# Patient Record
Sex: MALE | Race: BLACK OR AFRICAN AMERICAN | NOT HISPANIC OR LATINO | Employment: PART TIME | URBAN - METROPOLITAN AREA
[De-identification: names, ages, dates, MRNs, and addresses within clinical notes are randomized per-mention and may not be internally consistent; named-entity substitution may affect disease eponyms.]

---

## 2019-11-02 PROBLEM — Z79.82 ASPIRIN LONG-TERM USE: Status: ACTIVE | Noted: 2019-11-02

## 2019-11-02 PROBLEM — Z85.72 HX OF LYMPHOMA: Status: ACTIVE | Noted: 2019-11-02

## 2019-11-02 PROBLEM — E78.5 HYPERLIPIDEMIA: Status: ACTIVE | Noted: 2019-11-02

## 2019-11-02 PROBLEM — D64.9 ANEMIA: Status: ACTIVE | Noted: 2019-11-02

## 2019-11-02 PROBLEM — E11.319 TYPE 2 DIABETES MELLITUS WITH RETINOPATHY OF BOTH EYES, WITHOUT LONG-TERM CURRENT USE OF INSULIN: Status: ACTIVE | Noted: 2019-11-02

## 2019-11-02 PROBLEM — I10 HYPERTENSION, ESSENTIAL: Status: ACTIVE | Noted: 2019-11-02

## 2019-11-02 PROBLEM — R41.89 COGNITIVE IMPAIRMENT: Status: ACTIVE | Noted: 2019-11-02

## 2020-02-05 ENCOUNTER — OFFICE VISIT (OUTPATIENT)
Dept: FAMILY MEDICINE | Facility: CLINIC | Age: 70
End: 2020-02-05
Payer: MEDICARE

## 2020-02-05 VITALS
DIASTOLIC BLOOD PRESSURE: 74 MMHG | WEIGHT: 170.63 LBS | HEART RATE: 77 BPM | HEIGHT: 67 IN | BODY MASS INDEX: 26.78 KG/M2 | OXYGEN SATURATION: 97 % | TEMPERATURE: 98 F | SYSTOLIC BLOOD PRESSURE: 138 MMHG

## 2020-02-05 DIAGNOSIS — Z87.39 HISTORY OF GOUT: ICD-10-CM

## 2020-02-05 DIAGNOSIS — G31.84 MILD COGNITIVE IMPAIRMENT: ICD-10-CM

## 2020-02-05 DIAGNOSIS — D64.89 ANEMIA DUE TO RADIATION: ICD-10-CM

## 2020-02-05 DIAGNOSIS — G47.33 OBSTRUCTIVE SLEEP APNEA: ICD-10-CM

## 2020-02-05 DIAGNOSIS — E78.2 MIXED DIABETIC HYPERLIPIDEMIA ASSOCIATED WITH TYPE 2 DIABETES MELLITUS: ICD-10-CM

## 2020-02-05 DIAGNOSIS — Z85.72 HX OF LYMPHOMA: ICD-10-CM

## 2020-02-05 DIAGNOSIS — E11.69 MIXED DIABETIC HYPERLIPIDEMIA ASSOCIATED WITH TYPE 2 DIABETES MELLITUS: ICD-10-CM

## 2020-02-05 DIAGNOSIS — E55.9 VITAMIN D DEFICIENCY: ICD-10-CM

## 2020-02-05 DIAGNOSIS — N18.2 HYPERTENSION ASSOCIATED WITH STAGE 2 CHRONIC KIDNEY DISEASE DUE TO TYPE 2 DIABETES MELLITUS: Primary | ICD-10-CM

## 2020-02-05 DIAGNOSIS — Z11.59 NEED FOR HEPATITIS C SCREENING TEST: ICD-10-CM

## 2020-02-05 DIAGNOSIS — Z86.010 HISTORY OF COLON POLYPS: ICD-10-CM

## 2020-02-05 DIAGNOSIS — E11.3393 CONTROLLED TYPE 2 DIABETES MELLITUS WITH BOTH EYES AFFECTED BY MODERATE NONPROLIFERATIVE RETINOPATHY WITHOUT MACULAR EDEMA, WITHOUT LONG-TERM CURRENT USE OF INSULIN: ICD-10-CM

## 2020-02-05 DIAGNOSIS — I12.9 HYPERTENSION ASSOCIATED WITH STAGE 2 CHRONIC KIDNEY DISEASE DUE TO TYPE 2 DIABETES MELLITUS: Primary | ICD-10-CM

## 2020-02-05 DIAGNOSIS — Z23 NEED FOR VACCINATION FOR PNEUMOCOCCUS: ICD-10-CM

## 2020-02-05 DIAGNOSIS — E11.22 HYPERTENSION ASSOCIATED WITH STAGE 2 CHRONIC KIDNEY DISEASE DUE TO TYPE 2 DIABETES MELLITUS: Primary | ICD-10-CM

## 2020-02-05 PROBLEM — I15.2 HYPERTENSION ASSOCIATED WITH TYPE 2 DIABETES MELLITUS: Status: ACTIVE | Noted: 2019-11-02

## 2020-02-05 PROBLEM — E11.59 HYPERTENSION ASSOCIATED WITH TYPE 2 DIABETES MELLITUS: Status: ACTIVE | Noted: 2019-11-02

## 2020-02-05 PROBLEM — Z86.0100 HISTORY OF COLON POLYPS: Status: ACTIVE | Noted: 2020-02-05

## 2020-02-05 PROCEDURE — 99204 OFFICE O/P NEW MOD 45 MIN: CPT | Mod: PBBFAC,PO | Performed by: FAMILY MEDICINE

## 2020-02-05 PROCEDURE — 99204 PR OFFICE/OUTPT VISIT, NEW, LEVL IV, 45-59 MIN: ICD-10-PCS | Mod: S$PBB,25,, | Performed by: FAMILY MEDICINE

## 2020-02-05 PROCEDURE — G0009 ADMIN PNEUMOCOCCAL VACCINE: HCPCS | Mod: PBBFAC,PO

## 2020-02-05 PROCEDURE — 99999 PR PBB SHADOW E&M-NEW PATIENT-LVL IV: ICD-10-PCS | Mod: PBBFAC,,, | Performed by: FAMILY MEDICINE

## 2020-02-05 PROCEDURE — 99999 PR PBB SHADOW E&M-NEW PATIENT-LVL IV: CPT | Mod: PBBFAC,,, | Performed by: FAMILY MEDICINE

## 2020-02-05 PROCEDURE — 99204 OFFICE O/P NEW MOD 45 MIN: CPT | Mod: S$PBB,25,, | Performed by: FAMILY MEDICINE

## 2020-02-05 RX ORDER — ATORVASTATIN CALCIUM 20 MG/1
20 TABLET, FILM COATED ORAL NIGHTLY
Qty: 90 TABLET | Refills: 3 | Status: SHIPPED | OUTPATIENT
Start: 2020-02-05 | End: 2021-03-03 | Stop reason: SDUPTHER

## 2020-02-05 RX ORDER — AMLODIPINE BESYLATE 2.5 MG/1
2.5 TABLET ORAL DAILY
Qty: 90 TABLET | Refills: 3 | Status: SHIPPED | OUTPATIENT
Start: 2020-02-05 | End: 2021-03-03 | Stop reason: SDUPTHER

## 2020-02-05 RX ORDER — GLIMEPIRIDE 4 MG/1
4 TABLET ORAL DAILY
Qty: 90 TABLET | Refills: 3 | Status: SHIPPED | OUTPATIENT
Start: 2020-02-05 | End: 2021-02-01 | Stop reason: SDUPTHER

## 2020-02-05 RX ORDER — ALLOPURINOL 300 MG/1
300 TABLET ORAL DAILY
Qty: 90 TABLET | Refills: 3 | Status: SHIPPED | OUTPATIENT
Start: 2020-02-05 | End: 2021-03-03 | Stop reason: SDUPTHER

## 2020-02-05 RX ORDER — FOSINOPRIL SODIUM 40 MG/1
40 TABLET ORAL DAILY
Qty: 90 TABLET | Refills: 3 | Status: SHIPPED | OUTPATIENT
Start: 2020-02-05 | End: 2021-02-01 | Stop reason: SDUPTHER

## 2020-02-05 RX ORDER — METFORMIN HYDROCHLORIDE 1000 MG/1
1000 TABLET ORAL 2 TIMES DAILY
Qty: 180 TABLET | Refills: 3 | Status: SHIPPED | OUTPATIENT
Start: 2020-02-05 | End: 2021-03-03 | Stop reason: SDUPTHER

## 2020-02-05 RX ORDER — DONEPEZIL HYDROCHLORIDE 5 MG/1
5 TABLET, FILM COATED ORAL NIGHTLY
Qty: 90 TABLET | Refills: 3 | Status: SHIPPED | OUTPATIENT
Start: 2020-02-05 | End: 2021-03-03 | Stop reason: SDUPTHER

## 2020-02-05 NOTE — PROGRESS NOTES
Subjective:       Patient ID: Jorge Becerra is a 69 y.o. male.    Chief Complaint: Establish Care    HPI   Patient presents to establish care with a new family doctor after his previous family doctor (Dr. Shearer) changed to a  practice and for medication refills and for medication refills.  He has a past medical history as in the problem list and below and follows with Nephrology (Dr. Arredondo) at yearly interval, Oncology (Dr. hCacon) at yearly interval for follow-up of lymphoma diagnosed in 2002 treated with radiation although he tells me he was just released and was told he was cancer free and needed no further follow-up with Oncology, Gastroenterology (Dr. Matute) at 5 year interval secondary to polyps found on last colonoscopy in 2017 and ophthalmologist (Dr. Cerda) at 6 month interval.  He tells me he has been doing great since he saw his family doctor 3 months ago and has no new concerns or complaints today.  He tells me all of his chronic medical conditions have been very well controlled on his current medications without any adverse effects and he has lab results from [facility] from last month with him today ordered by his nephrologist as he could not complete labs through his past family doctor any longer.  His hemoglobin A1c remains controlled and stable at 6.6, CMP was normal and CBC showed stable mild normocytic anemia.  He checks his blood sugars fasting once daily and reports his lowest value has been 99 and highest 113 following a very strict low-carbohydrate diet that he notes his in forced by his wife.  He reports he was significantly heavier in the past and his wife put him on a strict diet and low-impact aerobic exercise routine that he completes most days of the week.  He notes he use use a CPAP for sleep apnea but with dramatic weight loss this resolved and he currently sleeps well without use of a CPAP.  He has not had a gout flare since being on allopurinol and has had a normal uric  acid level last year.  He does follow a gout diet as well.  He has a history of vitamin-D deficiency and previously supplemented with vitamin-D but has used any supplementation for some time and believes his levels were normal when last checked although this was remotely.  He has a history of mild cognitive impairment but tells me his memory significantly improve with use of Aricept.  Blood pressures have been well controlled with use of Norvasc and Monopril checking most is low we get home with blood pressures on average running in the 120s/60s-70s.  His blood pressure was initially slightly elevated but normalized upon recheck.  Question him about health maintenance issues he has never had hepatitis C antibody screening or shingles vaccination to his knowledge and he tells me it has been over 10 years since he had a tetanus vaccine.  He has previously had 1 pneumonia vaccine in 2017 and tells me he thinks he is due for another vaccine but his past provider did not have this available and he forgot to check at the pharmacy for this.    Review of Systems   Constitutional: Negative for activity change, appetite change, fatigue and unexpected weight change.   HENT: Negative for congestion, hearing loss, sore throat, trouble swallowing and voice change.    Eyes: Negative for visual disturbance.   Respiratory: Negative for cough, chest tightness, shortness of breath and wheezing.    Cardiovascular: Negative for chest pain, palpitations and leg swelling.   Gastrointestinal: Negative for abdominal pain, blood in stool, constipation, diarrhea, nausea and vomiting.   Genitourinary: Negative for difficulty urinating, dysuria, flank pain, frequency, hematuria and urgency.   Musculoskeletal: Negative for arthralgias, back pain, gait problem, joint swelling, myalgias, neck pain and neck stiffness.   Skin: Negative for rash and wound.   Neurological: Negative for dizziness, tremors, syncope, weakness, light-headedness, numbness  "and headaches.   Hematological: Negative for adenopathy. Does not bruise/bleed easily.   Psychiatric/Behavioral: Negative for dysphoric mood and sleep disturbance. The patient is not nervous/anxious.          Objective:      Vitals:    02/05/20 1304 02/05/20 1308   BP: (!) 140/72 138/74   Pulse: 77    Temp: 97.9 °F (36.6 °C)    TempSrc: Oral    SpO2: 97%    Weight: 77.4 kg (170 lb 10.2 oz)    Height: 5' 7" (1.702 m)    PainSc: 0-No pain      Physical Exam   Constitutional: He is oriented to person, place, and time. He appears well-developed and well-nourished. No distress.   HENT:   Head: Normocephalic and atraumatic.   Right Ear: External ear normal.   Left Ear: External ear normal.   Nose: Nose normal.   Mouth/Throat: Oropharynx is clear and moist.   Eyes: Pupils are equal, round, and reactive to light. Conjunctivae and EOM are normal. No scleral icterus.   Neck: Trachea normal, normal range of motion and phonation normal. Neck supple. No JVD present. No thyroid mass and no thyromegaly present.   Cardiovascular: Normal rate, regular rhythm and normal heart sounds. Exam reveals no gallop and no friction rub.   No murmur heard.  Pulses:       Dorsalis pedis pulses are 2+ on the right side, and 2+ on the left side.        Posterior tibial pulses are 2+ on the right side, and 2+ on the left side.   Pulmonary/Chest: Effort normal and breath sounds normal. No respiratory distress. He has no wheezes.   Abdominal: Soft. Bowel sounds are normal. He exhibits no distension and no mass. There is no tenderness. There is no guarding.   Musculoskeletal: Normal range of motion. He exhibits no edema or deformity.        Right foot: There is normal range of motion and no deformity.        Left foot: There is normal range of motion and no deformity.   Feet:   Right Foot:   Protective Sensation: 5 sites tested. 5 sites sensed.   Skin Integrity: Negative for ulcer, blister, skin breakdown, erythema, warmth, callus or dry skin.   Left " Foot:   Protective Sensation: 5 sites tested. 5 sites sensed.   Skin Integrity: Negative for ulcer, blister, skin breakdown, erythema, warmth, callus or dry skin.   Lymphadenopathy:     He has no cervical adenopathy.   Neurological: He is alert and oriented to person, place, and time.   Skin: Skin is warm and dry. He is not diaphoretic.   Psychiatric: He has a normal mood and affect. His behavior is normal. Judgment and thought content normal.   Nursing note and vitals reviewed.        Assessment:       1. Hypertension associated with stage 2 chronic kidney disease due to type 2 diabetes mellitus    2. 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 Mixed diabetic hyperlipidemia associated with type 2 diabetes mellitus    4. Anemia due to radiation    5. Hx of lymphoma    6. History of gout    7. Vitamin D deficiency    8. Obstructive sleep apnea    9. Mild cognitive impairment    10. History of colon polyps    11. Need for hepatitis C screening test    12. Need for vaccination for pneumococcus          Plan:   Hypertension associated with stage 2 chronic kidney disease due to type 2 diabetes mellitus  -     TSH; Future; Expected date: 02/05/2020  -     CBC auto differential; Future; Expected date: 02/05/2020  -     Comprehensive metabolic panel; Future; Expected date: 02/05/2020  -     fosinopril (MONOPRIL) 40 MG tablet; Take 1 tablet (40 mg total) by mouth once daily.  Dispense: 90 tablet; Refill: 3  -     amLODIPine (NORVASC) 2.5 MG tablet; Take 1 tablet (2.5 mg total) by mouth once daily.  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  Dispense: 180 tablet; Refill: 3  -     glimepiride (AMARYL) 4 MG tablet; Take 1 tablet (4 mg total) by mouth once daily.  Dispense: 90 tablet; Refill: 3    Mixed diabetic hyperlipidemia associated with type 2 diabetes mellitus  -     Lipid panel; Future; Expected date: 02/05/2020  -     atorvastatin (LIPITOR) 20 MG tablet; Take 1 tablet (20 mg total) by mouth every evening.  Dispense: 90 tablet; Refill: 3    Anemia due to radiation  -     CBC auto differential; Future; Expected date: 02/05/2020    Hx of lymphoma  -     CBC auto differential; Future; Expected date: 02/05/2020    History of gout  -     allopurinoL (ZYLOPRIM) 300 MG tablet; Take 1 tablet (300 mg total) by mouth once daily.  Dispense: 90 tablet; Refill: 3    Vitamin D deficiency  -     Vitamin D; Future; Expected date: 02/05/2020    Obstructive sleep apnea    Mild cognitive impairment  -     donepezil (ARICEPT) 5 MG tablet; Take 1 tablet (5 mg total) by mouth every evening.  Dispense: 90 tablet; Refill: 3    History of colon polyps    Need for hepatitis C screening test  -     Hepatitis C antibody; Future; Expected date: 02/05/2020    Need for vaccination for pneumococcus  -     Pneumococcal Conjugate Vaccine (13 Valent) (IM)      Follow up in about 3 months (around 5/5/2020).    Jorge appears to be stable and doing chronically on all of his current medications.  I reviewed the risks and benefits of all of these with him and provided him with refills as above as he requested.  Appears to be up-to-date with all labs at this time with his recent CBC, CMP and hemoglobin A1c along with PSA, microalbumin to creatinine ratio testing and fasting lipid panel completed in October within our system.  I recommended initially only checking a hemoglobin C antibody and vitamin-D level today but he would prefer to check these with all of his routine labs prior to a three-month follow-up visit with me.  I  advised him with all of his chronic medical conditions well controlled her for so long I would have no problem seeing him back at 6 month interval but he tells me he would prefer three-month interval at least at this time.  I will check all the labs as above as he requested just prior to a three-month follow-up with me.  I have asked my nurse to obtain records from Dr. Matute for his last colonoscopy but he appears to be up-to-date with this.  I discussed the risks and benefits of the pneumococcal 13, Tdap and Shingrix vaccines with him and he was very interested in all of these.  He can receive the pneumococcal vaccine here but advised him his insurance does not cover the others here and he will have to receive these at the pharmacy.  He stated he would get these when he goes for his medication refills.  He will continue following with Nephrology and Ophthalmology but his kidney function looks excellent on last labs he reports no vision problems currently.

## 2020-04-07 ENCOUNTER — PATIENT MESSAGE (OUTPATIENT)
Dept: FAMILY MEDICINE | Facility: CLINIC | Age: 70
End: 2020-04-07

## 2020-04-10 ENCOUNTER — HOSPITAL ENCOUNTER (EMERGENCY)
Facility: HOSPITAL | Age: 70
Discharge: HOME | End: 2020-04-10
Payer: MEDICARE

## 2020-04-10 VITALS
SYSTOLIC BLOOD PRESSURE: 149 MMHG | HEIGHT: 67 IN | BODY MASS INDEX: 26.68 KG/M2 | TEMPERATURE: 98 F | DIASTOLIC BLOOD PRESSURE: 68 MMHG | RESPIRATION RATE: 16 BRPM | WEIGHT: 170 LBS | HEART RATE: 73 BPM | OXYGEN SATURATION: 98 %

## 2020-04-10 DIAGNOSIS — Z20.822 CLOSE EXPOSURE TO COVID-19 VIRUS: Primary | ICD-10-CM

## 2020-04-10 LAB — SARS-COV-2 RDRP RESP QL NAA+PROBE: NEGATIVE

## 2020-04-10 PROCEDURE — U0002 COVID-19 LAB TEST NON-CDC: HCPCS

## 2020-04-10 PROCEDURE — 99282 EMERGENCY DEPT VISIT SF MDM: CPT | Mod: CS

## 2020-04-10 NOTE — ED PROVIDER NOTES
Encounter Date: 4/10/2020       History   No chief complaint on file.    Mr Becerra is a 68yo gentleman here via CQ for concern re known COVID exposure with multiple comorbities. He denies cough, occasion SOB. Afebrile.         Review of patient's allergies indicates:  No Known Allergies  No past medical history on file.  No past surgical history on file.  No family history on file.  Social History     Tobacco Use    Smoking status: Never Smoker   Substance Use Topics    Alcohol use: Never     Frequency: Never    Drug use: Not on file     Review of Systems   Constitutional: Negative for fever.   Respiratory: Positive for shortness of breath. Negative for cough.    Gastrointestinal: Negative for diarrhea, nausea and vomiting.   All other systems reviewed and are negative.      Physical Exam     Initial Vitals   BP Pulse Resp Temp SpO2   -- -- -- -- --      MAP       --         Physical Exam    Nursing note and vitals reviewed.  Constitutional: He appears well-developed and well-nourished.   HENT:   Head: Normocephalic.   Eyes: Conjunctivae and EOM are normal. Pupils are equal, round, and reactive to light.   Neck: Normal range of motion. Neck supple.   Cardiovascular: Normal rate, regular rhythm and normal heart sounds.   Pulmonary/Chest: Breath sounds normal. No respiratory distress. He has no wheezes. He has no rales.   Musculoskeletal: Normal range of motion.   Neurological: He is alert and oriented to person, place, and time. He has normal reflexes.   Skin: Skin is warm. Capillary refill takes less than 2 seconds.   Psychiatric: He has a normal mood and affect.         ED Course   Procedures  Labs Reviewed - No data to display       Imaging Results    None                                          Clinical Impression:       ICD-10-CM ICD-9-CM   1. Close Exposure to Covid-19 Virus Z20.828                                 Izabel Hernandez NP  04/10/20 1118

## 2020-04-10 NOTE — DISCHARGE INSTRUCTIONS
At this time you must quarantine at home until you have   3 days without fever  2.   Must have not taken any fever reducing medications   3.  Other symptoms such as cough should be improving.  4.  Must have been at least 7 days since first symptom.    At this time it is important to self quarantine at home and to call the [facility] at [phone] or text PRASHANTHD to [phone] for further advice on when quarantine may be lifted.  You may talk to a doctor by virtual visit by going to [email or web address] or [email or web address] or call your primary care doctor for further advice.  You should return to the ER immediately if you have difficulty breathing, have any worsening symptoms or any concerns. You may call [phone] for 24/7 Covid-19 information.    Places for Testing         [place] (for [facility] and [facility] patients only)                   [facility] [address] [place] [address]                             [facility] [place] [address], [address]  [place] [address]    [place] [facility]  [address] [place] [address]          [facility] [address]        [address]         [place], [address]          [facility] [address]        [address] [place] [address]          [facility] [address]        [address] [place] [facility] [place]  [address] [facility] [address]  [place] [address]

## 2020-04-14 ENCOUNTER — TELEPHONE (OUTPATIENT)
Dept: ADMINISTRATIVE | Facility: HOSPITAL | Age: 70
End: 2020-04-14

## 2020-04-30 ENCOUNTER — LAB VISIT (OUTPATIENT)
Dept: LAB | Facility: HOSPITAL | Age: 70
End: 2020-04-30
Attending: FAMILY MEDICINE
Payer: MEDICARE

## 2020-04-30 DIAGNOSIS — E78.2 MIXED DIABETIC HYPERLIPIDEMIA ASSOCIATED WITH TYPE 2 DIABETES MELLITUS: ICD-10-CM

## 2020-04-30 DIAGNOSIS — E11.3393 CONTROLLED TYPE 2 DIABETES MELLITUS WITH BOTH EYES AFFECTED BY MODERATE NONPROLIFERATIVE RETINOPATHY WITHOUT MACULAR EDEMA, WITHOUT LONG-TERM CURRENT USE OF INSULIN: ICD-10-CM

## 2020-04-30 DIAGNOSIS — Z85.72 HX OF LYMPHOMA: ICD-10-CM

## 2020-04-30 DIAGNOSIS — Z11.59 NEED FOR HEPATITIS C SCREENING TEST: ICD-10-CM

## 2020-04-30 DIAGNOSIS — N18.2 HYPERTENSION ASSOCIATED WITH STAGE 2 CHRONIC KIDNEY DISEASE DUE TO TYPE 2 DIABETES MELLITUS: ICD-10-CM

## 2020-04-30 DIAGNOSIS — E55.9 VITAMIN D DEFICIENCY: ICD-10-CM

## 2020-04-30 DIAGNOSIS — I12.9 HYPERTENSION ASSOCIATED WITH STAGE 2 CHRONIC KIDNEY DISEASE DUE TO TYPE 2 DIABETES MELLITUS: ICD-10-CM

## 2020-04-30 DIAGNOSIS — D64.89 ANEMIA DUE TO RADIATION: ICD-10-CM

## 2020-04-30 DIAGNOSIS — E11.22 HYPERTENSION ASSOCIATED WITH STAGE 2 CHRONIC KIDNEY DISEASE DUE TO TYPE 2 DIABETES MELLITUS: ICD-10-CM

## 2020-04-30 DIAGNOSIS — E11.69 MIXED DIABETIC HYPERLIPIDEMIA ASSOCIATED WITH TYPE 2 DIABETES MELLITUS: ICD-10-CM

## 2020-04-30 LAB
25(OH)D3+25(OH)D2 SERPL-MCNC: 39 NG/ML (ref 30–96)
ALBUMIN SERPL BCP-MCNC: 4 G/DL (ref 3.5–5.2)
ALP SERPL-CCNC: 63 U/L (ref 55–135)
ALT SERPL W/O P-5'-P-CCNC: 23 U/L (ref 10–44)
ANION GAP SERPL CALC-SCNC: 10 MMOL/L (ref 8–16)
AST SERPL-CCNC: 31 U/L (ref 10–40)
BASOPHILS # BLD AUTO: 0.04 K/UL (ref 0–0.2)
BASOPHILS NFR BLD: 0.5 % (ref 0–1.9)
BILIRUB SERPL-MCNC: 0.7 MG/DL (ref 0.1–1)
BUN SERPL-MCNC: 11 MG/DL (ref 8–23)
CALCIUM SERPL-MCNC: 10 MG/DL (ref 8.7–10.5)
CHLORIDE SERPL-SCNC: 104 MMOL/L (ref 95–110)
CHOLEST SERPL-MCNC: 97 MG/DL (ref 120–199)
CHOLEST/HDLC SERPL: 2.8 {RATIO} (ref 2–5)
CO2 SERPL-SCNC: 29 MMOL/L (ref 23–29)
CREAT SERPL-MCNC: 1 MG/DL (ref 0.5–1.4)
DIFFERENTIAL METHOD: ABNORMAL
EOSINOPHIL # BLD AUTO: 0.6 K/UL (ref 0–0.5)
EOSINOPHIL NFR BLD: 6.9 % (ref 0–8)
ERYTHROCYTE [DISTWIDTH] IN BLOOD BY AUTOMATED COUNT: 14.6 % (ref 11.5–14.5)
EST. GFR  (AFRICAN AMERICAN): >60 ML/MIN/1.73 M^2
EST. GFR  (NON AFRICAN AMERICAN): >60 ML/MIN/1.73 M^2
ESTIMATED AVG GLUCOSE: 137 MG/DL (ref 68–131)
GLUCOSE SERPL-MCNC: 92 MG/DL (ref 70–110)
HBA1C MFR BLD HPLC: 6.4 % (ref 4–5.6)
HCT VFR BLD AUTO: 40.9 % (ref 40–54)
HDLC SERPL-MCNC: 35 MG/DL (ref 40–75)
HDLC SERPL: 36.1 % (ref 20–50)
HGB BLD-MCNC: 12.8 G/DL (ref 14–18)
IMM GRANULOCYTES # BLD AUTO: 0.02 K/UL (ref 0–0.04)
IMM GRANULOCYTES NFR BLD AUTO: 0.2 % (ref 0–0.5)
LDLC SERPL CALC-MCNC: 44 MG/DL (ref 63–159)
LYMPHOCYTES # BLD AUTO: 3.1 K/UL (ref 1–4.8)
LYMPHOCYTES NFR BLD: 38 % (ref 18–48)
MCH RBC QN AUTO: 32.8 PG (ref 27–31)
MCHC RBC AUTO-ENTMCNC: 31.3 G/DL (ref 32–36)
MCV RBC AUTO: 105 FL (ref 82–98)
MONOCYTES # BLD AUTO: 0.8 K/UL (ref 0.3–1)
MONOCYTES NFR BLD: 9.2 % (ref 4–15)
NEUTROPHILS # BLD AUTO: 3.7 K/UL (ref 1.8–7.7)
NEUTROPHILS NFR BLD: 45.2 % (ref 38–73)
NONHDLC SERPL-MCNC: 62 MG/DL
NRBC BLD-RTO: 0 /100 WBC
PLATELET # BLD AUTO: 253 K/UL (ref 150–350)
PMV BLD AUTO: 11.4 FL (ref 9.2–12.9)
POTASSIUM SERPL-SCNC: 4.5 MMOL/L (ref 3.5–5.1)
PROT SERPL-MCNC: 7.5 G/DL (ref 6–8.4)
RBC # BLD AUTO: 3.9 M/UL (ref 4.6–6.2)
SODIUM SERPL-SCNC: 143 MMOL/L (ref 136–145)
TRIGL SERPL-MCNC: 90 MG/DL (ref 30–150)
TSH SERPL DL<=0.005 MIU/L-ACNC: 3.34 UIU/ML (ref 0.4–4)
WBC # BLD AUTO: 8.15 K/UL (ref 3.9–12.7)

## 2020-04-30 PROCEDURE — 83036 HEMOGLOBIN GLYCOSYLATED A1C: CPT

## 2020-04-30 PROCEDURE — 82306 VITAMIN D 25 HYDROXY: CPT

## 2020-04-30 PROCEDURE — 85025 COMPLETE CBC W/AUTO DIFF WBC: CPT

## 2020-04-30 PROCEDURE — 80053 COMPREHEN METABOLIC PANEL: CPT

## 2020-04-30 PROCEDURE — 80061 LIPID PANEL: CPT

## 2020-04-30 PROCEDURE — 86803 HEPATITIS C AB TEST: CPT

## 2020-04-30 PROCEDURE — 84443 ASSAY THYROID STIM HORMONE: CPT

## 2020-04-30 PROCEDURE — 36415 COLL VENOUS BLD VENIPUNCTURE: CPT | Mod: PO

## 2020-05-01 DIAGNOSIS — D53.9 MACROCYTIC ANEMIA: Primary | ICD-10-CM

## 2020-05-01 LAB — HCV AB SERPL QL IA: NEGATIVE

## 2020-05-04 ENCOUNTER — LAB VISIT (OUTPATIENT)
Dept: LAB | Facility: HOSPITAL | Age: 70
End: 2020-05-04
Attending: FAMILY MEDICINE
Payer: MEDICARE

## 2020-05-04 DIAGNOSIS — D53.9 MACROCYTIC ANEMIA: ICD-10-CM

## 2020-05-04 LAB
FOLATE SERPL-MCNC: 13 NG/ML (ref 4–24)
VIT B12 SERPL-MCNC: 1183 PG/ML (ref 210–950)

## 2020-05-04 PROCEDURE — 82607 VITAMIN B-12: CPT

## 2020-05-04 PROCEDURE — 82746 ASSAY OF FOLIC ACID SERUM: CPT

## 2020-05-04 PROCEDURE — 36415 COLL VENOUS BLD VENIPUNCTURE: CPT | Mod: PO

## 2020-05-05 ENCOUNTER — PATIENT MESSAGE (OUTPATIENT)
Dept: ADMINISTRATIVE | Facility: HOSPITAL | Age: 70
End: 2020-05-05

## 2020-06-17 LAB
LEFT EYE DM RETINOPATHY: POSITIVE
RIGHT EYE DM RETINOPATHY: POSITIVE

## 2020-07-13 ENCOUNTER — OFFICE VISIT (OUTPATIENT)
Dept: FAMILY MEDICINE | Facility: CLINIC | Age: 70
End: 2020-07-13
Payer: MEDICARE

## 2020-07-13 VITALS
RESPIRATION RATE: 18 BRPM | SYSTOLIC BLOOD PRESSURE: 136 MMHG | BODY MASS INDEX: 26.96 KG/M2 | HEART RATE: 80 BPM | DIASTOLIC BLOOD PRESSURE: 74 MMHG | WEIGHT: 171.75 LBS | TEMPERATURE: 98 F | HEIGHT: 67 IN | OXYGEN SATURATION: 98 %

## 2020-07-13 DIAGNOSIS — E11.3393 CONTROLLED TYPE 2 DIABETES MELLITUS WITH BOTH EYES AFFECTED BY MODERATE NONPROLIFERATIVE RETINOPATHY WITHOUT MACULAR EDEMA, WITHOUT LONG-TERM CURRENT USE OF INSULIN: Primary | ICD-10-CM

## 2020-07-13 DIAGNOSIS — E11.69 MIXED DIABETIC HYPERLIPIDEMIA ASSOCIATED WITH TYPE 2 DIABETES MELLITUS: ICD-10-CM

## 2020-07-13 DIAGNOSIS — I15.2 HYPERTENSION ASSOCIATED WITH TYPE 2 DIABETES MELLITUS: ICD-10-CM

## 2020-07-13 DIAGNOSIS — E78.2 MIXED DIABETIC HYPERLIPIDEMIA ASSOCIATED WITH TYPE 2 DIABETES MELLITUS: ICD-10-CM

## 2020-07-13 DIAGNOSIS — E11.59 HYPERTENSION ASSOCIATED WITH TYPE 2 DIABETES MELLITUS: ICD-10-CM

## 2020-07-13 DIAGNOSIS — Z12.5 SCREENING PSA (PROSTATE SPECIFIC ANTIGEN): ICD-10-CM

## 2020-07-13 PROCEDURE — 99213 PR OFFICE/OUTPT VISIT, EST, LEVL III, 20-29 MIN: ICD-10-PCS | Mod: S$PBB,,, | Performed by: FAMILY MEDICINE

## 2020-07-13 PROCEDURE — 99999 PR PBB SHADOW E&M-EST. PATIENT-LVL V: CPT | Mod: PBBFAC,,, | Performed by: FAMILY MEDICINE

## 2020-07-13 PROCEDURE — 99215 OFFICE O/P EST HI 40 MIN: CPT | Mod: PBBFAC,PO | Performed by: FAMILY MEDICINE

## 2020-07-13 PROCEDURE — 99999 PR PBB SHADOW E&M-EST. PATIENT-LVL V: ICD-10-PCS | Mod: PBBFAC,,, | Performed by: FAMILY MEDICINE

## 2020-07-13 PROCEDURE — 99213 OFFICE O/P EST LOW 20 MIN: CPT | Mod: S$PBB,,, | Performed by: FAMILY MEDICINE

## 2020-07-13 RX ORDER — ASPIRIN 325 MG
81 TABLET ORAL
COMMUNITY
Start: 2002-01-01 | End: 2021-08-13

## 2020-07-13 RX ORDER — UBIDECARENONE 75 MG
500 CAPSULE ORAL EVERY OTHER DAY
COMMUNITY
End: 2020-10-30

## 2020-07-13 NOTE — PROGRESS NOTES
Subjective:       Patient ID: Jorge Becerra is a 69 y.o. male.    Chief Complaint: Follow-up (3 month )    HPI   Patient presents for routine follow-up.  His previous appointment for three-month follow-up was rescheduled secondary to the pandemic.  He tells me he is doing wonderful today and since last seen and reports no new concerns or complaints.  Blood pressures and blood sugars continue to be well controlled any reports even during the pandemic he is still very active and exercising unchanged.  He has not had any problems with sleep or any flares of gout pain or other joint pains.  He reports doing well on all of his current medications without any adverse effects.  He tells me he was not able to receive the shingles vaccine since his last visit as his pharmacy would was out of the vaccine at that time but he has documentation that he received the Tdap vaccine in 2016 and is not due for this yet.  He has been putting off returning for shingles vaccination until the pandemic has subsided.    Review of Systems   Constitutional: Negative for activity change, appetite change, fatigue and unexpected weight change.   Eyes: Negative for visual disturbance.   Respiratory: Negative for cough, chest tightness, shortness of breath and wheezing.    Cardiovascular: Negative for chest pain, palpitations and leg swelling.   Gastrointestinal: Negative for abdominal pain, blood in stool, constipation, diarrhea, nausea and vomiting.   Genitourinary: Negative for difficulty urinating, dysuria, flank pain and frequency.   Musculoskeletal: Negative for arthralgias, back pain, gait problem, joint swelling, myalgias, neck pain and neck stiffness.   Skin: Negative for rash and wound.   Neurological: Negative for dizziness, tremors, syncope, weakness, light-headedness, numbness and headaches.   Hematological: Negative for adenopathy. Does not bruise/bleed easily.   Psychiatric/Behavioral: Negative for dysphoric mood and sleep  "disturbance. The patient is not nervous/anxious.          Objective:      Vitals:    07/13/20 1108   BP: 136/74   Pulse: 80   Resp: 18   Temp: 98.2 °F (36.8 °C)   TempSrc: Oral   SpO2: 98%   Weight: 77.9 kg (171 lb 11.8 oz)   Height: 5' 7" (1.702 m)   PainSc: 0-No pain     Physical Exam  Vitals signs and nursing note reviewed.   Constitutional:       General: He is not in acute distress.     Appearance: He is well-developed. He is not diaphoretic.   HENT:      Head: Normocephalic and atraumatic.   Cardiovascular:      Rate and Rhythm: Normal rate and regular rhythm.      Pulses:           Dorsalis pedis pulses are 2+ on the right side and 2+ on the left side.        Posterior tibial pulses are 2+ on the right side and 2+ on the left side.      Heart sounds: Normal heart sounds. No murmur. No friction rub. No gallop.    Pulmonary:      Effort: Pulmonary effort is normal. No respiratory distress.      Breath sounds: Normal breath sounds. No wheezing.   Chest:      Chest wall: No tenderness.   Abdominal:      General: Bowel sounds are normal. There is no distension.      Palpations: Abdomen is soft. There is no mass.      Tenderness: There is no abdominal tenderness. There is no guarding or rebound.   Musculoskeletal: Normal range of motion.         General: No tenderness or deformity.      Right foot: Normal range of motion. No deformity, bunion, Charcot foot, foot drop or prominent metatarsal heads.      Left foot: Normal range of motion. No deformity, bunion, Charcot foot, foot drop or prominent metatarsal heads.   Feet:      Right foot:      Protective Sensation: 5 sites tested. 5 sites sensed.      Skin integrity: Skin integrity normal. No ulcer, blister, skin breakdown, erythema, warmth, callus, dry skin or fissure.      Toenail Condition: Right toenails are normal.      Left foot:      Protective Sensation: 5 sites tested. 5 sites sensed.      Skin integrity: Skin integrity normal. No ulcer, blister, skin " breakdown, erythema, warmth, callus, dry skin or fissure.      Toenail Condition: Left toenails are normal.   Skin:     General: Skin is warm and dry.   Neurological:      Mental Status: He is alert and oriented to person, place, and time.   Psychiatric:         Behavior: Behavior normal.         Thought Content: Thought content normal.         Judgment: Judgment normal.           Assessment:       1. 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 Screening PSA (prostate specific antigen)    3. Hypertension associated with type 2 diabetes mellitus    4. 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    Jorge appears to be stable and doing very well today on his current medications.  I recommended no change in these and advised him to continue monitoring his blood pressures and blood sugars and alert me if he has any problems before routine follow-up.  Discussing this with him, he advised me his wife wanted him to continue following at 3 month interval and I am fine with this.  He will be due for PSA testing just after this and we will push back his follow-up visit to 10/26/2020 and have him repeat hemoglobin A1c and PSA a few days before this visit to prevent him from having to return again.  I have asked for his Tdap documentation be scanned into epic  and have updated his health maintenance issue.  I recommended after the pandemic subsides that he return to his pharmacy for Shingrix vaccination as he stated he would.  I am happy to see him back sooner if he has any problems.

## 2020-09-03 ENCOUNTER — PES CALL (OUTPATIENT)
Dept: ADMINISTRATIVE | Facility: CLINIC | Age: 70
End: 2020-09-03

## 2020-09-29 ENCOUNTER — PATIENT MESSAGE (OUTPATIENT)
Dept: OTHER | Facility: OTHER | Age: 70
End: 2020-09-29

## 2020-10-23 ENCOUNTER — LAB VISIT (OUTPATIENT)
Dept: LAB | Facility: HOSPITAL | Age: 70
End: 2020-10-23
Attending: FAMILY MEDICINE
Payer: MEDICARE

## 2020-10-23 DIAGNOSIS — E11.3393 CONTROLLED TYPE 2 DIABETES MELLITUS WITH BOTH EYES AFFECTED BY MODERATE NONPROLIFERATIVE RETINOPATHY WITHOUT MACULAR EDEMA, WITHOUT LONG-TERM CURRENT USE OF INSULIN: ICD-10-CM

## 2020-10-23 DIAGNOSIS — Z12.5 SCREENING PSA (PROSTATE SPECIFIC ANTIGEN): ICD-10-CM

## 2020-10-23 LAB
ESTIMATED AVG GLUCOSE: 143 MG/DL (ref 68–131)
HBA1C MFR BLD HPLC: 6.6 % (ref 4–5.6)

## 2020-10-23 PROCEDURE — 84153 ASSAY OF PSA TOTAL: CPT

## 2020-10-23 PROCEDURE — 83036 HEMOGLOBIN GLYCOSYLATED A1C: CPT

## 2020-10-23 PROCEDURE — 36415 COLL VENOUS BLD VENIPUNCTURE: CPT | Mod: PO

## 2020-10-24 LAB — COMPLEXED PSA SERPL-MCNC: 0.65 NG/ML (ref 0–4)

## 2020-10-30 ENCOUNTER — OFFICE VISIT (OUTPATIENT)
Dept: FAMILY MEDICINE | Facility: CLINIC | Age: 70
End: 2020-10-30
Payer: MEDICARE

## 2020-10-30 VITALS
HEIGHT: 67 IN | TEMPERATURE: 98 F | SYSTOLIC BLOOD PRESSURE: 130 MMHG | RESPIRATION RATE: 18 BRPM | WEIGHT: 169.06 LBS | BODY MASS INDEX: 26.53 KG/M2 | HEART RATE: 91 BPM | OXYGEN SATURATION: 99 % | DIASTOLIC BLOOD PRESSURE: 86 MMHG

## 2020-10-30 DIAGNOSIS — E11.59 HYPERTENSION ASSOCIATED WITH TYPE 2 DIABETES MELLITUS: ICD-10-CM

## 2020-10-30 DIAGNOSIS — E11.3393 CONTROLLED TYPE 2 DIABETES MELLITUS WITH BOTH EYES AFFECTED BY MODERATE NONPROLIFERATIVE RETINOPATHY WITHOUT MACULAR EDEMA, WITHOUT LONG-TERM CURRENT USE OF INSULIN: Primary | ICD-10-CM

## 2020-10-30 DIAGNOSIS — I15.2 HYPERTENSION ASSOCIATED WITH TYPE 2 DIABETES MELLITUS: ICD-10-CM

## 2020-10-30 DIAGNOSIS — Z79.82 ASPIRIN LONG-TERM USE: ICD-10-CM

## 2020-10-30 DIAGNOSIS — E11.69 MIXED DIABETIC HYPERLIPIDEMIA ASSOCIATED WITH TYPE 2 DIABETES MELLITUS: ICD-10-CM

## 2020-10-30 DIAGNOSIS — E78.2 MIXED DIABETIC HYPERLIPIDEMIA ASSOCIATED WITH TYPE 2 DIABETES MELLITUS: ICD-10-CM

## 2020-10-30 PROCEDURE — 90694 VACC AIIV4 NO PRSRV 0.5ML IM: CPT | Mod: PBBFAC,PO

## 2020-10-30 PROCEDURE — 99999 PR PBB SHADOW E&M-EST. PATIENT-LVL V: ICD-10-PCS | Mod: PBBFAC,,, | Performed by: STUDENT IN AN ORGANIZED HEALTH CARE EDUCATION/TRAINING PROGRAM

## 2020-10-30 PROCEDURE — 99999 PR PBB SHADOW E&M-EST. PATIENT-LVL V: CPT | Mod: PBBFAC,,, | Performed by: STUDENT IN AN ORGANIZED HEALTH CARE EDUCATION/TRAINING PROGRAM

## 2020-10-30 PROCEDURE — G0008 ADMIN INFLUENZA VIRUS VAC: HCPCS | Mod: PBBFAC,PO

## 2020-10-30 PROCEDURE — 99214 OFFICE O/P EST MOD 30 MIN: CPT | Mod: S$PBB,,, | Performed by: STUDENT IN AN ORGANIZED HEALTH CARE EDUCATION/TRAINING PROGRAM

## 2020-10-30 PROCEDURE — 99214 PR OFFICE/OUTPT VISIT, EST, LEVL IV, 30-39 MIN: ICD-10-PCS | Mod: S$PBB,,, | Performed by: STUDENT IN AN ORGANIZED HEALTH CARE EDUCATION/TRAINING PROGRAM

## 2020-10-30 PROCEDURE — 99215 OFFICE O/P EST HI 40 MIN: CPT | Mod: PBBFAC,PO,25 | Performed by: STUDENT IN AN ORGANIZED HEALTH CARE EDUCATION/TRAINING PROGRAM

## 2020-10-30 RX ORDER — CALCIUM CARBONATE/VITAMIN D3 500-10/5ML
1 LIQUID (ML) ORAL 2 TIMES DAILY
COMMUNITY
Start: 2020-09-01

## 2020-10-30 NOTE — PROGRESS NOTES
[facility] MEDICINE CLINIC NOTE    Patient Name: Jorge Becerra  YOB: 1950    PRESENTING HISTORY   Chief Complaint:   Chief Complaint   Patient presents with    Follow-up      New patient to me  History of Present Illness:  Mr. Jorge Becerra is a 69 y.o. male here for follow up.     T2DM- on amaryl and metformin. No hypoglycemia reported. Blood sugar running in  range. Last A1c mid 6s. UTD on all appropriate screening.     HTN-on very low dose norvasc, fosinopril. Doesn't check at home. Usually runs in 130s systolic.     Chronic aspirin use- on 325 aspirin for many years. Unclear indication. No hx heart attack or stroke.        Review of Systems   Constitutional: Negative for chills, fever and malaise/fatigue.   HENT: Negative for hearing loss and tinnitus.    Eyes: Negative for blurred vision and double vision.   Cardiovascular: Negative for chest pain and palpitations.         PAST HISTORY:     Past Medical History:   Diagnosis Date    Diabetes mellitus, type 2        Past Surgical History:   Procedure Laterality Date    TONSILLECTOMY         Family History   Problem Relation Age of Onset    Colon cancer Neg Hx        Social History     Socioeconomic History    Marital status:      Spouse name: Not on file    Number of children: Not on file    Years of education: Not on file    Highest education level: Not on file   Occupational History    Not on file   Social Needs    Financial resource strain: Not hard at all    Food insecurity     Worry: Never true     Inability: Never true    Transportation needs     Medical: No     Non-medical: No   Tobacco Use    Smoking status: Never Smoker   Substance and Sexual Activity    Alcohol use: Never     Frequency: Monthly or less     Drinks per session: 1 or 2     Binge frequency: Never    Drug use: Not on file    Sexual activity: Not on file   Lifestyle    Physical activity     Days per week: 2 days     Minutes per session: 20 min  "   Stress: Not at all   Relationships    Social connections     Talks on phone: Twice a week     Gets together: Once a week     Attends Sikhism service: Not on file     Active member of club or organization: No     Attends meetings of clubs or organizations: Never     Relationship status:    Other Topics Concern    Not on file   Social History Narrative    Not on file       MEDICATIONS & ALLERGIES:     Current Outpatient Medications on File Prior to Visit   Medication Sig    allopurinoL (ZYLOPRIM) 300 MG tablet Take 1 tablet (300 mg total) by mouth once daily.    amLODIPine (NORVASC) 2.5 MG tablet Take 1 tablet (2.5 mg total) by mouth once daily.    aspirin 325 MG tablet 81 mg.    atorvastatin (LIPITOR) 20 MG tablet Take 1 tablet (20 mg total) by mouth every evening.    donepezil (ARICEPT) 5 MG tablet Take 1 tablet (5 mg total) by mouth every evening.    ergocalciferol, vitamin D2, (VITAMIN D ORAL)     fosinopril (MONOPRIL) 40 MG tablet Take 1 tablet (40 mg total) by mouth once daily.    glimepiride (AMARYL) 4 MG tablet Take 1 tablet (4 mg total) by mouth once daily.    magnesium oxide 400 mg magnesium Cap     metFORMIN (GLUCOPHAGE) 1000 MG tablet Take 1 tablet (1,000 mg total) by mouth 2 (two) times daily.    vit C/E/zinc/lutein/zeaxanthin (OCUVITE EYE HEALTH ORAL)     [DISCONTINUED] cyanocobalamin (VITAMIN B-12) 500 MCG tablet Take 500 mcg by mouth every other day.     No current facility-administered medications on file prior to visit.        Review of patient's allergies indicates:  No Known Allergies    OBJECTIVE:   Vital Signs:  Vitals:    10/30/20 0928   BP: 130/86   Pulse: 91   Resp: 18   Temp: 97.6 °F (36.4 °C)   TempSrc: Temporal   SpO2: 99%   Weight: 76.7 kg (169 lb 1.5 oz)   Height: 5' 7" (1.702 m)       No results found for this or any previous visit (from the past 24 hour(s)).      Physical Exam   Constitutional: He is oriented to person, place, and time and well-developed, " well-nourished, and in no distress.   HENT:   Head: Normocephalic and atraumatic.   Right Ear: External ear normal.   Left Ear: External ear normal.   Eyes: Pupils are equal, round, and reactive to light. Conjunctivae and EOM are normal. No scleral icterus.   Neck: Normal range of motion. Neck supple. No thyromegaly present.   Cardiovascular: Normal rate, regular rhythm and normal heart sounds.   No murmur heard.  Pulmonary/Chest: Effort normal and breath sounds normal. No respiratory distress. He has no wheezes. He has no rales.   Musculoskeletal: Normal range of motion.         General: No tenderness, deformity or edema.   Lymphadenopathy:     He has no cervical adenopathy.   Neurological: He is alert and oriented to person, place, and time. Gait normal. GCS score is 15.   Skin: Skin is warm and dry. No rash noted. He is not diaphoretic. No erythema.   Psychiatric: Mood, memory, affect and judgment normal.   Nursing note and vitals reviewed.  No gross cognitive deficits appreciated    ASSESSMENT & PLAN:   69 M with well controlled T2DM.     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, without long-term current use of insulin  -     Lipid Panel; Future; Expected date: 04/30/2021  -     Hemoglobin A1C; Future; Expected date: 04/30/2021  -     Comprehensive Metabolic Panel; Future; Expected date: 04/30/2021  Continue current meds    Hypertension associated with type 2 diabetes mellitus  If continues in 130s, will increase norvasc to 5    Mixed diabetic hyperlipidemia associated with type 2 diabetes mellitus  Continue statin    Aspirin long-term use  Reduce to 81mg, no indication for full dose aspirin    Other orders  -     Influenza - Quadrivalent (Adjuvanted)     Mehul Royal MD

## 2020-12-11 ENCOUNTER — PATIENT MESSAGE (OUTPATIENT)
Dept: OTHER | Facility: OTHER | Age: 70
End: 2020-12-11

## 2020-12-23 ENCOUNTER — PATIENT OUTREACH (OUTPATIENT)
Dept: ADMINISTRATIVE | Facility: HOSPITAL | Age: 70
End: 2020-12-23

## 2020-12-23 NOTE — PROGRESS NOTES
EYE EXAM, COLORECTAL gap report review. , chart, care everywhere, media reviewed.     updated with external report: EYE EXAM, COLONOSCOPY

## 2020-12-30 LAB
LEFT EYE DM RETINOPATHY: POSITIVE
RIGHT EYE DM RETINOPATHY: POSITIVE

## 2021-01-20 ENCOUNTER — PATIENT OUTREACH (OUTPATIENT)
Dept: ADMINISTRATIVE | Facility: HOSPITAL | Age: 71
End: 2021-01-20

## 2021-02-01 DIAGNOSIS — I12.9 HYPERTENSION ASSOCIATED WITH STAGE 2 CHRONIC KIDNEY DISEASE DUE TO TYPE 2 DIABETES MELLITUS: ICD-10-CM

## 2021-02-01 DIAGNOSIS — N18.2 HYPERTENSION ASSOCIATED WITH STAGE 2 CHRONIC KIDNEY DISEASE DUE TO TYPE 2 DIABETES MELLITUS: ICD-10-CM

## 2021-02-01 DIAGNOSIS — E11.22 HYPERTENSION ASSOCIATED WITH STAGE 2 CHRONIC KIDNEY DISEASE DUE TO TYPE 2 DIABETES MELLITUS: ICD-10-CM

## 2021-02-01 DIAGNOSIS — E11.3393 CONTROLLED TYPE 2 DIABETES MELLITUS WITH BOTH EYES AFFECTED BY MODERATE NONPROLIFERATIVE RETINOPATHY WITHOUT MACULAR EDEMA, WITHOUT LONG-TERM CURRENT USE OF INSULIN: ICD-10-CM

## 2021-02-01 RX ORDER — FOSINOPRIL SODIUM 40 MG/1
40 TABLET ORAL DAILY
Qty: 90 TABLET | Refills: 3 | Status: SHIPPED | OUTPATIENT
Start: 2021-02-01 | End: 2021-11-08

## 2021-02-01 RX ORDER — GLIMEPIRIDE 4 MG/1
4 TABLET ORAL DAILY
Qty: 90 TABLET | Refills: 3 | Status: SHIPPED | OUTPATIENT
Start: 2021-02-01 | End: 2021-05-04 | Stop reason: DRUGHIGH

## 2021-03-03 ENCOUNTER — PATIENT MESSAGE (OUTPATIENT)
Dept: FAMILY MEDICINE | Facility: CLINIC | Age: 71
End: 2021-03-03

## 2021-03-03 DIAGNOSIS — I12.9 HYPERTENSION ASSOCIATED WITH STAGE 2 CHRONIC KIDNEY DISEASE DUE TO TYPE 2 DIABETES MELLITUS: ICD-10-CM

## 2021-03-03 DIAGNOSIS — E11.69 MIXED DIABETIC HYPERLIPIDEMIA ASSOCIATED WITH TYPE 2 DIABETES MELLITUS: ICD-10-CM

## 2021-03-03 DIAGNOSIS — Z87.39 HISTORY OF GOUT: ICD-10-CM

## 2021-03-03 DIAGNOSIS — E78.2 MIXED DIABETIC HYPERLIPIDEMIA ASSOCIATED WITH TYPE 2 DIABETES MELLITUS: ICD-10-CM

## 2021-03-03 DIAGNOSIS — N18.2 HYPERTENSION ASSOCIATED WITH STAGE 2 CHRONIC KIDNEY DISEASE DUE TO TYPE 2 DIABETES MELLITUS: ICD-10-CM

## 2021-03-03 DIAGNOSIS — E11.22 HYPERTENSION ASSOCIATED WITH STAGE 2 CHRONIC KIDNEY DISEASE DUE TO TYPE 2 DIABETES MELLITUS: ICD-10-CM

## 2021-03-03 DIAGNOSIS — E11.3393 CONTROLLED TYPE 2 DIABETES MELLITUS WITH BOTH EYES AFFECTED BY MODERATE NONPROLIFERATIVE RETINOPATHY WITHOUT MACULAR EDEMA, WITHOUT LONG-TERM CURRENT USE OF INSULIN: ICD-10-CM

## 2021-03-03 DIAGNOSIS — G31.84 MILD COGNITIVE IMPAIRMENT: ICD-10-CM

## 2021-03-03 RX ORDER — DONEPEZIL HYDROCHLORIDE 5 MG/1
5 TABLET, FILM COATED ORAL NIGHTLY
Qty: 90 TABLET | Refills: 3 | Status: SHIPPED | OUTPATIENT
Start: 2021-03-03 | End: 2022-02-01 | Stop reason: SDUPTHER

## 2021-03-03 RX ORDER — ALLOPURINOL 300 MG/1
300 TABLET ORAL DAILY
Qty: 90 TABLET | Refills: 3 | Status: SHIPPED | OUTPATIENT
Start: 2021-03-03 | End: 2022-02-01 | Stop reason: SDUPTHER

## 2021-03-03 RX ORDER — ATORVASTATIN CALCIUM 20 MG/1
20 TABLET, FILM COATED ORAL NIGHTLY
Qty: 90 TABLET | Refills: 3 | Status: SHIPPED | OUTPATIENT
Start: 2021-03-03 | End: 2022-02-01 | Stop reason: SDUPTHER

## 2021-03-03 RX ORDER — METFORMIN HYDROCHLORIDE 1000 MG/1
1000 TABLET ORAL 2 TIMES DAILY
Qty: 180 TABLET | Refills: 3 | Status: SHIPPED | OUTPATIENT
Start: 2021-03-03 | End: 2022-02-01 | Stop reason: SDUPTHER

## 2021-03-03 RX ORDER — AMLODIPINE BESYLATE 5 MG/1
5 TABLET ORAL DAILY
Qty: 90 TABLET | Refills: 3 | Status: SHIPPED | OUTPATIENT
Start: 2021-03-03 | End: 2021-06-03 | Stop reason: DRUGHIGH

## 2021-03-04 ENCOUNTER — PATIENT MESSAGE (OUTPATIENT)
Dept: FAMILY MEDICINE | Facility: CLINIC | Age: 71
End: 2021-03-04

## 2021-03-06 ENCOUNTER — IMMUNIZATION (OUTPATIENT)
Dept: PRIMARY CARE CLINIC | Facility: CLINIC | Age: 71
End: 2021-03-06
Payer: MEDICARE

## 2021-03-06 DIAGNOSIS — Z23 NEED FOR VACCINATION: Primary | ICD-10-CM

## 2021-03-06 PROCEDURE — 0031A COVID-19,VECTOR-NR,RS-AD26,PF,0.5 ML DOSE VACCINE (JANSSEN): ICD-10-PCS | Mod: CV19,S$GLB,, | Performed by: FAMILY MEDICINE

## 2021-03-06 PROCEDURE — 91303 COVID-19,VECTOR-NR,RS-AD26,PF,0.5 ML DOSE VACCINE (JANSSEN): ICD-10-PCS | Mod: S$GLB,,, | Performed by: FAMILY MEDICINE

## 2021-03-06 PROCEDURE — 0031A COVID-19,VECTOR-NR,RS-AD26,PF,0.5 ML DOSE VACCINE (JANSSEN): CPT | Mod: CV19,S$GLB,, | Performed by: FAMILY MEDICINE

## 2021-03-06 PROCEDURE — 91303 COVID-19,VECTOR-NR,RS-AD26,PF,0.5 ML DOSE VACCINE (JANSSEN): CPT | Mod: S$GLB,,, | Performed by: FAMILY MEDICINE

## 2021-03-23 ENCOUNTER — PATIENT OUTREACH (OUTPATIENT)
Dept: ADMINISTRATIVE | Facility: HOSPITAL | Age: 71
End: 2021-03-23

## 2021-04-09 ENCOUNTER — TELEPHONE (OUTPATIENT)
Dept: FAMILY MEDICINE | Facility: CLINIC | Age: 71
End: 2021-04-09

## 2021-04-12 ENCOUNTER — OFFICE VISIT (OUTPATIENT)
Dept: FAMILY MEDICINE | Facility: CLINIC | Age: 71
End: 2021-04-12
Payer: MEDICARE

## 2021-04-12 VITALS
HEART RATE: 94 BPM | DIASTOLIC BLOOD PRESSURE: 68 MMHG | WEIGHT: 151.88 LBS | BODY MASS INDEX: 23.84 KG/M2 | HEIGHT: 67 IN | OXYGEN SATURATION: 98 % | TEMPERATURE: 98 F | SYSTOLIC BLOOD PRESSURE: 142 MMHG

## 2021-04-12 DIAGNOSIS — M54.31 SCIATICA OF RIGHT SIDE: ICD-10-CM

## 2021-04-12 DIAGNOSIS — M62.838 MUSCLE SPASM OF RIGHT LEG: Primary | ICD-10-CM

## 2021-04-12 PROCEDURE — 99215 OFFICE O/P EST HI 40 MIN: CPT | Mod: PBBFAC,PO | Performed by: NURSE PRACTITIONER

## 2021-04-12 PROCEDURE — 99213 PR OFFICE/OUTPT VISIT, EST, LEVL III, 20-29 MIN: ICD-10-PCS | Mod: S$PBB,,, | Performed by: NURSE PRACTITIONER

## 2021-04-12 PROCEDURE — 99999 PR PBB SHADOW E&M-EST. PATIENT-LVL V: ICD-10-PCS | Mod: PBBFAC,,, | Performed by: NURSE PRACTITIONER

## 2021-04-12 PROCEDURE — 99999 PR PBB SHADOW E&M-EST. PATIENT-LVL V: CPT | Mod: PBBFAC,,, | Performed by: NURSE PRACTITIONER

## 2021-04-12 PROCEDURE — 99213 OFFICE O/P EST LOW 20 MIN: CPT | Mod: S$PBB,,, | Performed by: NURSE PRACTITIONER

## 2021-04-12 RX ORDER — NAPROXEN 500 MG/1
500 TABLET ORAL 2 TIMES DAILY WITH MEALS
Qty: 30 TABLET | Refills: 1 | Status: SHIPPED | OUTPATIENT
Start: 2021-04-12 | End: 2021-06-03

## 2021-04-12 RX ORDER — TIZANIDINE 4 MG/1
4 TABLET ORAL EVERY 6 HOURS PRN
Qty: 30 TABLET | Refills: 1 | Status: SHIPPED | OUTPATIENT
Start: 2021-04-12 | End: 2021-04-27

## 2021-04-26 ENCOUNTER — TELEPHONE (OUTPATIENT)
Dept: FAMILY MEDICINE | Facility: CLINIC | Age: 71
End: 2021-04-26

## 2021-04-26 ENCOUNTER — CLINICAL SUPPORT (OUTPATIENT)
Dept: FAMILY MEDICINE | Facility: CLINIC | Age: 71
End: 2021-04-26
Payer: MEDICARE

## 2021-04-26 VITALS — DIASTOLIC BLOOD PRESSURE: 68 MMHG | HEART RATE: 80 BPM | SYSTOLIC BLOOD PRESSURE: 132 MMHG

## 2021-04-26 PROCEDURE — 99999 PR PBB SHADOW E&M-EST. PATIENT-LVL I: CPT | Mod: PBBFAC,,,

## 2021-04-26 PROCEDURE — 99211 OFF/OP EST MAY X REQ PHY/QHP: CPT | Mod: PBBFAC,PO

## 2021-04-26 PROCEDURE — 99999 PR PBB SHADOW E&M-EST. PATIENT-LVL I: ICD-10-PCS | Mod: PBBFAC,,,

## 2021-04-30 ENCOUNTER — LAB VISIT (OUTPATIENT)
Dept: LAB | Facility: HOSPITAL | Age: 71
End: 2021-04-30
Attending: STUDENT IN AN ORGANIZED HEALTH CARE EDUCATION/TRAINING PROGRAM
Payer: MEDICARE

## 2021-04-30 DIAGNOSIS — E11.3393 CONTROLLED TYPE 2 DIABETES MELLITUS WITH BOTH EYES AFFECTED BY MODERATE NONPROLIFERATIVE RETINOPATHY WITHOUT MACULAR EDEMA, WITHOUT LONG-TERM CURRENT USE OF INSULIN: ICD-10-CM

## 2021-04-30 LAB
ALBUMIN SERPL BCP-MCNC: 3.2 G/DL (ref 3.5–5.2)
ALP SERPL-CCNC: 97 U/L (ref 55–135)
ALT SERPL W/O P-5'-P-CCNC: 13 U/L (ref 10–44)
ANION GAP SERPL CALC-SCNC: 11 MMOL/L (ref 8–16)
AST SERPL-CCNC: 20 U/L (ref 10–40)
BILIRUB SERPL-MCNC: 0.6 MG/DL (ref 0.1–1)
BUN SERPL-MCNC: 13 MG/DL (ref 8–23)
CALCIUM SERPL-MCNC: 10 MG/DL (ref 8.7–10.5)
CHLORIDE SERPL-SCNC: 103 MMOL/L (ref 95–110)
CHOLEST SERPL-MCNC: 93 MG/DL (ref 120–199)
CHOLEST/HDLC SERPL: 3.1 {RATIO} (ref 2–5)
CO2 SERPL-SCNC: 27 MMOL/L (ref 23–29)
CREAT SERPL-MCNC: 0.9 MG/DL (ref 0.5–1.4)
EST. GFR  (AFRICAN AMERICAN): >60 ML/MIN/1.73 M^2
EST. GFR  (NON AFRICAN AMERICAN): >60 ML/MIN/1.73 M^2
ESTIMATED AVG GLUCOSE: 131 MG/DL (ref 68–131)
GLUCOSE SERPL-MCNC: 95 MG/DL (ref 70–110)
HBA1C MFR BLD: 6.2 % (ref 4–5.6)
HDLC SERPL-MCNC: 30 MG/DL (ref 40–75)
HDLC SERPL: 32.3 % (ref 20–50)
LDLC SERPL CALC-MCNC: 46.8 MG/DL (ref 63–159)
NONHDLC SERPL-MCNC: 63 MG/DL
POTASSIUM SERPL-SCNC: 4.7 MMOL/L (ref 3.5–5.1)
PROT SERPL-MCNC: 7.4 G/DL (ref 6–8.4)
SODIUM SERPL-SCNC: 141 MMOL/L (ref 136–145)
TRIGL SERPL-MCNC: 81 MG/DL (ref 30–150)

## 2021-04-30 PROCEDURE — 80053 COMPREHEN METABOLIC PANEL: CPT | Performed by: STUDENT IN AN ORGANIZED HEALTH CARE EDUCATION/TRAINING PROGRAM

## 2021-04-30 PROCEDURE — 83036 HEMOGLOBIN GLYCOSYLATED A1C: CPT | Performed by: STUDENT IN AN ORGANIZED HEALTH CARE EDUCATION/TRAINING PROGRAM

## 2021-04-30 PROCEDURE — 36415 COLL VENOUS BLD VENIPUNCTURE: CPT | Mod: PO | Performed by: STUDENT IN AN ORGANIZED HEALTH CARE EDUCATION/TRAINING PROGRAM

## 2021-04-30 PROCEDURE — 80061 LIPID PANEL: CPT | Performed by: STUDENT IN AN ORGANIZED HEALTH CARE EDUCATION/TRAINING PROGRAM

## 2021-05-04 ENCOUNTER — OFFICE VISIT (OUTPATIENT)
Dept: FAMILY MEDICINE | Facility: CLINIC | Age: 71
End: 2021-05-04
Payer: MEDICARE

## 2021-05-04 ENCOUNTER — HOSPITAL ENCOUNTER (OUTPATIENT)
Dept: RADIOLOGY | Facility: CLINIC | Age: 71
Discharge: HOME | End: 2021-05-04
Attending: STUDENT IN AN ORGANIZED HEALTH CARE EDUCATION/TRAINING PROGRAM
Payer: MEDICARE

## 2021-05-04 ENCOUNTER — HOSPITAL ENCOUNTER (OUTPATIENT)
Dept: RADIOLOGY | Facility: HOSPITAL | Age: 71
Discharge: HOME | End: 2021-05-04
Attending: STUDENT IN AN ORGANIZED HEALTH CARE EDUCATION/TRAINING PROGRAM
Payer: MEDICARE

## 2021-05-04 ENCOUNTER — PATIENT MESSAGE (OUTPATIENT)
Dept: FAMILY MEDICINE | Facility: CLINIC | Age: 71
End: 2021-05-04

## 2021-05-04 VITALS
HEART RATE: 77 BPM | BODY MASS INDEX: 24.47 KG/M2 | OXYGEN SATURATION: 98 % | DIASTOLIC BLOOD PRESSURE: 84 MMHG | RESPIRATION RATE: 18 BRPM | HEIGHT: 67 IN | WEIGHT: 155.88 LBS | SYSTOLIC BLOOD PRESSURE: 138 MMHG

## 2021-05-04 DIAGNOSIS — R63.4 UNINTENTIONAL WEIGHT LOSS: ICD-10-CM

## 2021-05-04 DIAGNOSIS — M25.551 RIGHT HIP PAIN: Primary | ICD-10-CM

## 2021-05-04 DIAGNOSIS — N50.89 MASS OF SCROTUM: ICD-10-CM

## 2021-05-04 DIAGNOSIS — Z12.89 ENCOUNTER FOR SCREENING FOR MALIGNANT NEOPLASM OF OTHER SITES: ICD-10-CM

## 2021-05-04 DIAGNOSIS — R29.898 RIGIDITY (MUSCLES): ICD-10-CM

## 2021-05-04 DIAGNOSIS — M25.551 RIGHT HIP PAIN: ICD-10-CM

## 2021-05-04 PROCEDURE — 99999 PR PBB SHADOW E&M-EST. PATIENT-LVL V: ICD-10-PCS | Mod: PBBFAC,,, | Performed by: STUDENT IN AN ORGANIZED HEALTH CARE EDUCATION/TRAINING PROGRAM

## 2021-05-04 PROCEDURE — 73502 XR HIP 2 VIEW RIGHT: ICD-10-PCS | Mod: 26,RT,S$GLB, | Performed by: RADIOLOGY

## 2021-05-04 PROCEDURE — 99214 OFFICE O/P EST MOD 30 MIN: CPT | Mod: S$PBB,,, | Performed by: STUDENT IN AN ORGANIZED HEALTH CARE EDUCATION/TRAINING PROGRAM

## 2021-05-04 PROCEDURE — 99215 OFFICE O/P EST HI 40 MIN: CPT | Mod: PBBFAC,PO,25 | Performed by: STUDENT IN AN ORGANIZED HEALTH CARE EDUCATION/TRAINING PROGRAM

## 2021-05-04 PROCEDURE — 99999 PR PBB SHADOW E&M-EST. PATIENT-LVL V: CPT | Mod: PBBFAC,,, | Performed by: STUDENT IN AN ORGANIZED HEALTH CARE EDUCATION/TRAINING PROGRAM

## 2021-05-04 PROCEDURE — 76870 US EXAM SCROTUM: CPT | Mod: TC

## 2021-05-04 PROCEDURE — 99214 PR OFFICE/OUTPT VISIT, EST, LEVL IV, 30-39 MIN: ICD-10-PCS | Mod: S$PBB,,, | Performed by: STUDENT IN AN ORGANIZED HEALTH CARE EDUCATION/TRAINING PROGRAM

## 2021-05-04 PROCEDURE — 73502 X-RAY EXAM HIP UNI 2-3 VIEWS: CPT | Mod: TC,FY,PO,RT

## 2021-05-04 PROCEDURE — 73502 X-RAY EXAM HIP UNI 2-3 VIEWS: CPT | Mod: 26,RT,S$GLB, | Performed by: RADIOLOGY

## 2021-05-05 ENCOUNTER — HOSPITAL ENCOUNTER (OUTPATIENT)
Dept: RADIOLOGY | Facility: HOSPITAL | Age: 71
Discharge: HOME | End: 2021-05-05
Attending: STUDENT IN AN ORGANIZED HEALTH CARE EDUCATION/TRAINING PROGRAM
Payer: MEDICARE

## 2021-05-05 DIAGNOSIS — R22.41 MASS OF RIGHT THIGH: Primary | ICD-10-CM

## 2021-05-05 DIAGNOSIS — Z12.89 ENCOUNTER FOR SCREENING FOR MALIGNANT NEOPLASM OF OTHER SITES: ICD-10-CM

## 2021-05-05 DIAGNOSIS — R19.00 ABDOMINAL MASS, UNSPECIFIED ABDOMINAL LOCATION: Primary | ICD-10-CM

## 2021-05-05 PROCEDURE — 71250 CT THORAX DX C-: CPT | Mod: TC

## 2021-05-05 PROCEDURE — 74176 CT ABD & PELVIS W/O CONTRAST: CPT | Mod: TC

## 2021-05-06 DIAGNOSIS — E11.9 TYPE 2 DIABETES MELLITUS WITHOUT COMPLICATION: ICD-10-CM

## 2021-05-07 ENCOUNTER — TELEPHONE (OUTPATIENT)
Dept: SURGERY | Facility: CLINIC | Age: 71
End: 2021-05-07

## 2021-05-07 DIAGNOSIS — R22.41 MASS OF RIGHT THIGH: Primary | ICD-10-CM

## 2021-05-10 ENCOUNTER — OFFICE VISIT (OUTPATIENT)
Dept: SURGERY | Facility: CLINIC | Age: 71
End: 2021-05-10
Payer: MEDICARE

## 2021-05-10 VITALS
WEIGHT: 158.75 LBS | HEART RATE: 90 BPM | BODY MASS INDEX: 24.92 KG/M2 | DIASTOLIC BLOOD PRESSURE: 77 MMHG | RESPIRATION RATE: 16 BRPM | SYSTOLIC BLOOD PRESSURE: 155 MMHG | HEIGHT: 67 IN

## 2021-05-10 DIAGNOSIS — R22.41 MASS OF RIGHT THIGH: Primary | ICD-10-CM

## 2021-05-10 PROCEDURE — 99215 OFFICE O/P EST HI 40 MIN: CPT | Mod: PBBFAC,PN | Performed by: STUDENT IN AN ORGANIZED HEALTH CARE EDUCATION/TRAINING PROGRAM

## 2021-05-10 PROCEDURE — 99999 PR PBB SHADOW E&M-EST. PATIENT-LVL V: CPT | Mod: PBBFAC,,, | Performed by: STUDENT IN AN ORGANIZED HEALTH CARE EDUCATION/TRAINING PROGRAM

## 2021-05-10 PROCEDURE — 99999 PR PBB SHADOW E&M-EST. PATIENT-LVL V: ICD-10-PCS | Mod: PBBFAC,,, | Performed by: STUDENT IN AN ORGANIZED HEALTH CARE EDUCATION/TRAINING PROGRAM

## 2021-05-10 PROCEDURE — 99205 PR OFFICE/OUTPT VISIT, NEW, LEVL V, 60-74 MIN: ICD-10-PCS | Mod: S$PBB,,, | Performed by: STUDENT IN AN ORGANIZED HEALTH CARE EDUCATION/TRAINING PROGRAM

## 2021-05-10 PROCEDURE — 99205 OFFICE O/P NEW HI 60 MIN: CPT | Mod: S$PBB,,, | Performed by: STUDENT IN AN ORGANIZED HEALTH CARE EDUCATION/TRAINING PROGRAM

## 2021-05-17 DIAGNOSIS — R22.41 MASS OF RIGHT THIGH: Primary | ICD-10-CM

## 2021-05-24 ENCOUNTER — TELEPHONE (OUTPATIENT)
Dept: SURGERY | Facility: CLINIC | Age: 71
End: 2021-05-24

## 2021-05-25 ENCOUNTER — PATIENT MESSAGE (OUTPATIENT)
Dept: SURGERY | Facility: CLINIC | Age: 71
End: 2021-05-25

## 2021-05-31 ENCOUNTER — HOSPITAL ENCOUNTER (OUTPATIENT)
Dept: RADIOLOGY | Facility: HOSPITAL | Age: 71
Discharge: HOME | End: 2021-05-31
Attending: STUDENT IN AN ORGANIZED HEALTH CARE EDUCATION/TRAINING PROGRAM
Payer: MEDICARE

## 2021-05-31 VITALS
DIASTOLIC BLOOD PRESSURE: 68 MMHG | HEIGHT: 67 IN | TEMPERATURE: 98 F | SYSTOLIC BLOOD PRESSURE: 155 MMHG | BODY MASS INDEX: 24.33 KG/M2 | HEART RATE: 86 BPM | RESPIRATION RATE: 18 BRPM | OXYGEN SATURATION: 99 % | WEIGHT: 155 LBS

## 2021-05-31 DIAGNOSIS — R22.41 MASS OF RIGHT THIGH: Primary | ICD-10-CM

## 2021-05-31 DIAGNOSIS — R22.41 MASS OF RIGHT THIGH: ICD-10-CM

## 2021-05-31 PROCEDURE — 99152 MOD SED SAME PHYS/QHP 5/>YRS: CPT | Performed by: RADIOLOGY

## 2021-05-31 PROCEDURE — 77012 CT SCAN FOR NEEDLE BIOPSY: CPT | Mod: 26,,, | Performed by: RADIOLOGY

## 2021-05-31 PROCEDURE — 88189 PR  FLOWCYTOMETRY/READ, 16 & > MARKERS: ICD-10-PCS | Mod: ,,, | Performed by: PATHOLOGY

## 2021-05-31 PROCEDURE — 99152 PR MOD CONSCIOUS SEDATION, SAME PHYS, 5+ YRS, FIRST 15 MIN: ICD-10-PCS | Mod: ,,, | Performed by: RADIOLOGY

## 2021-05-31 PROCEDURE — 99900103 DSU ONLY-NO CHARGE-INITIAL HR (STAT)

## 2021-05-31 PROCEDURE — 25000003 PHARM REV CODE 250: Performed by: RADIOLOGY

## 2021-05-31 PROCEDURE — 77012 CT SCAN FOR NEEDLE BIOPSY: CPT | Mod: TC | Performed by: RADIOLOGY

## 2021-05-31 PROCEDURE — 88305 TISSUE EXAM BY PATHOLOGIST: ICD-10-PCS | Mod: 26,,, | Performed by: PATHOLOGY

## 2021-05-31 PROCEDURE — 99900104 DSU ONLY-NO CHARGE-EA ADD'L HR (STAT)

## 2021-05-31 PROCEDURE — 88184 FLOWCYTOMETRY/ TC 1 MARKER: CPT | Performed by: PATHOLOGY

## 2021-05-31 PROCEDURE — 20206 CT GUIDED NEEDLE PLACEMENT: ICD-10-PCS | Mod: RT,,, | Performed by: RADIOLOGY

## 2021-05-31 PROCEDURE — 20206 BIOPSY MUSCLE PERQ NEEDLE: CPT | Mod: RT,,, | Performed by: RADIOLOGY

## 2021-05-31 PROCEDURE — 88305 TISSUE EXAM BY PATHOLOGIST: CPT | Performed by: PATHOLOGY

## 2021-05-31 PROCEDURE — 77012 PR  CT GUIDANCE NEEDLE PLACEMENT: ICD-10-PCS | Mod: 26,,, | Performed by: RADIOLOGY

## 2021-05-31 PROCEDURE — 99152 MOD SED SAME PHYS/QHP 5/>YRS: CPT | Mod: ,,, | Performed by: RADIOLOGY

## 2021-05-31 PROCEDURE — 88341 IMHCHEM/IMCYTCHM EA ADD ANTB: CPT | Performed by: PATHOLOGY

## 2021-05-31 PROCEDURE — 27000826 CT GUIDED NEEDLE PLACEMENT

## 2021-05-31 PROCEDURE — 88342 IMHCHEM/IMCYTCHM 1ST ANTB: CPT | Performed by: PATHOLOGY

## 2021-05-31 PROCEDURE — 88189 FLOWCYTOMETRY/READ 16 & >: CPT | Mod: ,,, | Performed by: PATHOLOGY

## 2021-05-31 PROCEDURE — 88185 FLOWCYTOMETRY/TC ADD-ON: CPT | Performed by: PATHOLOGY

## 2021-05-31 PROCEDURE — 77012 CT SCAN FOR NEEDLE BIOPSY: CPT | Mod: TC

## 2021-05-31 PROCEDURE — 88305 TISSUE EXAM BY PATHOLOGIST: CPT | Mod: 26,,, | Performed by: PATHOLOGY

## 2021-05-31 PROCEDURE — 20206 BIOPSY MUSCLE PERQ NEEDLE: CPT | Mod: RT | Performed by: RADIOLOGY

## 2021-05-31 PROCEDURE — 63600175 PHARM REV CODE 636 W HCPCS: Performed by: RADIOLOGY

## 2021-05-31 RX ORDER — LIDOCAINE HYDROCHLORIDE 10 MG/ML
1 INJECTION, SOLUTION EPIDURAL; INFILTRATION; INTRACAUDAL; PERINEURAL ONCE
Status: COMPLETED | OUTPATIENT
Start: 2021-05-31 | End: 2021-05-31

## 2021-05-31 RX ORDER — FENTANYL CITRATE 50 UG/ML
25 INJECTION, SOLUTION INTRAMUSCULAR; INTRAVENOUS
Status: DISCONTINUED | OUTPATIENT
Start: 2021-05-31 | End: 2021-06-01 | Stop reason: HOSPADM

## 2021-05-31 RX ORDER — MIDAZOLAM HYDROCHLORIDE 1 MG/ML
1 INJECTION INTRAMUSCULAR; INTRAVENOUS
Status: DISCONTINUED | OUTPATIENT
Start: 2021-05-31 | End: 2021-06-01 | Stop reason: HOSPADM

## 2021-05-31 RX ADMIN — LIDOCAINE HYDROCHLORIDE 10 MG: 10 INJECTION, SOLUTION EPIDURAL; INFILTRATION; INTRACAUDAL; PERINEURAL at 11:05

## 2021-05-31 RX ADMIN — FENTANYL CITRATE 25 MCG: 50 INJECTION, SOLUTION INTRAMUSCULAR; INTRAVENOUS at 11:05

## 2021-05-31 RX ADMIN — MIDAZOLAM 1 MG: 1 INJECTION INTRAMUSCULAR; INTRAVENOUS at 11:05

## 2021-06-01 LAB
FLOW CYTOMETRY ANTIBODIES ANALYZED - TISSUE: NORMAL
FLOW CYTOMETRY COMMENT - TISSUE: NORMAL
FLOW CYTOMETRY INTERPRETATION - TISSUE: NORMAL
SPECIMEN TYPE - TISSUE: NORMAL

## 2021-06-03 ENCOUNTER — OFFICE VISIT (OUTPATIENT)
Dept: FAMILY MEDICINE | Facility: CLINIC | Age: 71
End: 2021-06-03
Payer: MEDICARE

## 2021-06-03 VITALS
OXYGEN SATURATION: 98 % | RESPIRATION RATE: 18 BRPM | HEART RATE: 88 BPM | WEIGHT: 154.31 LBS | SYSTOLIC BLOOD PRESSURE: 124 MMHG | BODY MASS INDEX: 24.22 KG/M2 | DIASTOLIC BLOOD PRESSURE: 86 MMHG | HEIGHT: 67 IN

## 2021-06-03 DIAGNOSIS — R53.1 WEAKNESS: ICD-10-CM

## 2021-06-03 DIAGNOSIS — M25.551 RIGHT HIP PAIN: ICD-10-CM

## 2021-06-03 DIAGNOSIS — R26.0 ATAXIC GAIT: ICD-10-CM

## 2021-06-03 DIAGNOSIS — R71.8 OTHER ABNORMALITY OF RED BLOOD CELLS: ICD-10-CM

## 2021-06-03 DIAGNOSIS — R22.41 MASS OF RIGHT THIGH: Primary | ICD-10-CM

## 2021-06-03 PROCEDURE — 99214 PR OFFICE/OUTPT VISIT, EST, LEVL IV, 30-39 MIN: ICD-10-PCS | Mod: S$PBB,,, | Performed by: STUDENT IN AN ORGANIZED HEALTH CARE EDUCATION/TRAINING PROGRAM

## 2021-06-03 PROCEDURE — 99999 PR PBB SHADOW E&M-EST. PATIENT-LVL IV: CPT | Mod: PBBFAC,,, | Performed by: STUDENT IN AN ORGANIZED HEALTH CARE EDUCATION/TRAINING PROGRAM

## 2021-06-03 PROCEDURE — 99214 OFFICE O/P EST MOD 30 MIN: CPT | Mod: PBBFAC,PO | Performed by: STUDENT IN AN ORGANIZED HEALTH CARE EDUCATION/TRAINING PROGRAM

## 2021-06-03 PROCEDURE — 99214 OFFICE O/P EST MOD 30 MIN: CPT | Mod: S$PBB,,, | Performed by: STUDENT IN AN ORGANIZED HEALTH CARE EDUCATION/TRAINING PROGRAM

## 2021-06-03 PROCEDURE — 99999 PR PBB SHADOW E&M-EST. PATIENT-LVL IV: ICD-10-PCS | Mod: PBBFAC,,, | Performed by: STUDENT IN AN ORGANIZED HEALTH CARE EDUCATION/TRAINING PROGRAM

## 2021-06-03 RX ORDER — TRAMADOL HYDROCHLORIDE 50 MG/1
50 TABLET ORAL EVERY 6 HOURS PRN
Qty: 30 TABLET | Refills: 0 | Status: SHIPPED | OUTPATIENT
Start: 2021-06-03 | End: 2022-02-01

## 2021-06-03 RX ORDER — MELOXICAM 7.5 MG/1
7.5 TABLET ORAL DAILY
Qty: 30 TABLET | Refills: 2 | Status: SHIPPED | OUTPATIENT
Start: 2021-06-03 | End: 2021-08-13

## 2021-06-07 ENCOUNTER — LAB VISIT (OUTPATIENT)
Dept: LAB | Facility: HOSPITAL | Age: 71
End: 2021-06-07
Attending: STUDENT IN AN ORGANIZED HEALTH CARE EDUCATION/TRAINING PROGRAM
Payer: MEDICARE

## 2021-06-07 DIAGNOSIS — R53.1 WEAKNESS: ICD-10-CM

## 2021-06-07 DIAGNOSIS — R26.0 ATAXIC GAIT: ICD-10-CM

## 2021-06-07 DIAGNOSIS — R71.8 OTHER ABNORMALITY OF RED BLOOD CELLS: ICD-10-CM

## 2021-06-07 LAB
ALBUMIN SERPL BCP-MCNC: 3.4 G/DL (ref 3.5–5.2)
ALP SERPL-CCNC: 85 U/L (ref 55–135)
ALT SERPL W/O P-5'-P-CCNC: 29 U/L (ref 10–44)
ANION GAP SERPL CALC-SCNC: 14 MMOL/L (ref 8–16)
AST SERPL-CCNC: 22 U/L (ref 10–40)
BILIRUB SERPL-MCNC: 0.4 MG/DL (ref 0.1–1)
BUN SERPL-MCNC: 10 MG/DL (ref 8–23)
CALCIUM SERPL-MCNC: 9.9 MG/DL (ref 8.7–10.5)
CHLORIDE SERPL-SCNC: 103 MMOL/L (ref 95–110)
CO2 SERPL-SCNC: 25 MMOL/L (ref 23–29)
CREAT SERPL-MCNC: 0.8 MG/DL (ref 0.5–1.4)
EST. GFR  (AFRICAN AMERICAN): >60 ML/MIN/1.73 M^2
EST. GFR  (NON AFRICAN AMERICAN): >60 ML/MIN/1.73 M^2
FERRITIN SERPL-MCNC: 97 NG/ML (ref 20–300)
FOLATE SERPL-MCNC: 10.2 NG/ML (ref 4–24)
GLUCOSE SERPL-MCNC: 126 MG/DL (ref 70–110)
IRON SERPL-MCNC: 32 UG/DL (ref 45–160)
POTASSIUM SERPL-SCNC: 5 MMOL/L (ref 3.5–5.1)
PROT SERPL-MCNC: 7.6 G/DL (ref 6–8.4)
SATURATED IRON: 10 % (ref 20–50)
SODIUM SERPL-SCNC: 142 MMOL/L (ref 136–145)
TOTAL IRON BINDING CAPACITY: 332 UG/DL (ref 250–450)
TRANSFERRIN SERPL-MCNC: 224 MG/DL (ref 200–375)
VIT B12 SERPL-MCNC: 360 PG/ML (ref 210–950)

## 2021-06-07 PROCEDURE — 80053 COMPREHEN METABOLIC PANEL: CPT | Performed by: STUDENT IN AN ORGANIZED HEALTH CARE EDUCATION/TRAINING PROGRAM

## 2021-06-07 PROCEDURE — 83540 ASSAY OF IRON: CPT | Performed by: STUDENT IN AN ORGANIZED HEALTH CARE EDUCATION/TRAINING PROGRAM

## 2021-06-07 PROCEDURE — 36415 COLL VENOUS BLD VENIPUNCTURE: CPT | Mod: PO | Performed by: STUDENT IN AN ORGANIZED HEALTH CARE EDUCATION/TRAINING PROGRAM

## 2021-06-07 PROCEDURE — 82607 VITAMIN B-12: CPT | Performed by: STUDENT IN AN ORGANIZED HEALTH CARE EDUCATION/TRAINING PROGRAM

## 2021-06-07 PROCEDURE — 82746 ASSAY OF FOLIC ACID SERUM: CPT | Performed by: STUDENT IN AN ORGANIZED HEALTH CARE EDUCATION/TRAINING PROGRAM

## 2021-06-07 PROCEDURE — 82728 ASSAY OF FERRITIN: CPT | Performed by: STUDENT IN AN ORGANIZED HEALTH CARE EDUCATION/TRAINING PROGRAM

## 2021-06-10 LAB
FINAL PATHOLOGIC DIAGNOSIS: NORMAL
GROSS: NORMAL
Lab: NORMAL
SUPPLEMENTAL DIAGNOSIS: NORMAL

## 2021-06-14 ENCOUNTER — TELEPHONE (OUTPATIENT)
Dept: HEMATOLOGY/ONCOLOGY | Facility: CLINIC | Age: 71
End: 2021-06-14

## 2021-06-15 ENCOUNTER — TELEPHONE (OUTPATIENT)
Dept: FAMILY MEDICINE | Facility: CLINIC | Age: 71
End: 2021-06-15

## 2021-06-15 ENCOUNTER — PATIENT MESSAGE (OUTPATIENT)
Dept: FAMILY MEDICINE | Facility: CLINIC | Age: 71
End: 2021-06-15

## 2021-06-16 ENCOUNTER — OFFICE VISIT (OUTPATIENT)
Dept: HEMATOLOGY/ONCOLOGY | Facility: CLINIC | Age: 71
End: 2021-06-16
Payer: MEDICARE

## 2021-06-16 ENCOUNTER — TELEPHONE (OUTPATIENT)
Dept: HEMATOLOGY/ONCOLOGY | Facility: CLINIC | Age: 71
End: 2021-06-16

## 2021-06-16 VITALS
OXYGEN SATURATION: 98 % | HEART RATE: 86 BPM | WEIGHT: 152.56 LBS | SYSTOLIC BLOOD PRESSURE: 141 MMHG | RESPIRATION RATE: 18 BRPM | DIASTOLIC BLOOD PRESSURE: 74 MMHG | HEIGHT: 67 IN | TEMPERATURE: 97 F | BODY MASS INDEX: 23.94 KG/M2

## 2021-06-16 DIAGNOSIS — E11.3393 CONTROLLED TYPE 2 DIABETES MELLITUS WITH BOTH EYES AFFECTED BY MODERATE NONPROLIFERATIVE RETINOPATHY WITHOUT MACULAR EDEMA, WITHOUT LONG-TERM CURRENT USE OF INSULIN: ICD-10-CM

## 2021-06-16 DIAGNOSIS — R22.41 MASS OF RIGHT THIGH: ICD-10-CM

## 2021-06-16 DIAGNOSIS — N18.2 HYPERTENSION ASSOCIATED WITH STAGE 2 CHRONIC KIDNEY DISEASE DUE TO TYPE 2 DIABETES MELLITUS: ICD-10-CM

## 2021-06-16 DIAGNOSIS — Z85.72 HX OF LYMPHOMA: ICD-10-CM

## 2021-06-16 DIAGNOSIS — E11.22 HYPERTENSION ASSOCIATED WITH STAGE 2 CHRONIC KIDNEY DISEASE DUE TO TYPE 2 DIABETES MELLITUS: ICD-10-CM

## 2021-06-16 DIAGNOSIS — C49.9 SARCOMA: Primary | ICD-10-CM

## 2021-06-16 DIAGNOSIS — I12.9 HYPERTENSION ASSOCIATED WITH STAGE 2 CHRONIC KIDNEY DISEASE DUE TO TYPE 2 DIABETES MELLITUS: ICD-10-CM

## 2021-06-16 PROCEDURE — 99215 OFFICE O/P EST HI 40 MIN: CPT | Mod: PBBFAC,PO | Performed by: INTERNAL MEDICINE

## 2021-06-16 PROCEDURE — 99205 PR OFFICE/OUTPT VISIT, NEW, LEVL V, 60-74 MIN: ICD-10-PCS | Mod: S$PBB,,, | Performed by: INTERNAL MEDICINE

## 2021-06-16 PROCEDURE — 99205 OFFICE O/P NEW HI 60 MIN: CPT | Mod: S$PBB,,, | Performed by: INTERNAL MEDICINE

## 2021-06-16 PROCEDURE — 99999 PR PBB SHADOW E&M-EST. PATIENT-LVL V: CPT | Mod: PBBFAC,,, | Performed by: INTERNAL MEDICINE

## 2021-06-16 PROCEDURE — 99999 PR PBB SHADOW E&M-EST. PATIENT-LVL V: ICD-10-PCS | Mod: PBBFAC,,, | Performed by: INTERNAL MEDICINE

## 2021-06-17 ENCOUNTER — TELEPHONE (OUTPATIENT)
Dept: HEMATOLOGY/ONCOLOGY | Facility: CLINIC | Age: 71
End: 2021-06-17

## 2021-06-21 ENCOUNTER — TELEPHONE (OUTPATIENT)
Dept: HEMATOLOGY/ONCOLOGY | Facility: CLINIC | Age: 71
End: 2021-06-21

## 2021-06-22 ENCOUNTER — TELEPHONE (OUTPATIENT)
Dept: HEMATOLOGY/ONCOLOGY | Facility: CLINIC | Age: 71
End: 2021-06-22

## 2021-06-25 ENCOUNTER — OFFICE VISIT (OUTPATIENT)
Dept: HEMATOLOGY/ONCOLOGY | Facility: CLINIC | Age: 71
End: 2021-06-25
Payer: MEDICARE

## 2021-06-25 VITALS
SYSTOLIC BLOOD PRESSURE: 156 MMHG | WEIGHT: 155.19 LBS | BODY MASS INDEX: 24.36 KG/M2 | TEMPERATURE: 98 F | HEIGHT: 67 IN | DIASTOLIC BLOOD PRESSURE: 73 MMHG | OXYGEN SATURATION: 98 % | HEART RATE: 83 BPM | RESPIRATION RATE: 18 BRPM

## 2021-06-25 DIAGNOSIS — D50.8 IRON DEFICIENCY ANEMIA SECONDARY TO INADEQUATE DIETARY IRON INTAKE: ICD-10-CM

## 2021-06-25 DIAGNOSIS — Z85.71 HISTORY OF HODGKIN'S LYMPHOMA: ICD-10-CM

## 2021-06-25 DIAGNOSIS — C49.9 SARCOMA: ICD-10-CM

## 2021-06-25 DIAGNOSIS — C49.21 SARCOMA OF RIGHT THIGH: Primary | ICD-10-CM

## 2021-06-25 DIAGNOSIS — C49.9 MALIGNANT NEOPLASM OF CONNECTIVE AND SOFT TISSUE, UNSPECIFIED: ICD-10-CM

## 2021-06-25 DIAGNOSIS — I10 ESSENTIAL HYPERTENSION: ICD-10-CM

## 2021-06-25 PROCEDURE — 99215 PR OFFICE/OUTPT VISIT, EST, LEVL V, 40-54 MIN: ICD-10-PCS | Mod: S$PBB,,, | Performed by: STUDENT IN AN ORGANIZED HEALTH CARE EDUCATION/TRAINING PROGRAM

## 2021-06-25 PROCEDURE — 99215 OFFICE O/P EST HI 40 MIN: CPT | Mod: PBBFAC | Performed by: STUDENT IN AN ORGANIZED HEALTH CARE EDUCATION/TRAINING PROGRAM

## 2021-06-25 PROCEDURE — 99999 PR PBB SHADOW E&M-EST. PATIENT-LVL V: ICD-10-PCS | Mod: PBBFAC,,, | Performed by: STUDENT IN AN ORGANIZED HEALTH CARE EDUCATION/TRAINING PROGRAM

## 2021-06-25 PROCEDURE — 99999 PR PBB SHADOW E&M-EST. PATIENT-LVL V: CPT | Mod: PBBFAC,,, | Performed by: STUDENT IN AN ORGANIZED HEALTH CARE EDUCATION/TRAINING PROGRAM

## 2021-06-25 PROCEDURE — 99215 OFFICE O/P EST HI 40 MIN: CPT | Mod: S$PBB,,, | Performed by: STUDENT IN AN ORGANIZED HEALTH CARE EDUCATION/TRAINING PROGRAM

## 2021-06-25 RX ORDER — ASPIRIN 81 MG/1
81 TABLET ORAL DAILY
COMMUNITY

## 2021-06-27 ENCOUNTER — PATIENT MESSAGE (OUTPATIENT)
Dept: FAMILY MEDICINE | Facility: CLINIC | Age: 71
End: 2021-06-27

## 2021-06-29 ENCOUNTER — TELEPHONE (OUTPATIENT)
Dept: FAMILY MEDICINE | Facility: CLINIC | Age: 71
End: 2021-06-29

## 2021-06-29 DIAGNOSIS — Z85.71 HISTORY OF HODGKIN'S LYMPHOMA: ICD-10-CM

## 2021-06-29 DIAGNOSIS — C49.21 SARCOMA OF RIGHT THIGH: Primary | ICD-10-CM

## 2021-06-30 ENCOUNTER — TELEPHONE (OUTPATIENT)
Dept: FAMILY MEDICINE | Facility: CLINIC | Age: 71
End: 2021-06-30

## 2021-06-30 ENCOUNTER — PATIENT MESSAGE (OUTPATIENT)
Dept: FAMILY MEDICINE | Facility: CLINIC | Age: 71
End: 2021-06-30

## 2021-07-01 ENCOUNTER — PATIENT MESSAGE (OUTPATIENT)
Dept: HEMATOLOGY/ONCOLOGY | Facility: CLINIC | Age: 71
End: 2021-07-01

## 2021-07-01 ENCOUNTER — PATIENT MESSAGE (OUTPATIENT)
Dept: FAMILY MEDICINE | Facility: CLINIC | Age: 71
End: 2021-07-01

## 2021-07-01 ENCOUNTER — PATIENT MESSAGE (OUTPATIENT)
Dept: ORTHOPEDICS | Facility: CLINIC | Age: 71
End: 2021-07-01

## 2021-07-01 ENCOUNTER — TELEPHONE (OUTPATIENT)
Dept: ORTHOPEDICS | Facility: CLINIC | Age: 71
End: 2021-07-01

## 2021-07-07 ENCOUNTER — PATIENT MESSAGE (OUTPATIENT)
Dept: ADMINISTRATIVE | Facility: HOSPITAL | Age: 71
End: 2021-07-07

## 2021-07-13 ENCOUNTER — PATIENT MESSAGE (OUTPATIENT)
Dept: HEMATOLOGY/ONCOLOGY | Facility: CLINIC | Age: 71
End: 2021-07-13

## 2021-07-13 ENCOUNTER — TELEPHONE (OUTPATIENT)
Dept: HEMATOLOGY/ONCOLOGY | Facility: CLINIC | Age: 71
End: 2021-07-13

## 2021-07-16 ENCOUNTER — TELEPHONE (OUTPATIENT)
Dept: HEMATOLOGY/ONCOLOGY | Facility: CLINIC | Age: 71
End: 2021-07-16

## 2021-07-19 ENCOUNTER — PATIENT MESSAGE (OUTPATIENT)
Dept: HEMATOLOGY/ONCOLOGY | Facility: CLINIC | Age: 71
End: 2021-07-19

## 2021-07-19 ENCOUNTER — TELEPHONE (OUTPATIENT)
Dept: HEMATOLOGY/ONCOLOGY | Facility: CLINIC | Age: 71
End: 2021-07-19

## 2021-07-20 ENCOUNTER — TELEPHONE (OUTPATIENT)
Dept: HEMATOLOGY/ONCOLOGY | Facility: CLINIC | Age: 71
End: 2021-07-20

## 2021-07-20 ENCOUNTER — PATIENT MESSAGE (OUTPATIENT)
Dept: HEMATOLOGY/ONCOLOGY | Facility: CLINIC | Age: 71
End: 2021-07-20

## 2021-07-27 ENCOUNTER — PATIENT MESSAGE (OUTPATIENT)
Dept: FAMILY MEDICINE | Facility: CLINIC | Age: 71
End: 2021-07-27

## 2021-07-29 ENCOUNTER — DOCUMENTATION ONLY (OUTPATIENT)
Dept: HEMATOLOGY/ONCOLOGY | Facility: CLINIC | Age: 71
End: 2021-07-29

## 2021-07-29 ENCOUNTER — PATIENT MESSAGE (OUTPATIENT)
Dept: HEMATOLOGY/ONCOLOGY | Facility: CLINIC | Age: 71
End: 2021-07-29

## 2021-07-29 ENCOUNTER — OFFICE VISIT (OUTPATIENT)
Dept: HEMATOLOGY/ONCOLOGY | Facility: CLINIC | Age: 71
End: 2021-07-29
Payer: MEDICARE

## 2021-07-29 VITALS
TEMPERATURE: 98 F | SYSTOLIC BLOOD PRESSURE: 143 MMHG | BODY MASS INDEX: 25.96 KG/M2 | OXYGEN SATURATION: 99 % | RESPIRATION RATE: 18 BRPM | DIASTOLIC BLOOD PRESSURE: 71 MMHG | WEIGHT: 165.38 LBS | HEIGHT: 67 IN | HEART RATE: 83 BPM

## 2021-07-29 DIAGNOSIS — C49.21 SARCOMA OF RIGHT THIGH: Primary | ICD-10-CM

## 2021-07-29 DIAGNOSIS — I10 ESSENTIAL HYPERTENSION: ICD-10-CM

## 2021-07-29 DIAGNOSIS — Z85.71 HISTORY OF HODGKIN'S LYMPHOMA: ICD-10-CM

## 2021-07-29 PROCEDURE — 99215 OFFICE O/P EST HI 40 MIN: CPT | Mod: S$PBB,,, | Performed by: STUDENT IN AN ORGANIZED HEALTH CARE EDUCATION/TRAINING PROGRAM

## 2021-07-29 PROCEDURE — 99999 PR PBB SHADOW E&M-EST. PATIENT-LVL V: ICD-10-PCS | Mod: PBBFAC,,, | Performed by: STUDENT IN AN ORGANIZED HEALTH CARE EDUCATION/TRAINING PROGRAM

## 2021-07-29 PROCEDURE — 99215 OFFICE O/P EST HI 40 MIN: CPT | Mod: PBBFAC | Performed by: STUDENT IN AN ORGANIZED HEALTH CARE EDUCATION/TRAINING PROGRAM

## 2021-07-29 PROCEDURE — 99215 PR OFFICE/OUTPT VISIT, EST, LEVL V, 40-54 MIN: ICD-10-PCS | Mod: S$PBB,,, | Performed by: STUDENT IN AN ORGANIZED HEALTH CARE EDUCATION/TRAINING PROGRAM

## 2021-07-29 PROCEDURE — 99999 PR PBB SHADOW E&M-EST. PATIENT-LVL V: CPT | Mod: PBBFAC,,, | Performed by: STUDENT IN AN ORGANIZED HEALTH CARE EDUCATION/TRAINING PROGRAM

## 2021-07-31 PROCEDURE — G0180 MD CERTIFICATION HHA PATIENT: HCPCS | Mod: ,,, | Performed by: STUDENT IN AN ORGANIZED HEALTH CARE EDUCATION/TRAINING PROGRAM

## 2021-07-31 PROCEDURE — G0180 PR HOME HEALTH MD CERTIFICATION: ICD-10-PCS | Mod: ,,, | Performed by: STUDENT IN AN ORGANIZED HEALTH CARE EDUCATION/TRAINING PROGRAM

## 2021-08-04 ENCOUNTER — EXTERNAL HOME HEALTH (OUTPATIENT)
Dept: HOME HEALTH SERVICES | Facility: HOSPITAL | Age: 71
End: 2021-08-04
Payer: MEDICARE

## 2021-08-05 ENCOUNTER — PATIENT MESSAGE (OUTPATIENT)
Dept: HEMATOLOGY/ONCOLOGY | Facility: CLINIC | Age: 71
End: 2021-08-05

## 2021-08-10 ENCOUNTER — TELEPHONE (OUTPATIENT)
Dept: HEMATOLOGY/ONCOLOGY | Facility: CLINIC | Age: 71
End: 2021-08-10

## 2021-08-13 ENCOUNTER — SPECIALTY PHARMACY (OUTPATIENT)
Dept: PHARMACY | Facility: CLINIC | Age: 71
End: 2021-08-13

## 2021-08-13 ENCOUNTER — OFFICE VISIT (OUTPATIENT)
Dept: HEMATOLOGY/ONCOLOGY | Facility: CLINIC | Age: 71
DRG: 847 | End: 2021-08-13
Payer: MEDICARE

## 2021-08-13 VITALS
DIASTOLIC BLOOD PRESSURE: 76 MMHG | BODY MASS INDEX: 24.67 KG/M2 | TEMPERATURE: 98 F | OXYGEN SATURATION: 99 % | SYSTOLIC BLOOD PRESSURE: 161 MMHG | WEIGHT: 157.19 LBS | RESPIRATION RATE: 18 BRPM | HEIGHT: 67 IN | HEART RATE: 82 BPM

## 2021-08-13 DIAGNOSIS — G89.3 CANCER RELATED PAIN: ICD-10-CM

## 2021-08-13 DIAGNOSIS — D50.8 IRON DEFICIENCY ANEMIA SECONDARY TO INADEQUATE DIETARY IRON INTAKE: ICD-10-CM

## 2021-08-13 DIAGNOSIS — C49.21 SARCOMA OF RIGHT THIGH: Primary | ICD-10-CM

## 2021-08-13 DIAGNOSIS — I10 ESSENTIAL HYPERTENSION: ICD-10-CM

## 2021-08-13 DIAGNOSIS — R11.0 CHEMOTHERAPY-INDUCED NAUSEA: ICD-10-CM

## 2021-08-13 DIAGNOSIS — Z85.71 HISTORY OF HODGKIN'S LYMPHOMA: ICD-10-CM

## 2021-08-13 DIAGNOSIS — T45.1X5A CHEMOTHERAPY-INDUCED NAUSEA: ICD-10-CM

## 2021-08-13 PROBLEM — C49.9 SARCOMA: Status: ACTIVE | Noted: 2021-08-13

## 2021-08-13 PROCEDURE — 99999 PR PBB SHADOW E&M-EST. PATIENT-LVL IV: ICD-10-PCS | Mod: PBBFAC,,, | Performed by: STUDENT IN AN ORGANIZED HEALTH CARE EDUCATION/TRAINING PROGRAM

## 2021-08-13 PROCEDURE — 99214 OFFICE O/P EST MOD 30 MIN: CPT | Mod: PBBFAC | Performed by: STUDENT IN AN ORGANIZED HEALTH CARE EDUCATION/TRAINING PROGRAM

## 2021-08-13 PROCEDURE — 99999 PR PBB SHADOW E&M-EST. PATIENT-LVL IV: CPT | Mod: PBBFAC,,, | Performed by: STUDENT IN AN ORGANIZED HEALTH CARE EDUCATION/TRAINING PROGRAM

## 2021-08-13 PROCEDURE — 99215 OFFICE O/P EST HI 40 MIN: CPT | Mod: S$PBB,,, | Performed by: STUDENT IN AN ORGANIZED HEALTH CARE EDUCATION/TRAINING PROGRAM

## 2021-08-13 PROCEDURE — 99215 PR OFFICE/OUTPT VISIT, EST, LEVL V, 40-54 MIN: ICD-10-PCS | Mod: S$PBB,,, | Performed by: STUDENT IN AN ORGANIZED HEALTH CARE EDUCATION/TRAINING PROGRAM

## 2021-08-13 RX ORDER — SODIUM CHLORIDE 0.9 % (FLUSH) 0.9 %
10 SYRINGE (ML) INJECTION
Status: CANCELLED | OUTPATIENT
Start: 2021-08-14

## 2021-08-13 RX ORDER — DEXAMETHASONE SODIUM PHOSPHATE 4 MG/ML
10 INJECTION, SOLUTION INTRA-ARTICULAR; INTRALESIONAL; INTRAMUSCULAR; INTRAVENOUS; SOFT TISSUE
Status: CANCELLED
Start: 2021-08-18

## 2021-08-13 RX ORDER — FERROUS SULFATE 325(65) MG
325 TABLET ORAL
COMMUNITY

## 2021-08-13 RX ORDER — HEPARIN 100 UNIT/ML
500 SYRINGE INTRAVENOUS
Status: CANCELLED | OUTPATIENT
Start: 2021-08-14

## 2021-08-13 RX ORDER — PROCHLORPERAZINE MALEATE 10 MG
10 TABLET ORAL
Status: CANCELLED | OUTPATIENT
Start: 2021-08-15

## 2021-08-13 RX ORDER — DEXAMETHASONE SODIUM PHOSPHATE 4 MG/ML
10 INJECTION, SOLUTION INTRA-ARTICULAR; INTRALESIONAL; INTRAMUSCULAR; INTRAVENOUS; SOFT TISSUE
Status: CANCELLED
Start: 2021-08-15

## 2021-08-13 RX ORDER — DEXAMETHASONE SODIUM PHOSPHATE 4 MG/ML
10 INJECTION, SOLUTION INTRA-ARTICULAR; INTRALESIONAL; INTRAMUSCULAR; INTRAVENOUS; SOFT TISSUE
Status: CANCELLED
Start: 2021-08-16

## 2021-08-13 RX ORDER — ONDANSETRON HYDROCHLORIDE 8 MG/1
8 TABLET, FILM COATED ORAL EVERY 8 HOURS PRN
COMMUNITY
End: 2022-04-18

## 2021-08-13 RX ORDER — SODIUM CHLORIDE 0.9 % (FLUSH) 0.9 %
10 SYRINGE (ML) INJECTION
Status: CANCELLED | OUTPATIENT
Start: 2021-08-16

## 2021-08-13 RX ORDER — SODIUM CHLORIDE 0.9 % (FLUSH) 0.9 %
10 SYRINGE (ML) INJECTION
Status: CANCELLED | OUTPATIENT
Start: 2021-08-17

## 2021-08-13 RX ORDER — HEPARIN 100 UNIT/ML
500 SYRINGE INTRAVENOUS
Status: CANCELLED | OUTPATIENT
Start: 2021-08-15

## 2021-08-13 RX ORDER — PROCHLORPERAZINE MALEATE 10 MG
10 TABLET ORAL
Status: CANCELLED | OUTPATIENT
Start: 2021-08-16

## 2021-08-13 RX ORDER — DEXAMETHASONE SODIUM PHOSPHATE 4 MG/ML
10 INJECTION, SOLUTION INTRA-ARTICULAR; INTRALESIONAL; INTRAMUSCULAR; INTRAVENOUS; SOFT TISSUE
Status: CANCELLED
Start: 2021-08-17

## 2021-08-13 RX ORDER — AMLODIPINE BESYLATE 5 MG/1
5 TABLET ORAL DAILY
COMMUNITY
End: 2022-02-01

## 2021-08-13 RX ORDER — HEPARIN 100 UNIT/ML
500 SYRINGE INTRAVENOUS
Status: CANCELLED | OUTPATIENT
Start: 2021-08-18

## 2021-08-13 RX ORDER — SUCRALFATE 1 G/1
1 TABLET ORAL 4 TIMES DAILY
COMMUNITY
End: 2022-04-18

## 2021-08-13 RX ORDER — HEPARIN 100 UNIT/ML
500 SYRINGE INTRAVENOUS
Status: CANCELLED | OUTPATIENT
Start: 2021-08-16

## 2021-08-13 RX ORDER — PROCHLORPERAZINE MALEATE 10 MG
10 TABLET ORAL EVERY 6 HOURS PRN
COMMUNITY
End: 2022-04-18

## 2021-08-13 RX ORDER — SODIUM CHLORIDE 0.9 % (FLUSH) 0.9 %
10 SYRINGE (ML) INJECTION
Status: CANCELLED | OUTPATIENT
Start: 2021-08-15

## 2021-08-13 RX ORDER — PROCHLORPERAZINE MALEATE 10 MG
10 TABLET ORAL
Status: CANCELLED | OUTPATIENT
Start: 2021-08-17

## 2021-08-13 RX ORDER — PEGFILGRASTIM 6 MG/.6ML
6 INJECTION SUBCUTANEOUS ONCE
Qty: 0.6 ML | Refills: 5 | Status: ON HOLD | OUTPATIENT
Start: 2021-08-13 | End: 2021-08-17

## 2021-08-13 RX ORDER — SODIUM CHLORIDE 0.9 % (FLUSH) 0.9 %
10 SYRINGE (ML) INJECTION
Status: CANCELLED | OUTPATIENT
Start: 2021-08-18

## 2021-08-13 RX ORDER — HEPARIN 100 UNIT/ML
500 SYRINGE INTRAVENOUS
Status: CANCELLED | OUTPATIENT
Start: 2021-08-17

## 2021-08-14 ENCOUNTER — HOSPITAL ENCOUNTER (INPATIENT)
Facility: HOSPITAL | Age: 71
LOS: 4 days | Discharge: HOME HEALTH | DRG: 847 | End: 2021-08-18
Attending: INTERNAL MEDICINE | Admitting: STUDENT IN AN ORGANIZED HEALTH CARE EDUCATION/TRAINING PROGRAM
Payer: MEDICARE

## 2021-08-14 ENCOUNTER — DOCUMENTATION ONLY (OUTPATIENT)
Dept: PHARMACY | Facility: HOSPITAL | Age: 71
End: 2021-08-14

## 2021-08-14 DIAGNOSIS — C49.21 SARCOMA OF RIGHT THIGH: Primary | ICD-10-CM

## 2021-08-14 DIAGNOSIS — R07.9 CHEST PAIN: ICD-10-CM

## 2021-08-14 PROBLEM — R11.0 CHEMOTHERAPY-INDUCED NAUSEA: Status: ACTIVE | Noted: 2021-08-14

## 2021-08-14 PROBLEM — D50.9 IRON DEFICIENCY ANEMIA: Status: ACTIVE | Noted: 2021-08-14

## 2021-08-14 PROBLEM — G89.3 CANCER RELATED PAIN: Status: ACTIVE | Noted: 2021-08-14

## 2021-08-14 PROBLEM — T45.1X5A CHEMOTHERAPY-INDUCED NAUSEA: Status: ACTIVE | Noted: 2021-08-14

## 2021-08-14 LAB
ALBUMIN SERPL BCP-MCNC: 3.4 G/DL (ref 3.5–5.2)
ALP SERPL-CCNC: 98 U/L (ref 55–135)
ALT SERPL W/O P-5'-P-CCNC: 13 U/L (ref 10–44)
ANION GAP SERPL CALC-SCNC: 13 MMOL/L (ref 8–16)
AST SERPL-CCNC: 15 U/L (ref 10–40)
BILIRUB SERPL-MCNC: 0.3 MG/DL (ref 0.1–1)
BUN SERPL-MCNC: 8 MG/DL (ref 8–23)
CALCIUM SERPL-MCNC: 9.5 MG/DL (ref 8.7–10.5)
CHLORIDE SERPL-SCNC: 100 MMOL/L (ref 95–110)
CO2 SERPL-SCNC: 27 MMOL/L (ref 23–29)
CREAT SERPL-MCNC: 0.8 MG/DL (ref 0.5–1.4)
ERYTHROCYTE [DISTWIDTH] IN BLOOD BY AUTOMATED COUNT: 18 % (ref 11.5–14.5)
EST. GFR  (AFRICAN AMERICAN): >60 ML/MIN/1.73 M^2
EST. GFR  (NON AFRICAN AMERICAN): >60 ML/MIN/1.73 M^2
ESTIMATED AVG GLUCOSE: 180 MG/DL (ref 68–131)
GLUCOSE SERPL-MCNC: 152 MG/DL (ref 70–110)
HBA1C MFR BLD: 7.9 % (ref 4–5.6)
HCT VFR BLD AUTO: 32.4 % (ref 40–54)
HGB BLD-MCNC: 10.4 G/DL (ref 14–18)
MCH RBC QN AUTO: 29.2 PG (ref 27–31)
MCHC RBC AUTO-ENTMCNC: 32.1 G/DL (ref 32–36)
MCV RBC AUTO: 91 FL (ref 82–98)
PLATELET # BLD AUTO: 300 K/UL (ref 150–450)
PMV BLD AUTO: 10.9 FL (ref 9.2–12.9)
POTASSIUM SERPL-SCNC: 4.4 MMOL/L (ref 3.5–5.1)
PROT SERPL-MCNC: 6.4 G/DL (ref 6–8.4)
RBC # BLD AUTO: 3.56 M/UL (ref 4.6–6.2)
SODIUM SERPL-SCNC: 140 MMOL/L (ref 136–145)
WBC # BLD AUTO: 14.06 K/UL (ref 3.9–12.7)

## 2021-08-14 PROCEDURE — 80053 COMPREHEN METABOLIC PANEL: CPT

## 2021-08-14 PROCEDURE — 83036 HEMOGLOBIN GLYCOSYLATED A1C: CPT

## 2021-08-14 PROCEDURE — 99223 1ST HOSP IP/OBS HIGH 75: CPT | Mod: ,,, | Performed by: INTERNAL MEDICINE

## 2021-08-14 PROCEDURE — 25000003 PHARM REV CODE 250

## 2021-08-14 PROCEDURE — 99223 PR INITIAL HOSPITAL CARE,LEVL III: ICD-10-PCS | Mod: ,,, | Performed by: INTERNAL MEDICINE

## 2021-08-14 PROCEDURE — 25000003 PHARM REV CODE 250: Performed by: INTERNAL MEDICINE

## 2021-08-14 PROCEDURE — 20600001 HC STEP DOWN PRIVATE ROOM

## 2021-08-14 PROCEDURE — 36415 COLL VENOUS BLD VENIPUNCTURE: CPT

## 2021-08-14 PROCEDURE — 85027 COMPLETE CBC AUTOMATED: CPT

## 2021-08-14 PROCEDURE — 63600175 PHARM REV CODE 636 W HCPCS

## 2021-08-14 PROCEDURE — 63600175 PHARM REV CODE 636 W HCPCS: Mod: JG | Performed by: INTERNAL MEDICINE

## 2021-08-14 RX ORDER — ONDANSETRON 4 MG/1
8 TABLET, FILM COATED ORAL EVERY 8 HOURS PRN
Status: DISCONTINUED | OUTPATIENT
Start: 2021-08-14 | End: 2021-08-16

## 2021-08-14 RX ORDER — IBUPROFEN 200 MG
16 TABLET ORAL
Status: DISCONTINUED | OUTPATIENT
Start: 2021-08-14 | End: 2021-08-18 | Stop reason: HOSPADM

## 2021-08-14 RX ORDER — SODIUM CHLORIDE 9 MG/ML
INJECTION, SOLUTION INTRAVENOUS ONCE
Status: COMPLETED | OUTPATIENT
Start: 2021-08-14 | End: 2021-08-14

## 2021-08-14 RX ORDER — ALLOPURINOL 300 MG/1
300 TABLET ORAL DAILY
Status: DISCONTINUED | OUTPATIENT
Start: 2021-08-15 | End: 2021-08-18 | Stop reason: HOSPADM

## 2021-08-14 RX ORDER — AMOXICILLIN 250 MG
1 CAPSULE ORAL 2 TIMES DAILY PRN
Status: DISCONTINUED | OUTPATIENT
Start: 2021-08-14 | End: 2021-08-18 | Stop reason: HOSPADM

## 2021-08-14 RX ORDER — SODIUM CHLORIDE 0.9 % (FLUSH) 0.9 %
10 SYRINGE (ML) INJECTION
Status: DISCONTINUED | OUTPATIENT
Start: 2021-08-14 | End: 2021-08-18 | Stop reason: HOSPADM

## 2021-08-14 RX ORDER — PROCHLORPERAZINE EDISYLATE 5 MG/ML
10 INJECTION INTRAMUSCULAR; INTRAVENOUS EVERY 6 HOURS PRN
Status: DISCONTINUED | OUTPATIENT
Start: 2021-08-14 | End: 2021-08-18 | Stop reason: HOSPADM

## 2021-08-14 RX ORDER — LANOLIN ALCOHOL/MO/W.PET/CERES
400 CREAM (GRAM) TOPICAL DAILY
Status: DISCONTINUED | OUTPATIENT
Start: 2021-08-15 | End: 2021-08-18 | Stop reason: HOSPADM

## 2021-08-14 RX ORDER — TRAMADOL HYDROCHLORIDE 50 MG/1
50 TABLET ORAL EVERY 6 HOURS PRN
Status: DISCONTINUED | OUTPATIENT
Start: 2021-08-14 | End: 2021-08-18 | Stop reason: HOSPADM

## 2021-08-14 RX ORDER — INSULIN ASPART 100 [IU]/ML
0-5 INJECTION, SOLUTION INTRAVENOUS; SUBCUTANEOUS
Status: DISCONTINUED | OUTPATIENT
Start: 2021-08-14 | End: 2021-08-15

## 2021-08-14 RX ORDER — ACETAMINOPHEN 325 MG/1
650 TABLET ORAL EVERY 4 HOURS PRN
Status: DISCONTINUED | OUTPATIENT
Start: 2021-08-14 | End: 2021-08-18 | Stop reason: HOSPADM

## 2021-08-14 RX ORDER — AMLODIPINE BESYLATE 5 MG/1
5 TABLET ORAL DAILY
Status: DISCONTINUED | OUTPATIENT
Start: 2021-08-15 | End: 2021-08-18 | Stop reason: HOSPADM

## 2021-08-14 RX ORDER — HEPARIN SODIUM 5000 [USP'U]/ML
5000 INJECTION, SOLUTION INTRAVENOUS; SUBCUTANEOUS EVERY 8 HOURS
Status: DISCONTINUED | OUTPATIENT
Start: 2021-08-14 | End: 2021-08-18 | Stop reason: HOSPADM

## 2021-08-14 RX ORDER — GLUCAGON 1 MG
1 KIT INJECTION
Status: DISCONTINUED | OUTPATIENT
Start: 2021-08-14 | End: 2021-08-18 | Stop reason: HOSPADM

## 2021-08-14 RX ORDER — IBUPROFEN 200 MG
24 TABLET ORAL
Status: DISCONTINUED | OUTPATIENT
Start: 2021-08-14 | End: 2021-08-18 | Stop reason: HOSPADM

## 2021-08-14 RX ORDER — CHOLECALCIFEROL (VITAMIN D3) 25 MCG
1000 TABLET ORAL DAILY
Status: DISCONTINUED | OUTPATIENT
Start: 2021-08-15 | End: 2021-08-18 | Stop reason: HOSPADM

## 2021-08-14 RX ORDER — ONDANSETRON 2 MG/ML
8 INJECTION INTRAMUSCULAR; INTRAVENOUS EVERY 8 HOURS PRN
Status: DISCONTINUED | OUTPATIENT
Start: 2021-08-14 | End: 2021-08-18 | Stop reason: HOSPADM

## 2021-08-14 RX ORDER — ATORVASTATIN CALCIUM 20 MG/1
20 TABLET, FILM COATED ORAL NIGHTLY
Status: DISCONTINUED | OUTPATIENT
Start: 2021-08-14 | End: 2021-08-18 | Stop reason: HOSPADM

## 2021-08-14 RX ORDER — DEXAMETHASONE 4 MG/1
8 TABLET ORAL
Status: COMPLETED | OUTPATIENT
Start: 2021-08-14 | End: 2021-08-17

## 2021-08-14 RX ORDER — AMLODIPINE BESYLATE 2.5 MG/1
2.5 TABLET ORAL ONCE
Status: COMPLETED | OUTPATIENT
Start: 2021-08-14 | End: 2021-08-14

## 2021-08-14 RX ORDER — ONDANSETRON 8 MG/1
8 TABLET, ORALLY DISINTEGRATING ORAL
Status: COMPLETED | OUTPATIENT
Start: 2021-08-14 | End: 2021-08-17

## 2021-08-14 RX ORDER — ASPIRIN 81 MG/1
81 TABLET ORAL DAILY
Status: DISCONTINUED | OUTPATIENT
Start: 2021-08-15 | End: 2021-08-18 | Stop reason: HOSPADM

## 2021-08-14 RX ORDER — TALC
3 POWDER (GRAM) TOPICAL NIGHTLY PRN
Status: DISCONTINUED | OUTPATIENT
Start: 2021-08-14 | End: 2021-08-18 | Stop reason: HOSPADM

## 2021-08-14 RX ORDER — FERROUS SULFATE 325(65) MG
325 TABLET, DELAYED RELEASE (ENTERIC COATED) ORAL DAILY
Status: DISCONTINUED | OUTPATIENT
Start: 2021-08-15 | End: 2021-08-18 | Stop reason: HOSPADM

## 2021-08-14 RX ORDER — HEPARIN 100 UNIT/ML
500 SYRINGE INTRAVENOUS
Status: DISCONTINUED | OUTPATIENT
Start: 2021-08-14 | End: 2021-08-18 | Stop reason: HOSPADM

## 2021-08-14 RX ORDER — DONEPEZIL HYDROCHLORIDE 5 MG/1
5 TABLET, FILM COATED ORAL NIGHTLY
Status: DISCONTINUED | OUTPATIENT
Start: 2021-08-14 | End: 2021-08-18 | Stop reason: HOSPADM

## 2021-08-14 RX ADMIN — DONEPEZIL HYDROCHLORIDE 5 MG: 5 TABLET, FILM COATED ORAL at 09:08

## 2021-08-14 RX ADMIN — DEXAMETHASONE 8 MG: 4 TABLET ORAL at 06:08

## 2021-08-14 RX ADMIN — SODIUM CHLORIDE: 0.9 INJECTION, SOLUTION INTRAVENOUS at 06:08

## 2021-08-14 RX ADMIN — ONDANSETRON 8 MG: 8 TABLET, ORALLY DISINTEGRATING ORAL at 06:08

## 2021-08-14 RX ADMIN — ATORVASTATIN CALCIUM 20 MG: 20 TABLET, FILM COATED ORAL at 09:08

## 2021-08-14 RX ADMIN — APREPITANT 130 MG: 130 INJECTION, EMULSION INTRAVENOUS at 06:08

## 2021-08-14 RX ADMIN — DIBASIC SODIUM PHOSPHATE, MONOBASIC POTASSIUM PHOSPHATE AND MONOBASIC SODIUM PHOSPHATE 2 TABLET: 852; 155; 130 TABLET ORAL at 06:08

## 2021-08-14 RX ADMIN — HEPARIN SODIUM 5000 UNITS: 5000 INJECTION INTRAVENOUS; SUBCUTANEOUS at 09:08

## 2021-08-14 RX ADMIN — DACARBAZINE 48 MG: 10 INJECTION, POWDER, FOR SOLUTION INTRAVENOUS at 06:08

## 2021-08-14 RX ADMIN — AMLODIPINE BESYLATE 2.5 MG: 2.5 TABLET ORAL at 06:08

## 2021-08-15 ENCOUNTER — DOCUMENT SCAN (OUTPATIENT)
Dept: HOME HEALTH SERVICES | Facility: HOSPITAL | Age: 71
End: 2021-08-15
Payer: MEDICARE

## 2021-08-15 LAB
ALBUMIN SERPL BCP-MCNC: 3.2 G/DL (ref 3.5–5.2)
ALP SERPL-CCNC: 95 U/L (ref 55–135)
ALT SERPL W/O P-5'-P-CCNC: 11 U/L (ref 10–44)
ANION GAP SERPL CALC-SCNC: 12 MMOL/L (ref 8–16)
AST SERPL-CCNC: 13 U/L (ref 10–40)
BILIRUB SERPL-MCNC: 0.3 MG/DL (ref 0.1–1)
BUN SERPL-MCNC: 10 MG/DL (ref 8–23)
CALCIUM SERPL-MCNC: 9.2 MG/DL (ref 8.7–10.5)
CHLORIDE SERPL-SCNC: 101 MMOL/L (ref 95–110)
CO2 SERPL-SCNC: 23 MMOL/L (ref 23–29)
CREAT SERPL-MCNC: 0.9 MG/DL (ref 0.5–1.4)
ERYTHROCYTE [DISTWIDTH] IN BLOOD BY AUTOMATED COUNT: 17.2 % (ref 11.5–14.5)
EST. GFR  (AFRICAN AMERICAN): >60 ML/MIN/1.73 M^2
EST. GFR  (NON AFRICAN AMERICAN): >60 ML/MIN/1.73 M^2
GLUCOSE SERPL-MCNC: 289 MG/DL (ref 70–110)
HCT VFR BLD AUTO: 31.6 % (ref 40–54)
HGB BLD-MCNC: 10.5 G/DL (ref 14–18)
MAGNESIUM SERPL-MCNC: 1.6 MG/DL (ref 1.6–2.6)
MCH RBC QN AUTO: 29.5 PG (ref 27–31)
MCHC RBC AUTO-ENTMCNC: 33.2 G/DL (ref 32–36)
MCV RBC AUTO: 89 FL (ref 82–98)
PHOSPHATE SERPL-MCNC: 2.8 MG/DL (ref 2.7–4.5)
PLATELET # BLD AUTO: 298 K/UL (ref 150–450)
PMV BLD AUTO: 11.2 FL (ref 9.2–12.9)
POCT GLUCOSE: 260 MG/DL (ref 70–110)
POCT GLUCOSE: 312 MG/DL (ref 70–110)
POCT GLUCOSE: 359 MG/DL (ref 70–110)
POCT GLUCOSE: 374 MG/DL (ref 70–110)
POTASSIUM SERPL-SCNC: 4.1 MMOL/L (ref 3.5–5.1)
PROT SERPL-MCNC: 6.4 G/DL (ref 6–8.4)
RBC # BLD AUTO: 3.56 M/UL (ref 4.6–6.2)
SODIUM SERPL-SCNC: 136 MMOL/L (ref 136–145)
WBC # BLD AUTO: 14.19 K/UL (ref 3.9–12.7)

## 2021-08-15 PROCEDURE — 25000003 PHARM REV CODE 250

## 2021-08-15 PROCEDURE — 63600175 PHARM REV CODE 636 W HCPCS: Performed by: INTERNAL MEDICINE

## 2021-08-15 PROCEDURE — 25000003 PHARM REV CODE 250: Performed by: INTERNAL MEDICINE

## 2021-08-15 PROCEDURE — 36415 COLL VENOUS BLD VENIPUNCTURE: CPT

## 2021-08-15 PROCEDURE — 80053 COMPREHEN METABOLIC PANEL: CPT

## 2021-08-15 PROCEDURE — 85027 COMPLETE CBC AUTOMATED: CPT

## 2021-08-15 PROCEDURE — 99232 SBSQ HOSP IP/OBS MODERATE 35: CPT | Mod: ,,, | Performed by: INTERNAL MEDICINE

## 2021-08-15 PROCEDURE — 84100 ASSAY OF PHOSPHORUS: CPT

## 2021-08-15 PROCEDURE — 99232 PR SUBSEQUENT HOSPITAL CARE,LEVL II: ICD-10-PCS | Mod: ,,, | Performed by: INTERNAL MEDICINE

## 2021-08-15 PROCEDURE — 63600175 PHARM REV CODE 636 W HCPCS

## 2021-08-15 PROCEDURE — 83735 ASSAY OF MAGNESIUM: CPT

## 2021-08-15 PROCEDURE — 20600001 HC STEP DOWN PRIVATE ROOM

## 2021-08-15 RX ORDER — SUCRALFATE 1 G/1
1 TABLET ORAL 4 TIMES DAILY
Status: DISCONTINUED | OUTPATIENT
Start: 2021-08-15 | End: 2021-08-16

## 2021-08-15 RX ORDER — LISINOPRIL 20 MG/1
20 TABLET ORAL DAILY
Status: DISCONTINUED | OUTPATIENT
Start: 2021-08-15 | End: 2021-08-18 | Stop reason: HOSPADM

## 2021-08-15 RX ORDER — INSULIN ASPART 100 [IU]/ML
0-5 INJECTION, SOLUTION INTRAVENOUS; SUBCUTANEOUS
Status: DISCONTINUED | OUTPATIENT
Start: 2021-08-15 | End: 2021-08-18 | Stop reason: HOSPADM

## 2021-08-15 RX ADMIN — SUCRALFATE 1 G: 1 TABLET ORAL at 05:08

## 2021-08-15 RX ADMIN — HEPARIN SODIUM 5000 UNITS: 5000 INJECTION INTRAVENOUS; SUBCUTANEOUS at 02:08

## 2021-08-15 RX ADMIN — INSULIN ASPART 3 UNITS: 100 INJECTION, SOLUTION INTRAVENOUS; SUBCUTANEOUS at 10:08

## 2021-08-15 RX ADMIN — AMLODIPINE BESYLATE 5 MG: 5 TABLET ORAL at 08:08

## 2021-08-15 RX ADMIN — INSULIN ASPART 4 UNITS: 100 INJECTION, SOLUTION INTRAVENOUS; SUBCUTANEOUS at 05:08

## 2021-08-15 RX ADMIN — ASPIRIN 81 MG: 81 TABLET, COATED ORAL at 08:08

## 2021-08-15 RX ADMIN — DIBASIC SODIUM PHOSPHATE, MONOBASIC POTASSIUM PHOSPHATE AND MONOBASIC SODIUM PHOSPHATE 2 TABLET: 852; 155; 130 TABLET ORAL at 05:08

## 2021-08-15 RX ADMIN — HEPARIN SODIUM 5000 UNITS: 5000 INJECTION INTRAVENOUS; SUBCUTANEOUS at 10:08

## 2021-08-15 RX ADMIN — Medication 1000 UNITS: at 08:08

## 2021-08-15 RX ADMIN — DACARBAZINE 48 MG: 10 INJECTION, POWDER, FOR SOLUTION INTRAVENOUS at 07:08

## 2021-08-15 RX ADMIN — ALLOPURINOL 300 MG: 300 TABLET ORAL at 08:08

## 2021-08-15 RX ADMIN — SUCRALFATE 1 G: 1 TABLET ORAL at 08:08

## 2021-08-15 RX ADMIN — DEXAMETHASONE 8 MG: 4 TABLET ORAL at 06:08

## 2021-08-15 RX ADMIN — DONEPEZIL HYDROCHLORIDE 5 MG: 5 TABLET, FILM COATED ORAL at 08:08

## 2021-08-15 RX ADMIN — HEPARIN SODIUM 5000 UNITS: 5000 INJECTION INTRAVENOUS; SUBCUTANEOUS at 06:08

## 2021-08-15 RX ADMIN — LISINOPRIL 20 MG: 20 TABLET ORAL at 12:08

## 2021-08-15 RX ADMIN — INSULIN ASPART 3 UNITS: 100 INJECTION, SOLUTION INTRAVENOUS; SUBCUTANEOUS at 09:08

## 2021-08-15 RX ADMIN — INSULIN ASPART 5 UNITS: 100 INJECTION, SOLUTION INTRAVENOUS; SUBCUTANEOUS at 02:08

## 2021-08-15 RX ADMIN — FERROUS SULFATE TAB EC 325 MG (65 MG FE EQUIVALENT) 325 MG: 325 (65 FE) TABLET DELAYED RESPONSE at 08:08

## 2021-08-15 RX ADMIN — ATORVASTATIN CALCIUM 20 MG: 20 TABLET, FILM COATED ORAL at 08:08

## 2021-08-15 RX ADMIN — Medication 400 MG: at 08:08

## 2021-08-15 RX ADMIN — ONDANSETRON 8 MG: 8 TABLET, ORALLY DISINTEGRATING ORAL at 06:08

## 2021-08-15 RX ADMIN — SUCRALFATE 1 G: 1 TABLET ORAL at 12:08

## 2021-08-16 LAB
ALBUMIN SERPL BCP-MCNC: 3.1 G/DL (ref 3.5–5.2)
ALP SERPL-CCNC: 95 U/L (ref 55–135)
ALT SERPL W/O P-5'-P-CCNC: 13 U/L (ref 10–44)
ANION GAP SERPL CALC-SCNC: 11 MMOL/L (ref 8–16)
AST SERPL-CCNC: 12 U/L (ref 10–40)
BILIRUB SERPL-MCNC: 0.3 MG/DL (ref 0.1–1)
BUN SERPL-MCNC: 11 MG/DL (ref 8–23)
CALCIUM SERPL-MCNC: 8.7 MG/DL (ref 8.7–10.5)
CHLORIDE SERPL-SCNC: 104 MMOL/L (ref 95–110)
CO2 SERPL-SCNC: 22 MMOL/L (ref 23–29)
CREAT SERPL-MCNC: 0.8 MG/DL (ref 0.5–1.4)
ERYTHROCYTE [DISTWIDTH] IN BLOOD BY AUTOMATED COUNT: 17.3 % (ref 11.5–14.5)
EST. GFR  (AFRICAN AMERICAN): >60 ML/MIN/1.73 M^2
EST. GFR  (NON AFRICAN AMERICAN): >60 ML/MIN/1.73 M^2
GLUCOSE SERPL-MCNC: 310 MG/DL (ref 70–110)
HCT VFR BLD AUTO: 30.4 % (ref 40–54)
HGB BLD-MCNC: 10.2 G/DL (ref 14–18)
MAGNESIUM SERPL-MCNC: 1.6 MG/DL (ref 1.6–2.6)
MCH RBC QN AUTO: 29.9 PG (ref 27–31)
MCHC RBC AUTO-ENTMCNC: 33.6 G/DL (ref 32–36)
MCV RBC AUTO: 89 FL (ref 82–98)
PHOSPHATE SERPL-MCNC: 2.9 MG/DL (ref 2.7–4.5)
PLATELET # BLD AUTO: 311 K/UL (ref 150–450)
PMV BLD AUTO: 10.9 FL (ref 9.2–12.9)
POCT GLUCOSE: 281 MG/DL (ref 70–110)
POCT GLUCOSE: 352 MG/DL (ref 70–110)
POCT GLUCOSE: 391 MG/DL (ref 70–110)
POCT GLUCOSE: 411 MG/DL (ref 70–110)
POTASSIUM SERPL-SCNC: 4.2 MMOL/L (ref 3.5–5.1)
PROT SERPL-MCNC: 6.2 G/DL (ref 6–8.4)
RBC # BLD AUTO: 3.41 M/UL (ref 4.6–6.2)
SODIUM SERPL-SCNC: 137 MMOL/L (ref 136–145)
WBC # BLD AUTO: 19.08 K/UL (ref 3.9–12.7)

## 2021-08-16 PROCEDURE — 20600001 HC STEP DOWN PRIVATE ROOM

## 2021-08-16 PROCEDURE — 83735 ASSAY OF MAGNESIUM: CPT

## 2021-08-16 PROCEDURE — 25000003 PHARM REV CODE 250

## 2021-08-16 PROCEDURE — 25000003 PHARM REV CODE 250: Performed by: STUDENT IN AN ORGANIZED HEALTH CARE EDUCATION/TRAINING PROGRAM

## 2021-08-16 PROCEDURE — 84100 ASSAY OF PHOSPHORUS: CPT

## 2021-08-16 PROCEDURE — 25000003 PHARM REV CODE 250: Performed by: INTERNAL MEDICINE

## 2021-08-16 PROCEDURE — 99232 SBSQ HOSP IP/OBS MODERATE 35: CPT | Mod: ,,, | Performed by: INTERNAL MEDICINE

## 2021-08-16 PROCEDURE — 63600175 PHARM REV CODE 636 W HCPCS

## 2021-08-16 PROCEDURE — 36415 COLL VENOUS BLD VENIPUNCTURE: CPT

## 2021-08-16 PROCEDURE — 85027 COMPLETE CBC AUTOMATED: CPT

## 2021-08-16 PROCEDURE — 80053 COMPREHEN METABOLIC PANEL: CPT

## 2021-08-16 PROCEDURE — 63600175 PHARM REV CODE 636 W HCPCS: Performed by: INTERNAL MEDICINE

## 2021-08-16 PROCEDURE — 99232 PR SUBSEQUENT HOSPITAL CARE,LEVL II: ICD-10-PCS | Mod: ,,, | Performed by: INTERNAL MEDICINE

## 2021-08-16 RX ORDER — SUCRALFATE 1 G/10ML
1 SUSPENSION ORAL
Status: DISCONTINUED | OUTPATIENT
Start: 2021-08-16 | End: 2021-08-18 | Stop reason: HOSPADM

## 2021-08-16 RX ORDER — MUPIROCIN 20 MG/G
OINTMENT TOPICAL 2 TIMES DAILY
Status: DISCONTINUED | OUTPATIENT
Start: 2021-08-16 | End: 2021-08-18 | Stop reason: HOSPADM

## 2021-08-16 RX ADMIN — SUCRALFATE 1 G: 1 TABLET ORAL at 09:08

## 2021-08-16 RX ADMIN — Medication 400 MG: at 09:08

## 2021-08-16 RX ADMIN — FERROUS SULFATE TAB EC 325 MG (65 MG FE EQUIVALENT) 325 MG: 325 (65 FE) TABLET DELAYED RESPONSE at 09:08

## 2021-08-16 RX ADMIN — HEPARIN SODIUM 5000 UNITS: 5000 INJECTION INTRAVENOUS; SUBCUTANEOUS at 06:08

## 2021-08-16 RX ADMIN — Medication 1000 UNITS: at 09:08

## 2021-08-16 RX ADMIN — SUCRALFATE 1 G: 1 SUSPENSION ORAL at 04:08

## 2021-08-16 RX ADMIN — HEPARIN SODIUM 5000 UNITS: 5000 INJECTION INTRAVENOUS; SUBCUTANEOUS at 10:08

## 2021-08-16 RX ADMIN — DIBASIC SODIUM PHOSPHATE, MONOBASIC POTASSIUM PHOSPHATE AND MONOBASIC SODIUM PHOSPHATE 2 TABLET: 852; 155; 130 TABLET ORAL at 05:08

## 2021-08-16 RX ADMIN — ONDANSETRON 8 MG: 8 TABLET, ORALLY DISINTEGRATING ORAL at 05:08

## 2021-08-16 RX ADMIN — AMLODIPINE BESYLATE 5 MG: 5 TABLET ORAL at 09:08

## 2021-08-16 RX ADMIN — DEXAMETHASONE 8 MG: 4 TABLET ORAL at 05:08

## 2021-08-16 RX ADMIN — INSULIN ASPART 3 UNITS: 100 INJECTION, SOLUTION INTRAVENOUS; SUBCUTANEOUS at 09:08

## 2021-08-16 RX ADMIN — DACARBAZINE 48 MG: 10 INJECTION, POWDER, FOR SOLUTION INTRAVENOUS at 06:08

## 2021-08-16 RX ADMIN — ALLOPURINOL 300 MG: 300 TABLET ORAL at 09:08

## 2021-08-16 RX ADMIN — INSULIN ASPART 5 UNITS: 100 INJECTION, SOLUTION INTRAVENOUS; SUBCUTANEOUS at 10:08

## 2021-08-16 RX ADMIN — INSULIN ASPART 5 UNITS: 100 INJECTION, SOLUTION INTRAVENOUS; SUBCUTANEOUS at 12:08

## 2021-08-16 RX ADMIN — DONEPEZIL HYDROCHLORIDE 5 MG: 5 TABLET, FILM COATED ORAL at 10:08

## 2021-08-16 RX ADMIN — INSULIN ASPART 5 UNITS: 100 INJECTION, SOLUTION INTRAVENOUS; SUBCUTANEOUS at 05:08

## 2021-08-16 RX ADMIN — HEPARIN SODIUM 5000 UNITS: 5000 INJECTION INTRAVENOUS; SUBCUTANEOUS at 03:08

## 2021-08-16 RX ADMIN — LISINOPRIL 20 MG: 20 TABLET ORAL at 09:08

## 2021-08-16 RX ADMIN — ATORVASTATIN CALCIUM 20 MG: 20 TABLET, FILM COATED ORAL at 10:08

## 2021-08-16 RX ADMIN — SUCRALFATE 1 G: 1 SUSPENSION ORAL at 10:08

## 2021-08-16 RX ADMIN — MUPIROCIN: 20 OINTMENT TOPICAL at 10:08

## 2021-08-16 RX ADMIN — ASPIRIN 81 MG: 81 TABLET, COATED ORAL at 09:08

## 2021-08-17 ENCOUNTER — SPECIALTY PHARMACY (OUTPATIENT)
Dept: PHARMACY | Facility: CLINIC | Age: 71
End: 2021-08-17

## 2021-08-17 LAB
ALBUMIN SERPL BCP-MCNC: 3.2 G/DL (ref 3.5–5.2)
ALP SERPL-CCNC: 84 U/L (ref 55–135)
ALT SERPL W/O P-5'-P-CCNC: 22 U/L (ref 10–44)
ANION GAP SERPL CALC-SCNC: 11 MMOL/L (ref 8–16)
AST SERPL-CCNC: 20 U/L (ref 10–40)
BILIRUB SERPL-MCNC: 0.3 MG/DL (ref 0.1–1)
BUN SERPL-MCNC: 15 MG/DL (ref 8–23)
CALCIUM SERPL-MCNC: 9.1 MG/DL (ref 8.7–10.5)
CHLORIDE SERPL-SCNC: 101 MMOL/L (ref 95–110)
CO2 SERPL-SCNC: 24 MMOL/L (ref 23–29)
CREAT SERPL-MCNC: 0.9 MG/DL (ref 0.5–1.4)
ERYTHROCYTE [DISTWIDTH] IN BLOOD BY AUTOMATED COUNT: 17.3 % (ref 11.5–14.5)
EST. GFR  (AFRICAN AMERICAN): >60 ML/MIN/1.73 M^2
EST. GFR  (NON AFRICAN AMERICAN): >60 ML/MIN/1.73 M^2
GLUCOSE SERPL-MCNC: 332 MG/DL (ref 70–110)
HCT VFR BLD AUTO: 29.6 % (ref 40–54)
HGB BLD-MCNC: 9.8 G/DL (ref 14–18)
MAGNESIUM SERPL-MCNC: 1.8 MG/DL (ref 1.6–2.6)
MCH RBC QN AUTO: 29.4 PG (ref 27–31)
MCHC RBC AUTO-ENTMCNC: 33.1 G/DL (ref 32–36)
MCV RBC AUTO: 89 FL (ref 82–98)
PHOSPHATE SERPL-MCNC: 3.5 MG/DL (ref 2.7–4.5)
PLATELET # BLD AUTO: 316 K/UL (ref 150–450)
PMV BLD AUTO: 11.3 FL (ref 9.2–12.9)
POCT GLUCOSE: 234 MG/DL (ref 70–110)
POCT GLUCOSE: 316 MG/DL (ref 70–110)
POCT GLUCOSE: 317 MG/DL (ref 70–110)
POTASSIUM SERPL-SCNC: 4.7 MMOL/L (ref 3.5–5.1)
PROT SERPL-MCNC: 6.1 G/DL (ref 6–8.4)
RBC # BLD AUTO: 3.33 M/UL (ref 4.6–6.2)
SODIUM SERPL-SCNC: 136 MMOL/L (ref 136–145)
WBC # BLD AUTO: 16.28 K/UL (ref 3.9–12.7)

## 2021-08-17 PROCEDURE — 63600175 PHARM REV CODE 636 W HCPCS

## 2021-08-17 PROCEDURE — 25000003 PHARM REV CODE 250: Performed by: INTERNAL MEDICINE

## 2021-08-17 PROCEDURE — 25000003 PHARM REV CODE 250

## 2021-08-17 PROCEDURE — 85027 COMPLETE CBC AUTOMATED: CPT

## 2021-08-17 PROCEDURE — 36415 COLL VENOUS BLD VENIPUNCTURE: CPT

## 2021-08-17 PROCEDURE — 99232 SBSQ HOSP IP/OBS MODERATE 35: CPT | Mod: ,,, | Performed by: INTERNAL MEDICINE

## 2021-08-17 PROCEDURE — 84100 ASSAY OF PHOSPHORUS: CPT

## 2021-08-17 PROCEDURE — 83735 ASSAY OF MAGNESIUM: CPT

## 2021-08-17 PROCEDURE — 25000003 PHARM REV CODE 250: Performed by: STUDENT IN AN ORGANIZED HEALTH CARE EDUCATION/TRAINING PROGRAM

## 2021-08-17 PROCEDURE — 99232 PR SUBSEQUENT HOSPITAL CARE,LEVL II: ICD-10-PCS | Mod: ,,, | Performed by: INTERNAL MEDICINE

## 2021-08-17 PROCEDURE — 80053 COMPREHEN METABOLIC PANEL: CPT

## 2021-08-17 PROCEDURE — 63600175 PHARM REV CODE 636 W HCPCS: Performed by: INTERNAL MEDICINE

## 2021-08-17 PROCEDURE — 20600001 HC STEP DOWN PRIVATE ROOM

## 2021-08-17 RX ORDER — PEGFILGRASTIM 6 MG/.6ML
6 INJECTION SUBCUTANEOUS ONCE
Qty: 0.6 ML | Refills: 0 | OUTPATIENT
Start: 2021-08-17 | End: 2021-08-18

## 2021-08-17 RX ADMIN — ATORVASTATIN CALCIUM 20 MG: 20 TABLET, FILM COATED ORAL at 09:08

## 2021-08-17 RX ADMIN — ONDANSETRON 8 MG: 8 TABLET, ORALLY DISINTEGRATING ORAL at 04:08

## 2021-08-17 RX ADMIN — INSULIN ASPART 5 UNITS: 100 INJECTION, SOLUTION INTRAVENOUS; SUBCUTANEOUS at 09:08

## 2021-08-17 RX ADMIN — SODIUM CHLORIDE: 0.9 INJECTION, SOLUTION INTRAVENOUS at 06:08

## 2021-08-17 RX ADMIN — HEPARIN SODIUM 5000 UNITS: 5000 INJECTION INTRAVENOUS; SUBCUTANEOUS at 05:08

## 2021-08-17 RX ADMIN — HEPARIN SODIUM 5000 UNITS: 5000 INJECTION INTRAVENOUS; SUBCUTANEOUS at 09:08

## 2021-08-17 RX ADMIN — Medication 400 MG: at 09:08

## 2021-08-17 RX ADMIN — DIBASIC SODIUM PHOSPHATE, MONOBASIC POTASSIUM PHOSPHATE AND MONOBASIC SODIUM PHOSPHATE 2 TABLET: 852; 155; 130 TABLET ORAL at 04:08

## 2021-08-17 RX ADMIN — SUCRALFATE 1 G: 1 SUSPENSION ORAL at 09:08

## 2021-08-17 RX ADMIN — INSULIN ASPART 4 UNITS: 100 INJECTION, SOLUTION INTRAVENOUS; SUBCUTANEOUS at 04:08

## 2021-08-17 RX ADMIN — INSULIN ASPART 2 UNITS: 100 INJECTION, SOLUTION INTRAVENOUS; SUBCUTANEOUS at 09:08

## 2021-08-17 RX ADMIN — SUCRALFATE 1 G: 1 SUSPENSION ORAL at 11:08

## 2021-08-17 RX ADMIN — SUCRALFATE 1 G: 1 SUSPENSION ORAL at 05:08

## 2021-08-17 RX ADMIN — ALLOPURINOL 300 MG: 300 TABLET ORAL at 09:08

## 2021-08-17 RX ADMIN — INSULIN ASPART 4 UNITS: 100 INJECTION, SOLUTION INTRAVENOUS; SUBCUTANEOUS at 12:08

## 2021-08-17 RX ADMIN — MUPIROCIN: 20 OINTMENT TOPICAL at 09:08

## 2021-08-17 RX ADMIN — LISINOPRIL 20 MG: 20 TABLET ORAL at 09:08

## 2021-08-17 RX ADMIN — DEXAMETHASONE 8 MG: 4 TABLET ORAL at 04:08

## 2021-08-17 RX ADMIN — SUCRALFATE 1 G: 1 SUSPENSION ORAL at 04:08

## 2021-08-17 RX ADMIN — DONEPEZIL HYDROCHLORIDE 5 MG: 5 TABLET, FILM COATED ORAL at 09:08

## 2021-08-17 RX ADMIN — HEPARIN SODIUM 5000 UNITS: 5000 INJECTION INTRAVENOUS; SUBCUTANEOUS at 03:08

## 2021-08-17 RX ADMIN — FERROUS SULFATE TAB EC 325 MG (65 MG FE EQUIVALENT) 325 MG: 325 (65 FE) TABLET DELAYED RESPONSE at 09:08

## 2021-08-17 RX ADMIN — AMLODIPINE BESYLATE 5 MG: 5 TABLET ORAL at 09:08

## 2021-08-17 RX ADMIN — ASPIRIN 81 MG: 81 TABLET, COATED ORAL at 09:08

## 2021-08-17 RX ADMIN — Medication 1000 UNITS: at 09:08

## 2021-08-18 ENCOUNTER — PATIENT MESSAGE (OUTPATIENT)
Dept: PHARMACY | Facility: CLINIC | Age: 71
End: 2021-08-18

## 2021-08-18 VITALS
WEIGHT: 159.38 LBS | OXYGEN SATURATION: 96 % | BODY MASS INDEX: 25.01 KG/M2 | RESPIRATION RATE: 18 BRPM | DIASTOLIC BLOOD PRESSURE: 80 MMHG | HEIGHT: 67 IN | HEART RATE: 74 BPM | TEMPERATURE: 98 F | SYSTOLIC BLOOD PRESSURE: 129 MMHG

## 2021-08-18 LAB
ALBUMIN SERPL BCP-MCNC: 3.2 G/DL (ref 3.5–5.2)
ALP SERPL-CCNC: 82 U/L (ref 55–135)
ALT SERPL W/O P-5'-P-CCNC: 35 U/L (ref 10–44)
ANION GAP SERPL CALC-SCNC: 10 MMOL/L (ref 8–16)
AST SERPL-CCNC: 25 U/L (ref 10–40)
BILIRUB SERPL-MCNC: 0.4 MG/DL (ref 0.1–1)
BUN SERPL-MCNC: 17 MG/DL (ref 8–23)
CALCIUM SERPL-MCNC: 9 MG/DL (ref 8.7–10.5)
CHLORIDE SERPL-SCNC: 101 MMOL/L (ref 95–110)
CO2 SERPL-SCNC: 25 MMOL/L (ref 23–29)
CREAT SERPL-MCNC: 0.9 MG/DL (ref 0.5–1.4)
ERYTHROCYTE [DISTWIDTH] IN BLOOD BY AUTOMATED COUNT: 17.2 % (ref 11.5–14.5)
EST. GFR  (AFRICAN AMERICAN): >60 ML/MIN/1.73 M^2
EST. GFR  (NON AFRICAN AMERICAN): >60 ML/MIN/1.73 M^2
GLUCOSE SERPL-MCNC: 360 MG/DL (ref 70–110)
HCT VFR BLD AUTO: 29.8 % (ref 40–54)
HGB BLD-MCNC: 10.3 G/DL (ref 14–18)
MAGNESIUM SERPL-MCNC: 1.9 MG/DL (ref 1.6–2.6)
MCH RBC QN AUTO: 30.3 PG (ref 27–31)
MCHC RBC AUTO-ENTMCNC: 34.6 G/DL (ref 32–36)
MCV RBC AUTO: 88 FL (ref 82–98)
PHOSPHATE SERPL-MCNC: 3.5 MG/DL (ref 2.7–4.5)
PLATELET # BLD AUTO: 308 K/UL (ref 150–450)
PMV BLD AUTO: 11.8 FL (ref 9.2–12.9)
POCT GLUCOSE: 288 MG/DL (ref 70–110)
POCT GLUCOSE: 339 MG/DL (ref 70–110)
POCT GLUCOSE: 341 MG/DL (ref 70–110)
POCT GLUCOSE: 405 MG/DL (ref 70–110)
POTASSIUM SERPL-SCNC: 4.4 MMOL/L (ref 3.5–5.1)
PROT SERPL-MCNC: 6 G/DL (ref 6–8.4)
RBC # BLD AUTO: 3.4 M/UL (ref 4.6–6.2)
SODIUM SERPL-SCNC: 136 MMOL/L (ref 136–145)
WBC # BLD AUTO: 11.91 K/UL (ref 3.9–12.7)

## 2021-08-18 PROCEDURE — 80053 COMPREHEN METABOLIC PANEL: CPT

## 2021-08-18 PROCEDURE — 25000003 PHARM REV CODE 250: Performed by: STUDENT IN AN ORGANIZED HEALTH CARE EDUCATION/TRAINING PROGRAM

## 2021-08-18 PROCEDURE — 83735 ASSAY OF MAGNESIUM: CPT

## 2021-08-18 PROCEDURE — 36415 COLL VENOUS BLD VENIPUNCTURE: CPT

## 2021-08-18 PROCEDURE — 99238 HOSP IP/OBS DSCHRG MGMT 30/<: CPT | Mod: ,,, | Performed by: INTERNAL MEDICINE

## 2021-08-18 PROCEDURE — 85027 COMPLETE CBC AUTOMATED: CPT

## 2021-08-18 PROCEDURE — 99238 PR HOSPITAL DISCHARGE DAY,<30 MIN: ICD-10-PCS | Mod: ,,, | Performed by: INTERNAL MEDICINE

## 2021-08-18 PROCEDURE — 25000003 PHARM REV CODE 250

## 2021-08-18 PROCEDURE — 63600175 PHARM REV CODE 636 W HCPCS

## 2021-08-18 PROCEDURE — 84100 ASSAY OF PHOSPHORUS: CPT

## 2021-08-18 RX ORDER — PEGFILGRASTIM-BMEZ 6 MG/.6ML
6 INJECTION SUBCUTANEOUS ONCE
Qty: 0.6 ML | Refills: 5 | Status: SHIPPED | OUTPATIENT
Start: 2021-08-18 | End: 2021-08-18

## 2021-08-18 RX ADMIN — LISINOPRIL 20 MG: 20 TABLET ORAL at 08:08

## 2021-08-18 RX ADMIN — SUCRALFATE 1 G: 1 SUSPENSION ORAL at 05:08

## 2021-08-18 RX ADMIN — INSULIN ASPART 3 UNITS: 100 INJECTION, SOLUTION INTRAVENOUS; SUBCUTANEOUS at 05:08

## 2021-08-18 RX ADMIN — INSULIN ASPART 4 UNITS: 100 INJECTION, SOLUTION INTRAVENOUS; SUBCUTANEOUS at 12:08

## 2021-08-18 RX ADMIN — FERROUS SULFATE TAB EC 325 MG (65 MG FE EQUIVALENT) 325 MG: 325 (65 FE) TABLET DELAYED RESPONSE at 08:08

## 2021-08-18 RX ADMIN — HEPARIN SODIUM 5000 UNITS: 5000 INJECTION INTRAVENOUS; SUBCUTANEOUS at 05:08

## 2021-08-18 RX ADMIN — SUCRALFATE 1 G: 1 SUSPENSION ORAL at 12:08

## 2021-08-18 RX ADMIN — Medication 400 MG: at 08:08

## 2021-08-18 RX ADMIN — Medication 1000 UNITS: at 08:08

## 2021-08-18 RX ADMIN — AMLODIPINE BESYLATE 5 MG: 5 TABLET ORAL at 08:08

## 2021-08-18 RX ADMIN — INSULIN ASPART 3 UNITS: 100 INJECTION, SOLUTION INTRAVENOUS; SUBCUTANEOUS at 08:08

## 2021-08-18 RX ADMIN — MUPIROCIN: 20 OINTMENT TOPICAL at 08:08

## 2021-08-18 RX ADMIN — HEPARIN SODIUM 5000 UNITS: 5000 INJECTION INTRAVENOUS; SUBCUTANEOUS at 02:08

## 2021-08-18 RX ADMIN — ASPIRIN 81 MG: 81 TABLET, COATED ORAL at 08:08

## 2021-08-18 RX ADMIN — ALLOPURINOL 300 MG: 300 TABLET ORAL at 08:08

## 2021-08-18 RX ADMIN — DIBASIC SODIUM PHOSPHATE, MONOBASIC POTASSIUM PHOSPHATE AND MONOBASIC SODIUM PHOSPHATE 2 TABLET: 852; 155; 130 TABLET ORAL at 05:08

## 2021-08-19 ENCOUNTER — DOCUMENTATION ONLY (OUTPATIENT)
Dept: HEMATOLOGY/ONCOLOGY | Facility: CLINIC | Age: 71
End: 2021-08-19

## 2021-08-19 ENCOUNTER — PATIENT MESSAGE (OUTPATIENT)
Dept: HEMATOLOGY/ONCOLOGY | Facility: CLINIC | Age: 71
End: 2021-08-19

## 2021-08-19 ENCOUNTER — INFUSION (OUTPATIENT)
Dept: INFUSION THERAPY | Facility: HOSPITAL | Age: 71
End: 2021-08-19
Attending: STUDENT IN AN ORGANIZED HEALTH CARE EDUCATION/TRAINING PROGRAM
Payer: MEDICARE

## 2021-08-19 ENCOUNTER — TELEPHONE (OUTPATIENT)
Dept: INFUSION THERAPY | Facility: HOSPITAL | Age: 71
End: 2021-08-19

## 2021-08-19 VITALS
TEMPERATURE: 98 F | DIASTOLIC BLOOD PRESSURE: 73 MMHG | SYSTOLIC BLOOD PRESSURE: 136 MMHG | HEART RATE: 71 BPM | RESPIRATION RATE: 18 BRPM

## 2021-08-19 DIAGNOSIS — C49.9 SARCOMA: Primary | ICD-10-CM

## 2021-08-19 DIAGNOSIS — C49.21 SARCOMA OF RIGHT THIGH: ICD-10-CM

## 2021-08-19 PROCEDURE — 63600175 PHARM REV CODE 636 W HCPCS: Mod: TB,PN | Performed by: STUDENT IN AN ORGANIZED HEALTH CARE EDUCATION/TRAINING PROGRAM

## 2021-08-19 PROCEDURE — 96372 THER/PROPH/DIAG INJ SC/IM: CPT | Mod: PN

## 2021-08-19 RX ADMIN — PEGFILGRASTIM-CBQV 6 MG: 6 INJECTION, SOLUTION SUBCUTANEOUS at 02:08

## 2021-08-22 ENCOUNTER — PATIENT MESSAGE (OUTPATIENT)
Dept: HEMATOLOGY/ONCOLOGY | Facility: CLINIC | Age: 71
End: 2021-08-22

## 2021-08-23 ENCOUNTER — TELEPHONE (OUTPATIENT)
Dept: HEMATOLOGY/ONCOLOGY | Facility: CLINIC | Age: 71
End: 2021-08-23

## 2021-08-25 LAB
LEFT EYE DM RETINOPATHY: POSITIVE
RIGHT EYE DM RETINOPATHY: POSITIVE

## 2021-08-27 ENCOUNTER — PATIENT OUTREACH (OUTPATIENT)
Dept: ADMINISTRATIVE | Facility: HOSPITAL | Age: 71
End: 2021-08-27

## 2021-08-30 ENCOUNTER — PATIENT MESSAGE (OUTPATIENT)
Dept: HEMATOLOGY/ONCOLOGY | Facility: CLINIC | Age: 71
End: 2021-08-30

## 2021-08-31 ENCOUNTER — PATIENT MESSAGE (OUTPATIENT)
Dept: HEMATOLOGY/ONCOLOGY | Facility: CLINIC | Age: 71
End: 2021-08-31

## 2021-09-02 ENCOUNTER — PATIENT MESSAGE (OUTPATIENT)
Dept: HEMATOLOGY/ONCOLOGY | Facility: CLINIC | Age: 71
End: 2021-09-02

## 2021-09-09 ENCOUNTER — PATIENT MESSAGE (OUTPATIENT)
Dept: HEMATOLOGY/ONCOLOGY | Facility: CLINIC | Age: 71
End: 2021-09-09

## 2021-09-10 ENCOUNTER — DOCUMENTATION ONLY (OUTPATIENT)
Dept: HEMATOLOGY/ONCOLOGY | Facility: CLINIC | Age: 71
End: 2021-09-10

## 2021-09-10 ENCOUNTER — TELEPHONE (OUTPATIENT)
Dept: HEMATOLOGY/ONCOLOGY | Facility: CLINIC | Age: 71
End: 2021-09-10

## 2021-09-10 ENCOUNTER — PATIENT MESSAGE (OUTPATIENT)
Dept: HEMATOLOGY/ONCOLOGY | Facility: CLINIC | Age: 71
End: 2021-09-10

## 2021-09-14 ENCOUNTER — DOCUMENT SCAN (OUTPATIENT)
Dept: HOME HEALTH SERVICES | Facility: HOSPITAL | Age: 71
End: 2021-09-14
Payer: MEDICARE

## 2021-10-07 ENCOUNTER — PATIENT MESSAGE (OUTPATIENT)
Dept: ADMINISTRATIVE | Facility: HOSPITAL | Age: 71
End: 2021-10-07

## 2021-11-28 DIAGNOSIS — M25.551 RIGHT HIP PAIN: ICD-10-CM

## 2021-11-28 DIAGNOSIS — R22.41 MASS OF RIGHT THIGH: ICD-10-CM

## 2021-11-29 RX ORDER — MELOXICAM 7.5 MG/1
TABLET ORAL
Qty: 30 TABLET | Refills: 2 | Status: SHIPPED | OUTPATIENT
Start: 2021-11-29 | End: 2022-02-01

## 2022-01-11 ENCOUNTER — PATIENT MESSAGE (OUTPATIENT)
Dept: FAMILY MEDICINE | Facility: CLINIC | Age: 72
End: 2022-01-11
Payer: MEDICARE

## 2022-01-12 ENCOUNTER — PATIENT MESSAGE (OUTPATIENT)
Dept: FAMILY MEDICINE | Facility: CLINIC | Age: 72
End: 2022-01-12
Payer: MEDICARE

## 2022-01-21 ENCOUNTER — OFFICE VISIT (OUTPATIENT)
Dept: FAMILY MEDICINE | Facility: CLINIC | Age: 72
End: 2022-01-21
Payer: MEDICARE

## 2022-01-21 VITALS
BODY MASS INDEX: 24.74 KG/M2 | HEART RATE: 89 BPM | WEIGHT: 157.63 LBS | SYSTOLIC BLOOD PRESSURE: 132 MMHG | OXYGEN SATURATION: 100 % | HEIGHT: 67 IN | DIASTOLIC BLOOD PRESSURE: 67 MMHG | RESPIRATION RATE: 18 BRPM

## 2022-01-21 DIAGNOSIS — Z48.03 ENCOUNTER FOR CHANGE OR REMOVAL OF DRAINS: Primary | ICD-10-CM

## 2022-01-21 PROCEDURE — 99999 PR PBB SHADOW E&M-EST. PATIENT-LVL V: ICD-10-PCS | Mod: PBBFAC,,, | Performed by: STUDENT IN AN ORGANIZED HEALTH CARE EDUCATION/TRAINING PROGRAM

## 2022-01-21 PROCEDURE — 99999 PR PBB SHADOW E&M-EST. PATIENT-LVL V: CPT | Mod: PBBFAC,,, | Performed by: STUDENT IN AN ORGANIZED HEALTH CARE EDUCATION/TRAINING PROGRAM

## 2022-01-21 PROCEDURE — 99213 OFFICE O/P EST LOW 20 MIN: CPT | Mod: S$PBB,,, | Performed by: STUDENT IN AN ORGANIZED HEALTH CARE EDUCATION/TRAINING PROGRAM

## 2022-01-21 PROCEDURE — 99213 PR OFFICE/OUTPT VISIT, EST, LEVL III, 20-29 MIN: ICD-10-PCS | Mod: S$PBB,,, | Performed by: STUDENT IN AN ORGANIZED HEALTH CARE EDUCATION/TRAINING PROGRAM

## 2022-01-21 PROCEDURE — 99215 OFFICE O/P EST HI 40 MIN: CPT | Mod: PBBFAC,PO | Performed by: STUDENT IN AN ORGANIZED HEALTH CARE EDUCATION/TRAINING PROGRAM

## 2022-01-21 RX ORDER — METHOCARBAMOL 500 MG/1
500 TABLET, FILM COATED ORAL
COMMUNITY
Start: 2022-01-13 | End: 2022-02-01 | Stop reason: SDUPTHER

## 2022-01-21 RX ORDER — CEFADROXIL 500 MG/1
500 CAPSULE ORAL 2 TIMES DAILY
COMMUNITY
Start: 2021-11-23 | End: 2022-04-18

## 2022-01-21 RX ORDER — HYDROCODONE BITARTRATE AND ACETAMINOPHEN 5; 325 MG/1; MG/1
1-2 TABLET ORAL
COMMUNITY
Start: 2021-11-23 | End: 2022-08-01

## 2022-01-21 RX ORDER — GLIPIZIDE 5 MG/1
5 TABLET, FILM COATED, EXTENDED RELEASE ORAL DAILY
COMMUNITY
Start: 2021-11-16 | End: 2022-02-01

## 2022-01-21 RX ORDER — SILVER SULFADIAZINE 10 G/1000G
CREAM TOPICAL
COMMUNITY
Start: 2021-10-20 | End: 2022-04-18

## 2022-01-27 NOTE — PROGRESS NOTES
[facility] CLINIC NOTE    Patient Name: Jorge Becerra  YOB: 1950    PRESENTING HISTORY   Chief Complaint:   Chief Complaint   Patient presents with    Follow-up     Remove drainage system         History of Present Illness:  Mr. Jorge Becerra is a 71 y.o. male here for drain removal.     Surgical procedure at [facility]   Drain in place has been draining scant amount for >1 week. Instructed to be removed when less than 30ccs.   No fevers.   Some pain with pulling/laying on drain site.   He feels well overall and is in good spirits.                                            Review of Systems   All other systems reviewed and are negative.        PAST HISTORY:     Past Medical History:   Diagnosis Date    Diabetes mellitus, type 2     Hypertension     Non Hodgkin's lymphoma        Past Surgical History:   Procedure Laterality Date    TONSILLECTOMY         Family History   Problem Relation Age of Onset    Colon cancer Neg Hx        Social History     Socioeconomic History    Marital status:    Tobacco Use    Smoking status: Never Smoker    Smokeless tobacco: Never Used   Substance and Sexual Activity    Alcohol use: Never     Social Determinants of Health     Financial Resource Strain: Low Risk     Difficulty of Paying Living Expenses: Not hard at all   Food Insecurity: No Food Insecurity    Worried About Running Out of Food in the Last Year: Never true    Ran Out of Food in the Last Year: Never true   Transportation Needs: No Transportation Needs    Lack of Transportation (Medical): No    Lack of Transportation (Non-Medical): No   Physical Activity: Inactive    Days of Exercise per Week: 0 days    Minutes of Exercise per Session: 0 min   Stress: No Stress Concern Present    Feeling of Stress : Not at all   Social Connections: Unknown    Frequency of Communication with Friends and Family: Once a week    Frequency of Social Gatherings with Friends and Family: Once a  week    Active Member of Clubs or Organizations: No    Attends Club or Organization Meetings: Never    Marital Status:    Housing Stability: Low Risk     Unable to Pay for Housing in the Last Year: No    Number of Places Lived in the Last Year: 1    Unstable Housing in the Last Year: No       MEDICATIONS & ALLERGIES:     Current Outpatient Medications on File Prior to Visit   Medication Sig    allopurinoL (ZYLOPRIM) 300 MG tablet Take 1 tablet (300 mg total) by mouth once daily. (Patient taking differently: Take 300 mg by mouth every other day.)    aspirin (ECOTRIN) 81 MG EC tablet Take 81 mg by mouth once daily.    atorvastatin (LIPITOR) 20 MG tablet Take 1 tablet (20 mg total) by mouth every evening.    cefadroxil (DURICEF) 500 MG Cap Take 500 mg by mouth 2 (two) times daily.    donepeziL (ARICEPT) 5 MG tablet Take 1 tablet (5 mg total) by mouth every evening.    ergocalciferol, vitamin D2, (VITAMIN D ORAL) Take 1 tablet by mouth once daily.     ferrous sulfate (FEOSOL) 325 mg (65 mg iron) Tab tablet Take 325 mg by mouth daily with breakfast.    fosinopriL (MONOPRIL) 40 MG tablet TAKE 1 TABLET BY MOUTH EVERY DAY    glipiZIDE (GLUCOTROL) 5 MG TR24 Take 5 mg by mouth once daily.    HYDROcodone-acetaminophen (NORCO) 5-325 mg per tablet Take 1-2 tablets by mouth.    magnesium oxide 400 mg magnesium Cap Take 1 tablet by mouth 2 (two) times a day.     metFORMIN (GLUCOPHAGE) 1000 MG tablet Take 1 tablet (1,000 mg total) by mouth 2 (two) times daily.    methocarbamoL (ROBAXIN) 500 MG Tab Take 500 mg by mouth.    silver sulfADIAZINE 1% (SILVADENE) 1 % cream Apply topically.    vit C/E/zinc/lutein/zeaxanthin (OCUVITE EYE HEALTH ORAL) Take 1 tablet by mouth 2 (two) times a day.     amLODIPine (NORVASC) 5 MG tablet Take 5 mg by mouth once daily.    k phos di & mono-sod phos mono (K-PHOS-NEUTRAL) 250 mg Tab Take 2 tablets by mouth Daily.    meloxicam (MOBIC) 7.5 MG tablet TAKE 1 TABLET BY MOUTH  "EVERY DAY    ondansetron (ZOFRAN) 8 MG tablet Take 8 mg by mouth every 8 (eight) hours as needed for Nausea.    prochlorperazine (COMPAZINE) 10 MG tablet Take 10 mg by mouth every 6 (six) hours as needed.    sucralfate (CARAFATE) 1 gram tablet Take 1 g by mouth 4 (four) times daily. Crush 1 tablet finely and mix in 15 ml water to swish and swallow four times a day    traMADoL (ULTRAM) 50 mg tablet Take 1 tablet (50 mg total) by mouth every 6 (six) hours as needed for Pain. (Patient not taking: Reported on 7/29/2021)    UNABLE TO FIND Sodium Bicarb-sodium chloride mouthwash--Commonly known as :Salt & Soda  Swish and spit 10 ml 4(four) hours while awake. Dissolve 2 teaspoons in 1 quart of water.     No current facility-administered medications on file prior to visit.       Review of patient's allergies indicates:  No Known Allergies    OBJECTIVE:   Vital Signs:  Vitals:    01/21/22 0840   BP: 132/67   Pulse: 89   Resp: 18   SpO2: 100%   Weight: 71.5 kg (157 lb 10.1 oz)   Height: 5' 7" (1.702 m)       No results found for this or any previous visit (from the past 24 hour(s)).      Physical Exam  Vitals and nursing note reviewed.   Skin:     Comments: Surgical drain right inguinal region. Suture has already been traumatically removed. He has a small skin defect superolateral to the drainage tract c/w prior suture site.   No redeness, warmth. Mucoid discharge without purulence.      Drain was removed with steady pressure after decompession.   No complications. Tolerated well.   Approx 1 cm skin defect remained after procedure, this was closed with skin glue.       ASSESSMENT & PLAN:     Encounter for change or removal of drains     Uncomplicated drain removal.   F/u as scheduled to review recent health history.       Mehul Royal MD   Internal Medicine    This note was created using Dragon voice recognition software that occasionally misinterprets phrases or words.          "

## 2022-01-31 ENCOUNTER — TELEPHONE (OUTPATIENT)
Dept: FAMILY MEDICINE | Facility: CLINIC | Age: 72
End: 2022-01-31
Payer: MEDICARE

## 2022-01-31 ENCOUNTER — PATIENT MESSAGE (OUTPATIENT)
Dept: FAMILY MEDICINE | Facility: CLINIC | Age: 72
End: 2022-01-31
Payer: MEDICARE

## 2022-01-31 NOTE — TELEPHONE ENCOUNTER
I called the patient to schedule their free one hour Enhanced Annual Wellness Visit with   Katey Nolen NP.

## 2022-02-01 ENCOUNTER — PATIENT OUTREACH (OUTPATIENT)
Dept: ADMINISTRATIVE | Facility: HOSPITAL | Age: 72
End: 2022-02-01
Payer: MEDICARE

## 2022-02-01 ENCOUNTER — OFFICE VISIT (OUTPATIENT)
Dept: FAMILY MEDICINE | Facility: CLINIC | Age: 72
End: 2022-02-01
Payer: MEDICARE

## 2022-02-01 ENCOUNTER — LAB VISIT (OUTPATIENT)
Dept: LAB | Facility: HOSPITAL | Age: 72
End: 2022-02-01
Attending: STUDENT IN AN ORGANIZED HEALTH CARE EDUCATION/TRAINING PROGRAM
Payer: MEDICARE

## 2022-02-01 VITALS
DIASTOLIC BLOOD PRESSURE: 72 MMHG | WEIGHT: 160.25 LBS | SYSTOLIC BLOOD PRESSURE: 150 MMHG | HEIGHT: 67 IN | RESPIRATION RATE: 18 BRPM | HEART RATE: 87 BPM | BODY MASS INDEX: 25.15 KG/M2 | OXYGEN SATURATION: 98 %

## 2022-02-01 DIAGNOSIS — C49.9 SARCOMA: ICD-10-CM

## 2022-02-01 DIAGNOSIS — M62.838 MUSCLE SPASM: Primary | ICD-10-CM

## 2022-02-01 DIAGNOSIS — M1A.9XX0 CHRONIC GOUT WITHOUT TOPHUS, UNSPECIFIED CAUSE, UNSPECIFIED SITE: ICD-10-CM

## 2022-02-01 DIAGNOSIS — E78.2 MIXED DIABETIC HYPERLIPIDEMIA ASSOCIATED WITH TYPE 2 DIABETES MELLITUS: ICD-10-CM

## 2022-02-01 DIAGNOSIS — E11.22 HYPERTENSION ASSOCIATED WITH STAGE 2 CHRONIC KIDNEY DISEASE DUE TO TYPE 2 DIABETES MELLITUS: ICD-10-CM

## 2022-02-01 DIAGNOSIS — I12.9 HYPERTENSION ASSOCIATED WITH STAGE 2 CHRONIC KIDNEY DISEASE DUE TO TYPE 2 DIABETES MELLITUS: ICD-10-CM

## 2022-02-01 DIAGNOSIS — E11.3393 CONTROLLED TYPE 2 DIABETES MELLITUS WITH BOTH EYES AFFECTED BY MODERATE NONPROLIFERATIVE RETINOPATHY WITHOUT MACULAR EDEMA, WITHOUT LONG-TERM CURRENT USE OF INSULIN: ICD-10-CM

## 2022-02-01 DIAGNOSIS — N18.2 HYPERTENSION ASSOCIATED WITH STAGE 2 CHRONIC KIDNEY DISEASE DUE TO TYPE 2 DIABETES MELLITUS: ICD-10-CM

## 2022-02-01 DIAGNOSIS — E11.69 MIXED DIABETIC HYPERLIPIDEMIA ASSOCIATED WITH TYPE 2 DIABETES MELLITUS: ICD-10-CM

## 2022-02-01 DIAGNOSIS — Z87.39 HISTORY OF GOUT: ICD-10-CM

## 2022-02-01 DIAGNOSIS — G31.84 MILD COGNITIVE IMPAIRMENT: ICD-10-CM

## 2022-02-01 LAB
ALBUMIN SERPL BCP-MCNC: 3.4 G/DL (ref 3.5–5.2)
ALP SERPL-CCNC: 72 U/L (ref 55–135)
ALT SERPL W/O P-5'-P-CCNC: 15 U/L (ref 10–44)
ANION GAP SERPL CALC-SCNC: 13 MMOL/L (ref 8–16)
AST SERPL-CCNC: 22 U/L (ref 10–40)
BASOPHILS # BLD AUTO: 0.02 K/UL (ref 0–0.2)
BASOPHILS NFR BLD: 0.3 % (ref 0–1.9)
BILIRUB SERPL-MCNC: 0.4 MG/DL (ref 0.1–1)
BUN SERPL-MCNC: 7 MG/DL (ref 8–23)
CALCIUM SERPL-MCNC: 9.7 MG/DL (ref 8.7–10.5)
CHLORIDE SERPL-SCNC: 103 MMOL/L (ref 95–110)
CO2 SERPL-SCNC: 25 MMOL/L (ref 23–29)
CREAT SERPL-MCNC: 0.7 MG/DL (ref 0.5–1.4)
DIFFERENTIAL METHOD: ABNORMAL
EOSINOPHIL # BLD AUTO: 0.6 K/UL (ref 0–0.5)
EOSINOPHIL NFR BLD: 9.2 % (ref 0–8)
ERYTHROCYTE [DISTWIDTH] IN BLOOD BY AUTOMATED COUNT: 16 % (ref 11.5–14.5)
EST. GFR  (AFRICAN AMERICAN): >60 ML/MIN/1.73 M^2
EST. GFR  (NON AFRICAN AMERICAN): >60 ML/MIN/1.73 M^2
ESTIMATED AVG GLUCOSE: 143 MG/DL (ref 68–131)
GLUCOSE SERPL-MCNC: 112 MG/DL (ref 70–110)
HBA1C MFR BLD: 6.6 % (ref 4–5.6)
HCT VFR BLD AUTO: 35.2 % (ref 40–54)
HGB BLD-MCNC: 10.8 G/DL (ref 14–18)
IMM GRANULOCYTES # BLD AUTO: 0.01 K/UL (ref 0–0.04)
IMM GRANULOCYTES NFR BLD AUTO: 0.2 % (ref 0–0.5)
LYMPHOCYTES # BLD AUTO: 1.5 K/UL (ref 1–4.8)
LYMPHOCYTES NFR BLD: 23.5 % (ref 18–48)
MCH RBC QN AUTO: 29.4 PG (ref 27–31)
MCHC RBC AUTO-ENTMCNC: 30.7 G/DL (ref 32–36)
MCV RBC AUTO: 96 FL (ref 82–98)
MONOCYTES # BLD AUTO: 0.6 K/UL (ref 0.3–1)
MONOCYTES NFR BLD: 9.7 % (ref 4–15)
NEUTROPHILS # BLD AUTO: 3.6 K/UL (ref 1.8–7.7)
NEUTROPHILS NFR BLD: 57.1 % (ref 38–73)
NRBC BLD-RTO: 0 /100 WBC
PLATELET # BLD AUTO: 329 K/UL (ref 150–450)
PMV BLD AUTO: 10.7 FL (ref 9.2–12.9)
POTASSIUM SERPL-SCNC: 4.5 MMOL/L (ref 3.5–5.1)
PROT SERPL-MCNC: 7.2 G/DL (ref 6–8.4)
RBC # BLD AUTO: 3.67 M/UL (ref 4.6–6.2)
SODIUM SERPL-SCNC: 141 MMOL/L (ref 136–145)
URATE SERPL-MCNC: 4.2 MG/DL (ref 3.4–7)
WBC # BLD AUTO: 6.3 K/UL (ref 3.9–12.7)

## 2022-02-01 PROCEDURE — 84550 ASSAY OF BLOOD/URIC ACID: CPT | Performed by: STUDENT IN AN ORGANIZED HEALTH CARE EDUCATION/TRAINING PROGRAM

## 2022-02-01 PROCEDURE — 99999 PR PBB SHADOW E&M-EST. PATIENT-LVL V: CPT | Mod: PBBFAC,,, | Performed by: STUDENT IN AN ORGANIZED HEALTH CARE EDUCATION/TRAINING PROGRAM

## 2022-02-01 PROCEDURE — 85025 COMPLETE CBC W/AUTO DIFF WBC: CPT | Performed by: STUDENT IN AN ORGANIZED HEALTH CARE EDUCATION/TRAINING PROGRAM

## 2022-02-01 PROCEDURE — 36415 COLL VENOUS BLD VENIPUNCTURE: CPT | Mod: PO | Performed by: STUDENT IN AN ORGANIZED HEALTH CARE EDUCATION/TRAINING PROGRAM

## 2022-02-01 PROCEDURE — G0008 ADMIN INFLUENZA VIRUS VAC: HCPCS | Mod: PBBFAC,PO

## 2022-02-01 PROCEDURE — 83036 HEMOGLOBIN GLYCOSYLATED A1C: CPT | Performed by: STUDENT IN AN ORGANIZED HEALTH CARE EDUCATION/TRAINING PROGRAM

## 2022-02-01 PROCEDURE — 99215 OFFICE O/P EST HI 40 MIN: CPT | Mod: PBBFAC,PO | Performed by: STUDENT IN AN ORGANIZED HEALTH CARE EDUCATION/TRAINING PROGRAM

## 2022-02-01 PROCEDURE — 99999 PR PBB SHADOW E&M-EST. PATIENT-LVL V: ICD-10-PCS | Mod: PBBFAC,,, | Performed by: STUDENT IN AN ORGANIZED HEALTH CARE EDUCATION/TRAINING PROGRAM

## 2022-02-01 PROCEDURE — 99214 PR OFFICE/OUTPT VISIT, EST, LEVL IV, 30-39 MIN: ICD-10-PCS | Mod: S$PBB,,, | Performed by: STUDENT IN AN ORGANIZED HEALTH CARE EDUCATION/TRAINING PROGRAM

## 2022-02-01 PROCEDURE — 99214 OFFICE O/P EST MOD 30 MIN: CPT | Mod: S$PBB,,, | Performed by: STUDENT IN AN ORGANIZED HEALTH CARE EDUCATION/TRAINING PROGRAM

## 2022-02-01 PROCEDURE — 80053 COMPREHEN METABOLIC PANEL: CPT | Performed by: STUDENT IN AN ORGANIZED HEALTH CARE EDUCATION/TRAINING PROGRAM

## 2022-02-01 PROCEDURE — 90694 VACC AIIV4 NO PRSRV 0.5ML IM: CPT | Mod: PBBFAC,PO

## 2022-02-01 RX ORDER — METFORMIN HYDROCHLORIDE 1000 MG/1
1000 TABLET ORAL 2 TIMES DAILY
Qty: 180 TABLET | Refills: 3 | Status: SHIPPED | OUTPATIENT
Start: 2022-02-01 | End: 2023-01-03

## 2022-02-01 RX ORDER — ALLOPURINOL 300 MG/1
300 TABLET ORAL EVERY OTHER DAY
Qty: 45 TABLET | Refills: 2 | Status: SHIPPED | OUTPATIENT
Start: 2022-02-01 | End: 2023-01-14

## 2022-02-01 RX ORDER — METHOCARBAMOL 500 MG/1
500 TABLET, FILM COATED ORAL 3 TIMES DAILY PRN
Qty: 270 TABLET | Refills: 1 | Status: SHIPPED | OUTPATIENT
Start: 2022-02-01 | End: 2022-08-01

## 2022-02-01 RX ORDER — DONEPEZIL HYDROCHLORIDE 5 MG/1
5 TABLET, FILM COATED ORAL NIGHTLY
Qty: 90 TABLET | Refills: 3 | Status: SHIPPED | OUTPATIENT
Start: 2022-02-01 | End: 2023-01-03

## 2022-02-01 RX ORDER — FOSINOPRIL SODIUM 40 MG/1
40 TABLET ORAL DAILY
Qty: 90 TABLET | Refills: 3 | Status: SHIPPED | OUTPATIENT
Start: 2022-02-01 | End: 2023-02-07 | Stop reason: SDUPTHER

## 2022-02-01 RX ORDER — ATORVASTATIN CALCIUM 20 MG/1
20 TABLET, FILM COATED ORAL NIGHTLY
Qty: 90 TABLET | Refills: 3 | Status: SHIPPED | OUTPATIENT
Start: 2022-02-01 | End: 2023-02-07 | Stop reason: SDUPTHER

## 2022-02-01 NOTE — PROGRESS NOTES
[facility] MEDICINE CLINIC NOTE    Patient Name: Jorge Becerra  YOB: 1950    PRESENTING HISTORY   Chief Complaint:   Chief Complaint   Patient presents with    Follow-up        History of Present Illness:  Mr. Jorge Becerra is a 71 y.o. male here for f/u.     Going back to [facility] upcoming for f/u.     Drain site has healed.     Reduced strength right leg. Has difficulty bending leg to put on sock.   Gets spasms    Chronic medical problems:    Dm2   Blood glucose 130s-140s fasting.   On glipizide was 100-115.     HTN- on Acei. Previously was on CCB, stopped.                                            Review of Systems   All other systems reviewed and are negative.        PAST HISTORY:     Past Medical History:   Diagnosis Date    Diabetes mellitus, type 2     Hypertension     Non Hodgkin's lymphoma        Past Surgical History:   Procedure Laterality Date    TONSILLECTOMY         Family History   Problem Relation Age of Onset    Colon cancer Neg Hx        Social History     Socioeconomic History    Marital status:    Tobacco Use    Smoking status: Never Smoker    Smokeless tobacco: Never Used   Substance and Sexual Activity    Alcohol use: Never     Social Determinants of Health     Financial Resource Strain: Low Risk     Difficulty of Paying Living Expenses: Not hard at all   Food Insecurity: No Food Insecurity    Worried About Running Out of Food in the Last Year: Never true    Ran Out of Food in the Last Year: Never true   Transportation Needs: No Transportation Needs    Lack of Transportation (Medical): No    Lack of Transportation (Non-Medical): No   Physical Activity: Inactive    Days of Exercise per Week: 0 days    Minutes of Exercise per Session: 0 min   Stress: No Stress Concern Present    Feeling of Stress : Not at all   Social Connections: Unknown    Frequency of Communication with Friends and Family: Once a week    Frequency of Social Gatherings with  Friends and Family: Once a week    Active Member of Clubs or Organizations: No    Attends Club or Organization Meetings: Never    Marital Status:    Housing Stability: Low Risk     Unable to Pay for Housing in the Last Year: No    Number of Places Lived in the Last Year: 1    Unstable Housing in the Last Year: No       MEDICATIONS & ALLERGIES:     Current Outpatient Medications on File Prior to Visit   Medication Sig    allopurinoL (ZYLOPRIM) 300 MG tablet Take 1 tablet (300 mg total) by mouth once daily. (Patient taking differently: Take 300 mg by mouth every other day.)    aspirin (ECOTRIN) 81 MG EC tablet Take 81 mg by mouth once daily.    atorvastatin (LIPITOR) 20 MG tablet Take 1 tablet (20 mg total) by mouth every evening.    cefadroxil (DURICEF) 500 MG Cap Take 500 mg by mouth 2 (two) times daily.    donepeziL (ARICEPT) 5 MG tablet Take 1 tablet (5 mg total) by mouth every evening.    ergocalciferol, vitamin D2, (VITAMIN D ORAL) Take 1 tablet by mouth once daily.     ferrous sulfate (FEOSOL) 325 mg (65 mg iron) Tab tablet Take 325 mg by mouth daily with breakfast.    fosinopriL (MONOPRIL) 40 MG tablet TAKE 1 TABLET BY MOUTH EVERY DAY    glipiZIDE (GLUCOTROL) 5 MG TR24 Take 5 mg by mouth once daily.    HYDROcodone-acetaminophen (NORCO) 5-325 mg per tablet Take 1-2 tablets by mouth.    magnesium oxide 400 mg magnesium Cap Take 1 tablet by mouth 2 (two) times a day.     metFORMIN (GLUCOPHAGE) 1000 MG tablet Take 1 tablet (1,000 mg total) by mouth 2 (two) times daily.    methocarbamoL (ROBAXIN) 500 MG Tab Take 500 mg by mouth.    silver sulfADIAZINE 1% (SILVADENE) 1 % cream Apply topically.    vit C/E/zinc/lutein/zeaxanthin (OCUVITE EYE HEALTH ORAL) Take 1 tablet by mouth 2 (two) times a day.     k phos di & mono-sod phos mono (K-PHOS-NEUTRAL) 250 mg Tab Take 2 tablets by mouth Daily.    ondansetron (ZOFRAN) 8 MG tablet Take 8 mg by mouth every 8 (eight) hours as needed for Nausea.  "   prochlorperazine (COMPAZINE) 10 MG tablet Take 10 mg by mouth every 6 (six) hours as needed.    sucralfate (CARAFATE) 1 gram tablet Take 1 g by mouth 4 (four) times daily. Crush 1 tablet finely and mix in 15 ml water to swish and swallow four times a day    [DISCONTINUED] amLODIPine (NORVASC) 5 MG tablet Take 5 mg by mouth once daily.    [DISCONTINUED] meloxicam (MOBIC) 7.5 MG tablet TAKE 1 TABLET BY MOUTH EVERY DAY    [DISCONTINUED] traMADoL (ULTRAM) 50 mg tablet Take 1 tablet (50 mg total) by mouth every 6 (six) hours as needed for Pain. (Patient not taking: Reported on 7/29/2021)    [DISCONTINUED] UNABLE TO FIND Sodium Bicarb-sodium chloride mouthwash--Commonly known as :Salt & Soda  Swish and spit 10 ml 4(four) hours while awake. Dissolve 2 teaspoons in 1 quart of water.     No current facility-administered medications on file prior to visit.       Review of patient's allergies indicates:  No Known Allergies    OBJECTIVE:   Vital Signs:  Vitals:    02/01/22 1002 02/01/22 1011   BP: (!) 162/74 (!) 150/72   Pulse: 87    Resp: 18    SpO2: 98%    Weight: 72.7 kg (160 lb 4.4 oz)    Height: 5' 7" (1.702 m)        No results found for this or any previous visit (from the past 24 hour(s)).      Physical Exam  Vitals and nursing note reviewed.   Constitutional:       General: He is not in acute distress.     Appearance: He is not toxic-appearing or diaphoretic.   HENT:      Head: Normocephalic and atraumatic.      Right Ear: External ear normal.      Left Ear: External ear normal.   Eyes:      General: No scleral icterus.     Extraocular Movements: EOM normal.      Conjunctiva/sclera: Conjunctivae normal.      Pupils: Pupils are equal, round, and reactive to light.   Neck:      Thyroid: No thyromegaly.      Vascular: No carotid bruit.   Cardiovascular:      Rate and Rhythm: Normal rate and regular rhythm.      Heart sounds: Normal heart sounds. No murmur heard.      Pulmonary:      Effort: Pulmonary effort is " normal. No respiratory distress.      Breath sounds: Normal breath sounds. No wheezing or rales.   Musculoskeletal:         General: No tenderness, deformity or edema. Normal range of motion.      Cervical back: Normal range of motion and neck supple.      Right lower leg: No edema.      Left lower leg: No edema.   Lymphadenopathy:      Cervical: No cervical adenopathy.   Skin:     General: Skin is warm and dry.      Findings: No erythema or rash.   Neurological:      Mental Status: He is alert and oriented to person, place, and time.      GCS: GCS score is 15.      Gait: Gait is intact.   Psychiatric:         Mood and Affect: Mood and affect normal.         Cognition and Memory: Memory normal.         Judgment: Judgment normal.         ASSESSMENT & PLAN:     Muscle spasm  -     Ambulatory referral/consult to Physical/Occupational Therapy; Future; Expected date: 02/08/2022  -     methocarbamoL (ROBAXIN) 500 MG Tab; Take 1 tablet (500 mg total) by mouth 3 (three) times daily as needed (muscle spasm).  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  Dispense: 180 tablet; Refill: 3    Mixed diabetic hyperlipidemia associated with type 2 diabetes mellitus  -     atorvastatin (LIPITOR) 20 MG tablet; Take 1 tablet (20 mg total) by mouth every evening.  Dispense: 90 tablet; Refill: 3    Mild cognitive impairment  -     donepeziL (ARICEPT) 5 MG tablet; Take 1 tablet (5 mg total) by mouth every evening.  Dispense: 90 tablet; Refill: 3    Hypertension associated with  stage 2 chronic kidney disease due to type 2 diabetes mellitus  -     fosinopriL (MONOPRIL) 40 MG tablet; Take 1 tablet (40 mg total) by mouth once daily.  Dispense: 90 tablet; Refill: 3    Chronic gout without tophus, unspecified cause, unspecified site  -     Uric Acid; Future; Expected date: 02/01/2022    History of gout  -     allopurinoL (ZYLOPRIM) 300 MG tablet; Take 1 tablet (300 mg total) by mouth every other day.  Dispense: 45 tablet; Refill: 2    Other orders  -     Influenza - Quadrivalent (Adjuvanted)     Recheck BP 1 week    Mehul Royal MD   Internal Medicine    This note was created using Dragon voice recognition software that occasionally misinterprets phrases or words.

## 2022-02-01 NOTE — PROGRESS NOTES
[facility] chart audits-HYPERTENSION.    INFUSION VISIT 8.19.21                       B/P 136/73    HTN diagnosis documented within time frame of measurement year.

## 2022-02-01 NOTE — PATIENT INSTRUCTIONS
If you are due for any health screening(s) below please notify me so we can arrange them to be ordered and scheduled to maintain your health.     Health Maintenance   Topic Date Due    Foot Exam  07/13/2021    Lipid Panel  04/30/2022    Hemoglobin A1c  05/16/2022    Eye Exam  08/25/2022    Low Dose Statin  02/01/2023    TETANUS VACCINE  04/14/2026    Hepatitis C Screening  Completed

## 2022-02-02 ENCOUNTER — CLINICAL SUPPORT (OUTPATIENT)
Dept: REHABILITATION | Facility: HOSPITAL | Age: 72
End: 2022-02-02
Payer: MEDICARE

## 2022-02-02 DIAGNOSIS — R53.1 DECREASED STRENGTH: ICD-10-CM

## 2022-02-02 DIAGNOSIS — M25.60 DECREASED RANGE OF MOTION: ICD-10-CM

## 2022-02-02 DIAGNOSIS — R26.89 DECREASED FUNCTIONAL MOBILITY: ICD-10-CM

## 2022-02-02 DIAGNOSIS — M62.838 MUSCLE SPASM: ICD-10-CM

## 2022-02-02 PROCEDURE — 97161 PT EVAL LOW COMPLEX 20 MIN: CPT | Mod: PN

## 2022-02-02 PROCEDURE — 97110 THERAPEUTIC EXERCISES: CPT | Mod: PN

## 2022-02-07 ENCOUNTER — CLINICAL SUPPORT (OUTPATIENT)
Dept: REHABILITATION | Facility: HOSPITAL | Age: 72
End: 2022-02-07
Payer: MEDICARE

## 2022-02-07 DIAGNOSIS — R26.89 DECREASED FUNCTIONAL MOBILITY: ICD-10-CM

## 2022-02-07 DIAGNOSIS — R53.1 DECREASED STRENGTH: ICD-10-CM

## 2022-02-07 DIAGNOSIS — M25.60 DECREASED RANGE OF MOTION: ICD-10-CM

## 2022-02-07 PROCEDURE — 97110 THERAPEUTIC EXERCISES: CPT | Mod: PN

## 2022-02-07 NOTE — PROGRESS NOTES
[facility] / [facility]  Physical Therapy Daily Treatment Note     Name: Jorge Becerra  Clinic Number: [id number]    Therapy Diagnosis:   Encounter Diagnoses   Name Primary?    Decreased range of motion     Decreased functional mobility     Decreased strength      Physician: Mehul Royal MD    Visit Date: 2/7/2022    Physician: Mehul Royal MD         Physician Orders: PT Eval and Treat  Medical Diagnosis from Referral: M62.838 (ICD-10-CM) - Muscle spasm   Evaluation Date: 2/2/2022  Authorization Period Expiration: 12/31/2022   Plan of Care Expiration: 5/30/2022                Progress Update: 3/4/2022               FOTO: 1 / 3   Visit # / Visits authorized: 1 / 20                Time In: 1015  Time Out: 1100  Total Billable Time: 45 minutes    Precautions: Standard  Insurance: Payor: MEDICARE / Plan: MEDICARE PART A & B / Product Type: Government /     Subjective     Pt reports: that he feels good today, no pain.  He was compliant with home exercise program.  Response to previous treatment: positive  Functional change: n/a    Pain: 1/10  Location: right upper legs     Objective     Jorge received therapeutic exercises to develop strength, endurance, ROM, flexibility and core stabilization for 45 minutes including:    Stationary bike x 5 mins  LTRs  2x10  DKTC on PB  2x10  SAQs   2x10  Bridges   2x10  Hip adduction with ball  3x10  Seated hamstring stretch  3x30 secs  Seated piriformis stretch  3x30 secs  Clams with yellow tb  2x10  Standing hip flexion  2x10  Standing hip extension 2x10  Step ups  2x10    Home Exercises Provided and Patient Education Provided     Education provided:   - low impact cardio.    Written Home Exercises Provided: yes.  Exercises were reviewed and Jorge was able to demonstrate them prior to the end of the session.  Jorge demonstrated good  understanding of the education provided.     See EMR under Patient Instructions for exercises provided prior  visit.    Assessment     Presents with significant R lower extremity weakness and decreased proprioception.    Jorge is progressing well towards his goals.   Pt prognosis is Good.     Pt will continue to benefit from skilled outpatient physical therapy to address the deficits listed in the problem list box on initial evaluation, provide pt/family education and to maximize pt's level of independence in the home and community environment.     Pt's spiritual, cultural and educational needs considered and pt agreeable to plan of care and goals.    Anticipated barriers to physical therapy: None    Goals:     SHORT TERM GOALS: 4 weeks 2/7/2022     1. Recent signs and systems trend is improving in order to progress towards LTG's. ongoing   2. Patient will be independent with HEP in order to further progress and return to maximal function. ongoing   3. Pain rating at Worst: 5/10 in order to progress towards increased independence with activity. ongoing   4. Patient will be able to correct postural deviations in sitting and standing, to decrease pain and promote postural awareness for injury prevention.  ongoing      LONG TERM GOALS: 8 weeks 2/7/2022     1. Patient will return to normal ADL, recreational, and work related activities with less pain and limitation.  ongoing   2. Patient will improve AROM to stated goals in order to return to maximal functional potential.  ongoing   3. Patient will improve Strength to stated goals of appropriate musculature in order to improve functional independence.  ongoing   4. Pain Rating at Best: 1/10 to improve Quality of Life.  ongoing   5. Patient will meet predicted functional outcome (FOTO) score: 65% to increase self-worth & perceived functional ability. ongoing   6. Patient will have met/partially met personal goal of being able to walk with no pain ongoing         Plan     Plan of care Certification: 2/2/2022 to 5/30/2022     Continue previous POC as stated.    Scottie Jorgensen,  PT

## 2022-02-07 NOTE — PLAN OF CARE
[facility]  Physical Therapy Initial Evaluation    Name: Jorge Becerra  [facility] Number: [id number]    Therapy Diagnosis:   Encounter Diagnoses   Name Primary?    Muscle spasm     Decreased range of motion     Decreased functional mobility     Decreased strength      Physician: Mehul Royal MD   Physician Orders: PT Eval and Treat  Medical Diagnosis from Referral: M62.838 (ICD-10-CM) - Muscle spasm   Evaluation Date: 2/2/2022  Authorization Period Expiration: 12/31/2022   Plan of Care Expiration: 5/30/2022    Progress Update: 3/4/2022  FOTO: 1 / 3   Visit # / Visits authorized: 1 / 1       PRECAUTIONS: Standard Precautions and Safety/fall precautions     Time In: 1600  Time Out: 1645  Total Appointment Time (timed & untimed codes): 45 minutes    SUBJECTIVE     Chief complaint:  S/p R hip sarcoma removal    DOS:  18 November 2021    History of current condition:  S/p R hip sarcoma removal    DOS:  18 November 2021;   Denies giving way, popping, clicking.  States that he was experiencing a shocking sensation down his R leg about 4-5x per day but has only experiencing it about 1x per day since taking Robaxin.  States that he has a f/u appt at [facility] in 2 weeks to discuss further treatment.   States that he went through chemotherapy and radiation from July 2021 to September 2021.   States that he is walking without the walker at home with no issues.  Has not fallen since surgery.      Previous episode:  none    Aggravating Factors:   Prolonged sitting > 10 mins  Easing Factors:   Extending knee    Imaging:  See epic.    Prior Therapy: N/A  Social History: Pt lives with their spouse;  No stairs in the home.  Occupation: Pt is retired.  Prior Level of Function: Independent with all ADLs  Current Level of Function: 85% of PLOF    Pain:  Current 0 /10, worst 1 /10, best 0 /10   Description: Sharp and Shooting    Pts goals: Pt reported goal is to get  stronger.    _______________________________________________________  Medical History:   Past Medical History:   Diagnosis Date    Diabetes mellitus, type 2     Hypertension     Non Hodgkin's lymphoma        Surgical History:   Jorge Becerra  has a past surgical history that includes Tonsillectomy.    Medications:   Jorge has a current medication list which includes the following prescription(s): allopurinol, aspirin, atorvastatin, cefadroxil, donepezil, ergocalciferol (vitamin d2), ferrous sulfate, fosinopril, hydrocodone-acetaminophen, k phos di & mono-sod phos mono, magnesium oxide, metformin, methocarbamol, ondansetron, prochlorperazine, silver sulfadiazine 1%, sucralfate, and vit c/e/zinc/lutein/zeaxanthin.    Allergies:   Review of patient's allergies indicates:  No Known Allergies     OBJECTIVE   (x = not tested due to pain and/or inability to obtain test position)    RANGE OF MOTION:      Hip AROM/PROM Right  2/2/2022 Left  2/2/2022 Goal   Hip Flexion (120) 107 wfl 115     Hip IR (45) 33 36 40     Hip ER (45) wfl wfl 40         Knee AROM/PROM Right  2/2/2022 Left  2/2/2022 Goal   Hyper - Zero - Flexion 0-3-130 wfl 0-0-135       Ankle/Foot AROM/PROM Right  2/2/2022 Left  2/2/2022 Goal   Dorsiflexion (20) wfl wfl 15     Plantarflexion (50) wfl wfl 45     Great Toe Extension (35) wfl wfl 30         STRENGTH:    L/E MMT Right  2/2/2022 Goal   Hip Flexion  3+/5 5/5 B    Hip Extension  3-/5 5/5 B   Hip Abduction  3+/5 5/5 B   Hip IR 3+/5 5/5 B   Hip ER 3+/5 5/5 B   Knee Flexion 4-/5 5/5 B   Knee Extension 4-/5 5/5 B   Ankle DF 4-/5 5/5 B   Ankle PF 4-/5 5/5 B   Ankle Inversion 4-/5 5/5 B   Ankle Eversion 4-/5 5/5 B   Big Toe Extension 4-/5 5/5 B     L/E MMT Left  2/2/2022 Goal   Hip Flexion  4/5 5/5 B    Hip Extension  4-/5 5/5 B   Hip Abduction  4/5 5/5 B   Hip IR 4-/5 5/5 B   Hip ER 4-/5 5/5 B   Knee Flexion 4/5 5/5 B   Knee Extension 4/5 5/5 B   Ankle DF 4/5 5/5 B   Ankle PF 4/5 5/5 B   Ankle Inversion 4/5  5/5 B   Ankle Eversion 4/5 5/5 B   Big Toe Extension 4/5 5/5 B       MUSCLE LENGTH:     Muscle Tested  Right  2/2/2022 Left   2/2/2022 Goal   Hip Flexors  decreased decreased Normal B   Quadriceps decreased normal Normal B   Hamstrings  decreased decreased Normal B   Piriformis  decreased decreased Normal B   Gastrocnemius  decreased decreased Normal B       Observation:  Sensation:  Sensation is intact to light touch  Palpation:  TTP around surgical incision.  Posture:  Pt presents with postural abnormalities which include: forward head and rounded shoulders  Gait Analysis: The patient ambulated with the following assistive device: rolling walker; the pt presents with the following gait abnormalities: decreased step length, juany, and arm swing; increased forward flexed posture, trunk sway     Movement Analysis:   Functional Test  Outcome   Double Leg Squat 1/2 squat limited by pain   Single Leg Step Down NT           TREATMENT   Total Treatment time separate from Evaluation: (10) minutes    Jorge received therapeutic exercises to develop strength, endurance, ROM, flexibility, and posture for (10) minutes including: x = exercises performed     TherEx 2/2/2022    Standing hip abduction x 3x10   LAQs x 3x10   Seated hamstring stretch x 3x30 secs   Standing hip flexion x 3x10     Plan for Next Visit:     Stationary bike x 5 mins  LTRs  2x10  DKTC on PB  2x10  SAQs   2x10  Bridges   2x10  Hip adduction with ball  3x10  Seated hamstring stretch  3x30 secs  Seated piriformis stretch  3x30 secs  Clams with yellow tb  2x10  Standing hip flexion  2x10  Standing hip extension 2x10  Step ups  2x10       Home Exercises and Patient Education Provided    Education/Self-Care provided:    Patient educated on the impairments noted above and the effects of physical therapy intervention to improve overall condition and quality of life.    Patient was educated on all the above exercise prior/during/after for proper posture,  positioning, and execution for safe performance with home exercise program.     Written Home Exercises Provided: yes. Prefers: Electronic Copies  Exercises were reviewed and Jorge was able to demonstrate them prior to the end of the session.  Jorge demonstrated good understanding of the education provided.     See EMR under Patient Instructions for exercises provided during therapy sessions.    ASSESSMENT   Jorge is a 71 y.o. male referred to outpatient Physical Therapy with a medical diagnosis of M62.838 (ICD-10-CM) - Muscle spasm   . Jorge presents with clinical signs and symptoms that support this diagnosis with     decreased knee and hip ROM, decreased hip girdle, quad, and hamstring Strength, patellar joint hypomobility, increased joint and soft tissue edema, and impaired functional mobility.    The above impairments will be addressed through manual therapy techniques, therapeutic exercises, functional training, and modalities as necessary. Patient was treated and educated on exercises for home program, progression of therapy, and benefits of therapy to achieve full functional mobility. Patient will benefit from skilled physical therapy to meet short and long term goals and return to prior level of function.    Pt prognosis is Good.   Pt will benefit from skilled outpatient Physical Therapy to address the deficits stated above and in the chart below, provide pt/family education, and to maximize pt's level of independence.     Plan of care discussed with patient: Yes  Pt's spiritual, cultural and educational needs considered and patient is agreeable to the plan of care and goals as stated below:     Anticipated Barriers for therapy: none    Medical Necessity is demonstrated by the following:   History  Co-morbidities and personal factors that may impact the plan of care Co-morbidities:   advanced age    Personal Factors:   no deficits     low   Examination  Body Structures and Functions, activity limitations  "and participation restrictions that may impact the plan of care Body Regions:   lower extremities    Body Systems:    gross symmetry  ROM  strength  gross coordinated movement  balance  gait  motor control  motor learning    Participation Restrictions:   See above in "Current Level of Function"     Activity limitations:   Learning and applying knowledge  no deficits    General Tasks and Commands  no deficits    Communication  no deficits    Mobility  no deficits    Self care  no deficits    Domestic Life  no deficits    Interactions/Relationships  no deficits    Life Areas  no deficits    Community and Social Life  community life  recreation and leisure  no deficits         low   Clinical Presentation stable and uncomplicated low   Decision Making/ Complexity Score: low       GOALS:  SHORT TERM GOALS: 4 weeks 2/2/2022   1. Recent signs and systems trend is improving in order to progress towards LTG's.    2. Patient will be independent with HEP in order to further progress and return to maximal function.    3. Pain rating at Worst: 5/10 in order to progress towards increased independence with activity.    4. Patient will be able to correct postural deviations in sitting and standing, to decrease pain and promote postural awareness for injury prevention.       LONG TERM GOALS: 8 weeks 2/2/2022   1. Patient will return to normal ADL, recreational, and work related activities with less pain and limitation.     2. Patient will improve AROM to stated goals in order to return to maximal functional potential.     3. Patient will improve Strength to stated goals of appropriate musculature in order to improve functional independence.     4. Pain Rating at Best: 1/10 to improve Quality of Life.     5. Patient will meet predicted functional outcome (FOTO) score: 65% to increase self-worth & perceived functional ability.    6. Patient will have met/partially met personal goal of being able to walk with no pain          PLAN "   Plan of care Certification: 2/2/2022 to 5/30/2022    Outpatient Physical Therapy 2 times weekly for 6 weeks to include any combination of the following interventions: virtual visits, dry needling, modalities, electrical stimulation (IFC, Pre-Mod, Attended with Functional Dry Needling), Manual Therapy, Moist Heat/ Ice, Neuromuscular Re-ed, Patient Education, Self Care, Therapeutic Exercise, Functional Training and Therapeutic Activites     Thank you for this referral.    These services are reasonable and necessary for the conditions set forth above while under my care.    Scottie Jorgensen, PT, DPT

## 2022-02-09 ENCOUNTER — CLINICAL SUPPORT (OUTPATIENT)
Dept: REHABILITATION | Facility: HOSPITAL | Age: 72
End: 2022-02-09
Payer: MEDICARE

## 2022-02-09 DIAGNOSIS — R26.89 DECREASED FUNCTIONAL MOBILITY: ICD-10-CM

## 2022-02-09 DIAGNOSIS — R53.1 DECREASED STRENGTH: ICD-10-CM

## 2022-02-09 DIAGNOSIS — M25.60 DECREASED RANGE OF MOTION: ICD-10-CM

## 2022-02-09 PROCEDURE — 97110 THERAPEUTIC EXERCISES: CPT | Mod: PN

## 2022-02-09 NOTE — PROGRESS NOTES
[facility] / [facility]  Physical Therapy Daily Treatment Note     Name: Jorge Becerra  Clinic Number: [id number]    Therapy Diagnosis:   Encounter Diagnoses   Name Primary?    Decreased range of motion     Decreased functional mobility     Decreased strength      Physician: Mehul Royal MD    Visit Date: 2/9/2022    Physician: Mehul Royal MD         Physician Orders: PT Eval and Treat  Medical Diagnosis from Referral: M62.838 (ICD-10-CM) - Muscle spasm   Evaluation Date: 2/2/2022  Authorization Period Expiration: 12/31/2022   Plan of Care Expiration: 5/30/2022                Progress Update: 3/4/2022               FOTO: 1 / 3   Visit # / Visits authorized: 2 / 20                Time In:0925  Time Out: 1018  Total Billable Time: 53 minutes    Precautions: Standard  Insurance: Payor: MEDICARE / Plan: MEDICARE PART A & B / Product Type: Government /     Subjective     Pt reports: that he feels good today, no pain.  He was compliant with home exercise program.  Response to previous treatment: positive  Functional change: n/a    Pain: 1/10  Location: right upper legs     Objective     Jorge received therapeutic exercises to develop strength, endurance, ROM, flexibility and core stabilization for 53 minutes including:    Stationary bike x 10 mins  LTRs  2x10  DKTC on PB  2x10  SAQs   2x10  Bridges   2x10  Seated marches  2#  2x10  Hip adduction with ball  3x10  Seated hamstring stretch  3x30 secs  Seated piriformis stretch  3x30 secs  Clams with yellow tb  2x10  Standing hip flexion  2x10  Standing hip extension 2x10  Step ups  3x10  R LOWER EXTREMITY only    Home Exercises Provided and Patient Education Provided     Education provided:   - low impact cardio.    Written Home Exercises Provided: yes.  Exercises were reviewed and Jorge was able to demonstrate them prior to the end of the session.  Jorge demonstrated good  understanding of the education provided.     See EMR under Patient  Instructions for exercises provided prior visit.    Assessment     Presents with significant R lower extremity weakness and decreased proprioception.    Jorge is progressing well towards his goals.   Pt prognosis is Good.     Pt will continue to benefit from skilled outpatient physical therapy to address the deficits listed in the problem list box on initial evaluation, provide pt/family education and to maximize pt's level of independence in the home and community environment.     Pt's spiritual, cultural and educational needs considered and pt agreeable to plan of care and goals.    Anticipated barriers to physical therapy: None    Goals:     SHORT TERM GOALS: 4 weeks 2/9/2022     1. Recent signs and systems trend is improving in order to progress towards LTG's. ongoing   2. Patient will be independent with HEP in order to further progress and return to maximal function. ongoing   3. Pain rating at Worst: 5/10 in order to progress towards increased independence with activity. ongoing   4. Patient will be able to correct postural deviations in sitting and standing, to decrease pain and promote postural awareness for injury prevention.  ongoing      LONG TERM GOALS: 8 weeks 2/9/2022     1. Patient will return to normal ADL, recreational, and work related activities with less pain and limitation.  ongoing   2. Patient will improve AROM to stated goals in order to return to maximal functional potential.  ongoing   3. Patient will improve Strength to stated goals of appropriate musculature in order to improve functional independence.  ongoing   4. Pain Rating at Best: 1/10 to improve Quality of Life.  ongoing   5. Patient will meet predicted functional outcome (FOTO) score: 65% to increase self-worth & perceived functional ability. ongoing   6. Patient will have met/partially met personal goal of being able to walk with no pain ongoing         Plan     Plan of care Certification: 2/2/2022 to 5/30/2022     Continue  previous POC as stated.    Scottie Jorgensen, PT

## 2022-02-11 ENCOUNTER — CLINICAL SUPPORT (OUTPATIENT)
Dept: FAMILY MEDICINE | Facility: CLINIC | Age: 72
End: 2022-02-11
Payer: MEDICARE

## 2022-02-11 ENCOUNTER — TELEPHONE (OUTPATIENT)
Dept: FAMILY MEDICINE | Facility: CLINIC | Age: 72
End: 2022-02-11

## 2022-02-11 VITALS — DIASTOLIC BLOOD PRESSURE: 70 MMHG | SYSTOLIC BLOOD PRESSURE: 128 MMHG | HEART RATE: 70 BPM

## 2022-02-11 PROCEDURE — 99212 OFFICE O/P EST SF 10 MIN: CPT | Mod: PBBFAC,PO

## 2022-02-11 PROCEDURE — 99999 PR PBB SHADOW E&M-EST. PATIENT-LVL II: CPT | Mod: PBBFAC,,,

## 2022-02-11 PROCEDURE — 99999 PR PBB SHADOW E&M-EST. PATIENT-LVL II: ICD-10-PCS | Mod: PBBFAC,,,

## 2022-02-11 NOTE — PROGRESS NOTES
Jorge Becerra 71 y.o. male is here today for Blood Pressure check.   History of HTN yes.    Review of patient's allergies indicates:  No Known Allergies  Creatinine   Date Value Ref Range Status   02/01/2022 0.7 0.5 - 1.4 mg/dL Final     Sodium   Date Value Ref Range Status   02/01/2022 141 136 - 145 mmol/L Final     Potassium   Date Value Ref Range Status   02/01/2022 4.5 3.5 - 5.1 mmol/L Final   ]  Patient verifies taking blood pressure medications on a regular basis at the same time of the day.     Current Outpatient Medications:     allopurinoL (ZYLOPRIM) 300 MG tablet, Take 1 tablet (300 mg total) by mouth every other day., Disp: 45 tablet, Rfl: 2    aspirin (ECOTRIN) 81 MG EC tablet, Take 81 mg by mouth once daily., Disp: , Rfl:     atorvastatin (LIPITOR) 20 MG tablet, Take 1 tablet (20 mg total) by mouth every evening., Disp: 90 tablet, Rfl: 3    cefadroxil (DURICEF) 500 MG Cap, Take 500 mg by mouth 2 (two) times daily., Disp: , Rfl:     donepeziL (ARICEPT) 5 MG tablet, Take 1 tablet (5 mg total) by mouth every evening., Disp: 90 tablet, Rfl: 3    ergocalciferol, vitamin D2, (VITAMIN D ORAL), Take 1 tablet by mouth once daily. , Disp: , Rfl:     ferrous sulfate (FEOSOL) 325 mg (65 mg iron) Tab tablet, Take 325 mg by mouth daily with breakfast., Disp: , Rfl:     fosinopriL (MONOPRIL) 40 MG tablet, Take 1 tablet (40 mg total) by mouth once daily., Disp: 90 tablet, Rfl: 3    HYDROcodone-acetaminophen (NORCO) 5-325 mg per tablet, Take 1-2 tablets by mouth., Disp: , Rfl:     k phos di & mono-sod phos mono (K-PHOS-NEUTRAL) 250 mg Tab, Take 2 tablets by mouth Daily., Disp: , Rfl:     magnesium oxide 400 mg magnesium Cap, Take 1 tablet by mouth 2 (two) times a day. , Disp: , Rfl:     metFORMIN (GLUCOPHAGE) 1000 MG tablet, Take 1 tablet (1,000 mg total) by mouth 2 (two) times daily., Disp: 180 tablet, Rfl: 3    methocarbamoL (ROBAXIN) 500 MG Tab, Take 1 tablet (500 mg total) by mouth 3 (three) times daily  as needed (muscle spasm)., Disp: 270 tablet, Rfl: 1    ondansetron (ZOFRAN) 8 MG tablet, Take 8 mg by mouth every 8 (eight) hours as needed for Nausea., Disp: , Rfl:     prochlorperazine (COMPAZINE) 10 MG tablet, Take 10 mg by mouth every 6 (six) hours as needed., Disp: , Rfl:     silver sulfADIAZINE 1% (SILVADENE) 1 % cream, Apply topically., Disp: , Rfl:     sucralfate (CARAFATE) 1 gram tablet, Take 1 g by mouth 4 (four) times daily. Crush 1 tablet finely and mix in 15 ml water to swish and swallow four times a day, Disp: , Rfl:     vit C/E/zinc/lutein/zeaxanthin (OCUVITE EYE HEALTH ORAL), Take 1 tablet by mouth 2 (two) times a day. , Disp: , Rfl:      Does patient have record of home blood pressure readings no.   Last dose of blood pressure medication was taken at around 8:00 am this morning  Patient is asymptomatic.     Initial  BP today was 140/72  Blood pressure reading after 15 minutes was 128/70 Pulse:70  Pt. States that his BP was elevated at last ov with Dr. Royal due to them having run late and rushing  When they (pt. Accompanied by his wife), came to see him last time 2/1/22.  Discussed pt's eligibility for our digital htn medicine program and invited them to come on 2/17 to   Speak with digital medicine representative Nahum Lopez and they informed me that they wld be  Out of town, going to [facility] in [place] for a check up. Gave them by direct number to call  And I would let them know the next time that we would have a representative from the Digital Medicine Dept.  Here in clinic to explain program and answer questions.  Next xander w/ Dr. Royal, on 8/1/22.  Dr. Royal notified.

## 2022-02-11 NOTE — TELEPHONE ENCOUNTER
Jorge Becerra 71 y.o. male is here today for Blood Pressure check.   History of HTN yes.    Review of patient's allergies indicates:  No Known Allergies  Creatinine   Date Value Ref Range Status   02/01/2022 0.7 0.5 - 1.4 mg/dL Final     Sodium   Date Value Ref Range Status   02/01/2022 141 136 - 145 mmol/L Final     Potassium   Date Value Ref Range Status   02/01/2022 4.5 3.5 - 5.1 mmol/L Final   ]  Patient verifies taking blood pressure medications on a regular basis at the same time of the day.     Current Outpatient Medications:     allopurinoL (ZYLOPRIM) 300 MG tablet, Take 1 tablet (300 mg total) by mouth every other day., Disp: 45 tablet, Rfl: 2    aspirin (ECOTRIN) 81 MG EC tablet, Take 81 mg by mouth once daily., Disp: , Rfl:     atorvastatin (LIPITOR) 20 MG tablet, Take 1 tablet (20 mg total) by mouth every evening., Disp: 90 tablet, Rfl: 3    cefadroxil (DURICEF) 500 MG Cap, Take 500 mg by mouth 2 (two) times daily., Disp: , Rfl:     donepeziL (ARICEPT) 5 MG tablet, Take 1 tablet (5 mg total) by mouth every evening., Disp: 90 tablet, Rfl: 3    ergocalciferol, vitamin D2, (VITAMIN D ORAL), Take 1 tablet by mouth once daily. , Disp: , Rfl:     ferrous sulfate (FEOSOL) 325 mg (65 mg iron) Tab tablet, Take 325 mg by mouth daily with breakfast., Disp: , Rfl:     fosinopriL (MONOPRIL) 40 MG tablet, Take 1 tablet (40 mg total) by mouth once daily., Disp: 90 tablet, Rfl: 3    HYDROcodone-acetaminophen (NORCO) 5-325 mg per tablet, Take 1-2 tablets by mouth., Disp: , Rfl:     k phos di & mono-sod phos mono (K-PHOS-NEUTRAL) 250 mg Tab, Take 2 tablets by mouth Daily., Disp: , Rfl:     magnesium oxide 400 mg magnesium Cap, Take 1 tablet by mouth 2 (two) times a day. , Disp: , Rfl:     metFORMIN (GLUCOPHAGE) 1000 MG tablet, Take 1 tablet (1,000 mg total) by mouth 2 (two) times daily., Disp: 180 tablet, Rfl: 3    methocarbamoL (ROBAXIN) 500 MG Tab, Take 1 tablet (500 mg total) by mouth 3 (three) times daily  as needed (muscle spasm)., Disp: 270 tablet, Rfl: 1    ondansetron (ZOFRAN) 8 MG tablet, Take 8 mg by mouth every 8 (eight) hours as needed for Nausea., Disp: , Rfl:     prochlorperazine (COMPAZINE) 10 MG tablet, Take 10 mg by mouth every 6 (six) hours as needed., Disp: , Rfl:     silver sulfADIAZINE 1% (SILVADENE) 1 % cream, Apply topically., Disp: , Rfl:     sucralfate (CARAFATE) 1 gram tablet, Take 1 g by mouth 4 (four) times daily. Crush 1 tablet finely and mix in 15 ml water to swish and swallow four times a day, Disp: , Rfl:     vit C/E/zinc/lutein/zeaxanthin (OCUVITE EYE HEALTH ORAL), Take 1 tablet by mouth 2 (two) times a day. , Disp: , Rfl:      Does patient have record of home blood pressure readings no.   Last dose of blood pressure medication was taken at around 8:00 am this morning  Patient is asymptomatic.     Initial  BP today was 140/72  Blood pressure reading after 15 minutes was 128/70 Pulse:70  Pt. States that his BP was elevated at last ov with Dr. Royal due to them having run late and rushing  When they (pt. Accompanied by his wife), came to see him last time 2/1/22.  Discussed pt's eligibility for our digital htn medicine program and invited them to come on 2/17 to   Speak with digital medicine representative Nahum Lopez and they informed me that they wld be  Out of town, going to [facility] in [place] for a check up. Gave them by direct number to call  And I would let them know the next time that we would have a representative from the Digital Medicine Dept.  Here in clinic to explain program and answer questions.  Next xander w/ Dr. Royal, on 8/1/22.  Dr. Royal notified.

## 2022-02-16 DIAGNOSIS — I12.9 HYPERTENSION ASSOCIATED WITH STAGE 2 CHRONIC KIDNEY DISEASE DUE TO TYPE 2 DIABETES MELLITUS: ICD-10-CM

## 2022-02-16 DIAGNOSIS — E11.22 HYPERTENSION ASSOCIATED WITH STAGE 2 CHRONIC KIDNEY DISEASE DUE TO TYPE 2 DIABETES MELLITUS: ICD-10-CM

## 2022-02-16 DIAGNOSIS — N18.2 HYPERTENSION ASSOCIATED WITH STAGE 2 CHRONIC KIDNEY DISEASE DUE TO TYPE 2 DIABETES MELLITUS: ICD-10-CM

## 2022-02-16 RX ORDER — AMLODIPINE BESYLATE 5 MG/1
TABLET ORAL
Qty: 90 TABLET | Refills: 3 | OUTPATIENT
Start: 2022-02-16

## 2022-02-16 NOTE — TELEPHONE ENCOUNTER
Quick DC. Inappropriate Request    Refill Authorization Note   Jorge Becerra  is requesting a refill authorization.  Brief Assessment and Rationale for Refill:  Quick Discontinue  Medication Therapy Plan:       Medication Reconciliation Completed:  No      Comments:   Pended Medication(s)       Requested Prescriptions     Pending Prescriptions Disp Refills    amLODIPine (NORVASC) 5 MG tablet [Pharmacy Med Name: AMLODIPINE BESYLATE 5 MG TAB] 90 tablet 3     Sig: TAKE 1 TABLET BY MOUTH EVERY DAY        Duplicate Pended Encounter(s)/ Last Prescribed Details: (includes pharmacy & prescriber details)   The original prescription was discontinued on 6/3/2021 by Mehul Royal MD for the following reason: Dose adjustment.           Note composed:7:26 AM 02/16/2022

## 2022-02-16 NOTE — TELEPHONE ENCOUNTER
Care Due:                  Date            Visit Type   Department     Provider  --------------------------------------------------------------------------------                                EP -                              PRIMARY      JERMAINE Welch  Last Visit: 02-      CARE ([facility])   MEDICINE       Naccari                              EP -                              PRIMARY      [facility]    Mehul Welch  Next Visit: 08-      CARE ([facility])   MEDICINE       Naccari                                                            Last  Test          Frequency    Reason                     Performed    Due Date  --------------------------------------------------------------------------------    Lipid Panel.  12 months..  atorvastatin.............  04- 04-    Powered by [employer]. Reference number: [id number].   2/16/2022 12:08:52 AM CST

## 2022-02-21 ENCOUNTER — CLINICAL SUPPORT (OUTPATIENT)
Dept: REHABILITATION | Facility: HOSPITAL | Age: 72
End: 2022-02-21
Payer: MEDICARE

## 2022-02-21 DIAGNOSIS — R26.89 DECREASED FUNCTIONAL MOBILITY: ICD-10-CM

## 2022-02-21 DIAGNOSIS — R53.1 DECREASED STRENGTH: ICD-10-CM

## 2022-02-21 DIAGNOSIS — M25.60 DECREASED RANGE OF MOTION: Primary | ICD-10-CM

## 2022-02-21 PROCEDURE — 97110 THERAPEUTIC EXERCISES: CPT | Mod: PN

## 2022-02-21 NOTE — PROGRESS NOTES
[facility] / [facility]  Physical Therapy Daily Treatment Note     Name: Jorge Becerra  Clinic Number: [id number]    Therapy Diagnosis:   Encounter Diagnoses   Name Primary?    Decreased range of motion Yes    Decreased functional mobility     Decreased strength      Physician: Mehul Royal MD    Visit Date: 2/21/2022    Physician: Mehul Royal MD         Physician Orders: PT Eval and Treat  Medical Diagnosis from Referral: M62.838 (ICD-10-CM) - Muscle spasm   Evaluation Date: 2/2/2022  Authorization Period Expiration: 12/31/2022   Plan of Care Expiration: 5/30/2022                Progress Update: 3/4/2022               FOTO: 1 / 3   Visit # / Visits authorized: 3 / 20                Time In:  1143  Time Out: 1236  Total Billable Time: 53 minutes    Precautions: Standard  Insurance: Payor: MEDICARE / Plan: MEDICARE PART A & B / Product Type: Government /     Subjective     Pt reports: that he feels good today, no pain.    He was compliant with home exercise program.  Response to previous treatment: positive  Functional change: n/a    Pain: 0/10  Location: right upper legs     Objective     Jorge received therapeutic exercises to develop strength, endurance, ROM, flexibility and core stabilization for 53 minutes including:    Stationary bike x 10 mins  LTRs  2x10  DKTC on PB  2x10  SAQs   2x10  Bridges   2x10  Seated marches  2#  2x10  Hip adduction with ball  3x10  Seated hamstring stretch  3x30 secs  Seated piriformis stretch  3x30 secs  Clams with yellow tb  2x10  Standing hip flexion  2x10  Standing hip extension 2x10  Step ups  3x10  R LOWER EXTREMITY only    Home Exercises Provided and Patient Education Provided     Education provided:   - low impact cardio.    Written Home Exercises Provided: yes.  Exercises were reviewed and Jorge was able to demonstrate them prior to the end of the session.  Jorge demonstrated good  understanding of the education provided.     See EMR under  Patient Instructions for exercises provided prior visit.    Assessment     Presents with significant R lower extremity weakness and decreased proprioception.    Jorge is progressing well towards his goals.   Pt prognosis is Good.     Pt will continue to benefit from skilled outpatient physical therapy to address the deficits listed in the problem list box on initial evaluation, provide pt/family education and to maximize pt's level of independence in the home and community environment.     Pt's spiritual, cultural and educational needs considered and pt agreeable to plan of care and goals.    Anticipated barriers to physical therapy: None    Goals:     SHORT TERM GOALS: 4 weeks 2/21/2022     1. Recent signs and systems trend is improving in order to progress towards LTG's. ongoing   2. Patient will be independent with HEP in order to further progress and return to maximal function. ongoing   3. Pain rating at Worst: 5/10 in order to progress towards increased independence with activity. ongoing   4. Patient will be able to correct postural deviations in sitting and standing, to decrease pain and promote postural awareness for injury prevention.  ongoing      LONG TERM GOALS: 8 weeks 2/21/2022     1. Patient will return to normal ADL, recreational, and work related activities with less pain and limitation.  ongoing   2. Patient will improve AROM to stated goals in order to return to maximal functional potential.  ongoing   3. Patient will improve Strength to stated goals of appropriate musculature in order to improve functional independence.  ongoing   4. Pain Rating at Best: 1/10 to improve Quality of Life.  ongoing   5. Patient will meet predicted functional outcome (FOTO) score: 65% to increase self-worth & perceived functional ability. ongoing   6. Patient will have met/partially met personal goal of being able to walk with no pain ongoing         Plan     Plan of care Certification: 2/2/2022 to  5/30/2022     Continue previous POC as stated.    Scottie Jorgensen, PT

## 2022-02-23 ENCOUNTER — CLINICAL SUPPORT (OUTPATIENT)
Dept: REHABILITATION | Facility: HOSPITAL | Age: 72
End: 2022-02-23
Payer: MEDICARE

## 2022-02-23 DIAGNOSIS — R53.1 DECREASED STRENGTH: ICD-10-CM

## 2022-02-23 DIAGNOSIS — M25.60 DECREASED RANGE OF MOTION: Primary | ICD-10-CM

## 2022-02-23 DIAGNOSIS — R26.89 DECREASED FUNCTIONAL MOBILITY: ICD-10-CM

## 2022-02-23 PROCEDURE — 97110 THERAPEUTIC EXERCISES: CPT | Mod: PN,CQ

## 2022-02-23 PROCEDURE — 97112 NEUROMUSCULAR REEDUCATION: CPT | Mod: PN,CQ

## 2022-02-23 NOTE — PROGRESS NOTES
"Counts include 234 beds at the [facility] / [facility]  Physical Therapy Daily Treatment Note     Name: Jorge Becerra  Clinic Number: [id number]    Therapy Diagnosis:   Encounter Diagnoses   Name Primary?    Decreased range of motion Yes    Decreased functional mobility     Decreased strength      Physician: Mehul Royal MD    Visit Date: 2/23/2022    Physician: Mehul Royal MD         Physician Orders: PT Eval and Treat  Medical Diagnosis from Referral: M62.838 (ICD-10-CM) - Muscle spasm   Evaluation Date: 2/2/2022  Authorization Period Expiration: 12/31/2022   Plan of Care Expiration: 5/30/2022                Progress Update: 3/4/2022               FOTO: 1 / 3   Visit # / Visits authorized: 4 / 20                Time In:  1015  Time Out: 1112  Total Billable Time: 30 minutes    Precautions: Standard  Insurance: Payor: MEDICARE / Plan: MEDICARE PART A & B / Product Type: Government /     Subjective     Pt reports: "I think the exercises are helping.", got a good report from [facility] the other day  He was compliant with home exercise program.  Response to previous treatment: positive  Functional change: improved gait pattern    Pain: 0/10  Location: right upper legs     Objective     Jorge received therapeutic exercises to develop strength, endurance, ROM, flexibility and core stabilization for 42 minutes including:  NMRE x 15 minutes to improve balance, proprioception:    Stationary bike x 10 mins  NOT PERFORMED     LTR  2x10  DKTC on PB  2x10  SAQ  1# 2x10  Bridges Red Theraband   2x10   NMRE  Hip adduction with ball  3x10  Clams with red tb  2x10  NMRE    Seated hamstring stretch  3x30 secs  Seated marches  2#  2x10  Seated piriformis stretch  3x30 secs    Standing hip flexion  2x10  Standing hip extension 2x10  Standing hip abduction 2 x 10 B  Step ups  3x10  R LOWER EXTREMITY only  NOT PERFORMED   HR 2 x 10   NMRE  Mini squats 2 x 10  Slant board gastroc stretch 3 x 30 sec B  SLS 2 x 20 sec B   " NMRE    Home Exercises Provided and Patient Education Provided     Education provided:   - low impact cardio.    Written Home Exercises Provided: yes.  Exercises were reviewed and Jorge was able to demonstrate them prior to the end of the session.  Jorge demonstrated good  understanding of the education provided.     See EMR under Patient Instructions for exercises provided prior visit.    Assessment     Jorge presents with decreased R LE strength, flexibility, and proprioception.  He tolerated PRE's w/o difficulty.  He is eager to return to his PLOF, and puts forth great effort with all activity.  Continued skilled therapy to improve balance to decrease his fall risk, increase his community outings, and improve his ADL's.  Jorge is progressing well towards his goals.   Pt prognosis is Good.     Pt will continue to benefit from skilled outpatient physical therapy to address the deficits listed in the problem list box on initial evaluation, provide pt/family education and to maximize pt's level of independence in the home and community environment.     Pt's spiritual, cultural and educational needs considered and pt agreeable to plan of care and goals.    Anticipated barriers to physical therapy: None    Goals:     SHORT TERM GOALS: 4 weeks 2/23/2022     1. Recent signs and systems trend is improving in order to progress towards LTG's. ongoing   2. Patient will be independent with HEP in order to further progress and return to maximal function. ongoing   3. Pain rating at Worst: 5/10 in order to progress towards increased independence with activity. ongoing   4. Patient will be able to correct postural deviations in sitting and standing, to decrease pain and promote postural awareness for injury prevention.  ongoing      LONG TERM GOALS: 8 weeks 2/23/2022     1. Patient will return to normal ADL, recreational, and work related activities with less pain and limitation.  ongoing   2. Patient will improve AROM to  stated goals in order to return to maximal functional potential.  ongoing   3. Patient will improve Strength to stated goals of appropriate musculature in order to improve functional independence.  ongoing   4. Pain Rating at Best: 1/10 to improve Quality of Life.  ongoing   5. Patient will meet predicted functional outcome (FOTO) score: 65% to increase self-worth & perceived functional ability. ongoing   6. Patient will have met/partially met personal goal of being able to walk with no pain ongoing         Plan     Plan of care Certification: 2/2/2022 to 5/30/2022     Continue previous POC as stated.    Airam Culver, PTA

## 2022-02-24 ENCOUNTER — IMMUNIZATION (OUTPATIENT)
Dept: PRIMARY CARE CLINIC | Facility: CLINIC | Age: 72
End: 2022-02-24
Payer: MEDICARE

## 2022-02-24 DIAGNOSIS — Z23 NEED FOR VACCINATION: Primary | ICD-10-CM

## 2022-02-24 PROCEDURE — 91305 COVID-19, MRNA, LNP-S, PF, 30 MCG/0.3 ML DOSE VACCINE (PFIZER): CPT | Mod: S$GLB,,, | Performed by: FAMILY MEDICINE

## 2022-02-24 PROCEDURE — 91305 COVID-19, MRNA, LNP-S, PF, 30 MCG/0.3 ML DOSE VACCINE (PFIZER): ICD-10-PCS | Mod: S$GLB,,, | Performed by: FAMILY MEDICINE

## 2022-02-28 ENCOUNTER — DOCUMENTATION ONLY (OUTPATIENT)
Dept: REHABILITATION | Facility: HOSPITAL | Age: 72
End: 2022-02-28

## 2022-02-28 ENCOUNTER — CLINICAL SUPPORT (OUTPATIENT)
Dept: REHABILITATION | Facility: HOSPITAL | Age: 72
End: 2022-02-28
Payer: MEDICARE

## 2022-02-28 DIAGNOSIS — R53.1 DECREASED STRENGTH: ICD-10-CM

## 2022-02-28 DIAGNOSIS — R26.89 DECREASED FUNCTIONAL MOBILITY: ICD-10-CM

## 2022-02-28 DIAGNOSIS — M25.60 DECREASED RANGE OF MOTION: Primary | ICD-10-CM

## 2022-02-28 PROCEDURE — 97110 THERAPEUTIC EXERCISES: CPT | Mod: PN

## 2022-02-28 NOTE — PROGRESS NOTES
30 day visit PT-PTA face-face discussion with ELLEN Jorgensen re: patient status, POC, and plan for progression done 2/28/22.  Airam Culver, PTA

## 2022-02-28 NOTE — PROGRESS NOTES
[facility] / [facility]  Physical Therapy Daily Treatment Note     Name: Jorge Becerra  Clinic Number: [id number]    Therapy Diagnosis:   Encounter Diagnoses   Name Primary?    Decreased range of motion Yes    Decreased functional mobility     Decreased strength      Physician: Mehul Royal MD    Visit Date: 2/28/2022    Physician: Meuhl Royal MD         Physician Orders: PT Eval and Treat  Medical Diagnosis from Referral: M62.838 (ICD-10-CM) - Muscle spasm   Evaluation Date: 2/2/2022  Authorization Period Expiration: 12/31/2022   Plan of Care Expiration: 5/30/2022                Progress Update: 3/4/2022               FOTO: 1 / 3   Visit # / Visits authorized: 5 / 20                Time In:  1015  Time Out: 1108  Total Billable Time: 53 minutes    Precautions: Standard  Insurance: Payor: MEDICARE / Plan: MEDICARE PART A & B / Product Type: Government /     Subjective     Pt reports: that he has improved since starting therapy.  States that he is able to get his shoes and socks on without assistance.  He was compliant with home exercise program.  Response to previous treatment: positive  Functional change: improved gait pattern    Pain: 0/10  Location: right upper legs     Objective     Jorge received therapeutic exercises to develop strength, endurance, ROM, flexibility and core stabilization for 53 minutes including:  NMRE x 15 minutes to improve balance, proprioception:    Stationary bike x 10 mins       LTR  2x10  DKTC on PB  3x10  SAQ  1# 2x10  Bridges Red Theraband   3x10   NMRE  Hip adduction with ball  3x10  Clams with red tb  3x10  NMRE    Seated hamstring stretch  3x30 secs  Seated marches  2#  2x10  Seated piriformis stretch  3x30 secs    Standing hip flexion  2x10 -   Standing hip extension 2x10 -   Standing hip abduction 2 x 10 B   Step ups  3x10  R LOWER EXTREMITY only  NOT PERFORMED   Shuttle leg press   50#   2x10  Shuttle heel raises   50#  2x10  HR 2 x 10   NMRE -  NP  Mini squats 2 x 10 - NP  Slant board gastroc stretch 3 x 30 sec B  SLS 2 x 20 sec B   NMRE    Home Exercises Provided and Patient Education Provided     Education provided:   - low impact cardio.    Written Home Exercises Provided: yes.  Exercises were reviewed and Jorge was able to demonstrate them prior to the end of the session.  Jorge demonstrated good  understanding of the education provided.     See EMR under Patient Instructions for exercises provided prior visit.    Assessment     Jorge presents with decreased R LE strength, flexibility, and proprioception.  He tolerated PRE's w/o difficulty.  He is eager to return to his PLOF, and puts forth great effort with all activity.  Continued skilled therapy to improve balance to decrease his fall risk, increase his community outings, and improve his ADL's.  Jorge is progressing well towards his goals.   Pt prognosis is Good.     Pt will continue to benefit from skilled outpatient physical therapy to address the deficits listed in the problem list box on initial evaluation, provide pt/family education and to maximize pt's level of independence in the home and community environment.     Pt's spiritual, cultural and educational needs considered and pt agreeable to plan of care and goals.    Anticipated barriers to physical therapy: None    Goals:     SHORT TERM GOALS: 4 weeks 2/28/2022     1. Recent signs and systems trend is improving in order to progress towards LTG's. ongoing   2. Patient will be independent with HEP in order to further progress and return to maximal function. ongoing   3. Pain rating at Worst: 5/10 in order to progress towards increased independence with activity. ongoing   4. Patient will be able to correct postural deviations in sitting and standing, to decrease pain and promote postural awareness for injury prevention.  ongoing      LONG TERM GOALS: 8 weeks 2/28/2022     1. Patient will return to normal ADL, recreational, and work  related activities with less pain and limitation.  ongoing   2. Patient will improve AROM to stated goals in order to return to maximal functional potential.  ongoing   3. Patient will improve Strength to stated goals of appropriate musculature in order to improve functional independence.  ongoing   4. Pain Rating at Best: 1/10 to improve Quality of Life.  ongoing   5. Patient will meet predicted functional outcome (FOTO) score: 65% to increase self-worth & perceived functional ability. ongoing   6. Patient will have met/partially met personal goal of being able to walk with no pain ongoing         Plan     Plan of care Certification: 2/2/2022 to 5/30/2022     Continue previous POC as stated.    Scottie Jorgensen, PT

## 2022-03-02 ENCOUNTER — CLINICAL SUPPORT (OUTPATIENT)
Dept: REHABILITATION | Facility: HOSPITAL | Age: 72
End: 2022-03-02
Payer: MEDICARE

## 2022-03-02 DIAGNOSIS — R26.89 DECREASED FUNCTIONAL MOBILITY: ICD-10-CM

## 2022-03-02 DIAGNOSIS — M25.60 DECREASED RANGE OF MOTION: Primary | ICD-10-CM

## 2022-03-02 DIAGNOSIS — R53.1 DECREASED STRENGTH: ICD-10-CM

## 2022-03-02 PROCEDURE — 97112 NEUROMUSCULAR REEDUCATION: CPT | Mod: PN,CQ

## 2022-03-02 PROCEDURE — 97110 THERAPEUTIC EXERCISES: CPT | Mod: PN,CQ

## 2022-03-02 NOTE — PROGRESS NOTES
"[facility] / [facility]  Physical Therapy Daily Treatment Note     Name: Jorge Becerra  Clinic Number: [id number]    Therapy Diagnosis:   Encounter Diagnoses   Name Primary?    Decreased range of motion Yes    Decreased functional mobility     Decreased strength      Physician: Mehul Royal MD    Visit Date: 3/2/2022    Physician: Mehul Royal MD         Physician Orders: PT Eval and Treat  Medical Diagnosis from Referral: M62.838 (ICD-10-CM) - Muscle spasm   Evaluation Date: 2/2/2022  Authorization Period Expiration: 12/31/2022   Plan of Care Expiration: 5/30/2022                Progress Update: 3/4/2022               FOTO: 1 / 3   Visit # / Visits authorized: 6 / 20                Time In:  0945  Time Out: 1025  Total Billable Time: 40 minutes    Precautions: Standard  Insurance: Payor: MEDICARE / Plan: MEDICARE PART A & B / Product Type: Government /     Subjective     Pt reports: "I am getting better"  He was compliant with home exercise program.  Response to previous treatment: positive  Functional change: improved gait pattern    Pain: 0/10  Location: right upper legs     Objective     Jorge received therapeutic exercises to develop strength, endurance, ROM, flexibility and core stabilization for 25 minutes including:  NMRE x 15 minutes to improve balance, proprioception:    Stationary bike x 10 mins    NOT PERFORMED      LTR  2x10  DKTC on PB  3x10  SAQ  1# 2x10  Bridges Red Theraband   3x10   NMRE  Hip adduction with ball  2x10  Clams with red tb  2x10  NMRE    Seated hamstring stretch  3x30 secs B  Seated marches  2#  2x10--standing today  Seated piriformis stretch  3x30 secs    Standing hip flexion  1# 2x10   Standing hip extension 1# 2x10   Standing hip abduction 1# 2 x 10 B   Step ups  3x10  R LOWER EXTREMITY only  NOT PERFORMED   SLS 2 x 20 sec B   NMRE  HR 2 x 10   NMRE - NOT PERFORMED   Mini squats 2 x 10 - NOT PERFORMED   Slant board gastroc stretch 3 x 30 sec " B    Shuttle leg press   50#   2x10  Shuttle heel raises   50#  2x10    Home Exercises Provided and Patient Education Provided     Education provided:   - low impact cardio.    Written Home Exercises Provided: yes.  Exercises were reviewed and Jorge was able to demonstrate them prior to the end of the session.  Jorge demonstrated good  understanding of the education provided.     See EMR under Patient Instructions for exercises provided prior visit.    Assessment     Jorge continues to show improvement in B LE strength.  He has improved gait pattern, but remains antalgic with a high fall risk.  Continued strengthening, balance, and proprioception to improve listed deficits..  Jorge is progressing well towards his goals.   Pt prognosis is Good.     Pt will continue to benefit from skilled outpatient physical therapy to address the deficits listed in the problem list box on initial evaluation, provide pt/family education and to maximize pt's level of independence in the home and community environment.     Pt's spiritual, cultural and educational needs considered and pt agreeable to plan of care and goals.    Anticipated barriers to physical therapy: None    Goals:     SHORT TERM GOALS: 4 weeks 3/2/2022     1. Recent signs and systems trend is improving in order to progress towards LTG's. ongoing   2. Patient will be independent with HEP in order to further progress and return to maximal function. ongoing   3. Pain rating at Worst: 5/10 in order to progress towards increased independence with activity. ongoing   4. Patient will be able to correct postural deviations in sitting and standing, to decrease pain and promote postural awareness for injury prevention.  ongoing      LONG TERM GOALS: 8 weeks 3/2/2022     1. Patient will return to normal ADL, recreational, and work related activities with less pain and limitation.  ongoing   2. Patient will improve AROM to stated goals in order to return to maximal functional  potential.  ongoing   3. Patient will improve Strength to stated goals of appropriate musculature in order to improve functional independence.  ongoing   4. Pain Rating at Best: 1/10 to improve Quality of Life.  ongoing   5. Patient will meet predicted functional outcome (FOTO) score: 65% to increase self-worth & perceived functional ability. ongoing   6. Patient will have met/partially met personal goal of being able to walk with no pain ongoing         Plan     Plan of care Certification: 2/2/2022 to 5/30/2022     Continue previous POC as stated.    Airam Culver, PTA

## 2022-03-07 ENCOUNTER — CLINICAL SUPPORT (OUTPATIENT)
Dept: REHABILITATION | Facility: HOSPITAL | Age: 72
End: 2022-03-07
Payer: MEDICARE

## 2022-03-07 DIAGNOSIS — R53.1 DECREASED STRENGTH: ICD-10-CM

## 2022-03-07 DIAGNOSIS — R26.89 DECREASED FUNCTIONAL MOBILITY: ICD-10-CM

## 2022-03-07 DIAGNOSIS — M25.60 DECREASED RANGE OF MOTION: Primary | ICD-10-CM

## 2022-03-07 PROCEDURE — 97110 THERAPEUTIC EXERCISES: CPT | Mod: PN

## 2022-03-07 NOTE — PROGRESS NOTES
[facility] / [facility]  Physical Therapy Daily Treatment Note     Name: Jorge Becerra  Clinic Number: [id number]    Therapy Diagnosis:   Encounter Diagnoses   Name Primary?    Decreased range of motion Yes    Decreased functional mobility     Decreased strength      Physician: Mehul Royal MD    Visit Date: 3/7/2022    Physician: Mehul Royal MD         Physician Orders: PT Eval and Treat  Medical Diagnosis from Referral: M62.838 (ICD-10-CM) - Muscle spasm   Evaluation Date: 2/2/2022  Authorization Period Expiration: 12/31/2022   Plan of Care Expiration: 5/30/2022                Progress Update: 3/4/2022               FOTO: 1 / 3   Visit # / Visits authorized: 7 / 20                Time In:  0930  Time Out: 1015  Total Billable Time: 45 minutes    Precautions: Standard  Insurance: Payor: MEDICARE / Plan: MEDICARE PART A & B / Product Type: Government /     Subjective     Pt reports: that he feels like he is getting stronger.  States that he is no longer getting that shooting pain down his leg.    He was compliant with home exercise program.  Response to previous treatment: positive  Functional change: improved gait pattern    Pain: 0/10  Location: right upper legs     Objective     Jorge received therapeutic exercises to develop strength, endurance, ROM, flexibility and core stabilization for 45 minutes including:  NMRE x 15 minutes to improve balance, proprioception:    Stationary bike x 10 mins        LTR  2x10  DKTC on PB  3x10  SAQ  2# 2x10  Bridges Red Theraband   3x10   NMRE  Hip adduction with ball  2x10  Clams with red tb  2x10  NMRE    Seated hamstring stretch  3x30 secs B  Standing marches in parallel bars  2x10  Seated piriformis stretch  3x30 secs    Standing hip flexion 2x10   Standing hip extension 2# 2x10   Standing hip abduction 2# 2 x 10 B   Step ups  3x10  R LOWER EXTREMITY only    SLS 2 x 20 sec B   NMRE  HR 2 x 10   NMRE - NOT PERFORMED   Mini squats 2 x 10 - NOT  PERFORMED   Slant board gastroc stretch 3 x 30 sec B    Shuttle leg press   62#   2x10  Shuttle SL leg press  25#  2X10   Shuttle heel raises   50#  2x10    Home Exercises Provided and Patient Education Provided     Education provided:   - low impact cardio.    Written Home Exercises Provided: yes.  Exercises were reviewed and Jorge was able to demonstrate them prior to the end of the session.  Jorge demonstrated good  understanding of the education provided.     See EMR under Patient Instructions for exercises provided prior visit.    Assessment     Jorge continues to show improvement in B LE strength.  He has improved gait pattern, but remains antalgic with a high fall risk.  Continued strengthening, balance, and proprioception to improve listed deficits..  Jorge is progressing well towards his goals.   Pt prognosis is Good.     Pt will continue to benefit from skilled outpatient physical therapy to address the deficits listed in the problem list box on initial evaluation, provide pt/family education and to maximize pt's level of independence in the home and community environment.     Pt's spiritual, cultural and educational needs considered and pt agreeable to plan of care and goals.    Anticipated barriers to physical therapy: None    Goals:     SHORT TERM GOALS: 4 weeks 3/7/2022     1. Recent signs and systems trend is improving in order to progress towards LTG's. ongoing   2. Patient will be independent with HEP in order to further progress and return to maximal function. ongoing   3. Pain rating at Worst: 5/10 in order to progress towards increased independence with activity. ongoing   4. Patient will be able to correct postural deviations in sitting and standing, to decrease pain and promote postural awareness for injury prevention.  ongoing      LONG TERM GOALS: 8 weeks 3/7/2022     1. Patient will return to normal ADL, recreational, and work related activities with less pain and limitation.  ongoing    2. Patient will improve AROM to stated goals in order to return to maximal functional potential.  ongoing   3. Patient will improve Strength to stated goals of appropriate musculature in order to improve functional independence.  ongoing   4. Pain Rating at Best: 1/10 to improve Quality of Life.  ongoing   5. Patient will meet predicted functional outcome (FOTO) score: 65% to increase self-worth & perceived functional ability. ongoing   6. Patient will have met/partially met personal goal of being able to walk with no pain ongoing         Plan     Plan of care Certification: 2/2/2022 to 5/30/2022     Continue previous POC as stated.    Scottie Jorgensen, PT

## 2022-03-09 LAB
LEFT EYE DM RETINOPATHY: POSITIVE
RIGHT EYE DM RETINOPATHY: POSITIVE

## 2022-03-10 ENCOUNTER — CLINICAL SUPPORT (OUTPATIENT)
Dept: REHABILITATION | Facility: HOSPITAL | Age: 72
End: 2022-03-10
Payer: MEDICARE

## 2022-03-10 ENCOUNTER — PATIENT OUTREACH (OUTPATIENT)
Dept: ADMINISTRATIVE | Facility: HOSPITAL | Age: 72
End: 2022-03-10
Payer: MEDICARE

## 2022-03-10 DIAGNOSIS — R26.89 DECREASED FUNCTIONAL MOBILITY: ICD-10-CM

## 2022-03-10 DIAGNOSIS — R53.1 DECREASED STRENGTH: ICD-10-CM

## 2022-03-10 DIAGNOSIS — M25.60 DECREASED RANGE OF MOTION: Primary | ICD-10-CM

## 2022-03-10 PROCEDURE — 97110 THERAPEUTIC EXERCISES: CPT | Mod: PN,CQ

## 2022-03-10 PROCEDURE — 97112 NEUROMUSCULAR REEDUCATION: CPT | Mod: PN,CQ

## 2022-03-10 NOTE — PROGRESS NOTES
[facility] / [facility]  Physical Therapy Daily Treatment Note     Name: Jorge Becerra  Clinic Number: [id number]    Therapy Diagnosis:   Encounter Diagnoses   Name Primary?    Decreased range of motion Yes    Decreased functional mobility     Decreased strength      Physician: Mehul Royal MD    Visit Date: 3/10/2022    Physician: Mehul Royal MD         Physician Orders: PT Eval and Treat  Medical Diagnosis from Referral: M62.838 (ICD-10-CM) - Muscle spasm   Evaluation Date: 2/2/2022  Authorization Period Expiration: 12/31/2022   Plan of Care Expiration: 5/30/2022                Progress Update: 3/4/2022               FOTO: 1 / 3   Visit # / Visits authorized: 8 / 20                Time In:  0957  Time Out: 1042  Total Billable Time: 45 minutes    Precautions: Standard  Insurance: Payor: MEDICARE / Plan: MEDICARE PART A & B / Product Type: Government /     Subjective     Pt reports: that he feels like he is getting stronger.  States that he is no longer getting that shooting pain down his leg.    He was compliant with home exercise program.  Response to previous treatment: positive  Functional change: improved gait pattern    Pain: 0/10  Location: right upper legs     Objective     Jorge received therapeutic exercises to develop strength, endurance, ROM, flexibility and core stabilization for 30 minutes including:  NMRE x 15 minutes to improve balance, proprioception:    Stationary bike x 10 mins        LTR  2x10  DKTC on PB  3x10  SAQ  2# 2x10  Bridges Red Theraband   2x10   NMRE  Hip adduction with ball  2x10  Clams with red tb  3x10  NMRE    Seated hamstring stretch  3x30 secs B  NOT PERFORMED   Seated piriformis stretch  3x30 secs  B    Standing marches 2#  2x10  Standing hip flexion 2#  2x10   Standing hip extension 2# 2x10   Standing hip abduction 2# 2 x 10 B   Step ups  3x10  R LOWER EXTREMITY only    SLS 2 x 20 sec B   NMRE  HR 3 x 10   NMRE  Mini squats 2 x 10   Slant  board gastroc stretch 3 x 30 sec B    Shuttle leg press   62#   2x10  Shuttle SL leg press  25#  2X10   Shuttle heel raises   50#  2x10    Home Exercises Provided and Patient Education Provided     Education provided:   - low impact cardio.    Written Home Exercises Provided: yes.  Exercises were reviewed and Jorge was able to demonstrate them prior to the end of the session.  Jorge demonstrated good  understanding of the education provided.     See EMR under Patient Instructions for exercises provided prior visit.    Assessment     Jorge demonstrates increased strength.  He tolerated PRE's w/o difficulty and minimal fatigue.  Continued strengthening, endurance, balance.  Jorge is progressing well towards his goals.   Pt prognosis is Good.     Pt will continue to benefit from skilled outpatient physical therapy to address the deficits listed in the problem list box on initial evaluation, provide pt/family education and to maximize pt's level of independence in the home and community environment.     Pt's spiritual, cultural and educational needs considered and pt agreeable to plan of care and goals.    Anticipated barriers to physical therapy: None    Goals:     SHORT TERM GOALS: 4 weeks 3/10/2022     1. Recent signs and systems trend is improving in order to progress towards LTG's. ongoing   2. Patient will be independent with HEP in order to further progress and return to maximal function. ongoing   3. Pain rating at Worst: 5/10 in order to progress towards increased independence with activity. ongoing   4. Patient will be able to correct postural deviations in sitting and standing, to decrease pain and promote postural awareness for injury prevention.  ongoing      LONG TERM GOALS: 8 weeks 3/10/2022     1. Patient will return to normal ADL, recreational, and work related activities with less pain and limitation.  ongoing   2. Patient will improve AROM to stated goals in order to return to maximal functional  potential.  ongoing   3. Patient will improve Strength to stated goals of appropriate musculature in order to improve functional independence.  ongoing   4. Pain Rating at Best: 1/10 to improve Quality of Life.  ongoing   5. Patient will meet predicted functional outcome (FOTO) score: 65% to increase self-worth & perceived functional ability. ongoing   6. Patient will have met/partially met personal goal of being able to walk with no pain ongoing         Plan     Plan of care Certification: 2/2/2022 to 5/30/2022     Continue previous POC as stated.    Airam Culver, PTA

## 2022-03-15 ENCOUNTER — DOCUMENTATION ONLY (OUTPATIENT)
Dept: REHABILITATION | Facility: HOSPITAL | Age: 72
End: 2022-03-15
Payer: MEDICARE

## 2022-03-15 NOTE — PROGRESS NOTES
30 day visit PT-PTA face-face discussion with ELLEN Jorgensen re: patient status, POC, and plan for progression done 3/15/22.  Airam Culver, PTA

## 2022-03-21 ENCOUNTER — CLINICAL SUPPORT (OUTPATIENT)
Dept: REHABILITATION | Facility: HOSPITAL | Age: 72
End: 2022-03-21
Payer: MEDICARE

## 2022-03-21 DIAGNOSIS — R26.89 DECREASED FUNCTIONAL MOBILITY: ICD-10-CM

## 2022-03-21 DIAGNOSIS — M25.60 DECREASED RANGE OF MOTION: Primary | ICD-10-CM

## 2022-03-21 DIAGNOSIS — R53.1 DECREASED STRENGTH: ICD-10-CM

## 2022-03-21 PROCEDURE — 97110 THERAPEUTIC EXERCISES: CPT | Mod: PN

## 2022-03-21 NOTE — PROGRESS NOTES
formerly [facility] / [facility]  Physical Therapy Daily Treatment Note     Name: Jorge Becerra  Clinic Number: [id number]    Therapy Diagnosis:   Encounter Diagnoses   Name Primary?    Decreased range of motion Yes    Decreased functional mobility     Decreased strength      Physician: Mehul Royal MD    Visit Date: 3/21/2022    Physician: Mehul Royal MD         Physician Orders: PT Eval and Treat  Medical Diagnosis from Referral: M62.838 (ICD-10-CM) - Muscle spasm   Evaluation Date: 2/2/2022  Authorization Period Expiration: 12/31/2022   Plan of Care Expiration: 5/30/2022                Progress Update: 3/4/2022               FOTO: 1 / 3   Visit # / Visits authorized: 9 / 20                Time In:  0930  Time Out: 1025  Total Billable Time: 55 minutes    Precautions: Standard  Insurance: Payor: MEDICARE / Plan: MEDICARE PART A & B / Product Type: Government /     Subjective     Pt reports: that he feels like he is getting stronger.  States that he feels like he is ready to be discharged next visit and that he is confident he can perform the exercises at home.    He was compliant with home exercise program.  Response to previous treatment: positive  Functional change: improved gait pattern    Pain: 0/10  Location: right upper legs     Objective     Jorge received therapeutic exercises to develop strength, endurance, ROM, flexibility and core stabilization for 55 minutes including:  NMRE x 15 minutes to improve balance, proprioception:    Stationary bike x 10 mins        LTR  2x10  DKTC on PB  3x10  SAQ  2# 2x10  Bridges Red Theraband   2x10   NMRE  Hip adduction with ball  2x10  Clams with red tb  3x10  NMRE    Seated hamstring stretch  3x30 secs B    Seated piriformis stretch  3x30 secs  B    Standing marches 2#  2x10  Standing hip flexion 2#  2x10   Standing hip extension 2# 2x10   Standing hip abduction 2# 2 x 10 B   Step ups  2#  2x10 B    SLS 2 x 20 sec B   NMRE  HR 3 x 10   NMRE -  NP  Mini squats 2 x 10 - NP  Slant board gastroc stretch 3 x 30 sec B    Shuttle leg press   67#   2x10  Shuttle SL leg press  12#  2X10   Shuttle heel raises   50#  2x10  Sit to stand x 10    Home Exercises Provided and Patient Education Provided     Education provided:   - low impact cardio.    Written Home Exercises Provided: yes.  Exercises were reviewed and Jorge was able to demonstrate them prior to the end of the session.  Jorge demonstrated good  understanding of the education provided.     See EMR under Patient Instructions for exercises provided prior visit.    Assessment     Jorge demonstrates increased strength.  He tolerated PRE's w/o difficulty and minimal fatigue.  Continued strengthening, endurance, balance.  Jorge is progressing well towards his goals.   Pt prognosis is Good.     Pt will continue to benefit from skilled outpatient physical therapy to address the deficits listed in the problem list box on initial evaluation, provide pt/family education and to maximize pt's level of independence in the home and community environment.     Pt's spiritual, cultural and educational needs considered and pt agreeable to plan of care and goals.    Anticipated barriers to physical therapy: None    Goals:     SHORT TERM GOALS: 4 weeks 3/21/2022     1. Recent signs and systems trend is improving in order to progress towards LTG's. ongoing   2. Patient will be independent with HEP in order to further progress and return to maximal function. ongoing   3. Pain rating at Worst: 5/10 in order to progress towards increased independence with activity. ongoing   4. Patient will be able to correct postural deviations in sitting and standing, to decrease pain and promote postural awareness for injury prevention.  ongoing      LONG TERM GOALS: 8 weeks 3/21/2022     1. Patient will return to normal ADL, recreational, and work related activities with less pain and limitation.  ongoing   2. Patient will improve AROM to  stated goals in order to return to maximal functional potential.  ongoing   3. Patient will improve Strength to stated goals of appropriate musculature in order to improve functional independence.  ongoing   4. Pain Rating at Best: 1/10 to improve Quality of Life.  ongoing   5. Patient will meet predicted functional outcome (FOTO) score: 65% to increase self-worth & perceived functional ability. ongoing   6. Patient will have met/partially met personal goal of being able to walk with no pain ongoing         Plan     Plan of care Certification: 2/2/2022 to 5/30/2022     Continue previous POC as stated.    Scottie Jorgensen, PT

## 2022-03-24 ENCOUNTER — CLINICAL SUPPORT (OUTPATIENT)
Dept: REHABILITATION | Facility: HOSPITAL | Age: 72
End: 2022-03-24
Payer: MEDICARE

## 2022-03-24 DIAGNOSIS — R53.1 DECREASED STRENGTH: ICD-10-CM

## 2022-03-24 DIAGNOSIS — R26.89 DECREASED FUNCTIONAL MOBILITY: ICD-10-CM

## 2022-03-24 DIAGNOSIS — M25.60 DECREASED RANGE OF MOTION: Primary | ICD-10-CM

## 2022-03-24 PROCEDURE — 97110 THERAPEUTIC EXERCISES: CPT | Mod: PN

## 2022-03-24 NOTE — PROGRESS NOTES
[facility] / [facility]  Physical Therapy Daily Treatment Note     Name: Jorge Becerra  Clinic Number: [id number]    Therapy Diagnosis:   Encounter Diagnoses   Name Primary?    Decreased range of motion Yes    Decreased functional mobility     Decreased strength      Physician: Mehul Royal MD    Visit Date: 3/24/2022    Physician: Mehul Royal MD         Physician Orders: PT Eval and Treat  Medical Diagnosis from Referral: M62.838 (ICD-10-CM) - Muscle spasm   Evaluation Date: 2/2/2022  Authorization Period Expiration: 12/31/2022   Plan of Care Expiration: 5/30/2022                Progress Update: 3/4/2022               FOTO: 1 / 3   Visit # / Visits authorized: 10 / 20                Time In:  0945  Time Out: 1030  Total Billable Time: 45 minutes    Precautions: Standard  Insurance: Payor: MEDICARE / Plan: MEDICARE PART A & B / Product Type: Government /     Subjective     Pt reports: that he feels like he is getting stronger.  States that he feels like he is ready to be discharged next visit and that he is confident he can perform the exercises at home.    He was compliant with home exercise program.  Response to previous treatment: positive  Functional change: improved gait pattern    Pain: 0/10  Location: right upper legs     Objective     Jorge received therapeutic exercises to develop strength, endurance, ROM, flexibility and core stabilization for 45 minutes including:  NMRE x 15 minutes to improve balance, proprioception:    Stationary bike x 10 mins        LTR  2x10  DKTC on PB  3x10  SAQ  2# 2x10  Bridges Red Theraband   2x10   NMRE  Hip adduction with ball  2x10  Clams with red tb  3x10  NMRE  SLRs   3x10      Seated hamstring stretch  3x30 secs B    Seated piriformis stretch  3x30 secs  B    Standing marches 2#  2x10  Standing hip flexion 2#  2x10   Standing hip extension 2# 2x10   Standing hip abduction 2# 2 x 10 B   Step ups  2#  2x10 B    SLS 2 x 20 sec B   NMRE  HR 3  x 10   NMRE - NP  Mini squats 2 x 10 - NP  Slant board gastroc stretch 3 x 30 sec B    Shuttle leg press   62#   2x10  Shuttle SL leg press  12#  2X10   Shuttle heel raises   50#  2x10  Sit to stand x 10    Home Exercises Provided and Patient Education Provided     Education provided:   - low impact cardio.    Written Home Exercises Provided: yes.  Exercises were reviewed and Jorge was able to demonstrate them prior to the end of the session.  Jorge demonstrated good  understanding of the education provided.     See EMR under Patient Instructions for exercises provided prior visit.    Assessment     Jorge demonstrates increased strength.  He tolerated PRE's w/o difficulty and minimal fatigue.  Continued strengthening, endurance, balance.  Jorge is progressing well towards his goals.   Pt prognosis is Good.     Pt will continue to benefit from skilled outpatient physical therapy to address the deficits listed in the problem list box on initial evaluation, provide pt/family education and to maximize pt's level of independence in the home and community environment.     Pt's spiritual, cultural and educational needs considered and pt agreeable to plan of care and goals.    Anticipated barriers to physical therapy: None    Goals:     SHORT TERM GOALS: 4 weeks 3/24/2022     1. Recent signs and systems trend is improving in order to progress towards LTG's. met   2. Patient will be independent with HEP in order to further progress and return to maximal function. met   3. Pain rating at Worst: 5/10 in order to progress towards increased independence with activity. met   4. Patient will be able to correct postural deviations in sitting and standing, to decrease pain and promote postural awareness for injury prevention.  ongoing      LONG TERM GOALS: 8 weeks 3/24/2022     1. Patient will return to normal ADL, recreational, and work related activities with less pain and limitation.  ongoing   2. Patient will improve AROM to  stated goals in order to return to maximal functional potential.  ongoing   3. Patient will improve Strength to stated goals of appropriate musculature in order to improve functional independence.  ongoing   4. Pain Rating at Best: 1/10 to improve Quality of Life.  ongoing   5. Patient will meet predicted functional outcome (FOTO) score: 65% to increase self-worth & perceived functional ability. ongoing   6. Patient will have met/partially met personal goal of being able to walk with no pain ongoing         Plan     Plan of care Certification: 2/2/2022 to 5/30/2022     Continue previous POC as stated.    Scottie Jorgensen, PT

## 2022-03-28 ENCOUNTER — CLINICAL SUPPORT (OUTPATIENT)
Dept: REHABILITATION | Facility: HOSPITAL | Age: 72
End: 2022-03-28
Payer: MEDICARE

## 2022-03-28 DIAGNOSIS — R26.89 DECREASED FUNCTIONAL MOBILITY: ICD-10-CM

## 2022-03-28 DIAGNOSIS — R53.1 DECREASED STRENGTH: ICD-10-CM

## 2022-03-28 DIAGNOSIS — M25.60 DECREASED RANGE OF MOTION: Primary | ICD-10-CM

## 2022-03-28 PROCEDURE — 97110 THERAPEUTIC EXERCISES: CPT | Mod: PN,CQ

## 2022-03-28 NOTE — PROGRESS NOTES
[facility] / [facility]  Physical Therapy Daily Treatment Note     Name: Jorge Becerra  Clinic Number: [id number]    Therapy Diagnosis:   Encounter Diagnoses   Name Primary?    Decreased range of motion Yes    Decreased functional mobility     Decreased strength      Physician: Mehul Royal MD    Visit Date: 3/28/2022    Physician: Mehul Royal MD         Physician Orders: PT Eval and Treat  Medical Diagnosis from Referral: M62.838 (ICD-10-CM) - Muscle spasm   Evaluation Date: 2/2/2022  Authorization Period Expiration: 12/31/2022   Plan of Care Expiration: 5/30/2022                Progress Update: 3/4/2022               FOTO: 1 / 3   Visit # / Visits authorized: 11/20     PTA visit # 1/5               Time In:  0915  Time Out: 1004  Total Billable Time: 49  minutes    Precautions: Standard  Insurance: Payor: MEDICARE / Plan: MEDICARE PART A & B / Product Type: Government /     Subjective     Pt reports: he is doing good today and not having any pain  :He was compliant with home exercise program.  Response to previous treatment: positive  Functional change: improved gait pattern    Pain: 0/10  Location: right upper legs     Objective     Jorge received therapeutic exercises to develop strength, endurance, ROM, flexibility and core stabilization for 49 minutes including:      Stationary bike x 10 mins  Level - 3 to increase bilateral lower extremity strength     Supine exercises:  lower trunk rotation   2x10  Double knee to chest on physioball  3 x 10  Hip adduction with ball  2x10    Seated hamstring stretch  3x30 secs B    Seated piriformis stretch  3x30 secs  Bilateral (R lower extremity on stool)    Standing marches 2#  2x10  Standing hip flexion 2#  2x10   Standing hip extension 2# 2x10   Standing hip abduction 2# 2 x 10 B   Step ups  2#  2x10 B   Slant board gastroc stretch 3 x 30 sec B    Shuttle leg press   62#   2x10  Shuttle Single L leg press  12#  2X10   Shuttle heel raises    62#  2x10      Not performed:  SLS 2 x 20 sec B   NMRE- NP  HR 3 x 10   NMRE - NP  Mini squats 2 x 10 - NP  SLS 2 x 20 sec B   NMRE- NP  HR 3 x 10   NMRE - NP  Mini squats 2 x 10 - NP  Sit to stand x 10  Short arc quad   2# 2x10  Bridges Red Theraband   2x10   NMRE  Clams with red tb  3x10  NMRE  Straight leg raises  3x10        Home Exercises Provided and Patient Education Provided     Education provided:   - low impact cardio.    Written Home Exercises Provided: yes.  Exercises were reviewed and Jorge was able to demonstrate them prior to the end of the session.  Jorge demonstrated good  understanding of the education provided.     See EMR under Patient Instructions for exercises provided prior visit.    Assessment     Jorge provided good effort and participation toward therapeutic interventions today with focus on lower extremity strengthening, balance and coordination exercises. Pt did well with exercises and reported no pain today.  Right lower extremity still appears to be weak but is improving.     Jorge is progressing well towards his goals.   Pt prognosis is Good.     Pt will continue to benefit from skilled outpatient physical therapy to address the deficits listed in the problem list box on initial evaluation, provide pt/family education and to maximize pt's level of independence in the home and community environment.     Pt's spiritual, cultural and educational needs considered and pt agreeable to plan of care and goals.    Anticipated barriers to physical therapy: None    Goals:     SHORT TERM GOALS: 4 weeks 3/28/2022     1. Recent signs and systems trend is improving in order to progress towards LTG's. met   2. Patient will be independent with HEP in order to further progress and return to maximal function. met   3. Pain rating at Worst: 5/10 in order to progress towards increased independence with activity. met   4. Patient will be able to correct postural deviations in sitting and standing, to  decrease pain and promote postural awareness for injury prevention.  ongoing      LONG TERM GOALS: 8 weeks 3/28/2022     1. Patient will return to normal ADL, recreational, and work related activities with less pain and limitation.  ongoing   2. Patient will improve AROM to stated goals in order to return to maximal functional potential.  ongoing   3. Patient will improve Strength to stated goals of appropriate musculature in order to improve functional independence.  ongoing   4. Pain Rating at Best: 1/10 to improve Quality of Life.  ongoing   5. Patient will meet predicted functional outcome (FOTO) score: 65% to increase self-worth & perceived functional ability. ongoing   6. Patient will have met/partially met personal goal of being able to walk with no pain ongoing         Plan     Plan of care Certification: 2/2/2022 to 5/30/2022     Continue with Outpatient Physical Therapy 2 times weekly for 6 weeks to include any combination of the following interventions: virtual visits, dry needling, modalities, electrical stimulation (IFC, Pre-Mod, Attended with Functional Dry Needling), Manual Therapy, Moist Heat/ Ice, Neuromuscular Re-ed, Patient Education, Self Care, Therapeutic Exercise, Functional Training and Therapeutic Activites     Amber Stock, DENISHA

## 2022-03-30 ENCOUNTER — CLINICAL SUPPORT (OUTPATIENT)
Dept: REHABILITATION | Facility: HOSPITAL | Age: 72
End: 2022-03-30
Payer: MEDICARE

## 2022-03-30 DIAGNOSIS — R26.89 DECREASED FUNCTIONAL MOBILITY: ICD-10-CM

## 2022-03-30 DIAGNOSIS — R53.1 DECREASED STRENGTH: ICD-10-CM

## 2022-03-30 DIAGNOSIS — M25.60 DECREASED RANGE OF MOTION: Primary | ICD-10-CM

## 2022-03-30 PROCEDURE — 97110 THERAPEUTIC EXERCISES: CPT | Mod: PN

## 2022-03-30 NOTE — PROGRESS NOTES
[facility] / [facility]  Physical Therapy Daily Treatment Note     Name: Jorge Becerra  Clinic Number: [id number]    Therapy Diagnosis:   Encounter Diagnoses   Name Primary?    Decreased range of motion Yes    Decreased functional mobility     Decreased strength      Physician: Mehul Royal MD    Visit Date: 3/30/2022    Physician: Mehul Royal MD         Physician Orders: PT Eval and Treat  Medical Diagnosis from Referral: M62.838 (ICD-10-CM) - Muscle spasm   Evaluation Date: 2/2/2022  Authorization Period Expiration: 12/31/2022   Plan of Care Expiration: 5/30/2022                Progress Update: 3/4/2022               FOTO: 1 / 3   Visit # / Visits authorized: 12/20     PTA visit # 1/5               Time In:  1015  Time Out: 1100  Total Billable Time: 45  minutes    Precautions: Standard  Insurance: Payor: MEDICARE / Plan: MEDICARE PART A & B / Product Type: Government /     Subjective     Pt reports: he is doing great, no complaints.  :He was compliant with home exercise program.  Response to previous treatment: positive  Functional change: improved gait pattern    Pain: 0/10  Location: right upper legs     Objective     Jorge received therapeutic exercises to develop strength, endurance, ROM, flexibility and core stabilization for 45 minutes including:    Stationary bike x 5 mins  Level - 3 to increase bilateral lower extremity strength     Supine exercises:  lower trunk rotation   2x10 - NP  Double knee to chest on physioball  3 x 10 - NP  Hip adduction with ball  2x10 - NP    Seated hamstring stretch  3x30 secs B    Seated piriformis stretch  3x30 secs  Bilateral (R lower extremity on stool)    Standing marches 2#  2x10  Standing hip flexion 2#  2x10   Standing hip extension 2# 2x10   Standing hip abduction 2# 2 x 10 B   Step ups  2#  2x10 B   Slant board gastroc stretch 3 x 30 sec B  Lateral walks in saucedo 1 lap    Shuttle leg press   62#   2x10  Shuttle Single L leg press  12#   2X10   Shuttle heel raises   62#  2x10        Not performed:  SLS 2 x 20 sec B   NMRE- NP  HR 3 x 10   NMRE - NP  Mini squats 2 x 10 - NP  SLS 2 x 20 sec B   NMRE- NP  HR 3 x 10   NMRE - NP  Mini squats 2 x 10 - NP  Sit to stand x 10  Short arc quad   2# 2x10  Bridges Red Theraband   2x10   NMRE  Clams with red tb  3x10  NMRE  Straight leg raises  3x10        Home Exercises Provided and Patient Education Provided     Education provided:   - low impact cardio.    Written Home Exercises Provided: yes.  Exercises were reviewed and Jorge was able to demonstrate them prior to the end of the session.  Jorge demonstrated good  understanding of the education provided.     See EMR under Patient Instructions for exercises provided prior visit.    Assessment     Jorge provided good effort and participation toward therapeutic interventions today with focus on lower extremity strengthening, balance and coordination exercises. Pt did well with exercises and reported no pain today.  Right lower extremity still appears to be weak but is improving.     Jorge is progressing well towards his goals.   Pt prognosis is Good.     Pt will continue to benefit from skilled outpatient physical therapy to address the deficits listed in the problem list box on initial evaluation, provide pt/family education and to maximize pt's level of independence in the home and community environment.     Pt's spiritual, cultural and educational needs considered and pt agreeable to plan of care and goals.    Anticipated barriers to physical therapy: None    Goals:     SHORT TERM GOALS: 4 weeks 3/30/2022     1. Recent signs and systems trend is improving in order to progress towards LTG's. met   2. Patient will be independent with HEP in order to further progress and return to maximal function. met   3. Pain rating at Worst: 5/10 in order to progress towards increased independence with activity. met   4. Patient will be able to correct postural  deviations in sitting and standing, to decrease pain and promote postural awareness for injury prevention.  ongoing      LONG TERM GOALS: 8 weeks 3/30/2022     1. Patient will return to normal ADL, recreational, and work related activities with less pain and limitation.  ongoing   2. Patient will improve AROM to stated goals in order to return to maximal functional potential.  ongoing   3. Patient will improve Strength to stated goals of appropriate musculature in order to improve functional independence.  ongoing   4. Pain Rating at Best: 1/10 to improve Quality of Life.  ongoing   5. Patient will meet predicted functional outcome (FOTO) score: 65% to increase self-worth & perceived functional ability. ongoing   6. Patient will have met/partially met personal goal of being able to walk with no pain ongoing         Plan     Plan of care Certification: 2/2/2022 to 5/30/2022     Continue with Outpatient Physical Therapy 2 times weekly for 6 weeks to include any combination of the following interventions: virtual visits, dry needling, modalities, electrical stimulation (IFC, Pre-Mod, Attended with Functional Dry Needling), Manual Therapy, Moist Heat/ Ice, Neuromuscular Re-ed, Patient Education, Self Care, Therapeutic Exercise, Functional Training and Therapeutic Activites     Scottie Jorgensen, PT

## 2022-04-04 ENCOUNTER — CLINICAL SUPPORT (OUTPATIENT)
Dept: REHABILITATION | Facility: HOSPITAL | Age: 72
End: 2022-04-04
Payer: MEDICARE

## 2022-04-04 DIAGNOSIS — R53.1 DECREASED STRENGTH: ICD-10-CM

## 2022-04-04 DIAGNOSIS — R26.89 DECREASED FUNCTIONAL MOBILITY: ICD-10-CM

## 2022-04-04 DIAGNOSIS — M25.60 DECREASED RANGE OF MOTION: Primary | ICD-10-CM

## 2022-04-04 PROCEDURE — 97110 THERAPEUTIC EXERCISES: CPT | Mod: PN

## 2022-04-04 NOTE — PROGRESS NOTES
[facility]  Physical Therapy Daily Treatment Note     Name: Jorge Becerra  Clinic Number: [id number]    Therapy Diagnosis:   Encounter Diagnoses   Name Primary?    Decreased range of motion Yes    Decreased functional mobility     Decreased strength      Physician: Mehul Royal MD    Visit Date: 4/4/2022    Physician: Mehul Royal MD         Physician Orders: PT Eval and Treat  Medical Diagnosis from Referral: M62.838 (ICD-10-CM) - Muscle spasm   Evaluation Date: 2/2/2022  Authorization Period Expiration: 12/31/2022   Plan of Care Expiration: 5/30/2022                Progress Update: 3/4/2022               FOTO: 1 / 3   Visit # / Visits authorized: 13/20     PTA visit # 1/5               Time In:  1010  (Pnt arrived early)  Time Out: 1105  Total Billable Time: 55  minutes    Precautions: Standard  Insurance: Payor: MEDICARE / Plan: MEDICARE PART A & B / Product Type: Government /     Subjective     Pt reports: he is doing great, no complaints.  :He was compliant with home exercise program.  Response to previous treatment: positive  Functional change: improved gait pattern    Pain: 0/10  Location: right upper legs     Objective     Jorge received therapeutic exercises to develop strength, endurance, ROM, flexibility and core stabilization for 55 minutes including:    Stationary bike x 10 mins  Level - 3 to increase bilateral lower extremity strength     Bridges   3x10  Clams with red tb   3x10  Hip adduction with ball  3x10  SLRs  3x10    Seated hamstring stretch  3x30 secs B    Seated piriformis stretch  3x30 secs  Bilateral (R lower extremity on stool)    Standing marches 2#  2x10  Standing hip flexion 2#  2x10   Standing hip extension 2# 2x10   Standing hip abduction 2# 2 x 10 B   Step ups  2#  2x10 B   Slant board gastroc stretch 3 x 30 sec B  Lateral walks in saucedo 1 lap  Sit to stand  x20     Shuttle leg press   62#   2x10  Shuttle Single L leg press  12#  2X10    Shuttle heel raises   62#  2x10  SLS 2 x 20 sec B   NMRE    Home Exercises Provided and Patient Education Provided     Education provided:   - low impact cardio.    Written Home Exercises Provided: yes.  Exercises were reviewed and Jorge was able to demonstrate them prior to the end of the session.  Jorge demonstrated good  understanding of the education provided.     See EMR under Patient Instructions for exercises provided prior visit.    Assessment     Jorge provided good effort and participation toward therapeutic interventions today with focus on lower extremity strengthening, balance and coordination exercises. Pt did well with exercises and reported no pain today.  Right lower extremity still appears to be weak but is improving.     Jorge is progressing well towards his goals.   Pt prognosis is Good.     Pt will continue to benefit from skilled outpatient physical therapy to address the deficits listed in the problem list box on initial evaluation, provide pt/family education and to maximize pt's level of independence in the home and community environment.     Pt's spiritual, cultural and educational needs considered and pt agreeable to plan of care and goals.    Anticipated barriers to physical therapy: None    Goals:     SHORT TERM GOALS: 4 weeks 4/4/2022     1. Recent signs and systems trend is improving in order to progress towards LTG's. met   2. Patient will be independent with HEP in order to further progress and return to maximal function. met   3. Pain rating at Worst: 5/10 in order to progress towards increased independence with activity. met   4. Patient will be able to correct postural deviations in sitting and standing, to decrease pain and promote postural awareness for injury prevention.  ongoing      LONG TERM GOALS: 8 weeks 4/4/2022     1. Patient will return to normal ADL, recreational, and work related activities with less pain and limitation.  ongoing   2. Patient will improve AROM  to stated goals in order to return to maximal functional potential.  ongoing   3. Patient will improve Strength to stated goals of appropriate musculature in order to improve functional independence.  ongoing   4. Pain Rating at Best: 1/10 to improve Quality of Life.  ongoing   5. Patient will meet predicted functional outcome (FOTO) score: 65% to increase self-worth & perceived functional ability. ongoing   6. Patient will have met/partially met personal goal of being able to walk with no pain ongoing         Plan     Plan of care Certification: 2/2/2022 to 5/30/2022     Continue with Outpatient Physical Therapy 2 times weekly for 6 weeks to include any combination of the following interventions: virtual visits, dry needling, modalities, electrical stimulation (IFC, Pre-Mod, Attended with Functional Dry Needling), Manual Therapy, Moist Heat/ Ice, Neuromuscular Re-ed, Patient Education, Self Care, Therapeutic Exercise, Functional Training and Therapeutic Activites     Scottie Jorgensen, PT

## 2022-04-06 ENCOUNTER — CLINICAL SUPPORT (OUTPATIENT)
Dept: REHABILITATION | Facility: HOSPITAL | Age: 72
End: 2022-04-06
Payer: MEDICARE

## 2022-04-06 DIAGNOSIS — R26.89 DECREASED FUNCTIONAL MOBILITY: ICD-10-CM

## 2022-04-06 DIAGNOSIS — R53.1 DECREASED STRENGTH: ICD-10-CM

## 2022-04-06 DIAGNOSIS — M25.60 DECREASED RANGE OF MOTION: Primary | ICD-10-CM

## 2022-04-06 PROCEDURE — 97110 THERAPEUTIC EXERCISES: CPT | Mod: PN

## 2022-04-06 NOTE — PROGRESS NOTES
[facility] / [facility]  Physical Therapy Daily Treatment Note     Name: Jorge Becerra  Clinic Number: [id number]    Therapy Diagnosis:   Encounter Diagnoses   Name Primary?    Decreased range of motion Yes    Decreased functional mobility     Decreased strength      Physician: Mehul Royal MD    Visit Date: 4/6/2022    Physician: Mehul Royal MD         Physician Orders: PT Eval and Treat  Medical Diagnosis from Referral: M62.838 (ICD-10-CM) - Muscle spasm   Evaluation Date: 2/2/2022  Authorization Period Expiration: 12/31/2022   Plan of Care Expiration: 5/30/2022                Progress Update: 3/4/2022               FOTO: 1 / 3   Visit # / Visits authorized: 14/20     PTA visit # 1/5               Time In:  1015  Time Out: 1110  Total Billable Time: 55  minutes    Precautions: Standard  Insurance: Payor: MEDICARE / Plan: MEDICARE PART A & B / Product Type: Government /     Subjective     Pt reports: he is doing great, no complaints.  :He was compliant with home exercise program.  Response to previous treatment: positive  Functional change: improved gait pattern    Pain: 0/10  Location: right upper legs     Objective     Jorge received therapeutic exercises to develop strength, endurance, ROM, flexibility and core stabilization for 55 minutes including:    Stationary bike x 10 mins  Level - 3 to increase bilateral lower extremity strength     Bridges   3x10  Clams with red tb   3x10  Hip adduction with ball  3x10  SLRs  3x10    Seated hamstring stretch  3x30 secs B    Seated piriformis stretch  3x30 secs  Bilateral (R lower extremity on stool)    Standing marches 2#  2x10  Standing hip flexion 2#  2x10   Standing hip extension 2# 2x10   Standing hip abduction 2# 2 x 10 B   Step ups  2#  2x10 B   Slant board gastroc stretch 3 x 30 sec B  Lateral walks in saucedo 1 lap  Sit to stand  x20     Shuttle leg press   62#   2x10  Shuttle Single L leg press  12#  2X10   Shuttle heel raises    62#  2x10  SLS 2 x 20 sec B   NMRE    Home Exercises Provided and Patient Education Provided     Education provided:   - low impact cardio.    Written Home Exercises Provided: yes.  Exercises were reviewed and Jorge was able to demonstrate them prior to the end of the session.  Jorge demonstrated good  understanding of the education provided.     See EMR under Patient Instructions for exercises provided prior visit.    Assessment     Jorge provided good effort and participation toward therapeutic interventions today with focus on lower extremity strengthening, balance and coordination exercises. Pt did well with exercises and reported no pain today.  Right lower extremity still appears to be weak but is improving.     Jorge is progressing well towards his goals.   Pt prognosis is Good.     Pt will continue to benefit from skilled outpatient physical therapy to address the deficits listed in the problem list box on initial evaluation, provide pt/family education and to maximize pt's level of independence in the home and community environment.     Pt's spiritual, cultural and educational needs considered and pt agreeable to plan of care and goals.    Anticipated barriers to physical therapy: None    Goals:     SHORT TERM GOALS: 4 weeks 4/6/2022     1. Recent signs and systems trend is improving in order to progress towards LTG's. met   2. Patient will be independent with HEP in order to further progress and return to maximal function. met   3. Pain rating at Worst: 5/10 in order to progress towards increased independence with activity. met   4. Patient will be able to correct postural deviations in sitting and standing, to decrease pain and promote postural awareness for injury prevention.  ongoing      LONG TERM GOALS: 8 weeks 4/6/2022     1. Patient will return to normal ADL, recreational, and work related activities with less pain and limitation.  ongoing   2. Patient will improve AROM to stated goals in order  to return to maximal functional potential.  ongoing   3. Patient will improve Strength to stated goals of appropriate musculature in order to improve functional independence.  ongoing   4. Pain Rating at Best: 1/10 to improve Quality of Life.  ongoing   5. Patient will meet predicted functional outcome (FOTO) score: 65% to increase self-worth & perceived functional ability. ongoing   6. Patient will have met/partially met personal goal of being able to walk with no pain ongoing         Plan     Plan of care Certification: 2/2/2022 to 5/30/2022     Continue with Outpatient Physical Therapy 2 times weekly for 6 weeks to include any combination of the following interventions: virtual visits, dry needling, modalities, electrical stimulation (IFC, Pre-Mod, Attended with Functional Dry Needling), Manual Therapy, Moist Heat/ Ice, Neuromuscular Re-ed, Patient Education, Self Care, Therapeutic Exercise, Functional Training and Therapeutic Activites     Scottie Jorgensen, PT

## 2022-04-11 ENCOUNTER — CLINICAL SUPPORT (OUTPATIENT)
Dept: REHABILITATION | Facility: HOSPITAL | Age: 72
End: 2022-04-11
Payer: MEDICARE

## 2022-04-11 DIAGNOSIS — M25.60 DECREASED RANGE OF MOTION: Primary | ICD-10-CM

## 2022-04-11 DIAGNOSIS — R53.1 DECREASED STRENGTH: ICD-10-CM

## 2022-04-11 DIAGNOSIS — R26.89 DECREASED FUNCTIONAL MOBILITY: ICD-10-CM

## 2022-04-11 PROCEDURE — 97110 THERAPEUTIC EXERCISES: CPT | Mod: PN

## 2022-04-11 NOTE — PROGRESS NOTES
[facility] / [facility]  Physical Therapy Daily Treatment Note     Name: Jorge Becerra  Clinic Number: [id number]    Therapy Diagnosis:   Encounter Diagnoses   Name Primary?    Decreased range of motion Yes    Decreased functional mobility     Decreased strength      Physician: Mehul Royal MD    Visit Date: 4/11/2022    Physician: Mehul Royal MD         Physician Orders: PT Eval and Treat  Medical Diagnosis from Referral: M62.838 (ICD-10-CM) - Muscle spasm   Evaluation Date: 2/2/2022  Authorization Period Expiration: 12/31/2022   Plan of Care Expiration: 5/30/2022                Progress Update: 3/4/2022               FOTO: 1 / 3   Visit # / Visits authorized: 15/20     PTA visit # 1/5               Time In:  1100  Time Out: 1145  Total Billable Time: 45  minutes    Precautions: Standard  Insurance: Payor: MEDICARE / Plan: MEDICARE PART A & B / Product Type: Government /     Subjective     Pt reports: he is doing great, no complaints.  :He was compliant with home exercise program.  Response to previous treatment: positive  Functional change: improved gait pattern    Pain: 0/10  Location: right upper legs     Objective     Jorge received therapeutic exercises to develop strength, endurance, ROM, flexibility and core stabilization for 45 minutes including:    Stationary bike x 10 mins  Level - 3 to increase bilateral lower extremity strength     Bridges   3x10  Clams with red tb   3x10  Hip adduction with ball  3x10  SLRs  3x10    Seated hamstring stretch  3x30 secs B    Seated piriformis stretch  3x30 secs  Bilateral (R lower extremity on stool)    Standing marches 3#  2x10  Standing hip flexion 3#  2x10   Standing hip extension 3# 2x10   Standing hip abduction 3# 2 x 10 B   Step ups  3#  2x10 B   Slant board gastroc stretch 3 x 30 sec B  Lateral walks in saucedo 1 lap  Sit to stand  x20     Shuttle leg press   62#   2x10  Shuttle Single L leg press  12#  2X10   Shuttle heel raises    62#  2x10  SLS 2 x 20 sec B   NMRE    Home Exercises Provided and Patient Education Provided     Education provided:   - low impact cardio.    Written Home Exercises Provided: yes.  Exercises were reviewed and Jorge was able to demonstrate them prior to the end of the session.  Jorge demonstrated good  understanding of the education provided.     See EMR under Patient Instructions for exercises provided prior visit.    Assessment     Jorge provided good effort and participation toward therapeutic interventions today with focus on lower extremity strengthening, balance and coordination exercises. Displays improved hip mobility. Pt did well with exercises and reported no pain today.  Right lower extremity still appears to be weak but is improving.     Jorge is progressing well towards his goals.   Pt prognosis is Good.     Pt will continue to benefit from skilled outpatient physical therapy to address the deficits listed in the problem list box on initial evaluation, provide pt/family education and to maximize pt's level of independence in the home and community environment.     Pt's spiritual, cultural and educational needs considered and pt agreeable to plan of care and goals.    Anticipated barriers to physical therapy: None    Goals:     SHORT TERM GOALS: 4 weeks 4/11/2022     1. Recent signs and systems trend is improving in order to progress towards LTG's. met   2. Patient will be independent with HEP in order to further progress and return to maximal function. met   3. Pain rating at Worst: 5/10 in order to progress towards increased independence with activity. met   4. Patient will be able to correct postural deviations in sitting and standing, to decrease pain and promote postural awareness for injury prevention.  ongoing      LONG TERM GOALS: 8 weeks 4/11/2022     1. Patient will return to normal ADL, recreational, and work related activities with less pain and limitation.  ongoing   2. Patient will  improve AROM to stated goals in order to return to maximal functional potential.  ongoing   3. Patient will improve Strength to stated goals of appropriate musculature in order to improve functional independence.  ongoing   4. Pain Rating at Best: 1/10 to improve Quality of Life.  ongoing   5. Patient will meet predicted functional outcome (FOTO) score: 65% to increase self-worth & perceived functional ability. ongoing   6. Patient will have met/partially met personal goal of being able to walk with no pain ongoing         Plan     Plan of care Certification: 2/2/2022 to 5/30/2022     Continue with Outpatient Physical Therapy 2 times weekly for 6 weeks to include any combination of the following interventions: virtual visits, dry needling, modalities, electrical stimulation (IFC, Pre-Mod, Attended with Functional Dry Needling), Manual Therapy, Moist Heat/ Ice, Neuromuscular Re-ed, Patient Education, Self Care, Therapeutic Exercise, Functional Training and Therapeutic Activites     Scottie Jorgensen, PT

## 2022-04-12 ENCOUNTER — HOSPITAL ENCOUNTER (EMERGENCY)
Facility: HOSPITAL | Age: 72
Discharge: HOME | End: 2022-04-12
Attending: EMERGENCY MEDICINE
Payer: MEDICARE

## 2022-04-12 VITALS
DIASTOLIC BLOOD PRESSURE: 99 MMHG | TEMPERATURE: 98 F | HEART RATE: 80 BPM | HEIGHT: 67 IN | RESPIRATION RATE: 18 BRPM | BODY MASS INDEX: 25.27 KG/M2 | WEIGHT: 161 LBS | SYSTOLIC BLOOD PRESSURE: 171 MMHG | OXYGEN SATURATION: 99 %

## 2022-04-12 DIAGNOSIS — R52 PAIN: ICD-10-CM

## 2022-04-12 DIAGNOSIS — M19.019 ARTHRITIS, SHOULDER REGION: Primary | ICD-10-CM

## 2022-04-12 PROCEDURE — 99283 EMERGENCY DEPT VISIT LOW MDM: CPT

## 2022-04-12 RX ORDER — DICLOFENAC SODIUM 10 MG/G
2 GEL TOPICAL DAILY
Qty: 50 G | Refills: 0 | Status: SHIPPED | OUTPATIENT
Start: 2022-04-12 | End: 2023-01-26

## 2022-04-12 NOTE — FIRST PROVIDER EVALUATION
Emergency Department TeleTriage Encounter Note      CHIEF COMPLAINT    Chief Complaint   Patient presents with    Shoulder Pain     Patient reports R shoulder pain x 3 weeks, denies trauma        VITAL SIGNS   Initial Vitals [04/12/22 1029]   BP Pulse Resp Temp SpO2   (!) 171/99 80 18 98.2 °F (36.8 °C) 99 %      MAP       --            ALLERGIES    Review of patient's allergies indicates:  No Known Allergies    PROVIDER TRIAGE NOTE  HPI: Jorge Becerra, a 71 y.o. male presents to the ED for evaluation right shoulder pain x 3 weeks.  No known injury.  Tried topical meds     Constitutional: Vital signs concerning for HTN, well nourished, well developed, appearing stated age, NAD   HEENT: NCAT, symmetrical lids, No obvious facial deformity.  Normal phonation. Normal Conjunctiva, Gross EOMs intact   Neck: NAROM   Respiratory: Normal effort.  No obvious use of accessory muscles   Abdomen: appearance (flat, rounded)   Musculoskeletal: Moved upper extremities well   Neuro: Alert, answers questions appropriately    Psych: appropriate mood and affect          ORDERS  Labs Reviewed - No data to display    ED Orders (720h ago, onward)    None            Virtual Visit Note: The provider triage portion of this emergency department evaluation and documentation was performed via ehealthtracker, a HIPAA-compliant telemedicine application, in concert with a tele-presenter in the room. A face to face patient evaluation with one of my colleagues will occur once the patient is placed in an emergency department room.      DISCLAIMER: This note was prepared with [employer] voice recognition transcription software. Garbled syntax, mangled pronouns, and other bizarre constructions may be attributed to that software system.

## 2022-04-12 NOTE — ED PROVIDER NOTES
Encounter Date: 4/12/2022       History     Chief Complaint   Patient presents with    Shoulder Pain     Patient reports R shoulder pain x 3 weeks, denies trauma      71-year-old male presenting to the emergency room for evaluation of 3 weeks of progressively worsening right shoulder pain.  Denies trauma or injury.  Denies loss of function, no sensory changes.  Patient describes pain as sharp, exacerbated with abduction, stabbing pain radiating through the shoulder from front to back.  He has tried menthol creams without relief.  No history of prior injury or surgery on the ipsilateral shoulder.        Review of patient's allergies indicates:  No Known Allergies  Past Medical History:   Diagnosis Date    Diabetes mellitus, type 2     Hypertension     Non Hodgkin's lymphoma      Past Surgical History:   Procedure Laterality Date    TONSILLECTOMY       Family History   Problem Relation Age of Onset    Colon cancer Neg Hx      Social History     Tobacco Use    Smoking status: Never Smoker    Smokeless tobacco: Never Used   Substance Use Topics    Alcohol use: Never     Review of Systems   Constitutional: Negative for chills, fatigue and fever.   HENT: Negative for congestion, hearing loss, sore throat and trouble swallowing.    Eyes: Negative for visual disturbance.   Respiratory: Negative for cough, chest tightness and shortness of breath.    Cardiovascular: Negative for chest pain.   Gastrointestinal: Negative for abdominal pain and nausea.   Endocrine: Negative for polyuria.   Genitourinary: Negative for difficulty urinating.   Musculoskeletal: Positive for arthralgias. Negative for myalgias.   Skin: Negative for rash.   Neurological: Negative for dizziness and headaches.   Psychiatric/Behavioral: The patient is not nervous/anxious.    All other systems reviewed and are negative.      Physical Exam     Initial Vitals [04/12/22 1029]   BP Pulse Resp Temp SpO2   (!) 171/99 80 18 98.2 °F (36.8 °C) 99 %       MAP       --         Physical Exam    Vitals reviewed.  Constitutional: He appears well-developed and well-nourished.   HENT:   Head: Normocephalic and atraumatic.   Eyes: EOM are normal. Pupils are equal, round, and reactive to light.   Neck: Neck supple.   Normal range of motion.  Cardiovascular: Normal rate, regular rhythm and normal heart sounds.   Pulmonary/Chest: Breath sounds normal. No respiratory distress.   Musculoskeletal:      Cervical back: Normal range of motion and neck supple.      Comments: Nontender throughout entire right shoulder, pain from anterior through joint exacerbated with abduction.    Otherwise non-tender to AC Joint, Bicepital Groove, clavicle, SC joints, posterior shoulder, arm. FAROM: Flexion: 0-150+, Abduction: 0-150+, Adduction: 0-30, Rotation: Internal: T6, External: 0-60. Negative Neer's, Speed's, Schuyler's.    Upper Extremity otherwise skin is warm, dry, intact w/o effusions/edema/ erythema/ ecchymosis/ masses/lesions/ obvious deformities/ obvious muscle wasting. Grossly NVID - Motor intact to AIN, PIN, ulnar, and median nerves.  Sensation intact to radial, ulnar, and median nerves.  Radial Pulses 2+. Capillary Refill <2sec.     Neurological: He is alert and oriented to person, place, and time. GCS score is 15. GCS eye subscore is 4. GCS verbal subscore is 5. GCS motor subscore is 6.   Skin: Skin is warm and dry. Capillary refill takes less than 2 seconds.   Psychiatric: He has a normal mood and affect. His behavior is normal. Judgment and thought content normal.         ED Course   Procedures  Labs Reviewed - No data to display       Imaging Results          X-Ray Shoulder Trauma Right (Final result)  Result time 04/12/22 10:56:39    Final result by Ravi Becerra MD (04/12/22 10:56:39)                 Narrative:    XR SHOULDER 2 OR MORE VIEWS    CLINICAL HISTORY:  71 years Male right shoulder pain    COMPARISON: None    FINDINGS: No acute fracture or malalignment of the right  shoulder. Mild AC joint osteoarthrosis. Mild glenohumeral joint space narrowing. Soft tissues are unremarkable.    IMPRESSION:    No acute osseous abnormality.    Mild degenerative changes of the right shoulder.    Electronically signed by:  Ravi Becerra MD  4/12/2022 10:56 AM CDT Workstation: [id number]                               Medications - No data to display  Medical Decision Making:   Initial Assessment:   71-year-old male presenting to the emergency room for evaluation of 3 weeks of progressively worsening right shoulder pain.  Denies trauma or injury.  Denies loss of function, no sensory changes.  Patient describes pain as sharp, exacerbated with abduction, stabbing pain radiating through the shoulder from front to back.  He has tried menthol creams without relief.  No history of prior injury or surgery on the ipsilateral shoulder.  Differential Diagnosis:   Fracture versus dislocation versus overuse injury versus arthritic changes  ED Management:  71-year-old male presenting as above for 3 weeks progressive worsening right shoulder pain.  Plain films demonstrate no acute fracture dislocation.  Physical exam is reassuring.  There are arthritic changes on plain films.  Likely represents acute on chronic arthritis versus overuse injury.  Plan to discharge home with Voltaren gel and re-evaluation by primary care.    Disposition:  Stable, discharge  Plan to discharge home with appropriate follow-up, including primary care manager.  Will discharge with prescription for Voltaren gel.    I discussed the findings and plan of care with this patient.  All questions were answered to the patient's satisfaction.  Disposition plan as above.  Verbal and written discharge instructions provided to the patient on discharge.  Return precautions discussed prior to discharge.     I discuss this patient case with the cosigning physician, who agrees with diagnosis and plan of care. This note was written using the assistance  of a dictation program and may contain grammatical errors.                       Clinical Impression:   Final diagnoses:  [R52] Pain  [M19.019] Arthritis, shoulder region (Primary)          ED Disposition Condition    Discharge Stable        ED Prescriptions     Medication Sig Dispense Start Date End Date Auth. Provider    diclofenac sodium (VOLTAREN) 1 % Gel Apply 2 g topically once daily. 50 g 4/12/2022  Otto Funez PA-C        Follow-up Information     Follow up With Specialties Details Why Contact Info Additional Information    Mehul Royal MD Family Medicine Schedule an appointment as soon as possible for a visit in 1 week  [address] [facility] [address]  [phone]       [facility] - Emergency Dept Emergency Medicine Go to  As needed, If symptoms worsen [address] [facility] [address]  [phone] 1st floor           Otto Funez PA-C  04/12/22 1120

## 2022-04-13 ENCOUNTER — CLINICAL SUPPORT (OUTPATIENT)
Dept: REHABILITATION | Facility: HOSPITAL | Age: 72
End: 2022-04-13
Payer: MEDICARE

## 2022-04-13 DIAGNOSIS — R26.89 DECREASED FUNCTIONAL MOBILITY: ICD-10-CM

## 2022-04-13 DIAGNOSIS — M25.60 DECREASED RANGE OF MOTION: Primary | ICD-10-CM

## 2022-04-13 DIAGNOSIS — R53.1 DECREASED STRENGTH: ICD-10-CM

## 2022-04-13 PROCEDURE — 97110 THERAPEUTIC EXERCISES: CPT | Mod: PN

## 2022-04-13 NOTE — PROGRESS NOTES
Critical access hospital / [facility]  Physical Therapy Daily Treatment Note     Name: Jorge Becerra  Clinic Number: [id number]    Therapy Diagnosis:   Encounter Diagnoses   Name Primary?    Decreased range of motion Yes    Decreased functional mobility     Decreased strength      Physician: Mehul Royal MD    Visit Date: 4/13/2022    Physician: Mehul Royal MD         Physician Orders: PT Eval and Treat  Medical Diagnosis from Referral: M62.838 (ICD-10-CM) - Muscle spasm   Evaluation Date: 2/2/2022  Authorization Period Expiration: 12/31/2022   Plan of Care Expiration: 5/30/2022                Progress Update: 3/4/2022               FOTO: 1 / 3   Visit # / Visits authorized: 16/20     PTA visit # 1/5               Time In:  1100  Time Out: 1145  Total Billable Time: 45  minutes    Precautions: Standard  Insurance: Payor: MEDICARE / Plan: MEDICARE PART A & B / Product Type: Government /     Subjective     Pt reports: he is doing great, no complaints.  :He was compliant with home exercise program.  Response to previous treatment: positive  Functional change: improved gait pattern    Pain: 0/10  Location: right upper legs     Objective     Jorge received therapeutic exercises to develop strength, endurance, ROM, flexibility and core stabilization for 45 minutes including:    Stationary bike x 10 mins  Level - 3 to increase bilateral lower extremity strength     Bridges   3x10  Clams with red tb   3x10  Hip adduction with ball  3x10  SLRs  3x10    Seated hamstring stretch  3x30 secs B    Seated piriformis stretch  3x30 secs  Bilateral (R lower extremity on stool)    Standing marches 3#  2x10  Standing hip flexion 3#  2x10   Standing hip extension 3# 2x10 - NP  Standing hip abduction 3# 2 x 10 B - NP  Step ups  3#  2x10 B   Slant board gastroc stretch 3 x 30 sec B  Lateral walks in saucedo 1 lap  Sit to stand  x20     Shuttle leg press   62#   2x10  Shuttle Single L leg press  12#  2X10   Shuttle heel  raises   62#  2x10  SLS 2 x 20 sec B   NMRE    Home Exercises Provided and Patient Education Provided     Education provided:   - low impact cardio.    Written Home Exercises Provided: yes.  Exercises were reviewed and Jorge was able to demonstrate them prior to the end of the session.  Jorge demonstrated good  understanding of the education provided.     See EMR under Patient Instructions for exercises provided prior visit.    Assessment     Jorge provided good effort and participation toward therapeutic interventions today with focus on lower extremity strengthening, balance and coordination exercises. Displays improved hip mobility. Pt did well with exercises and reported no pain today.  Right lower extremity still appears to be weak but is improving.     Jorge is progressing well towards his goals.   Pt prognosis is Good.     Pt will continue to benefit from skilled outpatient physical therapy to address the deficits listed in the problem list box on initial evaluation, provide pt/family education and to maximize pt's level of independence in the home and community environment.     Pt's spiritual, cultural and educational needs considered and pt agreeable to plan of care and goals.    Anticipated barriers to physical therapy: None    Goals:     SHORT TERM GOALS: 4 weeks 4/13/2022     1. Recent signs and systems trend is improving in order to progress towards LTG's. met   2. Patient will be independent with HEP in order to further progress and return to maximal function. met   3. Pain rating at Worst: 5/10 in order to progress towards increased independence with activity. met   4. Patient will be able to correct postural deviations in sitting and standing, to decrease pain and promote postural awareness for injury prevention.  ongoing      LONG TERM GOALS: 8 weeks 4/13/2022     1. Patient will return to normal ADL, recreational, and work related activities with less pain and limitation.  ongoing   2. Patient  will improve AROM to stated goals in order to return to maximal functional potential.  ongoing   3. Patient will improve Strength to stated goals of appropriate musculature in order to improve functional independence.  ongoing   4. Pain Rating at Best: 1/10 to improve Quality of Life.  ongoing   5. Patient will meet predicted functional outcome (FOTO) score: 65% to increase self-worth & perceived functional ability. ongoing   6. Patient will have met/partially met personal goal of being able to walk with no pain ongoing         Plan     Plan of care Certification: 2/2/2022 to 5/30/2022     Continue with Outpatient Physical Therapy 2 times weekly for 6 weeks to include any combination of the following interventions: virtual visits, dry needling, modalities, electrical stimulation (IFC, Pre-Mod, Attended with Functional Dry Needling), Manual Therapy, Moist Heat/ Ice, Neuromuscular Re-ed, Patient Education, Self Care, Therapeutic Exercise, Functional Training and Therapeutic Activites     Scottie Jorgensen, PT

## 2022-04-18 ENCOUNTER — OFFICE VISIT (OUTPATIENT)
Dept: FAMILY MEDICINE | Facility: CLINIC | Age: 72
End: 2022-04-18
Payer: MEDICARE

## 2022-04-18 ENCOUNTER — CLINICAL SUPPORT (OUTPATIENT)
Dept: REHABILITATION | Facility: HOSPITAL | Age: 72
End: 2022-04-18
Payer: MEDICARE

## 2022-04-18 VITALS
BODY MASS INDEX: 24.88 KG/M2 | DIASTOLIC BLOOD PRESSURE: 72 MMHG | HEIGHT: 67 IN | RESPIRATION RATE: 14 BRPM | HEART RATE: 83 BPM | TEMPERATURE: 98 F | SYSTOLIC BLOOD PRESSURE: 132 MMHG | WEIGHT: 158.5 LBS | OXYGEN SATURATION: 98 %

## 2022-04-18 DIAGNOSIS — N18.2 HYPERTENSION ASSOCIATED WITH STAGE 2 CHRONIC KIDNEY DISEASE DUE TO TYPE 2 DIABETES MELLITUS: ICD-10-CM

## 2022-04-18 DIAGNOSIS — M25.60 DECREASED RANGE OF MOTION: Primary | ICD-10-CM

## 2022-04-18 DIAGNOSIS — M19.011 ARTHRITIS OF RIGHT SHOULDER REGION: Primary | ICD-10-CM

## 2022-04-18 DIAGNOSIS — E78.2 MIXED DIABETIC HYPERLIPIDEMIA ASSOCIATED WITH TYPE 2 DIABETES MELLITUS: ICD-10-CM

## 2022-04-18 DIAGNOSIS — R53.1 DECREASED STRENGTH: ICD-10-CM

## 2022-04-18 DIAGNOSIS — I12.9 HYPERTENSION ASSOCIATED WITH STAGE 2 CHRONIC KIDNEY DISEASE DUE TO TYPE 2 DIABETES MELLITUS: ICD-10-CM

## 2022-04-18 DIAGNOSIS — E11.69 MIXED DIABETIC HYPERLIPIDEMIA ASSOCIATED WITH TYPE 2 DIABETES MELLITUS: ICD-10-CM

## 2022-04-18 DIAGNOSIS — R26.89 DECREASED FUNCTIONAL MOBILITY: ICD-10-CM

## 2022-04-18 DIAGNOSIS — E11.22 HYPERTENSION ASSOCIATED WITH STAGE 2 CHRONIC KIDNEY DISEASE DUE TO TYPE 2 DIABETES MELLITUS: ICD-10-CM

## 2022-04-18 DIAGNOSIS — E11.3393 CONTROLLED TYPE 2 DIABETES MELLITUS WITH BOTH EYES AFFECTED BY MODERATE NONPROLIFERATIVE RETINOPATHY WITHOUT MACULAR EDEMA, WITHOUT LONG-TERM CURRENT USE OF INSULIN: ICD-10-CM

## 2022-04-18 PROCEDURE — 99215 OFFICE O/P EST HI 40 MIN: CPT | Mod: PBBFAC,PO

## 2022-04-18 PROCEDURE — 99214 PR OFFICE/OUTPT VISIT, EST, LEVL IV, 30-39 MIN: ICD-10-PCS | Mod: S$PBB,,,

## 2022-04-18 PROCEDURE — 99999 PR PBB SHADOW E&M-EST. PATIENT-LVL V: CPT | Mod: PBBFAC,,,

## 2022-04-18 PROCEDURE — 99999 PR PBB SHADOW E&M-EST. PATIENT-LVL V: ICD-10-PCS | Mod: PBBFAC,,,

## 2022-04-18 PROCEDURE — 97110 THERAPEUTIC EXERCISES: CPT | Mod: PN

## 2022-04-18 PROCEDURE — 99214 OFFICE O/P EST MOD 30 MIN: CPT | Mod: S$PBB,,,

## 2022-04-18 RX ORDER — CALCIUM CARB/VITAMIN D3/VIT K1 500-500-40
1 TABLET,CHEWABLE ORAL DAILY
COMMUNITY
Start: 2021-07-06

## 2022-04-18 RX ORDER — IBUPROFEN 600 MG/1
600 TABLET ORAL EVERY 6 HOURS PRN
Qty: 30 TABLET | Refills: 0 | Status: SHIPPED | OUTPATIENT
Start: 2022-04-18 | End: 2022-08-01

## 2022-04-18 NOTE — PROGRESS NOTES
Subjective:       Patient ID: Jorge Becerra is a 71 y.o. male.    Chief Complaint: Shoulder Pain (Right)    Jorge Becerra is a 72 yo M pt w/ MHx listed below that presents to the clinic w/ complaints of acute on chronic R shoulder pain. He was recently seen in the ED for this problem and provided voltaren gel. He was found to have arthritic changes on imaging. He states that his symptoms have started to improve with the voltaren gel. His symptoms are aggravated with reaching forward, tying his shoes or putting on his socks. He is open to doing physical therapy prior to seeing an orthopedist if necessary.       Past Medical History:   Diagnosis Date    Diabetes mellitus, type 2     Hypertension     Non Hodgkin's lymphoma        Review of patient's allergies indicates:  No Known Allergies      Current Outpatient Medications:     allopurinoL (ZYLOPRIM) 300 MG tablet, Take 1 tablet (300 mg total) by mouth every other day., Disp: 45 tablet, Rfl: 2    aspirin (ECOTRIN) 81 MG EC tablet, Take 81 mg by mouth once daily., Disp: , Rfl:     atorvastatin (LIPITOR) 20 MG tablet, Take 1 tablet (20 mg total) by mouth every evening., Disp: 90 tablet, Rfl: 3    cholecalciferol, vitamin D3, 10 mcg (400 unit) Cap capsule, Take 1 tablet by mouth once daily., Disp: , Rfl:     diclofenac sodium (VOLTAREN) 1 % Gel, Apply 2 g topically once daily., Disp: 50 g, Rfl: 0    donepeziL (ARICEPT) 5 MG tablet, Take 1 tablet (5 mg total) by mouth every evening., Disp: 90 tablet, Rfl: 3    ergocalciferol, vitamin D2, (VITAMIN D ORAL), Take 1 tablet by mouth once daily. , Disp: , Rfl:     ferrous sulfate (FEOSOL) 325 mg (65 mg iron) Tab tablet, Take 325 mg by mouth daily with breakfast., Disp: , Rfl:     fosinopriL (MONOPRIL) 40 MG tablet, Take 1 tablet (40 mg total) by mouth once daily., Disp: 90 tablet, Rfl: 3    HYDROcodone-acetaminophen (NORCO) 5-325 mg per tablet, Take 1-2 tablets by mouth., Disp: , Rfl:     magnesium oxide 400 mg  magnesium Cap, Take 1 tablet by mouth 2 (two) times a day. , Disp: , Rfl:     metFORMIN (GLUCOPHAGE) 1000 MG tablet, Take 1 tablet (1,000 mg total) by mouth 2 (two) times daily., Disp: 180 tablet, Rfl: 3    methocarbamoL (ROBAXIN) 500 MG Tab, Take 1 tablet (500 mg total) by mouth 3 (three) times daily as needed (muscle spasm)., Disp: 270 tablet, Rfl: 1    vit C/E/zinc/lutein/zeaxanthin (OCUVITE EYE HEALTH ORAL), Take 1 tablet by mouth 2 (two) times a day. , Disp: , Rfl:     cefadroxil (DURICEF) 500 MG Cap, Take 500 mg by mouth 2 (two) times daily., Disp: , Rfl:     ibuprofen (ADVIL,MOTRIN) 600 MG tablet, Take 1 tablet (600 mg total) by mouth every 6 (six) hours as needed for Pain., Disp: 30 tablet, Rfl: 0    k phos di & mono-sod phos mono (K-PHOS-NEUTRAL) 250 mg Tab, Take 2 tablets by mouth Daily., Disp: , Rfl:     ondansetron (ZOFRAN) 8 MG tablet, Take 8 mg by mouth every 8 (eight) hours as needed for Nausea., Disp: , Rfl:     prochlorperazine (COMPAZINE) 10 MG tablet, Take 10 mg by mouth every 6 (six) hours as needed., Disp: , Rfl:     silver sulfADIAZINE 1% (SILVADENE) 1 % cream, Apply topically., Disp: , Rfl:     sucralfate (CARAFATE) 1 gram tablet, Take 1 g by mouth 4 (four) times daily. Crush 1 tablet finely and mix in 15 ml water to swish and swallow four times a day, Disp: , Rfl:     Review of Systems   Constitutional: Negative for activity change, appetite change, chills, diaphoresis, fatigue, fever and unexpected weight change.   HENT: Negative for congestion, ear pain, postnasal drip, rhinorrhea, sinus pressure, sneezing, sore throat and trouble swallowing.    Eyes: Negative for pain, itching and visual disturbance.   Respiratory: Negative for cough, chest tightness, shortness of breath and wheezing.    Cardiovascular: Negative for chest pain, palpitations and leg swelling.   Gastrointestinal: Negative for abdominal distention, abdominal pain, blood in stool, constipation, diarrhea, nausea and  "vomiting.   Endocrine: Negative for cold intolerance and heat intolerance.   Genitourinary: Negative for difficulty urinating, dysuria, frequency, hematuria and urgency.   Musculoskeletal: Positive for arthralgias. Negative for back pain, myalgias and neck pain.   Skin: Negative for color change, pallor, rash and wound.   Neurological: Negative for dizziness, syncope, weakness, numbness and headaches.   Hematological: Negative for adenopathy.   Psychiatric/Behavioral: Negative for behavioral problems. The patient is not nervous/anxious.        Objective:      /72 (BP Location: Right arm, Patient Position: Sitting, BP Method: Medium (Manual))   Pulse 83   Temp 98.4 °F (36.9 °C) (Oral)   Resp 14   Ht 5' 7" (1.702 m)   Wt 71.9 kg (158 lb 8.2 oz)   SpO2 98%   BMI 24.83 kg/m²   Physical Exam  Vitals reviewed.   Constitutional:       General: He is not in acute distress.     Appearance: Normal appearance. He is normal weight. He is not ill-appearing, toxic-appearing or diaphoretic.   HENT:      Head: Normocephalic.      Right Ear: External ear normal.      Left Ear: External ear normal.      Nose: Nose normal. No congestion or rhinorrhea.      Mouth/Throat:      Mouth: Mucous membranes are moist.      Pharynx: Oropharynx is clear.   Eyes:      General: No scleral icterus.        Right eye: No discharge.         Left eye: No discharge.      Extraocular Movements: Extraocular movements intact.      Conjunctiva/sclera: Conjunctivae normal.   Cardiovascular:      Rate and Rhythm: Normal rate and regular rhythm.      Pulses: Normal pulses.      Heart sounds: Normal heart sounds. No murmur heard.    No friction rub. No gallop.   Pulmonary:      Effort: Pulmonary effort is normal. No respiratory distress.      Breath sounds: Normal breath sounds. No wheezing, rhonchi or rales.   Chest:      Chest wall: No tenderness.   Abdominal:      General: There is no distension.      Palpations: Abdomen is soft. There is no " mass.      Tenderness: There is no abdominal tenderness. There is no guarding.   Musculoskeletal:         General: No swelling, tenderness, deformity or signs of injury. Normal range of motion.      Cervical back: Normal range of motion.      Right lower leg: No edema.      Left lower leg: No edema.   Skin:     General: Skin is warm and dry.      Capillary Refill: Capillary refill takes less than 2 seconds.      Coloration: Skin is not jaundiced.      Findings: No bruising, erythema, lesion or rash.   Neurological:      Mental Status: He is alert and oriented to person, place, and time.      Gait: Gait normal.   Psychiatric:         Mood and Affect: Mood normal.         Behavior: Behavior normal.         Thought Content: Thought content normal.         Judgment: Judgment normal.         Assessment:       1. Arthritis of right shoulder region    2. 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 Hypertension associated with stage 2 chronic kidney disease due to type 2 diabetes mellitus    4. Mixed diabetic hyperlipidemia associated with type 2 diabetes mellitus        Plan:       Arthritis of right shoulder region  -     ibuprofen (ADVIL,MOTRIN) 600 MG tablet; Take 1 tablet (600 mg total) by mouth every 6 (six) hours as needed for Pain.  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 Will continue to monitor.     Hypertension associated with stage 2 chronic kidney disease due to type 2 diabetes mellitus        -   Stable. Continue meds as prescribed. Will continue to monitor.     Mixed diabetic hyperlipidemia associated with type 2 diabetes mellitus        -   Continue meds as prescribed. Will continue to  monitor.         Will have patient started on first line therapy to see if his problem improves significantly or resolves. If pt still complaining of symptoms will place orders for PT and if still not resolution will have him see orthopedics.     Pt is scheduled for routine follow up w/ Dr. Lele Arredondo PA-C  Family Medicine Physician Assistant       I spent a total of 20 minutes on the day of the visit.This includes face to face time and non-face to face time preparing to see the patient (eg, review of tests), obtaining and/or reviewing separately obtained history, documenting clinical information in the electronic or other health record, independently interpreting results and communicating results to the patient/family/caregiver, or care coordinator.      We have addressed [4] Moderate: 1 or more chronic illnesses with exacerbation, progression, or side effects of treatment / 2 or more stable chronic illnesses / 1 undiagnosed new problem with uncertain prognosis / 1 acute illness with systemic symptoms / 1 acute complicated injury  The complexity of the data reviewed and analyzed for this visit was [2] Minimal or None  The risk of complications and/or morbidity or mortality are [4] Moderate risk (I.e. prescription drug management / decision regarding minor surgery with identified pt or procedure risk factors / decision regarding elective major surgery without identified pt or procedure risk factors / diagnosis or treatment significantly limited by social determinants of health)   The level of Medical Decision Making for this visit is [4] Moderate

## 2022-04-18 NOTE — PROGRESS NOTES
[facility] / [facility]  Physical Therapy Daily Treatment Note     Name: Jorge Becerra  Clinic Number: [id number]    Therapy Diagnosis:   Encounter Diagnoses   Name Primary?    Decreased range of motion Yes    Decreased functional mobility     Decreased strength      Physician: Mehul Royal MD    Visit Date: 4/18/2022    Physician: Mehul Royal MD         Physician Orders: PT Eval and Treat  Medical Diagnosis from Referral: M62.838 (ICD-10-CM) - Muscle spasm   Evaluation Date: 2/2/2022  Authorization Period Expiration: 12/31/2022   Plan of Care Expiration: 5/30/2022                Progress Update: 3/4/2022               FOTO: 1 / 3   Visit # / Visits authorized: 17/20     PTA visit # 1/5               Time In:  1015  Time Out: 1100  Total Billable Time: 45  minutes    Precautions: Standard  Insurance: Payor: MEDICARE / Plan: MEDICARE PART A & B / Product Type: Government /     Subjective     Pt reports: he is doing great, no complaints.  :He was compliant with home exercise program.  Response to previous treatment: positive  Functional change: improved gait pattern    Pain: 0/10  Location: right upper legs     Objective     Jorge received therapeutic exercises to develop strength, endurance, ROM, flexibility and core stabilization for 45 minutes including:    Stationary bike x 10 mins  Level - 3 to increase bilateral lower extremity strength     Bridges   3x10 - NP  Clams with red tb   3x10 - NP  Hip adduction with ball  3x10 - NP  SLRs  3x10 - NP    Seated hamstring stretch  3x30 secs B    Seated piriformis stretch  3x30 secs  Bilateral (R lower extremity on stool)    Standing marches 3#  2x10  Standing hip flexion 3#  2x10   Standing hip extension 3# 2x10 - NP  Standing hip abduction 3# 2 x 10 B - NP  Step ups  3#  2x10 B   Slant board gastroc stretch 3 x 30 sec B  Lateral walks in saucedo 1 lap  Sit to stand  x20     Shuttle leg press   62#   2x10  Shuttle Single L leg press  12#   2X10   Shuttle heel raises   62#  2x10  SLS 2 x 20 sec B   NMRE    Home Exercises Provided and Patient Education Provided     Education provided:   - low impact cardio.    Written Home Exercises Provided: yes.  Exercises were reviewed and Jorge was able to demonstrate them prior to the end of the session.  Jorge demonstrated good  understanding of the education provided.     See EMR under Patient Instructions for exercises provided prior visit.    Assessment     Jorge provided good effort and participation toward therapeutic interventions today with focus on lower extremity strengthening, balance and coordination exercises. Displays improved hip mobility. Pt did well with exercises and reported no pain today.  Right lower extremity still appears to be weak but is improving.     Jorge is progressing well towards his goals.   Pt prognosis is Good.     Pt will continue to benefit from skilled outpatient physical therapy to address the deficits listed in the problem list box on initial evaluation, provide pt/family education and to maximize pt's level of independence in the home and community environment.     Pt's spiritual, cultural and educational needs considered and pt agreeable to plan of care and goals.    Anticipated barriers to physical therapy: None    Goals:     SHORT TERM GOALS: 4 weeks 4/18/2022     1. Recent signs and systems trend is improving in order to progress towards LTG's. met   2. Patient will be independent with HEP in order to further progress and return to maximal function. met   3. Pain rating at Worst: 5/10 in order to progress towards increased independence with activity. met   4. Patient will be able to correct postural deviations in sitting and standing, to decrease pain and promote postural awareness for injury prevention.  ongoing      LONG TERM GOALS: 8 weeks 4/18/2022     1. Patient will return to normal ADL, recreational, and work related activities with less pain and limitation.   ongoing   2. Patient will improve AROM to stated goals in order to return to maximal functional potential.  ongoing   3. Patient will improve Strength to stated goals of appropriate musculature in order to improve functional independence.  ongoing   4. Pain Rating at Best: 1/10 to improve Quality of Life.  ongoing   5. Patient will meet predicted functional outcome (FOTO) score: 65% to increase self-worth & perceived functional ability. ongoing   6. Patient will have met/partially met personal goal of being able to walk with no pain ongoing         Plan     Plan of care Certification: 2/2/2022 to 5/30/2022     Continue with Outpatient Physical Therapy 2 times weekly for 6 weeks to include any combination of the following interventions: virtual visits, dry needling, modalities, electrical stimulation (IFC, Pre-Mod, Attended with Functional Dry Needling), Manual Therapy, Moist Heat/ Ice, Neuromuscular Re-ed, Patient Education, Self Care, Therapeutic Exercise, Functional Training and Therapeutic Activites     Scottie Jorgensen, PT

## 2022-04-18 NOTE — PATIENT INSTRUCTIONS
Over the counter tylenol or ibuprofen for arthritis pain. Take the prescription strength ibuprofen for severe pain symptoms.         Mario Patel,     If you are due for any health screening(s) below please notify me so we can arrange them to be ordered and scheduled to maintain your health. Most healthy patients complete it. Don't lose out on improving your health.     Health Maintenance   Topic Date Due    Foot Exam  07/13/2021    Lipid Panel  04/30/2022    Hemoglobin A1c  08/01/2022    Eye Exam  03/09/2023    Low Dose Statin  04/18/2023    TETANUS VACCINE  04/14/2026    Hepatitis C Screening  Completed

## 2022-04-20 ENCOUNTER — CLINICAL SUPPORT (OUTPATIENT)
Dept: REHABILITATION | Facility: HOSPITAL | Age: 72
End: 2022-04-20
Payer: MEDICARE

## 2022-04-20 DIAGNOSIS — M25.60 DECREASED RANGE OF MOTION: Primary | ICD-10-CM

## 2022-04-20 DIAGNOSIS — R53.1 DECREASED STRENGTH: ICD-10-CM

## 2022-04-20 DIAGNOSIS — R26.89 DECREASED FUNCTIONAL MOBILITY: ICD-10-CM

## 2022-04-20 PROCEDURE — 97110 THERAPEUTIC EXERCISES: CPT | Mod: PN

## 2022-04-20 NOTE — PROGRESS NOTES
[facility] / [facility]  Physical Therapy Daily Treatment Note     Name: Jorge Becerra  Clinic Number: [id number]    Therapy Diagnosis:   Encounter Diagnoses   Name Primary?    Decreased range of motion Yes    Decreased functional mobility     Decreased strength      Physician: Mehul Royal MD    Visit Date: 4/20/2022    Physician: Mehul Royal MD         Physician Orders: PT Eval and Treat  Medical Diagnosis from Referral: M62.838 (ICD-10-CM) - Muscle spasm   Evaluation Date: 2/2/2022  Authorization Period Expiration: 12/31/2022   Plan of Care Expiration: 5/30/2022                Progress Update: 3/4/2022               FOTO: 1 / 3   Visit # / Visits authorized: 18/20     PTA visit # 1/5               Time In:  1100  Time Out: 1155  Total Billable Time: 55  minutes    Precautions: Standard  Insurance: Payor: MEDICARE / Plan: MEDICARE PART A & B / Product Type: Government /     Subjective     Pt reports: he is doing great, no complaints.  :He was compliant with home exercise program.  Response to previous treatment: positive  Functional change: improved gait pattern    Pain: 0/10  Location: right upper legs     Objective     Jorge received therapeutic exercises to develop strength, endurance, ROM, flexibility and core stabilization for 55 minutes including:    Stationary bike x 10 mins  Level - 3 to increase bilateral lower extremity strength     Bridges   3x10 - NP  Clams with red tb   3x10 - NP  Hip adduction with ball  3x10 - NP  SLRs  3x10 - NP    Seated hamstring stretch  3x30 secs B    Seated piriformis stretch  3x30 secs  Bilateral (R lower extremity on stool)    Standing marches 3#  2x10  Standing hip flexion 3#  2x10   Standing hip extension 3# 2x10   Standing hip abduction 3# 2 x 10 B - NP  Step ups  4#  2x10 B   Lateral step ups  4#  2x10 B  Slant board gastroc stretch 3 x 30 sec B  Lateral walks in saucedo 1 lap   Sit to stand  x20     Shuttle leg press   75#   2x10  Shuttle  Single L leg press  12#  2X10   Shuttle heel raises   62#  2x10  SLS 2 x 20 sec B   NMRE    Home Exercises Provided and Patient Education Provided     Education provided:   - low impact cardio.    Written Home Exercises Provided: yes.  Exercises were reviewed and Jorge was able to demonstrate them prior to the end of the session.  Jorge demonstrated good  understanding of the education provided.     See EMR under Patient Instructions for exercises provided prior visit.    Assessment     Jorge provided good effort and participation toward therapeutic interventions today with focus on lower extremity strengthening, balance and coordination exercises. Displays improved hip mobility. Pt did well with exercises and reported no pain today.  Right lower extremity still appears to be weak but is improving.     Jorge is progressing well towards his goals.   Pt prognosis is Good.     Pt will continue to benefit from skilled outpatient physical therapy to address the deficits listed in the problem list box on initial evaluation, provide pt/family education and to maximize pt's level of independence in the home and community environment.     Pt's spiritual, cultural and educational needs considered and pt agreeable to plan of care and goals.    Anticipated barriers to physical therapy: None    Goals:     SHORT TERM GOALS: 4 weeks 4/20/2022     1. Recent signs and systems trend is improving in order to progress towards LTG's. met   2. Patient will be independent with HEP in order to further progress and return to maximal function. met   3. Pain rating at Worst: 5/10 in order to progress towards increased independence with activity. met   4. Patient will be able to correct postural deviations in sitting and standing, to decrease pain and promote postural awareness for injury prevention.  ongoing      LONG TERM GOALS: 8 weeks 4/20/2022     1. Patient will return to normal ADL, recreational, and work related activities with  less pain and limitation.  ongoing   2. Patient will improve AROM to stated goals in order to return to maximal functional potential.  ongoing   3. Patient will improve Strength to stated goals of appropriate musculature in order to improve functional independence.  ongoing   4. Pain Rating at Best: 1/10 to improve Quality of Life.  ongoing   5. Patient will meet predicted functional outcome (FOTO) score: 65% to increase self-worth & perceived functional ability. ongoing   6. Patient will have met/partially met personal goal of being able to walk with no pain ongoing         Plan     Plan of care Certification: 2/2/2022 to 5/30/2022     Continue with Outpatient Physical Therapy 2 times weekly for 6 weeks to include any combination of the following interventions: virtual visits, dry needling, modalities, electrical stimulation (IFC, Pre-Mod, Attended with Functional Dry Needling), Manual Therapy, Moist Heat/ Ice, Neuromuscular Re-ed, Patient Education, Self Care, Therapeutic Exercise, Functional Training and Therapeutic Activites     Scottie Jorgensen, PT

## 2022-04-27 ENCOUNTER — CLINICAL SUPPORT (OUTPATIENT)
Dept: REHABILITATION | Facility: HOSPITAL | Age: 72
End: 2022-04-27
Payer: MEDICARE

## 2022-04-27 DIAGNOSIS — R26.89 DECREASED FUNCTIONAL MOBILITY: ICD-10-CM

## 2022-04-27 DIAGNOSIS — R53.1 DECREASED STRENGTH: ICD-10-CM

## 2022-04-27 DIAGNOSIS — M25.60 DECREASED RANGE OF MOTION: Primary | ICD-10-CM

## 2022-04-27 PROCEDURE — 97110 THERAPEUTIC EXERCISES: CPT | Mod: PN

## 2022-04-27 NOTE — PROGRESS NOTES
On [facility] of [facility] / [facility]  Physical Therapy Daily Treatment Note     Name: Jorge Becerra  Clinic Number: [id number]    Therapy Diagnosis:   Encounter Diagnoses   Name Primary?    Decreased range of motion Yes    Decreased functional mobility     Decreased strength      Physician: Mehul Royal MD    Visit Date: 4/27/2022    Physician: Mehul Royal MD         Physician Orders: PT Eval and Treat  Medical Diagnosis from Referral: M62.838 (ICD-10-CM) - Muscle spasm   Evaluation Date: 2/2/2022  Authorization Period Expiration: 12/31/2022   Plan of Care Expiration: 5/30/2022                Progress Update: 3/4/2022               FOTO: 1 / 3   Visit # / Visits authorized: 19/20     PTA visit # 1/5               Time In:  1354   (Pnt arrived early)  Time Out: 1347   Total Billable Time: 53  minutes    Precautions: Standard  Insurance: Payor: MEDICARE / Plan: MEDICARE PART A & B / Product Type: Government /     Subjective     Pt reports: he is doing great, no complaints.  :He was compliant with home exercise program.  Response to previous treatment: positive  Functional change: improved gait pattern    Pain: 0/10  Location: right upper legs     Objective     Jorge received therapeutic exercises to develop strength, endurance, ROM, flexibility and core stabilization for 53 minutes including:    Stationary bike x 10 mins  Level - 3 to increase bilateral lower extremity strength     Bridges   3x10   Clams with red tb   3x10   Hip adduction with ball  3x10   SLRs  3x10     Seated hamstring stretch  3x30 secs B    Seated piriformis stretch  3x30 secs  Bilateral (R lower extremity on stool)    Standing marches 3#  2x10  Standing hip flexion 3#  3x10   Standing hip extension 3# 2x10   Standing hip abduction 3# 2 x 10 B - NP  Step ups  2x10 B   Lateral step ups  4#  2x10 B  Slant board gastroc stretch 3 x 30 sec B  Lateral walks in saucedo 1 lap   Sit to stand  x20     Shuttle leg press   75#    2x10  Shuttle Single L leg press  12#  2X10   Shuttle heel raises   62#  2x10  SLS 2 x 20 sec B   NMRE    Home Exercises Provided and Patient Education Provided     Education provided:   - low impact cardio.    Written Home Exercises Provided: yes.  Exercises were reviewed and Jorge was able to demonstrate them prior to the end of the session.  Jorge demonstrated good  understanding of the education provided.     See EMR under Patient Instructions for exercises provided prior visit.    Assessment     Jorge provided good effort and participation toward therapeutic interventions today with focus on lower extremity strengthening, balance and coordination exercises. Displays improved hip mobility. Pt did well with exercises and reported no pain today.  Right lower extremity still appears to be weak but is improving.     Jorge is progressing well towards his goals.   Pt prognosis is Good.     Pt will continue to benefit from skilled outpatient physical therapy to address the deficits listed in the problem list box on initial evaluation, provide pt/family education and to maximize pt's level of independence in the home and community environment.     Pt's spiritual, cultural and educational needs considered and pt agreeable to plan of care and goals.    Anticipated barriers to physical therapy: None    Goals:     SHORT TERM GOALS: 4 weeks 4/27/2022     1. Recent signs and systems trend is improving in order to progress towards LTG's. met   2. Patient will be independent with HEP in order to further progress and return to maximal function. met   3. Pain rating at Worst: 5/10 in order to progress towards increased independence with activity. met   4. Patient will be able to correct postural deviations in sitting and standing, to decrease pain and promote postural awareness for injury prevention.  ongoing      LONG TERM GOALS: 8 weeks 4/27/2022     1. Patient will return to normal ADL, recreational, and work related  activities with less pain and limitation.  ongoing   2. Patient will improve AROM to stated goals in order to return to maximal functional potential.  ongoing   3. Patient will improve Strength to stated goals of appropriate musculature in order to improve functional independence.  ongoing   4. Pain Rating at Best: 1/10 to improve Quality of Life.  ongoing   5. Patient will meet predicted functional outcome (FOTO) score: 65% to increase self-worth & perceived functional ability. ongoing   6. Patient will have met/partially met personal goal of being able to walk with no pain ongoing         Plan     Plan of care Certification: 2/2/2022 to 5/30/2022     Continue with Outpatient Physical Therapy 2 times weekly for 6 weeks to include any combination of the following interventions: virtual visits, dry needling, modalities, electrical stimulation (IFC, Pre-Mod, Attended with Functional Dry Needling), Manual Therapy, Moist Heat/ Ice, Neuromuscular Re-ed, Patient Education, Self Care, Therapeutic Exercise, Functional Training and Therapeutic Activites     Scottie Jorgensen, PT

## 2022-05-03 ENCOUNTER — PATIENT MESSAGE (OUTPATIENT)
Dept: FAMILY MEDICINE | Facility: CLINIC | Age: 72
End: 2022-05-03
Payer: MEDICARE

## 2022-05-03 ENCOUNTER — CLINICAL SUPPORT (OUTPATIENT)
Dept: REHABILITATION | Facility: HOSPITAL | Age: 72
End: 2022-05-03
Payer: MEDICARE

## 2022-05-03 DIAGNOSIS — M25.60 DECREASED RANGE OF MOTION: Primary | ICD-10-CM

## 2022-05-03 DIAGNOSIS — R53.1 DECREASED STRENGTH: ICD-10-CM

## 2022-05-03 DIAGNOSIS — M19.019 SHOULDER ARTHRITIS: Primary | ICD-10-CM

## 2022-05-03 DIAGNOSIS — R26.89 DECREASED FUNCTIONAL MOBILITY: ICD-10-CM

## 2022-05-03 PROCEDURE — 97110 THERAPEUTIC EXERCISES: CPT | Mod: PN

## 2022-05-03 NOTE — PROGRESS NOTES
[facility] / [facility]  Physical Therapy Daily Treatment and Discharge Note     Name: Jorge Becerra  Clinic Number: [id number]    Therapy Diagnosis:   Encounter Diagnoses   Name Primary?    Decreased range of motion Yes    Decreased functional mobility     Decreased strength      Physician: Mehul Royal MD    Visit Date: 5/3/2022    Physician: Mehul Royal MD         Physician Orders: PT Eval and Treat  Medical Diagnosis from Referral: M62.838 (ICD-10-CM) - Muscle spasm   Evaluation Date: 2/2/2022  Authorization Period Expiration: 12/31/2022   Plan of Care Expiration: 5/30/2022                Progress Update: 3/4/2022               FOTO: 1 / 3   Visit # / Visits authorized: 20/20     PTA visit # 1/5               Time In:  0945  Time Out: 1040   Total Billable Time: 55  minutes    Precautions: Standard  Insurance: Payor: MEDICARE / Plan: MEDICARE PART A & B / Product Type: Government /     Subjective     Pt reports: he is doing great, no complaints.  States that he feels confident he can perform exercises at home.  :He was compliant with home exercise program.  Response to previous treatment: positive  Functional change: improved gait pattern    Pain: 0/10  Location: right upper legs     Objective     Jorge received therapeutic exercises to develop strength, endurance, ROM, flexibility and core stabilization for 55 minutes including:    Stationary bike x 10 mins  Level - 3 to increase bilateral lower extremity strength     Bridges   3x10   Clams with red tb   3x10   Hip adduction with ball  3x10  SLRs  3x10     Seated hamstring stretch  3x30 secs B    Seated piriformis stretch  3x30 secs  B    Standing marches 3#  2x10  Standing hip flexion 3#  3x10   Standing hip extension 3# 2x10   Standing hip abduction 3# 2 x 10 B - NP  Step ups  2x10 B   Lateral step ups  4#  2x10 B  Slant board gastroc stretch 3 x 30 sec B  Lateral walks in saucedo 1 lap   Sit to stand  x20     Shuttle leg press    87#   2x10  Shuttle Single L leg press  12#  2X10   Shuttle heel raises   87#  2x10  SLS 2 x 20 sec B   NMRE    Home Exercises Provided and Patient Education Provided     Education provided:   - low impact cardio.    Written Home Exercises Provided: yes.  Exercises were reviewed and Jorge was able to demonstrate them prior to the end of the session.  Jorge demonstrated good  understanding of the education provided.     See EMR under Patient Instructions for exercises provided prior visit.    Assessment     Jorge provided good effort and participation toward therapeutic interventions today with focus on lower extremity strengthening, balance and coordination exercises. Displays improved hip mobility.      Jorge has achieved his goals.   Pt prognosis is Good.     Pt will be discharged from physical therapy and continue exercises as HEP.     Pt's spiritual, cultural and educational needs considered and pt agreeable to plan of care and goals.    Anticipated barriers to physical therapy: None    Goals:     SHORT TERM GOALS: 4 weeks 5/3/2022     1. Recent signs and systems trend is improving in order to progress towards LTG's. met   2. Patient will be independent with HEP in order to further progress and return to maximal function. met   3. Pain rating at Worst: 5/10 in order to progress towards increased independence with activity. met   4. Patient will be able to correct postural deviations in sitting and standing, to decrease pain and promote postural awareness for injury prevention.  met      LONG TERM GOALS: 8 weeks 5/3/2022     1. Patient will return to normal ADL, recreational, and work related activities with less pain and limitation.  met   2. Patient will improve AROM to stated goals in order to return to maximal functional potential.  met   3. Patient will improve Strength to stated goals of appropriate musculature in order to improve functional independence.  met   4. Pain Rating at Best: 1/10 to improve  Quality of Life.  met   5. Patient will meet predicted functional outcome (FOTO) score: 65% to increase self-worth & perceived functional ability. met   6. Patient will have met/partially met personal goal of being able to walk with no pain met         Plan     Plan of care Certification: 2/2/2022 to 5/30/2022     Pnt will be discharged from physical therapy and will continue exercises as HEP.    Scottie Jorgensen, PT

## 2022-05-04 DIAGNOSIS — E11.9 TYPE 2 DIABETES MELLITUS WITHOUT COMPLICATION: ICD-10-CM

## 2022-05-09 ENCOUNTER — PATIENT MESSAGE (OUTPATIENT)
Dept: ADMINISTRATIVE | Facility: HOSPITAL | Age: 72
End: 2022-05-09
Payer: MEDICARE

## 2022-05-31 ENCOUNTER — PATIENT MESSAGE (OUTPATIENT)
Dept: ADMINISTRATIVE | Facility: HOSPITAL | Age: 72
End: 2022-05-31
Payer: MEDICARE

## 2022-06-07 ENCOUNTER — CLINICAL SUPPORT (OUTPATIENT)
Dept: REHABILITATION | Facility: HOSPITAL | Age: 72
End: 2022-06-07
Payer: MEDICARE

## 2022-06-07 DIAGNOSIS — M62.89 ABNORMAL INCREASED MUSCLE TIGHTNESS: ICD-10-CM

## 2022-06-07 DIAGNOSIS — R29.898 DECREASED STRENGTH OF UPPER EXTREMITY: ICD-10-CM

## 2022-06-07 DIAGNOSIS — M19.019 SHOULDER ARTHRITIS: ICD-10-CM

## 2022-06-07 DIAGNOSIS — R29.3 POSTURE ABNORMALITY: ICD-10-CM

## 2022-06-07 DIAGNOSIS — M25.611 DECREASED RANGE OF MOTION OF RIGHT SHOULDER: ICD-10-CM

## 2022-06-07 PROBLEM — M25.619 DECREASED RANGE OF MOTION (ROM) OF SHOULDER: Status: ACTIVE | Noted: 2022-06-07

## 2022-06-07 PROCEDURE — 97161 PT EVAL LOW COMPLEX 20 MIN: CPT | Mod: PN

## 2022-06-07 PROCEDURE — 97110 THERAPEUTIC EXERCISES: CPT | Mod: PN

## 2022-06-07 NOTE — PLAN OF CARE
[facility]  Physical Therapy Initial Evaluation    Name: Jorge Becerra  [facility] Number: [id number]    Therapy Diagnosis:  Encounter Diagnoses   Name Primary?    Shoulder arthritis     Decreased strength of upper extremity     Abnormal increased muscle tightness     Posture abnormality     Decreased range of motion of right shoulder       Physician: Mehul Royal MD   Physician Orders: PT Eval and Treat  Medical Diagnosis from Referral: M19.019 (ICD-10-CM) - Shoulder arthritis   Evaluation Date: 6/7/2022  Authorization Period Expiration: 12/31/2022   Plan of Care Expiration: 8/30/2022    Progress Update: 7/7/2022  FOTO: 1 / 3    Visit # / Visits authorized: 1 / 1      PRECAUTIONS: Standard Precautions     Time In: 1030  Time Out: 1110  Total Appointment Time (timed & untimed codes): 40 minutes    SUBJECTIVE     Chief Complaint: R superior shoulder pain    History of current condition:  Pain started about 2 months ago.   Denies specific incident.  Pain intensity and frequency has improved.  States that he has been taking tylenol twice a day.  Denies popping, clicking, instability, neck pain.    Dominant Extremity: Right    Aggravating Factors:  Lifting overhead, reaching out to the side, reaching behind back, picking up objects  Easing Factors: tylenol    Imaging:     IMPRESSION:     No acute osseous abnormality.     Mild degenerative changes of the right shoulder.     Electronically signed by:  Ravi Becerra MD  4/12/2022 10:56 AM CDT Workstation: [id number]     Prior Therapy: Yes  Social History: Pt lives with his spouse.    Occupation: Pt is a pharmacy tech.    Prior Level of Function: Independent with all ADLs  Current Level of Function: 75% of PLOF    Pain:  Current 4 /10, worst 8 /10, best 4 /10   Description: Dull and Throbbing    Pts goals: Pt reported goal is to be able to pull weeds without pain.      _______________________________________________________  Medical  History:   Past Medical History:   Diagnosis Date    Diabetes mellitus, type 2     Hypertension     Non Hodgkin's lymphoma        Surgical History:   Jorge Becerra  has a past surgical history that includes Tonsillectomy.    Medications:   Jorge has a current medication list which includes the following prescription(s): allopurinol, aspirin, atorvastatin, cholecalciferol (vitamin d3), diclofenac sodium, donepezil, ergocalciferol (vitamin d2), ferrous sulfate, fosinopril, hydrocodone-acetaminophen, ibuprofen, magnesium oxide, metformin, methocarbamol, and vit c/e/zinc/lutein/zeaxanthin.    Allergies:   Review of patient's allergies indicates:  No Known Allergies     OBJECTIVE   (x = not tested due to pain and/or inability to obtain test position)    RANGE OF MOTION:      Shoulder AROM/PROM Right  6/7/2022 Left  6/7/2022 Goal   Forward Flexion (180) 90/105 c pain wfl 180     ER at 90 degrees (90) 65 c pain wfl 90     IR at 90 degrees 42 c pain wfl    ER at 0 degrees (45)  36 c pain wfl 45       STRENGTH:    U/E MMT Right  6/7/2022 Goal   Shoulder Flexion 3+/5 5/5 B   Shoulder Abduction 3+/5 5/5 B   Shoulder IR 3+/5 5/5 B   Shoulder ER 3-/5 5/5 B   Elbow Flexion  4-/5 5/5 B   Elbow Extension 4-/5 5/5 B     U/E MMT Left  6/7/2022 Goal   Shoulder Flexion 4-/5 5/5 B   Shoulder Abduction 4-/5 5/5 B   Shoulder IR 4-/5 5/5 B   Shoulder ER 3+/5 5/5 B   Elbow Flexion  4-/5 5/5 B   Elbow Extension 4-/5 5/5 B     MUSCLE LENGTH:     Muscle Tested  Right  6/7/2022 Left   6/7/2022 Goal   Pectoralis Minor  decreased decreased Normal B   Pectoralis Major decreased decreased Normal B       SPECIAL TESTS:     Right  6/7/2022 Left   6/7/2022 Goal   [place] Thiago Positive Negative Negative B    Neers Positive Negative Negative B    Empty Can Positive Negative Negative B    Full Can Positive Negative Negative B    Sulcus Sign Negative Negative Negative B    Drop arm Negative Negative Negative B   Obriens Negative Negative Negative B    Apprehension Negative Negative Negative B       Observation:  No edema, ecchymosis noted.    Sensation:  Sensation is intact to light touch    Palpation: No TTP.    Posture:  Pt presents with postural abnormalities which include: forward head and rounded shoulders    Gait: N/A    Movement Analysis: N/A    Balance: N/A      FUNCTION:         TREATMENT     Total Treatment time separate from Evaluation: (10) minutes    Jorge received therapeutic exercises to develop strength, endurance, ROM, flexibility, and posture for (10) minutes including: x = exercises performed     TherEx 6/7/2022    Wall flexion with towel x 3x10   Sleeper stretch x 3x30 secs   IR/ER with yellow tb x 3x10          Plan for Next Visit:     UBE  2/2  Scapular squeezes   3x10  Upper trap stretches  2x30 secs B  OH pulley  (flexion/abduction)  x3 mins each  Shoulder wall wipes with towel 3x10  Finger ladder  x20  CC Rows 3#  3x10  CC Shoulder extension 3x10  CC SA punches  3x10  Wall pushups with a plus  2x10  Sleeper stretch 3x30 secs  Doorway stretch 3x30 secs       Home Exercises and Patient Education Provided    Education/Self-Care provided:    Patient educated on the impairments noted above and the effects of physical therapy intervention to improve overall condition and QOL.    Patient was educated on all the above exercise prior/during/after for proper posture, positioning, and execution for safe performance with home exercise program.    Written Home Exercises Provided: yes. Prefers: Electronic Copies  Exercises were reviewed and Jorge was able to demonstrate them prior to the end of the session.  Jorge demonstrated good understanding of the education provided.     See EMR under Patient Instructions for exercises provided during therapy sessions.    ASSESSMENT     Jorge is a 71 y.o. male referred to outpatient Physical Therapy with a medical diagnosis of M19.019 (ICD-10-CM) - Shoulder arthritis   .  Jorge presents with clinical  "signs and symptoms that support this diagnosis with     decreased shoulder girdle ROM, decreased scapular and shoulder strength, Glenohumeral joint hypomobility, and impaired functional mobility.    The above impairments will be addressed through manual therapy techniques, therapeutic exercises, functional training, and modalities as necessary. Patient was treated and educated on exercises for home program, progression of therapy, and benefits of therapy to achieve full functional mobility.     Pt prognosis is Good.   Pt will benefit from skilled outpatient Physical Therapy to address the deficits stated above and in the chart below, provide pt/family education, and to maximize pt's level of independence.     Plan of care discussed with patient: Yes  Pt's spiritual, cultural and educational needs considered and patient is agreeable to the plan of care and goals as stated below:     Anticipated Barriers for therapy: none    Medical Necessity is demonstrated by the following  History  Co-morbidities and personal factors that may impact the plan of care Co-morbidities:   advanced age    Personal Factors:   no deficits     low   Examination  Body Structures and Functions, activity limitations and participation restrictions that may impact the plan of care Body Regions:   upper extremities    Body Systems:    gross symmetry  ROM  strength  gross coordinated movement  motor control  motor learning    Participation Restrictions:   See above in "Current Level of Function"     Activity limitations:   Learning and applying knowledge  no deficits    General Tasks and Commands  no deficits    Communication  no deficits    Mobility  no deficits    Self care  no deficits    Domestic Life  no deficits    Interactions/Relationships  no deficits    Life Areas  no deficits    Community and Social Life  community life  recreation and leisure  no deficits         low   Clinical Presentation stable and uncomplicated low   Decision " Making/ Complexity Score: low       GOALS:  SHORT TERM GOALS: 4 weeks 6/7/2022   1. Recent signs and systems trend is improving in order to progress towards LTG's.    2. Patient will be independent with HEP in order to further progress and return to maximal function.    3. Pain rating at Worst: 5/10 in order to progress towards increased independence with activity.    4. Patient will be able to correct postural deviations in sitting and standing, to decrease pain and promote postural awareness for injury prevention.       LONG TERM GOALS: 8 weeks 6/7/2022   1. Patient will return to normal ADL, recreational, and work related activities with less pain and limitation.     2. Patient will improve AROM to stated goals in order to return to maximal functional potential.     3. Patient will improve Strength to stated goals of appropriate musculature in order to improve functional independence.     4. Pain Rating at Best: 1/10 to improve Quality of Life.     5. Patient will meet predicted functional outcome (FOTO) score: 80% to increase self-worth & perceived functional ability.    6. Patient will have met/partially met personal goal of being able to reach overhead with no pain        PLAN   Plan of care Certification: 6/7/2022 to 8/30/2022    Outpatient Physical Therapy 2 times weekly for 6 weeks to include any combination of the following interventions: virtual visits, dry needling, modalities, electrical stimulation (IFC, Pre-Mod, Attended with Functional Dry Needling), Manual Therapy, Moist Heat/ Ice, Neuromuscular Re-ed, Patient Education, Self Care, Therapeutic Exercise, Functional Training and Therapeutic Activites     Thank you for this referral.    These services are reasonable and necessary for the conditions set forth above while under my care.    Scottie Jorgensen, PT, DPT

## 2022-06-14 ENCOUNTER — CLINICAL SUPPORT (OUTPATIENT)
Dept: REHABILITATION | Facility: HOSPITAL | Age: 72
End: 2022-06-14
Payer: MEDICARE

## 2022-06-14 DIAGNOSIS — R29.3 POSTURE ABNORMALITY: ICD-10-CM

## 2022-06-14 DIAGNOSIS — M62.89 ABNORMAL INCREASED MUSCLE TIGHTNESS: ICD-10-CM

## 2022-06-14 DIAGNOSIS — M25.619 DECREASED RANGE OF MOTION OF SHOULDER, UNSPECIFIED LATERALITY: ICD-10-CM

## 2022-06-14 DIAGNOSIS — R29.898 DECREASED STRENGTH OF UPPER EXTREMITY: Primary | ICD-10-CM

## 2022-06-14 PROCEDURE — 97110 THERAPEUTIC EXERCISES: CPT | Mod: PN

## 2022-06-14 NOTE — PROGRESS NOTES
[facility] / [facility]  Physical Therapy Daily Treatment Note     Name: Jorge Becerra  Clinic Number: [id number]    Therapy Diagnosis:   Encounter Diagnoses   Name Primary?    Decreased strength of upper extremity Yes    Abnormal increased muscle tightness     Posture abnormality     Decreased range of motion of shoulder, unspecified laterality      Physician: Mehul Royal MD    Visit Date: 6/14/2022    Physician: Mehul Royal MD   Physician Orders: PT Eval and Treat  Medical Diagnosis from Referral: M19.019 (ICD-10-CM) - Shoulder arthritis   Evaluation Date: 6/7/2022  Authorization Period Expiration: 12/31/2022   Plan of Care Expiration: 8/30/2022                Progress Update: 7/7/2022               FOTO: 1 / 3    Visit # / Visits authorized: 1 / 20           Time In: 1220  Time Out: 1315  Total Billable Time: 55 minutes    Precautions: Standard  Insurance: Payor: MEDICARE / Plan: MEDICARE PART A & B / Product Type: Government /     Subjective     Pt reports: that he feels like the exercises are helping.  He is having less pain.  He was compliant with home exercise program.  Response to previous treatment: n/a  Functional change: n/a    Pain: 1/10  Location: bilateral shoulder      Objective     Jorge received therapeutic exercises to develop strength, endurance, ROM, flexibility and posture for 55 minutes including:    UBE  3/3  Scapular squeezes   3x10  Upper trap stretches  2x30 secs B  OH pulley  (flexion/abduction)  x3 mins each  Shoulder wall wipes with towel 3x10  Finger ladder  x20  CC Rows 7#  3x10  CC Shoulder extension 3#  3x10  CC SA punches 3#    3x10  Wall pushups with a plus  2x10  Sleeper stretch 3x30 secs  Doorway stretch 3x30 secs  IR stretch with strap 3x15 secs    Home Exercises Provided and Patient Education Provided     Education provided:   - postural awareness.    Written Home Exercises Provided: yes.  Exercises were reviewed and Jorge was able to  demonstrate them prior to the end of the session.  Jorge demonstrated good  understanding of the education provided.     See EMR under Patient Instructions for exercises provided prior visit.    Assessment     Presents with poor postural awareness, decreased periscapular strength and increased posterior capsular tightness on the R.   Will continue to progress with therapy.  Jorge is progressing well towards his goals.   Pt prognosis is Good.     Pt will continue to benefit from skilled outpatient physical therapy to address the deficits listed in the problem list box on initial evaluation, provide pt/family education and to maximize pt's level of independence in the home and community environment.     Pt's spiritual, cultural and educational needs considered and pt agreeable to plan of care and goals.    Anticipated barriers to physical therapy: None    Goals:     SHORT TERM GOALS: 4 weeks 6/14/2022     1. Recent signs and systems trend is improving in order to progress towards LTG's. ongoing   2. Patient will be independent with HEP in order to further progress and return to maximal function. ongoing   3. Pain rating at Worst: 5/10 in order to progress towards increased independence with activity. ongoing   4. Patient will be able to correct postural deviations in sitting and standing, to decrease pain and promote postural awareness for injury prevention.  ongoing      LONG TERM GOALS: 8 weeks 6/14/2022     1. Patient will return to normal ADL, recreational, and work related activities with less pain and limitation.  ongoing   2. Patient will improve AROM to stated goals in order to return to maximal functional potential.  ongoing   3. Patient will improve Strength to stated goals of appropriate musculature in order to improve functional independence.  ongoing   4. Pain Rating at Best: 1/10 to improve Quality of Life.  ongoing   5. Patient will meet predicted functional outcome (FOTO) score: 80% to increase  self-worth & perceived functional ability. ongoing   6. Patient will have met/partially met personal goal of being able to reach overhead with no pain ongoing         Plan     Continue POC as previously stated.    Scottie Jorgensen, PT

## 2022-06-16 ENCOUNTER — CLINICAL SUPPORT (OUTPATIENT)
Dept: REHABILITATION | Facility: HOSPITAL | Age: 72
End: 2022-06-16
Payer: MEDICARE

## 2022-06-16 DIAGNOSIS — M25.619 DECREASED RANGE OF MOTION OF SHOULDER, UNSPECIFIED LATERALITY: ICD-10-CM

## 2022-06-16 DIAGNOSIS — M62.89 ABNORMAL INCREASED MUSCLE TIGHTNESS: ICD-10-CM

## 2022-06-16 DIAGNOSIS — R29.898 DECREASED STRENGTH OF UPPER EXTREMITY: Primary | ICD-10-CM

## 2022-06-16 DIAGNOSIS — R29.3 POSTURE ABNORMALITY: ICD-10-CM

## 2022-06-16 PROCEDURE — 97110 THERAPEUTIC EXERCISES: CPT | Mod: PN

## 2022-06-16 NOTE — PROGRESS NOTES
[facility] / [facility]  Physical Therapy Daily Treatment Note     Name: Jorge Becerra  Clinic Number: [id number]    Therapy Diagnosis:   Encounter Diagnoses   Name Primary?    Decreased strength of upper extremity Yes    Abnormal increased muscle tightness     Posture abnormality     Decreased range of motion of shoulder, unspecified laterality      Physician: Mehul Royal MD    Visit Date: 6/16/2022    Physician: Mehul Royal MD   Physician Orders: PT Eval and Treat  Medical Diagnosis from Referral: M19.019 (ICD-10-CM) - Shoulder arthritis   Evaluation Date: 6/7/2022  Authorization Period Expiration: 12/31/2022   Plan of Care Expiration: 8/30/2022                Progress Update: 7/7/2022               FOTO: 1 / 3    Visit # / Visits authorized: 2 / 20           Time In: 1115  Time Out: 1200  Total Billable Time: 45 minutes    Precautions: Standard  Insurance: Payor: MEDICARE / Plan: MEDICARE PART A & B / Product Type: Government /     Subjective     Pt reports: that he felt some minor soreness after last session but nothing significant.    He was compliant with home exercise program.  Response to previous treatment: n/a  Functional change: n/a    Pain: 1/10  Location: bilateral shoulder      Objective     Jorge received therapeutic exercises to develop strength, endurance, ROM, flexibility and posture for 45 minutes including:    UBE  3/3  B shoulder ER with red tb   3x10  Upper trap stretches  2x30 secs B  Supine shoulder flexion with dowel  10#  3x10  Shoulder wall wipes with towel 3x10  Finger ladder  x20  CC Rows 7#  3x10  CC Shoulder extension 3#  3x10  CC SA punches 3#    3x10  Wall pushups with a plus  2x10  Sleeper stretch 3x30 secs  Doorway stretch 3x30 secs  IR stretch with strap 3x15 secs    Home Exercises Provided and Patient Education Provided     Education provided:   - postural awareness.    Written Home Exercises Provided: yes.  Exercises were reviewed and Jorge  was able to demonstrate them prior to the end of the session.  Jorge demonstrated good  understanding of the education provided.     See EMR under Patient Instructions for exercises provided prior visit.    Assessment     Presents with poor postural awareness, decreased periscapular strength and increased posterior capsular tightness on the R.   Will continue to progress with therapy.  Jorge is progressing well towards his goals.   Pt prognosis is Good.     Pt will continue to benefit from skilled outpatient physical therapy to address the deficits listed in the problem list box on initial evaluation, provide pt/family education and to maximize pt's level of independence in the home and community environment.     Pt's spiritual, cultural and educational needs considered and pt agreeable to plan of care and goals.    Anticipated barriers to physical therapy: None    Goals:     SHORT TERM GOALS: 4 weeks 6/16/2022     1. Recent signs and systems trend is improving in order to progress towards LTG's. ongoing   2. Patient will be independent with HEP in order to further progress and return to maximal function. ongoing   3. Pain rating at Worst: 5/10 in order to progress towards increased independence with activity. ongoing   4. Patient will be able to correct postural deviations in sitting and standing, to decrease pain and promote postural awareness for injury prevention.  ongoing      LONG TERM GOALS: 8 weeks 6/16/2022     1. Patient will return to normal ADL, recreational, and work related activities with less pain and limitation.  ongoing   2. Patient will improve AROM to stated goals in order to return to maximal functional potential.  ongoing   3. Patient will improve Strength to stated goals of appropriate musculature in order to improve functional independence.  ongoing   4. Pain Rating at Best: 1/10 to improve Quality of Life.  ongoing   5. Patient will meet predicted functional outcome (FOTO) score: 80% to  increase self-worth & perceived functional ability. ongoing   6. Patient will have met/partially met personal goal of being able to reach overhead with no pain ongoing         Plan     Continue POC as previously stated.    Scotite Jorgensen, PT

## 2022-06-24 ENCOUNTER — CLINICAL SUPPORT (OUTPATIENT)
Dept: REHABILITATION | Facility: HOSPITAL | Age: 72
End: 2022-06-24
Payer: MEDICARE

## 2022-06-24 DIAGNOSIS — R29.3 POSTURE ABNORMALITY: ICD-10-CM

## 2022-06-24 DIAGNOSIS — M25.619 DECREASED RANGE OF MOTION OF SHOULDER, UNSPECIFIED LATERALITY: ICD-10-CM

## 2022-06-24 DIAGNOSIS — R29.898 DECREASED STRENGTH OF UPPER EXTREMITY: Primary | ICD-10-CM

## 2022-06-24 DIAGNOSIS — M62.89 ABNORMAL INCREASED MUSCLE TIGHTNESS: ICD-10-CM

## 2022-06-24 PROCEDURE — 97110 THERAPEUTIC EXERCISES: CPT | Mod: PN,CQ

## 2022-06-24 NOTE — PROGRESS NOTES
[facility] / [facility]  Physical Therapy Daily Treatment Note     Name: Jorge Becerra  Clinic Number: [id number]    Therapy Diagnosis:   Encounter Diagnoses   Name Primary?    Decreased strength of upper extremity Yes    Abnormal increased muscle tightness     Posture abnormality     Decreased range of motion of shoulder, unspecified laterality      Physician: Mehul Royal MD    Visit Date: 6/24/2022    Physician: Mehul Royal MD   Physician Orders: PT Eval and Treat  Medical Diagnosis from Referral: M19.019 (ICD-10-CM) - Shoulder arthritis   Evaluation Date: 6/7/2022  Authorization Period Expiration: 12/31/2022   Plan of Care Expiration: 8/30/2022                Progress Update: 7/7/2022               FOTO: 1 / 3    Visit # / Visits authorized: 3 / 20 + eval    ([id number])    Time In: 0915  Time Out: 0959  Total Billable Time: 44 minutes    Precautions: Standard  Insurance: Payor: MEDICARE / Plan: MEDICARE PART A & B / Product Type: Government /     Subjective     Pt reports:his shoulder is feeling better, he is using it more    He was compliant with home exercise program.  Response to previous treatment: no complaints  Functional change: decreased pain    Pain: 1/10  Location: bilateral shoulder      Objective     Jorge received therapeutic exercises to develop strength, endurance, ROM, flexibility and posture for 44 minutes including:    UBE  3/3    B shoulder ER with Red Theraband   3x10  Upper trap stretches  2x30 secs B    Shoulder wall wipes with towel 3x10  Finger ladder  x20  NOT PERFORMED time   Doorway stretch 3x30 secs  IR stretch with strap 3x15 secs  SA rolls with bolster against wall 3 x 10  Wall pushups with a plus  2x10    CC Rows 7#  3x10  CC Shoulder extension 3#  3x10  CC SA punches 3#    3x10-NOT PERFORMED - pt unable to extend elbows and retract scapulae simultaneously     Sleeper stretch 2x30 secs  Supine shoulder flexion with dowel  10#  3x10  Supine SA  punch 10# 3x10    Home Exercises Provided and Patient Education Provided     Education provided:   - postural awareness.    Written Home Exercises Provided: yes.  Exercises were reviewed and Jorge was able to demonstrate them prior to the end of the session.  Jorge demonstrated good  understanding of the education provided.     See EMR under Patient Instructions for exercises provided prior visit.    Assessment     Jorge presents with forward head and trunk posture, protracted scapulae.  He tolerated PRE's w/o difficulty.  Assistance needed with IR behind back to get UE to midline.  Difficulty with SA punches with cable column and theraband.  He is progressing well with UE strength and ROM.  Continued therapy to improve ROM, ADL's.  Jorge is progressing well towards his goals.   Pt prognosis is Good.     Pt will continue to benefit from skilled outpatient physical therapy to address the deficits listed in the problem list box on initial evaluation, provide pt/family education and to maximize pt's level of independence in the home and community environment.     Pt's spiritual, cultural and educational needs considered and pt agreeable to plan of care and goals.    Anticipated barriers to physical therapy: None    Goals:     SHORT TERM GOALS: 4 weeks 6/24/2022     1. Recent signs and systems trend is improving in order to progress towards LTG's. ongoing   2. Patient will be independent with HEP in order to further progress and return to maximal function. ongoing   3. Pain rating at Worst: 5/10 in order to progress towards increased independence with activity. ongoing   4. Patient will be able to correct postural deviations in sitting and standing, to decrease pain and promote postural awareness for injury prevention.  ongoing      LONG TERM GOALS: 8 weeks 6/24/2022     1. Patient will return to normal ADL, recreational, and work related activities with less pain and limitation.  ongoing   2. Patient will improve  AROM to stated goals in order to return to maximal functional potential.  ongoing   3. Patient will improve Strength to stated goals of appropriate musculature in order to improve functional independence.  ongoing   4. Pain Rating at Best: 1/10 to improve Quality of Life.  ongoing   5. Patient will meet predicted functional outcome (FOTO) score: 80% to increase self-worth & perceived functional ability. ongoing   6. Patient will have met/partially met personal goal of being able to reach overhead with no pain ongoing         Plan     Continue POC as previously stated.    Airam Culver, PTA

## 2022-06-27 ENCOUNTER — CLINICAL SUPPORT (OUTPATIENT)
Dept: REHABILITATION | Facility: HOSPITAL | Age: 72
End: 2022-06-27
Payer: MEDICARE

## 2022-06-27 DIAGNOSIS — M62.89 ABNORMAL INCREASED MUSCLE TIGHTNESS: ICD-10-CM

## 2022-06-27 DIAGNOSIS — R29.898 DECREASED STRENGTH OF UPPER EXTREMITY: Primary | ICD-10-CM

## 2022-06-27 DIAGNOSIS — R29.3 POSTURE ABNORMALITY: ICD-10-CM

## 2022-06-27 DIAGNOSIS — M25.619 DECREASED RANGE OF MOTION OF SHOULDER, UNSPECIFIED LATERALITY: ICD-10-CM

## 2022-06-27 PROCEDURE — 97110 THERAPEUTIC EXERCISES: CPT | Mod: PN

## 2022-06-27 NOTE — PROGRESS NOTES
[facility] / [facility]  Physical Therapy Daily Treatment Note     Name: Jorge Becerra  Clinic Number: [id number]    Therapy Diagnosis:   Encounter Diagnoses   Name Primary?    Decreased strength of upper extremity Yes    Abnormal increased muscle tightness     Posture abnormality     Decreased range of motion of shoulder, unspecified laterality      Physician: Mehul Royal MD    Visit Date: 6/27/2022    Physician: Mehul Royal MD   Physician Orders: PT Eval and Treat  Medical Diagnosis from Referral: M19.019 (ICD-10-CM) - Shoulder arthritis   Evaluation Date: 6/7/2022  Authorization Period Expiration: 12/31/2022   Plan of Care Expiration: 8/30/2022                Progress Update: 7/7/2022               FOTO: 1 / 3    Visit # / Visits authorized: 4 / 20 + eval    ([id number])    Time In: 1100  Time Out: 1143  Total Billable Time: 43 minutes    Precautions: Standard  Insurance: Payor: MEDICARE / Plan: MEDICARE PART A & B / Product Type: Government /     Subjective     Pt reports: that he feels great, no complaints.  He was compliant with home exercise program.  Response to previous treatment: no complaints  Functional change: decreased pain    Pain: 1/10  Location: bilateral shoulder      Objective     Jorge received therapeutic exercises to develop strength, endurance, ROM, flexibility and posture for 43 minutes including:    UBE  3/3    B shoulder ER with Red Theraband   3x10  Upper trap stretches  2x30 secs B    Shoulder wall wipes with towel 3x10  Finger ladder  x20     Doorway stretch 3x30 secs  IR stretch with strap 3x15 secs  SA rolls with bolster against wall 3 x 10  Wall pushups with a plus  2x10    CC Rows 7#  3x10  CC Shoulder extension 3#  3x10    Sleeper stretch 2x30 secs  Supine shoulder flexion with dowel  10#  3x10  Supine SA punch 10# 3x10    Home Exercises Provided and Patient Education Provided     Education provided:   - postural awareness.    Written Home  Exercises Provided: yes.  Exercises were reviewed and Jorge was able to demonstrate them prior to the end of the session.  Jorge demonstrated good  understanding of the education provided.     See EMR under Patient Instructions for exercises provided prior visit.    Assessment     Jorge presents with forward head and trunk posture, protracted scapulae.  He tolerated PRE's w/o difficulty.  Assistance needed with IR behind back to get UE to midline.  Difficulty with SA punches with cable column and theraband.  He is progressing well with UE strength and ROM.  Continued therapy to improve ROM, ADL's.  Jorge is progressing well towards his goals.   Pt prognosis is Good.     Pt will continue to benefit from skilled outpatient physical therapy to address the deficits listed in the problem list box on initial evaluation, provide pt/family education and to maximize pt's level of independence in the home and community environment.     Pt's spiritual, cultural and educational needs considered and pt agreeable to plan of care and goals.    Anticipated barriers to physical therapy: None    Goals:     SHORT TERM GOALS: 4 weeks 6/27/2022     1. Recent signs and systems trend is improving in order to progress towards LTG's. ongoing   2. Patient will be independent with HEP in order to further progress and return to maximal function. ongoing   3. Pain rating at Worst: 5/10 in order to progress towards increased independence with activity. ongoing   4. Patient will be able to correct postural deviations in sitting and standing, to decrease pain and promote postural awareness for injury prevention.  ongoing      LONG TERM GOALS: 8 weeks 6/27/2022     1. Patient will return to normal ADL, recreational, and work related activities with less pain and limitation.  ongoing   2. Patient will improve AROM to stated goals in order to return to maximal functional potential.  ongoing   3. Patient will improve Strength to stated goals of  appropriate musculature in order to improve functional independence.  ongoing   4. Pain Rating at Best: 1/10 to improve Quality of Life.  ongoing   5. Patient will meet predicted functional outcome (FOTO) score: 80% to increase self-worth & perceived functional ability. ongoing   6. Patient will have met/partially met personal goal of being able to reach overhead with no pain ongoing         Plan     Continue POC as previously stated.    Scottie Jorgensen, PT

## 2022-06-28 ENCOUNTER — DOCUMENTATION ONLY (OUTPATIENT)
Dept: REHABILITATION | Facility: HOSPITAL | Age: 72
End: 2022-06-28
Payer: MEDICARE

## 2022-06-28 NOTE — PROGRESS NOTES
30 day visit PT-PTA face-face discussion with ELLEN Jorgensen re: patient status, POC, and plan for progression done 6/28/22.  Airam Culver, PTA

## 2022-06-30 ENCOUNTER — CLINICAL SUPPORT (OUTPATIENT)
Dept: REHABILITATION | Facility: HOSPITAL | Age: 72
End: 2022-06-30
Payer: MEDICARE

## 2022-06-30 DIAGNOSIS — R29.898 DECREASED STRENGTH OF UPPER EXTREMITY: Primary | ICD-10-CM

## 2022-06-30 DIAGNOSIS — M25.619 DECREASED RANGE OF MOTION OF SHOULDER, UNSPECIFIED LATERALITY: ICD-10-CM

## 2022-06-30 DIAGNOSIS — M62.89 ABNORMAL INCREASED MUSCLE TIGHTNESS: ICD-10-CM

## 2022-06-30 DIAGNOSIS — R29.3 POSTURE ABNORMALITY: ICD-10-CM

## 2022-06-30 PROCEDURE — 97110 THERAPEUTIC EXERCISES: CPT | Mod: PN

## 2022-06-30 NOTE — PROGRESS NOTES
[facility] / [facility]  Physical Therapy Daily Treatment Note     Name: Jorge Becerra  Clinic Number: [id number]    Therapy Diagnosis:   Encounter Diagnoses   Name Primary?    Decreased strength of upper extremity Yes    Abnormal increased muscle tightness     Posture abnormality     Decreased range of motion of shoulder, unspecified laterality      Physician: Mehul Royal MD    Visit Date: 6/30/2022    Physician: Mehul Royal MD   Physician Orders: PT Eval and Treat  Medical Diagnosis from Referral: M19.019 (ICD-10-CM) - Shoulder arthritis   Evaluation Date: 6/7/2022  Authorization Period Expiration: 12/31/2022   Plan of Care Expiration: 8/30/2022                Progress Update: 7/7/2022               FOTO: 1 / 3    Visit # / Visits authorized: 5 / 20 + eval    ([id number])    Time In: 1115  Time Out: 1155  Total Billable Time: 40 minutes    Precautions: Standard  Insurance: Payor: MEDICARE / Plan: MEDICARE PART A & B / Product Type: Government /     Subjective     Pt reports: that he feels great, no complaints.  He was compliant with home exercise program.  Response to previous treatment: no complaints  Functional change: decreased pain    Pain: 1/10  Location: bilateral shoulder      Objective     Jorge received therapeutic exercises to develop strength, endurance, ROM, flexibility and posture for 40 minutes including:    UBE  3/3    B shoulder ER with Red Theraband   3x10  Upper trap stretches  2x30 secs B    Shoulder wall wipes with towel 3x10  Finger ladder  x20     Doorway stretch 3x30 secs  IR stretch with strap 3x15 secs  SA rolls with bolster against wall 3 x 10  Wall pushups with a plus  2x10    CC Rows 7#  3x10  CC Shoulder extension 3#  3x10    Sleeper stretch 2x30 secs  Supine shoulder flexion with dowel  10#  3x10  Supine SA punch 10# 3x10    Home Exercises Provided and Patient Education Provided     Education provided:   - postural awareness.    Written Home  Exercises Provided: yes.  Exercises were reviewed and Jorge was able to demonstrate them prior to the end of the session.  Jorge demonstrated good  understanding of the education provided.     See EMR under Patient Instructions for exercises provided prior visit.    Assessment     Jorge presents with forward head and trunk posture, protracted scapulae.  He tolerated PRE's w/o difficulty.  Assistance needed with IR behind back to get UE to midline.  Difficulty with SA punches with cable column and theraband.  He is progressing well with UE strength and ROM.  Continued therapy to improve ROM, ADL's.  Jorge is progressing well towards his goals.   Pt prognosis is Good.     Pt will continue to benefit from skilled outpatient physical therapy to address the deficits listed in the problem list box on initial evaluation, provide pt/family education and to maximize pt's level of independence in the home and community environment.     Pt's spiritual, cultural and educational needs considered and pt agreeable to plan of care and goals.    Anticipated barriers to physical therapy: None    Goals:     SHORT TERM GOALS: 4 weeks 6/30/2022     1. Recent signs and systems trend is improving in order to progress towards LTG's. ongoing   2. Patient will be independent with HEP in order to further progress and return to maximal function. ongoing   3. Pain rating at Worst: 5/10 in order to progress towards increased independence with activity. ongoing   4. Patient will be able to correct postural deviations in sitting and standing, to decrease pain and promote postural awareness for injury prevention.  ongoing      LONG TERM GOALS: 8 weeks 6/30/2022     1. Patient will return to normal ADL, recreational, and work related activities with less pain and limitation.  ongoing   2. Patient will improve AROM to stated goals in order to return to maximal functional potential.  ongoing   3. Patient will improve Strength to stated goals of  appropriate musculature in order to improve functional independence.  ongoing   4. Pain Rating at Best: 1/10 to improve Quality of Life.  ongoing   5. Patient will meet predicted functional outcome (FOTO) score: 80% to increase self-worth & perceived functional ability. ongoing   6. Patient will have met/partially met personal goal of being able to reach overhead with no pain ongoing         Plan     Continue POC as previously stated.    Scottie Jorgensen, PT

## 2022-07-05 ENCOUNTER — CLINICAL SUPPORT (OUTPATIENT)
Dept: REHABILITATION | Facility: HOSPITAL | Age: 72
End: 2022-07-05
Payer: MEDICARE

## 2022-07-05 DIAGNOSIS — M62.89 ABNORMAL INCREASED MUSCLE TIGHTNESS: ICD-10-CM

## 2022-07-05 DIAGNOSIS — M25.619 DECREASED RANGE OF MOTION OF SHOULDER, UNSPECIFIED LATERALITY: ICD-10-CM

## 2022-07-05 DIAGNOSIS — R29.3 POSTURE ABNORMALITY: ICD-10-CM

## 2022-07-05 DIAGNOSIS — R29.898 DECREASED STRENGTH OF UPPER EXTREMITY: Primary | ICD-10-CM

## 2022-07-05 PROCEDURE — 97110 THERAPEUTIC EXERCISES: CPT | Mod: PN

## 2022-07-05 NOTE — PROGRESS NOTES
[facility] / [facility]  Physical Therapy Daily Treatment Note     Name: Jorge Becerra  Clinic Number: [id number]    Therapy Diagnosis:   Encounter Diagnoses   Name Primary?    Decreased strength of upper extremity Yes    Abnormal increased muscle tightness     Posture abnormality     Decreased range of motion of shoulder, unspecified laterality      Physician: Mehul Royal MD    Visit Date: 7/5/2022    Physician: Mehul Royal MD   Physician Orders: PT Eval and Treat  Medical Diagnosis from Referral: M19.019 (ICD-10-CM) - Shoulder arthritis   Evaluation Date: 6/7/2022  Authorization Period Expiration: 12/31/2022   Plan of Care Expiration: 8/30/2022                Progress Update: 7/7/2022               FOTO: 1 / 3    Visit # / Visits authorized: 5 / 20 + eval    ([email or web address])    Time In: 1220   (Pnt arrived early)  Time Out: 1315   Total Billable Time: 55 minutes    Precautions: Standard  Insurance: Payor: MEDICARE / Plan: MEDICARE PART A & B / Product Type: Government /     Subjective     Pt reports: that he feels great, no complaints.  He was compliant with home exercise program.  Response to previous treatment: no complaints  Functional change: decreased pain    Pain: 1/10  Location: bilateral shoulder      Objective     Jorge received therapeutic exercises to develop strength, endurance, ROM, flexibility and posture for 55 minutes including:    UBE  3/3    B shoulder ER with Red Theraband   3x10  Upper trap stretches  2x30 secs B    Shoulder wall wipes with towel 3x10  Finger ladder  x20     Doorway stretch 3x30 secs  IR stretch with strap 3x15 secs  SA rolls with bolster against wall 3 x 10  Wall pushups with a plus  3x10    CC Rows 7#  3x10  CC Shoulder extension 3#  3x10  CC Wood chop 7#  2x10 B    Sleeper stretch 2x30 secs  Supine shoulder flexion with dowel  10#  3x10  Supine SA punch 10# 3x10    Home Exercises Provided and Patient Education Provided     Education provided:    - postural awareness.    Written Home Exercises Provided: yes.  Exercises were reviewed and Jorge was able to demonstrate them prior to the end of the session.  Jorge demonstrated good  understanding of the education provided.     See EMR under Patient Instructions for exercises provided prior visit.    Assessment     Jorge presents with forward head and trunk posture, protracted scapulae.  He tolerated PRE's w/o difficulty.  Assistance needed with IR behind back to get UE to midline.  Difficulty with SA punches with cable column and theraband.  He is progressing well with UE strength and ROM.  Continued therapy to improve ROM, ADL's.  Jorge is progressing well towards his goals.   Pt prognosis is Good.     Pt will continue to benefit from skilled outpatient physical therapy to address the deficits listed in the problem list box on initial evaluation, provide pt/family education and to maximize pt's level of independence in the home and community environment.     Pt's spiritual, cultural and educational needs considered and pt agreeable to plan of care and goals.    Anticipated barriers to physical therapy: None    Goals:     SHORT TERM GOALS: 4 weeks 7/5/2022     1. Recent signs and systems trend is improving in order to progress towards LTG's. ongoing   2. Patient will be independent with HEP in order to further progress and return to maximal function. ongoing   3. Pain rating at Worst: 5/10 in order to progress towards increased independence with activity. ongoing   4. Patient will be able to correct postural deviations in sitting and standing, to decrease pain and promote postural awareness for injury prevention.  ongoing      LONG TERM GOALS: 8 weeks 7/5/2022     1. Patient will return to normal ADL, recreational, and work related activities with less pain and limitation.  ongoing   2. Patient will improve AROM to stated goals in order to return to maximal functional potential.  ongoing   3. Patient will  improve Strength to stated goals of appropriate musculature in order to improve functional independence.  ongoing   4. Pain Rating at Best: 1/10 to improve Quality of Life.  ongoing   5. Patient will meet predicted functional outcome (FOTO) score: 80% to increase self-worth & perceived functional ability. ongoing   6. Patient will have met/partially met personal goal of being able to reach overhead with no pain ongoing         Plan     Continue POC as previously stated.    Scottie Jorgensen, PT

## 2022-07-06 DIAGNOSIS — E11.9 TYPE 2 DIABETES MELLITUS WITHOUT COMPLICATION: ICD-10-CM

## 2022-07-08 ENCOUNTER — CLINICAL SUPPORT (OUTPATIENT)
Dept: REHABILITATION | Facility: HOSPITAL | Age: 72
End: 2022-07-08
Payer: MEDICARE

## 2022-07-08 DIAGNOSIS — R29.898 DECREASED STRENGTH OF UPPER EXTREMITY: Primary | ICD-10-CM

## 2022-07-08 DIAGNOSIS — M25.619 DECREASED RANGE OF MOTION OF SHOULDER, UNSPECIFIED LATERALITY: ICD-10-CM

## 2022-07-08 DIAGNOSIS — M62.89 ABNORMAL INCREASED MUSCLE TIGHTNESS: ICD-10-CM

## 2022-07-08 DIAGNOSIS — R29.3 POSTURE ABNORMALITY: ICD-10-CM

## 2022-07-08 PROCEDURE — 97110 THERAPEUTIC EXERCISES: CPT | Mod: PN,CQ

## 2022-07-08 PROCEDURE — 97112 NEUROMUSCULAR REEDUCATION: CPT | Mod: PN,CQ

## 2022-07-08 NOTE — PROGRESS NOTES
[facility] / [facility]  Physical Therapy Daily Treatment Note     Name: Jorge Becerra  Clinic Number: [id number]    Therapy Diagnosis:   Encounter Diagnoses   Name Primary?    Decreased strength of upper extremity Yes    Abnormal increased muscle tightness     Posture abnormality     Decreased range of motion of shoulder, unspecified laterality      Physician: Mehul Royal MD    Visit Date: 7/8/2022    Physician: Mehul Royal MD   Physician Orders: PT Eval and Treat  Medical Diagnosis from Referral: M19.019 (ICD-10-CM) - Shoulder arthritis   Evaluation Date: 6/7/2022  Authorization Period Expiration: 12/31/2022   Plan of Care Expiration: 8/30/2022                Progress Update: 7/7/2022               FOTO: 1 / 3    Visit # / Visits authorized: 7 / 20 + eval    ([id number])    Time In: 1038  Time Out: 1125  Total Billable Time: 47 minutes    Precautions: Standard  Insurance: Payor: MEDICARE / Plan: MEDICARE PART A & B / Product Type: Government /     Subjective     Pt reports: that he feels great, no complaints.  He was compliant with home exercise program.  Response to previous treatment: no complaints  Functional change: decreased pain    Pain: 0/10  Location: bilateral shoulder      Objective     Jorge received therapeutic exercises to develop strength, endurance, ROM, flexibility and posture for 32 minutes including:  NMRE x 15 minutes to improve posture, proprioception:    UBE  3/3, level 2    B shoulder ER with Red Theraband   3x10  Upper trap stretches  2x30 secs B  NOT PERFORMED time   Shoulder shrugs 4# 2 x 15 B  Bicep curls 4# 2 x 15 B  D2 flexion  2 x 10 B  NMRE  Shoulder flexion with horizontal abduction resistance Red Theraband 2 x 10   NMRE    Wall walks Red Theraband x 10  NMRE  Wall arc R<>L Red Theraband x 5  NMRE  Finger ladder  x20     NOT PERFORMED time   Shoulder wall wipes with towel 3x10  NOT PERFORMED   Ball wall: cw, ccw x 20 each direction, green ball   NMRE       Doorway stretch 3x30 secs  NOT PERFORMED time   IR stretch with strap 3x15 secs  SA rolls with bolster against wall 3 x 10  Pushups with a plus against plinth  3x10    CC Rows 7#  3x10  CC Shoulder extension 3#  3x10  CC Wood chop 7#  2x10 B     Sleeper stretch 2x30 secs  NOT PERFORMED time   Supine shoulder flexion with dowel  10#  3x10   NOT PERFORMED time    Supine SA punch 10# 3x10   NOT PERFORMED time     Home Exercises Provided and Patient Education Provided     Education provided:   - postural awareness.    Written Home Exercises Provided: yes.  Exercises were reviewed and Jorge was able to demonstrate them prior to the end of the session.  Jorge demonstrated good  understanding of the education provided.     See EMR under Patient Instructions for exercises provided prior visit.    Assessment     Jorge shows improvement in shoulder ROM and strength.  He tolerated additional PRE's w/o increased s/s.  Continued UE strengthening to improve posture, job duties, functional activities.   Jorge is progressing well towards his goals.   Pt prognosis is Good.     Pt will continue to benefit from skilled outpatient physical therapy to address the deficits listed in the problem list box on initial evaluation, provide pt/family education and to maximize pt's level of independence in the home and community environment.     Pt's spiritual, cultural and educational needs considered and pt agreeable to plan of care and goals.    Anticipated barriers to physical therapy: None    Goals:     SHORT TERM GOALS: 4 weeks 7/8/2022     1. Recent signs and systems trend is improving in order to progress towards LTG's. ongoing   2. Patient will be independent with HEP in order to further progress and return to maximal function. ongoing   3. Pain rating at Worst: 5/10 in order to progress towards increased independence with activity. ongoing   4. Patient will be able to correct postural deviations in sitting and standing,  to decrease pain and promote postural awareness for injury prevention.  ongoing      LONG TERM GOALS: 8 weeks 7/8/2022     1. Patient will return to normal ADL, recreational, and work related activities with less pain and limitation.  ongoing   2. Patient will improve AROM to stated goals in order to return to maximal functional potential.  ongoing   3. Patient will improve Strength to stated goals of appropriate musculature in order to improve functional independence.  ongoing   4. Pain Rating at Best: 1/10 to improve Quality of Life.  ongoing   5. Patient will meet predicted functional outcome (FOTO) score: 80% to increase self-worth & perceived functional ability. ongoing   6. Patient will have met/partially met personal goal of being able to reach overhead with no pain ongoing         Plan     Continue POC as previously stated.    Airam Culver, PTA

## 2022-07-11 ENCOUNTER — PATIENT MESSAGE (OUTPATIENT)
Dept: ADMINISTRATIVE | Facility: HOSPITAL | Age: 72
End: 2022-07-11
Payer: MEDICARE

## 2022-07-11 ENCOUNTER — CLINICAL SUPPORT (OUTPATIENT)
Dept: REHABILITATION | Facility: HOSPITAL | Age: 72
End: 2022-07-11
Payer: MEDICARE

## 2022-07-11 DIAGNOSIS — R29.3 POSTURE ABNORMALITY: ICD-10-CM

## 2022-07-11 DIAGNOSIS — R29.898 DECREASED STRENGTH OF UPPER EXTREMITY: Primary | ICD-10-CM

## 2022-07-11 DIAGNOSIS — M62.89 ABNORMAL INCREASED MUSCLE TIGHTNESS: ICD-10-CM

## 2022-07-11 DIAGNOSIS — M25.619 DECREASED RANGE OF MOTION OF SHOULDER, UNSPECIFIED LATERALITY: ICD-10-CM

## 2022-07-11 PROCEDURE — 97112 NEUROMUSCULAR REEDUCATION: CPT | Mod: PN,CQ

## 2022-07-11 PROCEDURE — 97110 THERAPEUTIC EXERCISES: CPT | Mod: PN,CQ

## 2022-07-11 NOTE — PROGRESS NOTES
"[facility] / [facility]  Physical Therapy Daily Treatment Note     Name: Jorge Becerra  Clinic Number: [id number]    Therapy Diagnosis:   Encounter Diagnoses   Name Primary?    Decreased strength of upper extremity Yes    Abnormal increased muscle tightness     Posture abnormality     Decreased range of motion of shoulder, unspecified laterality      Physician: Mehul Royal MD    Visit Date: 7/11/2022    Physician: Mehul Royal MD   Physician Orders: PT Eval and Treat  Medical Diagnosis from Referral: M19.019 (ICD-10-CM) - Shoulder arthritis   Evaluation Date: 6/7/2022  Authorization Period Expiration: 12/31/2022   Plan of Care Expiration: 8/30/2022                Progress Update: 7/7/2022               FOTO: 1 / 3    Visit # / Visits authorized: 8 / 20 + eval    ([id number])    Time In: 1058  Time Out: 1157  Total Billable Time: 59 minutes    Precautions: Standard  Insurance: Payor: MEDICARE / Plan: MEDICARE PART A & B / Product Type: Government /     Subjective     Pt reports:  "I can feel those shoulders getting stronger"l  He was compliant with home exercise program.  Response to previous treatment: positive  Functional change: decreased pain    Pain: 0/10  Location: bilateral shoulder      Objective     Jorge received therapeutic exercises to develop strength, endurance, ROM, flexibility and posture for 44 minutes including:  NMRE x 15 minutes to improve posture, proprioception:    UBE  4/4, level 2    B shoulder ER with Red Theraband   3x10  Upper trap stretches  2x30 secs B  NOT PERFORMED time   Shoulder shrugs 4# 2 x 15 B  Bicep curls 4# 2 x 15 B  Shoulder abduction 2# 2 x 10 B  D2 flexion  Red Theraband 2 x 10 B  NMRE  Shoulder flexion with horizontal abduction resistance Red Theraband 2 x 10   NMRE    Wall walks Red Theraband x 10  NMRE  Wall arc R<>L Red Theraband x 5  NMRE  Finger ladder  x20     NOT PERFORMED time   Shoulder wall wipes with towel 3x10  NOT PERFORMED "   Ball wall: cw, ccw x 20 each direction, green ball  NMRE  Medicine ball slams 10# 2 x 10       Doorway stretch 3x30 secs  NOT PERFORMED time   IR stretch with strap 10x5 secs  SA rolls with bolster against wall 3 x 10  Pushups with a plus against plinth  3x10  Shoulder taps against plinth 2 x 10 B  NMRE     Plyotoss R/L, green ball x 20 each, overhead throw    CC Rows 7#  3x10  CC Shoulder extension 7#  3x10  CC Wood chop 7#  2x10 B     Sleeper stretch 2x30 secs  NOT PERFORMED time   Supine shoulder flexion with dowel  10#  3x10   NOT PERFORMED time    Supine SA punch 10# 3x10   NOT PERFORMED time     Home Exercises Provided and Patient Education Provided     Education provided:   - postural awareness.    Written Home Exercises Provided: yes.  Exercises were reviewed and Jorge was able to demonstrate them prior to the end of the session.  Jorge demonstrated good  understanding of the education provided.     See EMR under Patient Instructions for exercises provided prior visit.    Assessment     Jorge presents with improved B UE strength and flexibility.  He tolerated PRE's without difficulty.  Added stabilization exercises today and he is aware of working his scapular retractors to improve his aime scapular strength.  Continued B UE strength to improve job duties, posture, functional activities.     Jorge is progressing well towards his goals.   Pt prognosis is Good.     Pt will continue to benefit from skilled outpatient physical therapy to address the deficits listed in the problem list box on initial evaluation, provide pt/family education and to maximize pt's level of independence in the home and community environment.     Pt's spiritual, cultural and educational needs considered and pt agreeable to plan of care and goals.    Anticipated barriers to physical therapy: None    Goals:     SHORT TERM GOALS: 4 weeks 7/11/2022     1. Recent signs and systems trend is improving in order to progress towards LTG's.  ongoing   2. Patient will be independent with HEP in order to further progress and return to maximal function. ongoing   3. Pain rating at Worst: 5/10 in order to progress towards increased independence with activity. ongoing   4. Patient will be able to correct postural deviations in sitting and standing, to decrease pain and promote postural awareness for injury prevention.  ongoing      LONG TERM GOALS: 8 weeks 7/11/2022     1. Patient will return to normal ADL, recreational, and work related activities with less pain and limitation.  ongoing   2. Patient will improve AROM to stated goals in order to return to maximal functional potential.  ongoing   3. Patient will improve Strength to stated goals of appropriate musculature in order to improve functional independence.  ongoing   4. Pain Rating at Best: 1/10 to improve Quality of Life.  ongoing   5. Patient will meet predicted functional outcome (FOTO) score: 80% to increase self-worth & perceived functional ability. ongoing   6. Patient will have met/partially met personal goal of being able to reach overhead with no pain ongoing         Plan     Continue POC as previously stated.    Airam Culver, PTA

## 2022-07-12 ENCOUNTER — DOCUMENTATION ONLY (OUTPATIENT)
Dept: REHABILITATION | Facility: HOSPITAL | Age: 72
End: 2022-07-12
Payer: MEDICARE

## 2022-07-12 NOTE — PROGRESS NOTES
30 day visit PT-PTA face-face discussion with ELLEN Jorgensen re: patient status, POC, and plan for progression done 7/12/22.  Airam Culver, PTA

## 2022-07-25 ENCOUNTER — CLINICAL SUPPORT (OUTPATIENT)
Dept: REHABILITATION | Facility: HOSPITAL | Age: 72
End: 2022-07-25
Payer: MEDICARE

## 2022-07-25 DIAGNOSIS — M62.89 ABNORMAL INCREASED MUSCLE TIGHTNESS: ICD-10-CM

## 2022-07-25 DIAGNOSIS — R29.898 DECREASED STRENGTH OF UPPER EXTREMITY: Primary | ICD-10-CM

## 2022-07-25 DIAGNOSIS — M25.619 DECREASED RANGE OF MOTION OF SHOULDER, UNSPECIFIED LATERALITY: ICD-10-CM

## 2022-07-25 DIAGNOSIS — R29.3 POSTURE ABNORMALITY: ICD-10-CM

## 2022-07-25 PROCEDURE — 97110 THERAPEUTIC EXERCISES: CPT | Mod: PN

## 2022-07-25 NOTE — PROGRESS NOTES
[facility] / [facility]  Physical Therapy Daily Treatment Note     Name: Jorge Becerra  Clinic Number: [id number]    Therapy Diagnosis:   Encounter Diagnoses   Name Primary?    Decreased strength of upper extremity Yes    Abnormal increased muscle tightness     Posture abnormality     Decreased range of motion of shoulder, unspecified laterality      Physician: Mehul Royal MD    Visit Date: 7/25/2022    Physician: Mehul Royal MD   Physician Orders: PT Eval and Treat  Medical Diagnosis from Referral: M19.019 (ICD-10-CM) - Shoulder arthritis   Evaluation Date: 6/7/2022  Authorization Period Expiration: 12/31/2022   Plan of Care Expiration: 8/30/2022                Progress Update: 7/7/2022               FOTO: 1 / 3    Visit # / Visits authorized: 9 / 20 + eval    ([id number])    Time In: 0855   (Pnt arrived early)  Time Out: 0950  Total Billable Time: 55 minutes    Precautions: Standard  Insurance: Payor: MEDICARE / Plan: MEDICARE PART A & B / Product Type: Government /     Subjective     Pt reports:  That he has no pain, no complaints.  He was compliant with home exercise program.  Response to previous treatment: positive  Functional change: decreased pain    Pain: 0/10  Location: bilateral shoulder      Objective     Jorge received therapeutic exercises to develop strength, endurance, ROM, flexibility and posture for 55 minutes including:    UBE  3/3, level 2    B shoulder ER with Red Theraband   3x10  Upper trap stretches  2x30 secs B    Shoulder shrugs 4# 2 x 15 B  Bicep curls 4# 2 x 15 B  Shoulder abduction 2# 2 x 10 B  D2 flexion  Red Theraband 2 x 10 B  NMRE  Shoulder flexion with horizontal abduction resistance Red Theraband 2 x 10   NMRE    Wall walks Red Theraband x 10  NMRE  Finger ladder  x20     NOT PERFORMED time   Shoulder wall wipes with towel 3x10    Ball wall: cw, ccw x 20 each direction, green ball  NMRE  Medicine ball slams 10# 2 x 10 - NP       Doorway stretch  3x30 secs   IR stretch with strap 10x5 secs  SA rolls with bolster against wall 3 x 10  Pushups with a plus against plinth  3x10  Shoulder taps against plinth 2 x 10 B  NMRE     Plyotoss R/L, green ball x 20 each, overhead throw - NP    CC Rows 7#  3x10  CC Shoulder extension 7#  3x10  CC Wood chop 7#  2x10 B     Sleeper stretch 2x30 secs   Supine shoulder flexion with dowel  10#    Supine SA punch 10# 3x10       Home Exercises Provided and Patient Education Provided     Education provided:   - postural awareness.    Written Home Exercises Provided: yes.  Exercises were reviewed and Jorge was able to demonstrate them prior to the end of the session.  Jorge demonstrated good  understanding of the education provided.     See EMR under Patient Instructions for exercises provided prior visit.    Assessment     Jorge presents with improved B UE strength and flexibility.  He tolerated PRE's without difficulty.  Added stabilization exercises today and he is aware of working his scapular retractors to improve his aime scapular strength.  Continued B UE strength to improve job duties, posture, functional activities.     Jorge is progressing well towards his goals.   Pt prognosis is Good.     Pt will continue to benefit from skilled outpatient physical therapy to address the deficits listed in the problem list box on initial evaluation, provide pt/family education and to maximize pt's level of independence in the home and community environment.     Pt's spiritual, cultural and educational needs considered and pt agreeable to plan of care and goals.    Anticipated barriers to physical therapy: None    Goals:     SHORT TERM GOALS: 4 weeks 7/25/2022     1. Recent signs and systems trend is improving in order to progress towards LTG's. ongoing   2. Patient will be independent with HEP in order to further progress and return to maximal function. ongoing   3. Pain rating at Worst: 5/10 in order to progress towards increased  independence with activity. ongoing   4. Patient will be able to correct postural deviations in sitting and standing, to decrease pain and promote postural awareness for injury prevention.  ongoing      LONG TERM GOALS: 8 weeks 7/25/2022     1. Patient will return to normal ADL, recreational, and work related activities with less pain and limitation.  ongoing   2. Patient will improve AROM to stated goals in order to return to maximal functional potential.  ongoing   3. Patient will improve Strength to stated goals of appropriate musculature in order to improve functional independence.  ongoing   4. Pain Rating at Best: 1/10 to improve Quality of Life.  ongoing   5. Patient will meet predicted functional outcome (FOTO) score: 80% to increase self-worth & perceived functional ability. ongoing   6. Patient will have met/partially met personal goal of being able to reach overhead with no pain ongoing         Plan     Continue POC as previously stated.    Scottie Jorgensen, PT

## 2022-07-28 ENCOUNTER — CLINICAL SUPPORT (OUTPATIENT)
Dept: REHABILITATION | Facility: HOSPITAL | Age: 72
End: 2022-07-28
Payer: MEDICARE

## 2022-07-28 DIAGNOSIS — M62.89 ABNORMAL INCREASED MUSCLE TIGHTNESS: ICD-10-CM

## 2022-07-28 DIAGNOSIS — R29.898 DECREASED STRENGTH OF UPPER EXTREMITY: Primary | ICD-10-CM

## 2022-07-28 DIAGNOSIS — R29.3 POSTURE ABNORMALITY: ICD-10-CM

## 2022-07-28 DIAGNOSIS — M25.619 DECREASED RANGE OF MOTION OF SHOULDER, UNSPECIFIED LATERALITY: ICD-10-CM

## 2022-07-28 PROCEDURE — 97110 THERAPEUTIC EXERCISES: CPT | Mod: PN

## 2022-07-28 NOTE — PROGRESS NOTES
[facility] / [facility]  Physical Therapy Daily Treatment and Discharge Note     Name: Jorge Becerra  Clinic Number: [id number]    Therapy Diagnosis:   Encounter Diagnoses   Name Primary?    Decreased strength of upper extremity Yes    Abnormal increased muscle tightness     Posture abnormality     Decreased range of motion of shoulder, unspecified laterality      Physician: Mehul Royal MD    Visit Date: 7/28/2022    Physician: Mehul Royal MD   Physician Orders: PT Eval and Treat  Medical Diagnosis from Referral: M19.019 (ICD-10-CM) - Shoulder arthritis   Evaluation Date: 6/7/2022  Authorization Period Expiration: 12/31/2022   Plan of Care Expiration: 8/30/2022                Progress Update: 7/7/2022               FOTO: 1 / 3    Visit # / Visits authorized: 10 / 20 + eval    ([id number])    Time In: 0925  (Pnt arrived early)  Time Out: 1020    Total Billable Time: 55 minutes    Precautions: Standard  Insurance: Payor: MEDICARE / Plan: MEDICARE PART A & B / Product Type: Government /     Subjective     Pt reports:  That he has no pain, no complaints.  States that he is confident that he can perform exercises on his own.    He was compliant with home exercise program.  Response to previous treatment: positive  Functional change: decreased pain    Pain: 0/10  Location: bilateral shoulder      Objective     Jorge received therapeutic exercises to develop strength, endurance, ROM, flexibility and posture for 55 minutes including:    UBE  3/3, level 2    B shoulder ER with Red Theraband   3x10  Upper trap stretches  2x30 secs B    Shoulder shrugs 4# 2 x 15 B  Bicep curls 4# 2 x 15 B  Shoulder abduction 2# 2 x 10 B  D2 flexion  Red Theraband 2 x 10 B  NMRE  Shoulder flexion with horizontal abduction resistance Red Theraband 2 x 10   NMRE    Wall walks Red Theraband x 10  NMRE  Finger ladder  x20     NOT PERFORMED time   Shoulder wall wipes with towel 3x10    Ball wall: cw, ccw x 20 each  direction, green ball  NMRE  Medicine ball slams 10# 2 x 10 - NP       Doorway stretch 3x30 secs   IR stretch with strap 10x5 secs  SA rolls with bolster against wall 3 x 10  Pushups with a plus against plinth  3x10  Shoulder taps against plinth 2 x 10 B  NMRE     Plyotoss R/L, green ball x 20 each, overhead throw - NP    CC Rows 7#  3x10  CC Shoulder extension 7#  3x10  CC Wood chop 7#  2x10 B     Sleeper stretch 2x30 secs   Supine shoulder flexion with dowel  10#    Supine SA punch 10# 3x10       Home Exercises Provided and Patient Education Provided     Education provided:   - postural awareness.    Written Home Exercises Provided: yes.  Exercises were reviewed and Jorge was able to demonstrate them prior to the end of the session.  Jorge demonstrated good  understanding of the education provided.     See EMR under Patient Instructions for exercises provided prior visit.    Assessment     Jorge presents with improved B UE strength and flexibility.  He tolerated PRE's without difficulty.  Added stabilization exercises today and he is aware of working his scapular retractors to improve his aime scapular strength.  Continued B UE strength to improve job duties, posture, functional activities.     Jorge is progressing well towards his goals.   Pt prognosis is Good.     Pnt is being discharged from physical therapy.  Pt's spiritual, cultural and educational needs considered and pt agreeable to plan of care and goals.    Anticipated barriers to physical therapy: None    Goals:     SHORT TERM GOALS: 4 weeks 7/28/2022     1. Recent signs and systems trend is improving in order to progress towards LTG's. met   2. Patient will be independent with HEP in order to further progress and return to maximal function. met   3. Pain rating at Worst: 5/10 in order to progress towards increased independence with activity. met   4. Patient will be able to correct postural deviations in sitting and standing, to decrease pain and  promote postural awareness for injury prevention.  Met      LONG TERM GOALS: 8 weeks 7/28/2022     1. Patient will return to normal ADL, recreational, and work related activities with less pain and limitation.  met   2. Patient will improve AROM to stated goals in order to return to maximal functional potential.  met   3. Patient will improve Strength to stated goals of appropriate musculature in order to improve functional independence.  met   4. Pain Rating at Best: 1/10 to improve Quality of Life.  met   5. Patient will meet predicted functional outcome (FOTO) score: 80% to increase self-worth & perceived functional ability. met   6. Patient will have met/partially met personal goal of being able to reach overhead with no pain met         Plan     Pnt is being discharged from physical therapy.    Scottie Jorgensen, PT

## 2022-08-01 ENCOUNTER — LAB VISIT (OUTPATIENT)
Dept: LAB | Facility: HOSPITAL | Age: 72
End: 2022-08-01
Attending: STUDENT IN AN ORGANIZED HEALTH CARE EDUCATION/TRAINING PROGRAM
Payer: MEDICARE

## 2022-08-01 ENCOUNTER — OFFICE VISIT (OUTPATIENT)
Dept: FAMILY MEDICINE | Facility: CLINIC | Age: 72
End: 2022-08-01
Payer: MEDICARE

## 2022-08-01 VITALS
RESPIRATION RATE: 18 BRPM | WEIGHT: 157.44 LBS | HEIGHT: 67 IN | SYSTOLIC BLOOD PRESSURE: 150 MMHG | OXYGEN SATURATION: 97 % | HEART RATE: 98 BPM | DIASTOLIC BLOOD PRESSURE: 78 MMHG | BODY MASS INDEX: 24.71 KG/M2

## 2022-08-01 DIAGNOSIS — E11.3393 CONTROLLED TYPE 2 DIABETES MELLITUS WITH BOTH EYES AFFECTED BY MODERATE NONPROLIFERATIVE RETINOPATHY WITHOUT MACULAR EDEMA, WITHOUT LONG-TERM CURRENT USE OF INSULIN: ICD-10-CM

## 2022-08-01 DIAGNOSIS — I10 BENIGN ESSENTIAL HTN: ICD-10-CM

## 2022-08-01 DIAGNOSIS — I10 BENIGN ESSENTIAL HTN: Primary | ICD-10-CM

## 2022-08-01 LAB
ALBUMIN SERPL BCP-MCNC: 4 G/DL (ref 3.5–5.2)
ALP SERPL-CCNC: 70 U/L (ref 55–135)
ALT SERPL W/O P-5'-P-CCNC: 37 U/L (ref 10–44)
ANION GAP SERPL CALC-SCNC: 13 MMOL/L (ref 8–16)
AST SERPL-CCNC: 37 U/L (ref 10–40)
BILIRUB SERPL-MCNC: 0.5 MG/DL (ref 0.1–1)
BUN SERPL-MCNC: 10 MG/DL (ref 8–23)
CALCIUM SERPL-MCNC: 10 MG/DL (ref 8.7–10.5)
CHLORIDE SERPL-SCNC: 104 MMOL/L (ref 95–110)
CHOLEST SERPL-MCNC: 90 MG/DL (ref 120–199)
CHOLEST SERPL-MCNC: 90 MG/DL (ref 120–199)
CHOLEST/HDLC SERPL: 2.4 {RATIO} (ref 2–5)
CHOLEST/HDLC SERPL: 2.4 {RATIO} (ref 2–5)
CO2 SERPL-SCNC: 25 MMOL/L (ref 23–29)
CREAT SERPL-MCNC: 0.9 MG/DL (ref 0.5–1.4)
EST. GFR  (NO RACE VARIABLE): >60 ML/MIN/1.73 M^2
ESTIMATED AVG GLUCOSE: 169 MG/DL (ref 68–131)
GLUCOSE SERPL-MCNC: 130 MG/DL (ref 70–110)
HBA1C MFR BLD: 7.5 % (ref 4–5.6)
HDLC SERPL-MCNC: 38 MG/DL (ref 40–75)
HDLC SERPL-MCNC: 38 MG/DL (ref 40–75)
HDLC SERPL: 42.2 % (ref 20–50)
HDLC SERPL: 42.2 % (ref 20–50)
LDLC SERPL CALC-MCNC: 28.4 MG/DL (ref 63–159)
LDLC SERPL CALC-MCNC: 28.4 MG/DL (ref 63–159)
NONHDLC SERPL-MCNC: 52 MG/DL
NONHDLC SERPL-MCNC: 52 MG/DL
POTASSIUM SERPL-SCNC: 4.4 MMOL/L (ref 3.5–5.1)
PROT SERPL-MCNC: 7.4 G/DL (ref 6–8.4)
SODIUM SERPL-SCNC: 142 MMOL/L (ref 136–145)
TRIGL SERPL-MCNC: 118 MG/DL (ref 30–150)
TRIGL SERPL-MCNC: 118 MG/DL (ref 30–150)

## 2022-08-01 PROCEDURE — 80061 LIPID PANEL: CPT | Performed by: STUDENT IN AN ORGANIZED HEALTH CARE EDUCATION/TRAINING PROGRAM

## 2022-08-01 PROCEDURE — 80053 COMPREHEN METABOLIC PANEL: CPT | Performed by: STUDENT IN AN ORGANIZED HEALTH CARE EDUCATION/TRAINING PROGRAM

## 2022-08-01 PROCEDURE — 99999 PR PBB SHADOW E&M-EST. PATIENT-LVL IV: ICD-10-PCS | Mod: PBBFAC,,, | Performed by: STUDENT IN AN ORGANIZED HEALTH CARE EDUCATION/TRAINING PROGRAM

## 2022-08-01 PROCEDURE — 83036 HEMOGLOBIN GLYCOSYLATED A1C: CPT | Performed by: STUDENT IN AN ORGANIZED HEALTH CARE EDUCATION/TRAINING PROGRAM

## 2022-08-01 PROCEDURE — 99214 OFFICE O/P EST MOD 30 MIN: CPT | Mod: S$PBB,,, | Performed by: STUDENT IN AN ORGANIZED HEALTH CARE EDUCATION/TRAINING PROGRAM

## 2022-08-01 PROCEDURE — 36415 COLL VENOUS BLD VENIPUNCTURE: CPT | Mod: PO | Performed by: STUDENT IN AN ORGANIZED HEALTH CARE EDUCATION/TRAINING PROGRAM

## 2022-08-01 PROCEDURE — 99999 PR PBB SHADOW E&M-EST. PATIENT-LVL IV: CPT | Mod: PBBFAC,,, | Performed by: STUDENT IN AN ORGANIZED HEALTH CARE EDUCATION/TRAINING PROGRAM

## 2022-08-01 PROCEDURE — 99214 PR OFFICE/OUTPT VISIT, EST, LEVL IV, 30-39 MIN: ICD-10-PCS | Mod: S$PBB,,, | Performed by: STUDENT IN AN ORGANIZED HEALTH CARE EDUCATION/TRAINING PROGRAM

## 2022-08-01 PROCEDURE — 99214 OFFICE O/P EST MOD 30 MIN: CPT | Mod: PBBFAC,PO | Performed by: STUDENT IN AN ORGANIZED HEALTH CARE EDUCATION/TRAINING PROGRAM

## 2022-08-01 RX ORDER — EMPAGLIFLOZIN 10 MG/1
10 TABLET, FILM COATED ORAL DAILY
Qty: 30 TABLET | Refills: 0 | Status: SHIPPED | OUTPATIENT
Start: 2022-08-01 | End: 2022-08-29

## 2022-08-01 RX ORDER — AMLODIPINE BESYLATE 5 MG/1
5 TABLET ORAL DAILY
Qty: 30 TABLET | Refills: 11 | Status: SHIPPED | OUTPATIENT
Start: 2022-08-01 | End: 2023-07-13

## 2022-08-01 NOTE — PROGRESS NOTES
[facility] MEDICINE CLINIC NOTE    Patient Name: Jorge Becerra  YOB: 1950    PRESENTING HISTORY   Chief Complaint:   Chief Complaint   Patient presents with    Follow-up        History of Present Illness:  Mr. Jorge Becerra is a 71 y.o. male here for f/u.     Going to [facility]  Q3 months for surveillance.     Weight is stable.     Mobility is reasonably good s/p PT.     Shoulder mobility has improved.     HTN- not well controlled. On Acei monotherapy.     T2DM- fasting -150. On metformin monotherapy.                                            Review of Systems   All other systems reviewed and are negative.        PAST HISTORY:     Past Medical History:   Diagnosis Date    Diabetes mellitus, type 2     Hypertension     Non Hodgkin's lymphoma        Past Surgical History:   Procedure Laterality Date    TONSILLECTOMY         Family History   Problem Relation Age of Onset    Colon cancer Neg Hx        Social History     Socioeconomic History    Marital status:    Tobacco Use    Smoking status: Never Smoker    Smokeless tobacco: Never Used   Substance and Sexual Activity    Alcohol use: Never     Social Determinants of Health     Financial Resource Strain: Low Risk     Difficulty of Paying Living Expenses: Not hard at all   Food Insecurity: No Food Insecurity    Worried About Running Out of Food in the Last Year: Never true    Ran Out of Food in the Last Year: Never true   Transportation Needs: No Transportation Needs    Lack of Transportation (Medical): No    Lack of Transportation (Non-Medical): No   Physical Activity: Insufficiently Active    Days of Exercise per Week: 1 day    Minutes of Exercise per Session: 30 min   Stress: No Stress Concern Present    Feeling of Stress : Only a little   Social Connections: Unknown    Frequency of Communication with Friends and Family: Once a week    Frequency of Social Gatherings with Friends and Family: Once a week    Active  Member of Clubs or Organizations: No    Attends Club or Organization Meetings: Never    Marital Status:    Housing Stability: Low Risk     Unable to Pay for Housing in the Last Year: No    Number of Places Lived in the Last Year: 1    Unstable Housing in the Last Year: No       MEDICATIONS & ALLERGIES:     Current Outpatient Medications on File Prior to Visit   Medication Sig    allopurinoL (ZYLOPRIM) 300 MG tablet Take 1 tablet (300 mg total) by mouth every other day.    aspirin (ECOTRIN) 81 MG EC tablet Take 81 mg by mouth once daily.    atorvastatin (LIPITOR) 20 MG tablet Take 1 tablet (20 mg total) by mouth every evening.    cholecalciferol, vitamin D3, 10 mcg (400 unit) Cap capsule Take 1 tablet by mouth once daily.    diclofenac sodium (VOLTAREN) 1 % Gel Apply 2 g topically once daily.    donepeziL (ARICEPT) 5 MG tablet Take 1 tablet (5 mg total) by mouth every evening.    ergocalciferol, vitamin D2, (VITAMIN D ORAL) Take 1 tablet by mouth once daily.     ferrous sulfate (FEOSOL) 325 mg (65 mg iron) Tab tablet Take 325 mg by mouth daily with breakfast.    fosinopriL (MONOPRIL) 40 MG tablet Take 1 tablet (40 mg total) by mouth once daily.    magnesium oxide 400 mg magnesium Cap Take 1 tablet by mouth 2 (two) times a day.     metFORMIN (GLUCOPHAGE) 1000 MG tablet Take 1 tablet (1,000 mg total) by mouth 2 (two) times daily.    vit C/E/zinc/lutein/zeaxanthin (OCUVITE EYE HEALTH ORAL) Take 1 tablet by mouth 2 (two) times a day.     [DISCONTINUED] HYDROcodone-acetaminophen (NORCO) 5-325 mg per tablet Take 1-2 tablets by mouth.    [DISCONTINUED] ibuprofen (ADVIL,MOTRIN) 600 MG tablet Take 1 tablet (600 mg total) by mouth every 6 (six) hours as needed for Pain. (Patient not taking: Reported on 8/1/2022)    [DISCONTINUED] methocarbamoL (ROBAXIN) 500 MG Tab Take 1 tablet (500 mg total) by mouth 3 (three) times daily as needed (muscle spasm). (Patient not taking: Reported on 8/1/2022)     No  "current facility-administered medications on file prior to visit.       Review of patient's allergies indicates:  No Known Allergies    OBJECTIVE:   Vital Signs:  Vitals:    08/01/22 1437 08/01/22 1448   BP: (!) 172/100 (!) 150/78   Pulse: 98    Resp: 18    SpO2: 97%    Weight: 71.4 kg (157 lb 6.5 oz)    Height: 5' 7" (1.702 m)        No results found for this or any previous visit (from the past 24 hour(s)).      Physical Exam  Vitals and nursing note reviewed.   Constitutional:       General: He is not in acute distress.     Appearance: He is not toxic-appearing or diaphoretic.   HENT:      Head: Normocephalic and atraumatic.      Right Ear: External ear normal.      Left Ear: External ear normal.   Eyes:      General: No scleral icterus.     Conjunctiva/sclera: Conjunctivae normal.      Pupils: Pupils are equal, round, and reactive to light.   Neck:      Thyroid: No thyromegaly.      Vascular: No carotid bruit.   Cardiovascular:      Rate and Rhythm: Normal rate and regular rhythm.      Heart sounds: Normal heart sounds. No murmur heard.  Pulmonary:      Effort: Pulmonary effort is normal. No respiratory distress.      Breath sounds: Normal breath sounds. No wheezing or rales.   Musculoskeletal:         General: No tenderness or deformity. Normal range of motion.      Cervical back: Normal range of motion and neck supple.      Right lower leg: No edema.      Left lower leg: No edema.   Lymphadenopathy:      Cervical: No cervical adenopathy.   Skin:     General: Skin is warm and dry.      Findings: No erythema or rash.   Neurological:      Mental Status: He is alert and oriented to person, place, and time.      Gait: Gait is intact.   Psychiatric:         Mood and Affect: Mood and affect normal.         Cognition and Memory: Memory normal.         Judgment: Judgment normal.         Protective Sensation (w/ 10 gram monofilament):  Right: Decreased  Left: Intact    Visual Inspection:  Normal -  Bilateral    Pedal " Pulses:   Right: Present  Left: Present    Posterior tibialis:   Right:Present  Left: Present    ASSESSMENT & PLAN:     Benign essential HTN  -     amLODIPine (NORVASC) 5 MG tablet; Take 1 tablet (5 mg total) by mouth once daily.  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  Dispense: 30 tablet; Refill: 0  -     Hemoglobin A1C; Future; Expected date: 08/01/2022  -     Comprehensive Metabolic Panel; Future; Expected date: 08/01/2022  -     Lipid Panel; Future; Expected date: 08/01/2022  -     Lipid Panel; Future; Expected date: 08/01/2022        Mehul Royal MD   Internal Medicine    This note was created using Dragon voice recognition software that occasionally misinterprets phrases or words.

## 2022-09-07 LAB
LEFT EYE DM RETINOPATHY: POSITIVE
RIGHT EYE DM RETINOPATHY: POSITIVE

## 2022-09-09 ENCOUNTER — PATIENT OUTREACH (OUTPATIENT)
Dept: ADMINISTRATIVE | Facility: HOSPITAL | Age: 72
End: 2022-09-09
Payer: MEDICARE

## 2022-09-20 ENCOUNTER — IMMUNIZATION (OUTPATIENT)
Dept: PRIMARY CARE CLINIC | Facility: CLINIC | Age: 72
End: 2022-09-20
Payer: MEDICARE

## 2022-09-20 DIAGNOSIS — Z23 NEED FOR VACCINATION: Primary | ICD-10-CM

## 2022-09-20 PROCEDURE — 0124A COVID-19, MRNA, LNP-S, BIVALENT BOOSTER, PF, 30 MCG/0.3 ML DOSE: CPT | Mod: S$GLB,,, | Performed by: FAMILY MEDICINE

## 2022-09-20 PROCEDURE — 91312 COVID-19, MRNA, LNP-S, BIVALENT BOOSTER, PF, 30 MCG/0.3 ML DOSE: ICD-10-PCS | Mod: S$GLB,,, | Performed by: FAMILY MEDICINE

## 2022-09-20 PROCEDURE — 91312 COVID-19, MRNA, LNP-S, BIVALENT BOOSTER, PF, 30 MCG/0.3 ML DOSE: CPT | Mod: S$GLB,,, | Performed by: FAMILY MEDICINE

## 2022-09-20 PROCEDURE — 0124A COVID-19, MRNA, LNP-S, BIVALENT BOOSTER, PF, 30 MCG/0.3 ML DOSE: ICD-10-PCS | Mod: S$GLB,,, | Performed by: FAMILY MEDICINE

## 2022-12-09 ENCOUNTER — PATIENT MESSAGE (OUTPATIENT)
Dept: FAMILY MEDICINE | Facility: CLINIC | Age: 72
End: 2022-12-09
Payer: MEDICARE

## 2022-12-09 DIAGNOSIS — E11.22 HYPERTENSION ASSOCIATED WITH STAGE 2 CHRONIC KIDNEY DISEASE DUE TO TYPE 2 DIABETES MELLITUS: Primary | ICD-10-CM

## 2022-12-09 DIAGNOSIS — N18.2 HYPERTENSION ASSOCIATED WITH STAGE 2 CHRONIC KIDNEY DISEASE DUE TO TYPE 2 DIABETES MELLITUS: Primary | ICD-10-CM

## 2022-12-09 DIAGNOSIS — I12.9 HYPERTENSION ASSOCIATED WITH STAGE 2 CHRONIC KIDNEY DISEASE DUE TO TYPE 2 DIABETES MELLITUS: Primary | ICD-10-CM

## 2022-12-12 NOTE — TELEPHONE ENCOUNTER
Noted appointment with Dr. Travis Alvarado on 1-26-23. Appointment scheduled by patient via [facility].

## 2023-01-26 ENCOUNTER — PATIENT MESSAGE (OUTPATIENT)
Dept: CARDIOLOGY | Facility: CLINIC | Age: 73
End: 2023-01-26

## 2023-01-26 ENCOUNTER — OFFICE VISIT (OUTPATIENT)
Dept: CARDIOLOGY | Facility: CLINIC | Age: 73
End: 2023-01-26
Payer: MEDICARE

## 2023-01-26 VITALS
SYSTOLIC BLOOD PRESSURE: 138 MMHG | HEIGHT: 67 IN | OXYGEN SATURATION: 98 % | HEART RATE: 83 BPM | DIASTOLIC BLOOD PRESSURE: 76 MMHG | RESPIRATION RATE: 16 BRPM | WEIGHT: 163 LBS | BODY MASS INDEX: 25.58 KG/M2

## 2023-01-26 DIAGNOSIS — E11.3393 CONTROLLED TYPE 2 DIABETES MELLITUS WITH BOTH EYES AFFECTED BY MODERATE NONPROLIFERATIVE RETINOPATHY WITHOUT MACULAR EDEMA, WITHOUT LONG-TERM CURRENT USE OF INSULIN: ICD-10-CM

## 2023-01-26 DIAGNOSIS — I12.9 HYPERTENSION ASSOCIATED WITH STAGE 2 CHRONIC KIDNEY DISEASE DUE TO TYPE 2 DIABETES MELLITUS: ICD-10-CM

## 2023-01-26 DIAGNOSIS — E78.2 MIXED DIABETIC HYPERLIPIDEMIA ASSOCIATED WITH TYPE 2 DIABETES MELLITUS: ICD-10-CM

## 2023-01-26 DIAGNOSIS — R00.2 PALPITATIONS: ICD-10-CM

## 2023-01-26 DIAGNOSIS — E11.22 HYPERTENSION ASSOCIATED WITH STAGE 2 CHRONIC KIDNEY DISEASE DUE TO TYPE 2 DIABETES MELLITUS: ICD-10-CM

## 2023-01-26 DIAGNOSIS — E11.69 MIXED DIABETIC HYPERLIPIDEMIA ASSOCIATED WITH TYPE 2 DIABETES MELLITUS: ICD-10-CM

## 2023-01-26 DIAGNOSIS — N18.2 HYPERTENSION ASSOCIATED WITH STAGE 2 CHRONIC KIDNEY DISEASE DUE TO TYPE 2 DIABETES MELLITUS: ICD-10-CM

## 2023-01-26 PROCEDURE — 99999 PR PBB SHADOW E&M-EST. PATIENT-LVL IV: CPT | Mod: PBBFAC,,, | Performed by: INTERNAL MEDICINE

## 2023-01-26 PROCEDURE — 99999 PR PBB SHADOW E&M-EST. PATIENT-LVL IV: ICD-10-PCS | Mod: PBBFAC,,, | Performed by: INTERNAL MEDICINE

## 2023-01-26 PROCEDURE — 99204 OFFICE O/P NEW MOD 45 MIN: CPT | Mod: S$PBB,,, | Performed by: INTERNAL MEDICINE

## 2023-01-26 PROCEDURE — 93010 EKG 12-LEAD: ICD-10-PCS | Mod: S$PBB,,, | Performed by: INTERNAL MEDICINE

## 2023-01-26 PROCEDURE — 99214 OFFICE O/P EST MOD 30 MIN: CPT | Mod: PBBFAC,PN | Performed by: INTERNAL MEDICINE

## 2023-01-26 PROCEDURE — 93005 ELECTROCARDIOGRAM TRACING: CPT | Mod: PBBFAC,PN | Performed by: INTERNAL MEDICINE

## 2023-01-26 PROCEDURE — 99204 PR OFFICE/OUTPT VISIT, NEW, LEVL IV, 45-59 MIN: ICD-10-PCS | Mod: S$PBB,,, | Performed by: INTERNAL MEDICINE

## 2023-01-26 PROCEDURE — 93010 ELECTROCARDIOGRAM REPORT: CPT | Mod: S$PBB,,, | Performed by: INTERNAL MEDICINE

## 2023-01-26 NOTE — PROGRESS NOTES
Subjective:    Patient ID:  Jorge Becerra is a 72 y.o. male patient here for evaluation Establish Care (Doctor with [facility] wanted him to establish with cardiologist. 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  And since then he has been following at physicians at [facility] lately he is intermittent episodes of palpitations and with varying intensity and duration sometimes even at rest.  Denies having any dizziness lightheaded is a weakness denies having chest discomfort some fatigue and tiredness is present.  No arm neck or jaw pain elicited.  He was advised to have cardiac workup and he seeking evaluation for the same.        Review of patient's allergies indicates:  No Known Allergies    Past Medical History:   Diagnosis Date    Diabetes mellitus, type 2     Hypertension     Non Hodgkin's lymphoma      Past Surgical History:   Procedure Laterality Date    TONSILLECTOMY       Social History     Tobacco Use    Smoking status: Never    Smokeless tobacco: Never   Substance Use Topics    Alcohol use: Never        Review of Systems   As noted in HPI in addition     Constitutional: Negative for chills, fatigue and fever.   Eyes: No double vision, No blurred vision  Neuro: No headaches, No dizziness  Respiratory: Negative for cough, shortness of breath and wheezing.    Cardiovascular: Negative for chest pain.  Positive for palpitations as described above but no syncopal episodes.    Gastrointestinal: Negative for abdominal pain, No melena, diarrhea, nausea and vomiting.   Genitourinary: Negative for dysuria and frequency, Negative for hematuria  Skin: Negative for bruising, Negative for edema or discoloration noted.   Endocrine: Negative for polyphagia, Negative for heat intolerance, Negative for cold intolerance  Psychiatric: Negative for depression, Negative for anxiety, Negative  for memory loss  Musculoskeletal: Negative for neck pain, Negative for muscle weakness, Negative for back pain          Objective        Vitals:    01/26/23 1201   BP: 138/76   Pulse: 83   Resp: 16       LIPIDS - LAST 2   Lab Results   Component Value Date    CHOL 90 (L) 08/01/2022    CHOL 90 (L) 08/01/2022    HDL 38 (L) 08/01/2022    HDL 38 (L) 08/01/2022    LDLCALC 28.4 (L) 08/01/2022    LDLCALC 28.4 (L) 08/01/2022    TRIG 118 08/01/2022    TRIG 118 08/01/2022    CHOLHDL 42.2 08/01/2022    CHOLHDL 42.2 08/01/2022       CBC - LAST 2  Lab Results   Component Value Date    WBC 6.30 02/01/2022    WBC 11.91 08/18/2021    RBC 3.67 (L) 02/01/2022    RBC 3.40 (L) 08/18/2021    HGB 10.8 (L) 02/01/2022    HGB 10.3 (L) 08/18/2021    HCT 35.2 (L) 02/01/2022    HCT 29.8 (L) 08/18/2021    MCV 96 02/01/2022    MCV 88 08/18/2021    MCH 29.4 02/01/2022    MCH 30.3 08/18/2021    MCHC 30.7 (L) 02/01/2022    MCHC 34.6 08/18/2021    RDW 16.0 (H) 02/01/2022    RDW 17.2 (H) 08/18/2021     02/01/2022     08/18/2021    MPV 10.7 02/01/2022    MPV 11.8 08/18/2021    GRAN 3.6 02/01/2022    GRAN 57.1 02/01/2022    LYMPH 1.5 02/01/2022    LYMPH 23.5 02/01/2022    MONO 0.6 02/01/2022    MONO 9.7 02/01/2022    BASO 0.02 02/01/2022    BASO 0.06 08/13/2021    NRBC 0 02/01/2022    NRBC 0 08/13/2021       CHEMISTRY & LIVER FUNCTION - LAST 2  Lab Results   Component Value Date     08/01/2022     02/01/2022    K 4.4 08/01/2022    K 4.5 02/01/2022     08/01/2022     02/01/2022    CO2 25 08/01/2022    CO2 25 02/01/2022    ANIONGAP 13 08/01/2022    ANIONGAP 13 02/01/2022    BUN 10 08/01/2022    BUN 7 (L) 02/01/2022    CREATININE 0.9 08/01/2022    CREATININE 0.7 02/01/2022     (H) 08/01/2022     (H) 02/01/2022    CALCIUM 10.0 08/01/2022    CALCIUM 9.7 02/01/2022    MG 1.9 08/18/2021    MG 1.8 08/17/2021    ALBUMIN 4.0 08/01/2022    ALBUMIN 3.4 (L) 02/01/2022    PROT 7.4 08/01/2022    PROT 7.2 02/01/2022     ALKPHOS 70 08/01/2022    ALKPHOS 72 02/01/2022    ALT 37 08/01/2022    ALT 15 02/01/2022    AST 37 08/01/2022    AST 22 02/01/2022    BILITOT 0.5 08/01/2022    BILITOT 0.4 02/01/2022        CARDIAC PROFILE - LAST 2  No results found for: BNP, CPK, CPKMB, LDH, TROPONINI, TROPONINIHS     COAGULATION - LAST 2  Lab Results   Component Value Date    INR 1.0 05/31/2021       ENDOCRINE & PSA - LAST 2  Lab Results   Component Value Date    HGBA1C 7.5 (H) 08/01/2022    HGBA1C 6.6 (H) 02/01/2022    MICROALBUR 1.2 10/31/2019    MICROALBUR 1.7 10/15/2018    TSH 2.520 05/04/2021    TSH 3.342 04/30/2020    PSA 0.65 10/23/2020        ECHOCARDIOGRAM RESULTS  No results found for this or any previous visit.      CURRENT/PREVIOUS VISIT EKG  No results found for this or any previous visit.  No valid procedures specified.   No results found for this or any previous visit.    No valid procedures specified.    01/26/2023 EKG shows normal sinus rhythm voltage criteria for LVH otherwise within normal limits.      PREVIOUS STRESS TEST              PREVIOUS ANGIOGRAM        PHYSICAL EXAM    GENERAL: well built, well nourished, well-developed in no apparent distress alert and oriented.   HEENT: Normocephalic. Pupils normal and conjunctivae normal.  Mucous membranes normal, no cyanosis or icterus, trachea central,no pallor or icterus is noted..   NECK: No JVD. No bruit..   THYROID: Thyroid not enlarged. No nodules present..   CARDIAC: Regular rate and rhythm. S1 is normal.S2 is normal.No gallops, clicks or murmurs noted at this time.  CHEST ANATOMY: normal.   LUNGS: Clear to auscultation. No wheezing or rhonchi..   ABDOMEN: Soft no masses or organomegaly.  No abdomen pulsations or bruits.  Normal bowel sounds. No pulsations and no masses felt, No guarding or rebound.   EXTREMITIES: No cyanosis, clubbing or edema noted at this time., no calf tenderness bilaterally.   PERIPHERAL VASCULAR SYSTEM: Good palpable distal pulses.   CENTRAL NERVOUS  SYSTEM: No focal motor or sensory deficits noted.   SKIN: Skin without lesions, moist, well perfused.   MUSCLE STRENGTH & TONE: No noteable weakness, atrophy or abnormal movement.     I HAVE REVIEWED :    The vital signs, nurses notes, and all the pertinent radiology and labs.        Current Outpatient Medications   Medication Instructions    allopurinoL (ZYLOPRIM) 300 mg, Oral, Every other day    amLODIPine (NORVASC) 5 mg, Oral, Daily    aspirin (ECOTRIN) 81 mg, Oral, Daily    atorvastatin (LIPITOR) 20 mg, Oral, Nightly    cholecalciferol, vitamin D3, 10 mcg (400 unit) Cap capsule 1 tablet, Oral, Daily    donepeziL (ARICEPT) 5 MG tablet TAKE 1 TABLET BY MOUTH EVERY DAY IN THE EVENING    ferrous sulfate (FEOSOL) 325 mg, Oral, With breakfast    fosinopriL (MONOPRIL) 40 mg, Oral, Daily    JARDIANCE 10 mg tablet TAKE 1 TABLET BY MOUTH EVERY DAY    magnesium oxide 400 mg magnesium Cap 1 tablet, Oral, 2 times daily    metFORMIN (GLUCOPHAGE) 1000 MG tablet TAKE 1 TABLET BY MOUTH TWICE A DAY    vit C/E/zinc/lutein/zeaxanthin (OCUVITE EYE HEALTH ORAL) 1 tablet, Oral, 2 times daily          Assessment & Plan     Hypertension associated with stage 2 chronic kidney disease due to type 2 diabetes mellitus  I have encouraged him to continue on present therapy to include amlodipine 5 mg once daily maintain on low-salt low-fat diet.  Continue Monocryl 40 mg a day appears to be stable.    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    Palpitations  Patient has recurrent episodes of palpitations with varying intensity and duration recommend to obtain a magnesium level thyroid stimulation test,    Obtain a 48 hour Holter evaluation  Are obtain echocardiogram for LV function assessment and valvular morphology   Obtain a symptom limited exercise stress test with imaging study to rule out any ischemic heart disease contributing and exercise-induced arrhythmias.  In addition to this I have encouraged him to continue on magnesium oxide as he is doing.          Follow up in about 2 weeks (around 2/9/2023).

## 2023-01-26 NOTE — ASSESSMENT & PLAN NOTE
Patient has recurrent episodes of palpitations with varying intensity and duration recommend to obtain a magnesium level thyroid stimulation test,   Obtain a 48 hour Holter evaluation  Are obtain echocardiogram for LV function assessment and valvular morphology   Obtain a symptom limited exercise stress test with imaging study to rule out any ischemic heart disease contributing and exercise-induced arrhythmias.  In addition to this I have encouraged him to continue on magnesium oxide as he is doing.

## 2023-01-26 NOTE — ASSESSMENT & PLAN NOTE
I have encouraged him to continue on present therapy to include amlodipine 5 mg once daily maintain on low-salt low-fat diet.  Continue Monocryl 40 mg a day appears to be stable.

## 2023-02-07 ENCOUNTER — LAB VISIT (OUTPATIENT)
Dept: LAB | Facility: HOSPITAL | Age: 73
End: 2023-02-07
Payer: MEDICARE

## 2023-02-07 ENCOUNTER — OFFICE VISIT (OUTPATIENT)
Dept: FAMILY MEDICINE | Facility: CLINIC | Age: 73
End: 2023-02-07
Payer: MEDICARE

## 2023-02-07 VITALS
RESPIRATION RATE: 18 BRPM | SYSTOLIC BLOOD PRESSURE: 132 MMHG | HEIGHT: 67 IN | DIASTOLIC BLOOD PRESSURE: 72 MMHG | WEIGHT: 163.13 LBS | HEART RATE: 84 BPM | BODY MASS INDEX: 25.6 KG/M2 | OXYGEN SATURATION: 98 %

## 2023-02-07 DIAGNOSIS — E11.22 HYPERTENSION ASSOCIATED WITH STAGE 2 CHRONIC KIDNEY DISEASE DUE TO TYPE 2 DIABETES MELLITUS: ICD-10-CM

## 2023-02-07 DIAGNOSIS — E11.69 MIXED DIABETIC HYPERLIPIDEMIA ASSOCIATED WITH TYPE 2 DIABETES MELLITUS: ICD-10-CM

## 2023-02-07 DIAGNOSIS — Z87.39 HISTORY OF GOUT: ICD-10-CM

## 2023-02-07 DIAGNOSIS — E11.3393 CONTROLLED TYPE 2 DIABETES MELLITUS WITH BOTH EYES AFFECTED BY MODERATE NONPROLIFERATIVE RETINOPATHY WITHOUT MACULAR EDEMA, WITHOUT LONG-TERM CURRENT USE OF INSULIN: ICD-10-CM

## 2023-02-07 DIAGNOSIS — N18.2 HYPERTENSION ASSOCIATED WITH STAGE 2 CHRONIC KIDNEY DISEASE DUE TO TYPE 2 DIABETES MELLITUS: ICD-10-CM

## 2023-02-07 DIAGNOSIS — I12.9 HYPERTENSION ASSOCIATED WITH STAGE 2 CHRONIC KIDNEY DISEASE DUE TO TYPE 2 DIABETES MELLITUS: ICD-10-CM

## 2023-02-07 DIAGNOSIS — R00.2 PALPITATIONS: ICD-10-CM

## 2023-02-07 DIAGNOSIS — E78.2 MIXED DIABETIC HYPERLIPIDEMIA ASSOCIATED WITH TYPE 2 DIABETES MELLITUS: ICD-10-CM

## 2023-02-07 DIAGNOSIS — Z00.00 ROUTINE GENERAL MEDICAL EXAMINATION AT A HEALTH CARE FACILITY: Primary | ICD-10-CM

## 2023-02-07 LAB
ALBUMIN SERPL BCP-MCNC: 4.2 G/DL (ref 3.5–5.2)
ALP SERPL-CCNC: 74 U/L (ref 55–135)
ALT SERPL W/O P-5'-P-CCNC: 29 U/L (ref 10–44)
ANION GAP SERPL CALC-SCNC: 18 MMOL/L (ref 8–16)
AST SERPL-CCNC: 35 U/L (ref 10–40)
BILIRUB SERPL-MCNC: 0.8 MG/DL (ref 0.1–1)
BUN SERPL-MCNC: 10 MG/DL (ref 8–23)
CALCIUM SERPL-MCNC: 10.5 MG/DL (ref 8.7–10.5)
CHLORIDE SERPL-SCNC: 102 MMOL/L (ref 95–110)
CO2 SERPL-SCNC: 22 MMOL/L (ref 23–29)
CREAT SERPL-MCNC: 1 MG/DL (ref 0.5–1.4)
EST. GFR  (NO RACE VARIABLE): >60 ML/MIN/1.73 M^2
GLUCOSE SERPL-MCNC: 91 MG/DL (ref 70–110)
MAGNESIUM SERPL-MCNC: 1.9 MG/DL (ref 1.6–2.6)
POTASSIUM SERPL-SCNC: 4.9 MMOL/L (ref 3.5–5.1)
PROT SERPL-MCNC: 7.5 G/DL (ref 6–8.4)
SODIUM SERPL-SCNC: 142 MMOL/L (ref 136–145)
TSH SERPL DL<=0.005 MIU/L-ACNC: 2.41 UIU/ML (ref 0.4–4)

## 2023-02-07 PROCEDURE — 83036 HEMOGLOBIN GLYCOSYLATED A1C: CPT | Performed by: STUDENT IN AN ORGANIZED HEALTH CARE EDUCATION/TRAINING PROGRAM

## 2023-02-07 PROCEDURE — 80053 COMPREHEN METABOLIC PANEL: CPT | Performed by: STUDENT IN AN ORGANIZED HEALTH CARE EDUCATION/TRAINING PROGRAM

## 2023-02-07 PROCEDURE — 99214 PR OFFICE/OUTPT VISIT, EST, LEVL IV, 30-39 MIN: ICD-10-PCS | Mod: S$PBB,,, | Performed by: STUDENT IN AN ORGANIZED HEALTH CARE EDUCATION/TRAINING PROGRAM

## 2023-02-07 PROCEDURE — 36415 COLL VENOUS BLD VENIPUNCTURE: CPT | Mod: PO | Performed by: INTERNAL MEDICINE

## 2023-02-07 PROCEDURE — 99214 OFFICE O/P EST MOD 30 MIN: CPT | Mod: S$PBB,,, | Performed by: STUDENT IN AN ORGANIZED HEALTH CARE EDUCATION/TRAINING PROGRAM

## 2023-02-07 PROCEDURE — 84443 ASSAY THYROID STIM HORMONE: CPT | Performed by: INTERNAL MEDICINE

## 2023-02-07 PROCEDURE — 99999 PR PBB SHADOW E&M-EST. PATIENT-LVL IV: ICD-10-PCS | Mod: PBBFAC,,, | Performed by: STUDENT IN AN ORGANIZED HEALTH CARE EDUCATION/TRAINING PROGRAM

## 2023-02-07 PROCEDURE — 99214 OFFICE O/P EST MOD 30 MIN: CPT | Mod: PBBFAC,PO | Performed by: STUDENT IN AN ORGANIZED HEALTH CARE EDUCATION/TRAINING PROGRAM

## 2023-02-07 PROCEDURE — 83735 ASSAY OF MAGNESIUM: CPT | Performed by: INTERNAL MEDICINE

## 2023-02-07 PROCEDURE — 99999 PR PBB SHADOW E&M-EST. PATIENT-LVL IV: CPT | Mod: PBBFAC,,, | Performed by: STUDENT IN AN ORGANIZED HEALTH CARE EDUCATION/TRAINING PROGRAM

## 2023-02-07 RX ORDER — ALLOPURINOL 300 MG/1
300 TABLET ORAL EVERY OTHER DAY
Qty: 45 TABLET | Refills: 3 | Status: SHIPPED | OUTPATIENT
Start: 2023-02-07 | End: 2024-04-02

## 2023-02-07 RX ORDER — ATORVASTATIN CALCIUM 20 MG/1
20 TABLET, FILM COATED ORAL NIGHTLY
Qty: 90 TABLET | Refills: 3 | Status: SHIPPED | OUTPATIENT
Start: 2023-02-07 | End: 2023-08-08

## 2023-02-07 RX ORDER — FOSINOPRIL SODIUM 40 MG/1
40 TABLET ORAL DAILY
Qty: 90 TABLET | Refills: 3 | Status: SHIPPED | OUTPATIENT
Start: 2023-02-07 | End: 2024-03-21

## 2023-02-07 NOTE — PROGRESS NOTES
"[facility] CLINIC NOTE    Patient Name: Jorge Becerra  YOB: 1950    PRESENTING HISTORY     History of Present Illness:  Mr. Jorge Becerra is a 72 y.o. male here for routine checkup.     His carcinoma is currently undetectable.   Has frequent f/u at [facility].     HTN- runs 130s/70s at home.     T2DM- running 130-140 fasting.     No gout flares.     His strength has improved, walking well.     On aricept for memory loss. This is grossly stable.       ROS      OBJECTIVE:   Vital Signs:  Vitals:    02/07/23 1328 02/07/23 1338   BP: 136/72 132/72   Pulse: 84    Resp: 18    SpO2: 98%    Weight: 74 kg (163 lb 2.3 oz)    Height: 5' 7" (1.702 m)           Physical Exam: Normal, no change.     Physical Exam    ASSESSMENT & PLAN:     Routine general medical examination at a health care facility    History of gout  -     allopurinoL (ZYLOPRIM) 300 MG tablet; Take 1 tablet (300 mg total) by mouth every other day.  Dispense: 45 tablet; Refill: 3    Hypertension associated with stage 2 chronic kidney disease due to type 2 diabetes mellitus  -     fosinopriL (MONOPRIL) 40 MG tablet; Take 1 tablet (40 mg total) by mouth once daily.  Dispense: 90 tablet; Refill: 3    Mixed diabetic hyperlipidemia associated with type 2 diabetes mellitus  -     atorvastatin (LIPITOR) 20 MG tablet; Take 1 tablet (20 mg total) by mouth every evening.  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

## 2023-02-08 DIAGNOSIS — E11.3393 CONTROLLED TYPE 2 DIABETES MELLITUS WITH BOTH EYES AFFECTED BY MODERATE NONPROLIFERATIVE RETINOPATHY WITHOUT MACULAR EDEMA, WITHOUT LONG-TERM CURRENT USE OF INSULIN: Primary | ICD-10-CM

## 2023-02-08 LAB
ESTIMATED AVG GLUCOSE: 200 MG/DL (ref 68–131)
HBA1C MFR BLD: 8.6 % (ref 4–5.6)

## 2023-02-14 ENCOUNTER — PATIENT MESSAGE (OUTPATIENT)
Dept: CARDIOLOGY | Facility: HOSPITAL | Age: 73
End: 2023-02-14

## 2023-02-14 ENCOUNTER — TELEPHONE (OUTPATIENT)
Dept: CARDIOLOGY | Facility: HOSPITAL | Age: 73
End: 2023-02-14

## 2023-02-14 NOTE — TELEPHONE ENCOUNTER
Left message on voicemail.     Patient advised, test will be at [facility] ([address]   Will need to register on the first floor at the main entrance.   Patient advised that arrival time is 6:20am.  Patient advised that he may be here about 3.5-4 hours, and may want to bring something to occupy their time, as there will be periods of waiting.    Patient advised, may take his medications prior to testing if you need to.   Advised if he needs to eat to take his medications, please keep it light, like toast and juice.    Patient advised to avoid all caffeine 12 hours prior to testing.  This includes decaf tea and coffee.    Will provide peanut butter crackers for a snack after stress test.  If patient would prefer something else, please bring a snack from home.    Wear comfortable clothing.   No lotions, oils, or powders to the upper chest area. May wear deodorant.    No metal jewelry, buttons, or zippers to the upper body.  Advised to call the office if any questions.     Will send instructions via [employer] as well.

## 2023-02-15 ENCOUNTER — HOSPITAL ENCOUNTER (OUTPATIENT)
Dept: CARDIOLOGY | Facility: HOSPITAL | Age: 73
Discharge: HOME | End: 2023-02-15
Attending: INTERNAL MEDICINE
Payer: MEDICARE

## 2023-02-15 ENCOUNTER — HOSPITAL ENCOUNTER (OUTPATIENT)
Dept: RADIOLOGY | Facility: HOSPITAL | Age: 73
Discharge: HOME | End: 2023-02-15
Attending: INTERNAL MEDICINE
Payer: MEDICARE

## 2023-02-15 VITALS — WEIGHT: 163 LBS | BODY MASS INDEX: 25.58 KG/M2 | HEIGHT: 67 IN

## 2023-02-15 DIAGNOSIS — E11.3393 CONTROLLED TYPE 2 DIABETES MELLITUS WITH BOTH EYES AFFECTED BY MODERATE NONPROLIFERATIVE RETINOPATHY WITHOUT MACULAR EDEMA, WITHOUT LONG-TERM CURRENT USE OF INSULIN: ICD-10-CM

## 2023-02-15 DIAGNOSIS — E78.2 MIXED DIABETIC HYPERLIPIDEMIA ASSOCIATED WITH TYPE 2 DIABETES MELLITUS: ICD-10-CM

## 2023-02-15 DIAGNOSIS — R00.2 PALPITATIONS: ICD-10-CM

## 2023-02-15 DIAGNOSIS — E11.69 MIXED DIABETIC HYPERLIPIDEMIA ASSOCIATED WITH TYPE 2 DIABETES MELLITUS: ICD-10-CM

## 2023-02-15 DIAGNOSIS — I12.9 HYPERTENSION ASSOCIATED WITH STAGE 2 CHRONIC KIDNEY DISEASE DUE TO TYPE 2 DIABETES MELLITUS: ICD-10-CM

## 2023-02-15 DIAGNOSIS — E11.22 HYPERTENSION ASSOCIATED WITH STAGE 2 CHRONIC KIDNEY DISEASE DUE TO TYPE 2 DIABETES MELLITUS: ICD-10-CM

## 2023-02-15 DIAGNOSIS — N18.2 HYPERTENSION ASSOCIATED WITH STAGE 2 CHRONIC KIDNEY DISEASE DUE TO TYPE 2 DIABETES MELLITUS: ICD-10-CM

## 2023-02-15 LAB
CV STRESS BASE HR: 70 BPM
DIASTOLIC BLOOD PRESSURE: 78 MMHG
EJECTION FRACTION- HIGH: 65 %
END DIASTOLIC INDEX-HIGH: 153 ML/M2
END DIASTOLIC INDEX-LOW: 93 ML/M2
END SYSTOLIC INDEX-HIGH: 71 ML/M2
END SYSTOLIC INDEX-LOW: 31 ML/M2
NUC REST DIASTOLIC VOLUME INDEX: 89
NUC REST EJECTION FRACTION: 65
NUC REST SYSTOLIC VOLUME INDEX: 31
NUC STRESS DIASTOLIC VOLUME INDEX: 85
NUC STRESS EJECTION FRACTION: 73 %
NUC STRESS SYSTOLIC VOLUME INDEX: 23
OHS CV CPX 1 MINUTE RECOVERY HEART RATE: 137 BPM
OHS CV CPX 85 PERCENT MAX PREDICTED HEART RATE MALE: 124
OHS CV CPX ESTIMATED METS: 5
OHS CV CPX MAX PREDICTED HEART RATE: 146
OHS CV CPX PATIENT IS FEMALE: 0
OHS CV CPX PATIENT IS MALE: 1
OHS CV CPX PEAK DIASTOLIC BLOOD PRESSURE: 98 MMHG
OHS CV CPX PEAK HEAR RATE: 142 BPM
OHS CV CPX PEAK RATE PRESSURE PRODUCT: NORMAL
OHS CV CPX PEAK SYSTOLIC BLOOD PRESSURE: 200 MMHG
OHS CV CPX PERCENT MAX PREDICTED HEART RATE ACHIEVED: 97
OHS CV CPX RATE PRESSURE PRODUCT PRESENTING: NORMAL
RETIRED EF AND QEF - SEE NOTES: 53 %
STRESS ECHO POST EXERCISE DUR MIN: 3 MINUTES
STRESS ECHO POST EXERCISE DUR SEC: 3 SECONDS
SYSTOLIC BLOOD PRESSURE: 152 MMHG

## 2023-02-15 PROCEDURE — 93018 CV STRESS TEST I&R ONLY: CPT | Mod: ,,, | Performed by: INTERNAL MEDICINE

## 2023-02-15 PROCEDURE — 93306 ECHO (CUPID ONLY): ICD-10-PCS | Mod: 26,,, | Performed by: INTERNAL MEDICINE

## 2023-02-15 PROCEDURE — A9502 TC99M TETROFOSMIN: HCPCS

## 2023-02-15 PROCEDURE — 78452 HT MUSCLE IMAGE SPECT MULT: CPT | Mod: 26,,, | Performed by: INTERNAL MEDICINE

## 2023-02-15 PROCEDURE — 93018 NUCLEAR STRESS - CARDIOLOGY INTERPRETED (CUPID ONLY): ICD-10-PCS | Mod: ,,, | Performed by: INTERNAL MEDICINE

## 2023-02-15 PROCEDURE — 93016 CV STRESS TEST SUPVJ ONLY: CPT | Mod: ,,, | Performed by: NURSE PRACTITIONER

## 2023-02-15 PROCEDURE — 93016 NUCLEAR STRESS - CARDIOLOGY INTERPRETED (CUPID ONLY): ICD-10-PCS | Mod: ,,, | Performed by: NURSE PRACTITIONER

## 2023-02-15 PROCEDURE — 78452 HT MUSCLE IMAGE SPECT MULT: CPT

## 2023-02-15 PROCEDURE — 93306 TTE W/DOPPLER COMPLETE: CPT | Mod: 26,,, | Performed by: INTERNAL MEDICINE

## 2023-02-15 PROCEDURE — 93306 TTE W/DOPPLER COMPLETE: CPT

## 2023-02-15 PROCEDURE — 78452 NUCLEAR STRESS - CARDIOLOGY INTERPRETED (CUPID ONLY): ICD-10-PCS | Mod: 26,,, | Performed by: INTERNAL MEDICINE

## 2023-02-17 LAB
AORTIC ROOT ANNULUS: 3.2 CM
AORTIC VALVE CUSP SEPERATION: 2.1 CM
AV INDEX (PROSTH): 0.78
AV MEAN GRADIENT: 4 MMHG
AV PEAK GRADIENT: 8 MMHG
AV REGURGITATION PRESSURE HALF TIME: 435 MS
AV VALVE AREA: 2.71 CM2
AV VELOCITY RATIO: 0.66
BSA FOR ECHO PROCEDURE: 1.87 M2
CV ECHO LV RWT: 0.46 CM
DOP CALC AO PEAK VEL: 1.41 M/S
DOP CALC AO VTI: 22.7 CM
DOP CALC LVOT AREA: 3.5 CM2
DOP CALC LVOT DIAMETER: 2.1 CM
DOP CALC LVOT PEAK VEL: 0.93 M/S
DOP CALC LVOT STROKE VOLUME: 61.62 CM3
DOP CALCLVOT PEAK VEL VTI: 17.8 CM
E WAVE DECELERATION TIME: 243 MSEC
E/A RATIO: 0.66
E/E' RATIO: 7.88 M/S
ECHO LV POSTERIOR WALL: 1.06 CM (ref 0.6–1.1)
EJECTION FRACTION: 68 %
FRACTIONAL SHORTENING: 38 % (ref 28–44)
INTERVENTRICULAR SEPTUM: 0.83 CM (ref 0.6–1.1)
IVRT: 100 MSEC
LEFT ATRIUM SIZE: 4 CM
LEFT INTERNAL DIMENSION IN SYSTOLE: 2.89 CM (ref 2.1–4)
LEFT VENTRICLE DIASTOLIC VOLUME INDEX: 53.41 ML/M2
LEFT VENTRICLE DIASTOLIC VOLUME: 98.8 ML
LEFT VENTRICLE MASS INDEX: 80 G/M2
LEFT VENTRICLE SYSTOLIC VOLUME INDEX: 17.2 ML/M2
LEFT VENTRICLE SYSTOLIC VOLUME: 31.9 ML
LEFT VENTRICULAR INTERNAL DIMENSION IN DIASTOLE: 4.63 CM (ref 3.5–6)
LEFT VENTRICULAR MASS: 148.63 G
LV LATERAL E/E' RATIO: 7 M/S
LV SEPTAL E/E' RATIO: 9 M/S
LVOT MG: 2 MMHG
LVOT MV: 0.59 CM/S
MV PEAK A VEL: 0.96 M/S
MV PEAK E VEL: 0.63 M/S
MV STENOSIS PRESSURE HALF TIME: 60 MS
MV VALVE AREA P 1/2 METHOD: 3.67 CM2
PISA AR MAX VEL: 2.23 M/S
PISA TR MAX VEL: 2.43 M/S
RA PRESSURE: 3 MMHG
RIGHT VENTRICULAR END-DIASTOLIC DIMENSION: 2.54 CM
TDI LATERAL: 0.09 M/S
TDI SEPTAL: 0.07 M/S
TDI: 0.08 M/S
TR MAX PG: 24 MMHG
TV REST PULMONARY ARTERY PRESSURE: 27 MMHG

## 2023-02-19 DIAGNOSIS — E11.3393 CONTROLLED TYPE 2 DIABETES MELLITUS WITH BOTH EYES AFFECTED BY MODERATE NONPROLIFERATIVE RETINOPATHY WITHOUT MACULAR EDEMA, WITHOUT LONG-TERM CURRENT USE OF INSULIN: ICD-10-CM

## 2023-02-19 RX ORDER — EMPAGLIFLOZIN 10 MG/1
TABLET, FILM COATED ORAL
Qty: 90 TABLET | Refills: 1 | OUTPATIENT
Start: 2023-02-19

## 2023-02-19 NOTE — TELEPHONE ENCOUNTER
No new care gaps identified.  [facility] Embedded Care Gaps. Reference number: [id number]. 2/19/2023   12:23:33 AM CST

## 2023-02-19 NOTE — TELEPHONE ENCOUNTER
Quick DC. Request already responded to by other means (e.g. phone or fax)   Refill Authorization Note   Jorge Becerra  is requesting a refill authorization.  Brief Assessment and Rationale for Refill:  Quick Discontinue  Medication Therapy Plan:  Signed 2/8/23    Medication Reconciliation Completed:  No      Comments:     Note composed:5:12 PM 02/19/2023

## 2023-02-28 ENCOUNTER — OFFICE VISIT (OUTPATIENT)
Dept: CARDIOLOGY | Facility: CLINIC | Age: 73
End: 2023-02-28
Payer: MEDICARE

## 2023-02-28 VITALS
HEIGHT: 67 IN | HEART RATE: 78 BPM | OXYGEN SATURATION: 98 % | BODY MASS INDEX: 25.58 KG/M2 | WEIGHT: 163 LBS | SYSTOLIC BLOOD PRESSURE: 128 MMHG | DIASTOLIC BLOOD PRESSURE: 80 MMHG

## 2023-02-28 DIAGNOSIS — E11.69 MIXED DIABETIC HYPERLIPIDEMIA ASSOCIATED WITH TYPE 2 DIABETES MELLITUS: ICD-10-CM

## 2023-02-28 DIAGNOSIS — N18.2 HYPERTENSION ASSOCIATED WITH STAGE 2 CHRONIC KIDNEY DISEASE DUE TO TYPE 2 DIABETES MELLITUS: ICD-10-CM

## 2023-02-28 DIAGNOSIS — E11.22 HYPERTENSION ASSOCIATED WITH STAGE 2 CHRONIC KIDNEY DISEASE DUE TO TYPE 2 DIABETES MELLITUS: ICD-10-CM

## 2023-02-28 DIAGNOSIS — E78.2 MIXED DIABETIC HYPERLIPIDEMIA ASSOCIATED WITH TYPE 2 DIABETES MELLITUS: ICD-10-CM

## 2023-02-28 DIAGNOSIS — Z79.82 ASPIRIN LONG-TERM USE: ICD-10-CM

## 2023-02-28 DIAGNOSIS — I12.9 HYPERTENSION ASSOCIATED WITH STAGE 2 CHRONIC KIDNEY DISEASE DUE TO TYPE 2 DIABETES MELLITUS: ICD-10-CM

## 2023-02-28 DIAGNOSIS — C49.21 SARCOMA OF RIGHT THIGH: ICD-10-CM

## 2023-02-28 DIAGNOSIS — R00.2 PALPITATIONS: ICD-10-CM

## 2023-02-28 PROCEDURE — 99213 PR OFFICE/OUTPT VISIT, EST, LEVL III, 20-29 MIN: ICD-10-PCS | Mod: S$PBB,,, | Performed by: NURSE PRACTITIONER

## 2023-02-28 PROCEDURE — 99213 OFFICE O/P EST LOW 20 MIN: CPT | Mod: S$PBB,,, | Performed by: NURSE PRACTITIONER

## 2023-02-28 PROCEDURE — 99999 PR PBB SHADOW E&M-EST. PATIENT-LVL IV: ICD-10-PCS | Mod: PBBFAC,,, | Performed by: NURSE PRACTITIONER

## 2023-02-28 PROCEDURE — 99999 PR PBB SHADOW E&M-EST. PATIENT-LVL IV: CPT | Mod: PBBFAC,,, | Performed by: NURSE PRACTITIONER

## 2023-02-28 PROCEDURE — 99214 OFFICE O/P EST MOD 30 MIN: CPT | Mod: PBBFAC,PN | Performed by: NURSE PRACTITIONER

## 2023-02-28 NOTE — ASSESSMENT & PLAN NOTE
Blood pressure is controlled in the office in at home.  128/80 mm Hg today in the office.  He states his blood pressure at home is generally 130/70.  Patient reports the blood pressure never goes higher than 140 systolic.    Patient does not follow a low-sodium diet.  We discussed this at length and advised foods to avoid in the importance of monitoring sodium in the diet.  Patient is to continue amlodipine 5 mg p.o. daily, Monopril 40 mg p.o. daily

## 2023-02-28 NOTE — PROGRESS NOTES
Subjective:    Patient ID:  Jorge Becerra is a 72 y.o. male patient here for evaluation Results      History of Present Illness:  Pt is in office today to follow-up on results of testing ordered by Dr. Uribe  Patient has a history of hypertension.  He states he takes his blood pressure at home and it generally runs 130s over 170s.  Patient does not exercise much but his wife has been trying to get him to do so.  Patient is followed at [facility] every 3 months for a sarcoma in the right upper thigh.  He had chemo radiation and surgery back in 2021.     [facility] wanted him to establish care with a local cardiologist.  He saw Dr. Uribe in ordered the below testing for complaints of palpitations.    Testing on 1/26/23:  Holter:  1. Sinus rhythm with rates of 55 bpm to 127 bpm. Mean rate 78 bpm.  2. 158 mostly unifocal PVC's.  3. 82 PAC's including one couplet and one triplet on day one at 8:43:54 pm with a rate of 128 bpm.  4. No symptoms were reported.    Stress:     Normal myocardial perfusion scan. There is no evidence of myocardial ischemia or infarction.    The gated perfusion images showed an ejection fraction of 65% at rest. The gated perfusion images showed an ejection fraction of 73% post stress. Normal ejection fraction is greater than 53%.    There is normal wall motion at rest and post stress.    LV cavity size is normal at rest and normal at stress.    The ECG portion of the study is negative for ischemia.    The patient reported no chest pain during the stress test.    During stress, rare PACs are noted. , During stress, rare PVCs are noted.    The patient exercised for 3 minutes 3 seconds on a Charli protocol, corresponding to a functional capacity of 5 METS, achieving a peak heart rate of 142 bpm, which is 97 % of the age predicted maximum heart rate.    Echo:  The left ventricle is normal in size with concentric remodeling and normal systolic function.  The estimated ejection fraction is  68%.  Normal left ventricular diastolic function.  Mild left atrial enlargement.  Mild right ventricular enlargement with normal right ventricular systolic function.  Mild right atrial enlargement.  Normal central venous pressure (3 mmHg).  The estimated PA systolic pressure is 27 mmHg.    Review of patient's allergies indicates:  No Known Allergies    Past Medical History:   Diagnosis Date    Diabetes mellitus, type 2     Hypertension     Non Hodgkin's lymphoma      Past Surgical History:   Procedure Laterality Date    TONSILLECTOMY       Social History     Tobacco Use    Smoking status: Never    Smokeless tobacco: Never   Substance Use Topics    Alcohol use: Never        REVIEW OF SYSTEMS: As noted in HPI   CARDIOVASCULAR: No recent chest pain, palpitations, arm, neck, or jaw pain  RESPIRATORY: No recent fever, cough chills, SOB or congestion  : No blood in the urine  GI: No Nausea, vomiting, constipation, diarrhea, blood, or reflux.  MUSCULOSKELETAL: No myalgias  NEURO: No lightheadedness or dizziness  EYES: No Double vision, blurry, vision or headache        Objective        Vitals:    02/28/23 0957   BP: 128/80   Pulse: 78       LIPIDS - LAST 2   Lab Results   Component Value Date    CHOL 90 (L) 08/01/2022    CHOL 90 (L) 08/01/2022    HDL 38 (L) 08/01/2022    HDL 38 (L) 08/01/2022    LDLCALC 28.4 (L) 08/01/2022    LDLCALC 28.4 (L) 08/01/2022    TRIG 118 08/01/2022    TRIG 118 08/01/2022    CHOLHDL 42.2 08/01/2022    CHOLHDL 42.2 08/01/2022       CBC - LAST 2  Lab Results   Component Value Date    WBC 6.30 02/01/2022    WBC 11.91 08/18/2021    RBC 3.67 (L) 02/01/2022    RBC 3.40 (L) 08/18/2021    HGB 10.8 (L) 02/01/2022    HGB 10.3 (L) 08/18/2021    HCT 35.2 (L) 02/01/2022    HCT 29.8 (L) 08/18/2021    MCV 96 02/01/2022    MCV 88 08/18/2021    MCH 29.4 02/01/2022    MCH 30.3 08/18/2021    MCHC 30.7 (L) 02/01/2022    MCHC 34.6 08/18/2021    RDW 16.0 (H) 02/01/2022    RDW 17.2 (H) 08/18/2021      02/01/2022     08/18/2021    MPV 10.7 02/01/2022    MPV 11.8 08/18/2021    GRAN 3.6 02/01/2022    GRAN 57.1 02/01/2022    LYMPH 1.5 02/01/2022    LYMPH 23.5 02/01/2022    MONO 0.6 02/01/2022    MONO 9.7 02/01/2022    BASO 0.02 02/01/2022    BASO 0.06 08/13/2021    NRBC 0 02/01/2022    NRBC 0 08/13/2021       CHEMISTRY & LIVER FUNCTION - LAST 2  Lab Results   Component Value Date     02/07/2023     08/01/2022    K 4.9 02/07/2023    K 4.4 08/01/2022     02/07/2023     08/01/2022    CO2 22 (L) 02/07/2023    CO2 25 08/01/2022    ANIONGAP 18 (H) 02/07/2023    ANIONGAP 13 08/01/2022    BUN 10 02/07/2023    BUN 10 08/01/2022    CREATININE 1.0 02/07/2023    CREATININE 0.9 08/01/2022    GLU 91 02/07/2023     (H) 08/01/2022    CALCIUM 10.5 02/07/2023    CALCIUM 10.0 08/01/2022    MG 1.9 02/07/2023    MG 1.9 08/18/2021    ALBUMIN 4.2 02/07/2023    ALBUMIN 4.0 08/01/2022    PROT 7.5 02/07/2023    PROT 7.4 08/01/2022    ALKPHOS 74 02/07/2023    ALKPHOS 70 08/01/2022    ALT 29 02/07/2023    ALT 37 08/01/2022    AST 35 02/07/2023    AST 37 08/01/2022    BILITOT 0.8 02/07/2023    BILITOT 0.5 08/01/2022        CARDIAC PROFILE - LAST 2  No results found for: BNP, CPK, CPKMB, LDH, TROPONINI     COAGULATION - LAST 2  Lab Results   Component Value Date    INR 1.0 05/31/2021       ENDOCRINE & PSA - LAST 2  Lab Results   Component Value Date    HGBA1C 8.6 (H) 02/07/2023    HGBA1C 7.5 (H) 08/01/2022    MICROALBUR 1.2 10/31/2019    MICROALBUR 1.7 10/15/2018    TSH 2.409 02/07/2023    TSH 2.520 05/04/2021    PSA 0.65 10/23/2020        ECHOCARDIOGRAM RESULTS  Results for orders placed during the hospital encounter of 02/15/23    Echo    Interpretation Summary  · The left ventricle is normal in size with concentric remodeling and normal systolic function.  · The estimated ejection fraction is 68%.  · Normal left ventricular diastolic function.  · Mild left atrial enlargement.  · Mild right ventricular  enlargement with normal right ventricular systolic function.  · Mild right atrial enlargement.  · Normal central venous pressure (3 mmHg).  · The estimated PA systolic pressure is 27 mmHg.      CURRENT/PREVIOUS VISIT EKG  Results for orders placed or performed in visit on 01/26/23   IN OFFICE EKG 12-LEAD (to [facility])    Collection Time: 01/26/23 12:09 PM    Narrative    Test Reason : E11.22,I12.9,N18.2,E11.69,E78.2,E11.3393,R00.2,    Vent. Rate : 074 BPM     Atrial Rate : 074 BPM     P-R Int : 124 ms          QRS Dur : 084 ms      QT Int : 394 ms       P-R-T Axes : -27 -24 032 degrees     QTc Int : 437 ms    Normal sinus rhythm  Minimal voltage criteria for LVH, may be normal variant ( R in aVL )  Borderline Abnormal ECG  No previous ECGs available  Confirmed by Vinicius Uribe MD ([id number]) on 1/29/2023 2:05:24 PM    Referred By: RODERICK MEADOWS           Confirmed By:Vinicius Uribe MD     No valid procedures specified.   Results for orders placed during the hospital encounter of 02/15/23    Nuclear Stress - Cardiology Interpreted    Interpretation Summary    Normal myocardial perfusion scan. There is no evidence of myocardial ischemia or infarction.    The gated perfusion images showed an ejection fraction of 65% at rest. The gated perfusion images showed an ejection fraction of 73% post stress. Normal ejection fraction is greater than 53%.    There is normal wall motion at rest and post stress.    LV cavity size is normal at rest and normal at stress.    The ECG portion of the study is negative for ischemia.    The patient reported no chest pain during the stress test.    During stress, rare PACs are noted. , During stress, rare PVCs are noted.    The patient exercised for 3 minutes 3 seconds on a Charli protocol, corresponding to a functional capacity of 5 METS, achieving a peak heart rate of 142 bpm, which is 97 % of the age predicted maximum heart rate.    No valid procedures specified.    PHYSICAL  EXAM  CONSTITUTIONAL: Well built, well nourished elderly male breathing comfortably in no apparent distress  NECK: no carotid bruit, no JVD  LUNGS: CTA  CHEST WALL: no tenderness  HEART: regular rate and rhythm, S1, S2 normal, no murmur, click, rub or gallop   ABDOMEN: soft, non-tender; bowel sounds normal; no masses,  no organomegaly  EXTREMITIES: Extremities normal, trace edema bilateral ankles, no calf tenderness noted  NEURO: AAO X 3    I HAVE REVIEWED :    The vital signs, nurses notes, and all the pertinent radiology and labs.    Current Outpatient Medications   Medication Instructions    allopurinoL (ZYLOPRIM) 300 mg, Oral, Every other day    amLODIPine (NORVASC) 5 mg, Oral, Daily    aspirin (ECOTRIN) 81 mg, Oral, Daily    atorvastatin (LIPITOR) 20 mg, Oral, Nightly    cholecalciferol, vitamin D3, 10 mcg (400 unit) Cap capsule 1 tablet, Oral, Daily    donepeziL (ARICEPT) 5 MG tablet TAKE 1 TABLET BY MOUTH EVERY DAY IN THE EVENING    empagliflozin (JARDIANCE) 25 mg, Oral, Daily    ferrous sulfate (FEOSOL) 325 mg, Oral, With breakfast    fosinopriL (MONOPRIL) 40 mg, Oral, Daily    magnesium oxide 400 mg magnesium Cap 1 tablet, Oral, 2 times daily    metFORMIN (GLUCOPHAGE) 1000 MG tablet TAKE 1 TABLET BY MOUTH TWICE A DAY    vit C/E/zinc/lutein/zeaxanthin (OCUVITE EYE HEALTH ORAL) 1 tablet, Oral, 2 times daily        Assessment & Plan     Mixed diabetic hyperlipidemia associated with type 2 diabetes mellitus  Diabetes is managed by primary care.  He states his a.m. blood sugars are usually around 150s.  Advised to start low-impact exercise such as walking or bicycling routinely and adhere to a low-sodium low-cholesterol diet  Patient is to continue Lipitor 20 mg q.h.s..    Hypertension associated with stage 2 chronic kidney disease due to type 2 diabetes mellitus  Blood pressure is controlled in the office in at home.  128/80 mm Hg today in the office.  He states his blood pressure at home is generally 130/70.   Patient reports the blood pressure never goes higher than 140 systolic.    Patient does not follow a low-sodium diet.  We discussed this at length and advised foods to avoid in the importance of monitoring sodium in the diet.  Patient is to continue amlodipine 5 mg p.o. daily, Monopril 40 mg p.o. daily    Palpitations  Patient is to continue magnesium oxide.  Holter monitor showed no arrhythmias or pauses.  Rare PACs and PVCs noted.  Patient advised to avoid excessive caffeine, decongestants, stimulants     Aspirin long-term use  Continue aspirin 81 mg p.o. daily as directed.    Sarcoma of right thigh  This is followed by [facility]          No follow-ups on file.

## 2023-02-28 NOTE — ASSESSMENT & PLAN NOTE
Diabetes is managed by primary care.  He states his a.m. blood sugars are usually around 150s.  Advised to start low-impact exercise such as walking or bicycling routinely and adhere to a low-sodium low-cholesterol diet  Patient is to continue Lipitor 20 mg q.h.s..

## 2023-02-28 NOTE — ASSESSMENT & PLAN NOTE
Patient is to continue magnesium oxide.  Holter monitor showed no arrhythmias or pauses.  Rare PACs and PVCs noted.  Patient advised to avoid excessive caffeine, decongestants, stimulants

## 2023-03-16 NOTE — TELEPHONE ENCOUNTER
Can you please see portal message, pt was advised to call the office back if shoulder pain did not get any better. I have pended referral. Please advise.    None known

## 2023-03-20 ENCOUNTER — TELEPHONE (OUTPATIENT)
Dept: CARDIOLOGY | Facility: CLINIC | Age: 73
End: 2023-03-20
Payer: MEDICARE

## 2023-04-11 ENCOUNTER — PATIENT MESSAGE (OUTPATIENT)
Dept: ADMINISTRATIVE | Facility: HOSPITAL | Age: 73
End: 2023-04-11
Payer: MEDICARE

## 2023-07-05 ENCOUNTER — PATIENT MESSAGE (OUTPATIENT)
Dept: FAMILY MEDICINE | Facility: CLINIC | Age: 73
End: 2023-07-05
Payer: MEDICARE

## 2023-07-05 DIAGNOSIS — E11.3393 CONTROLLED TYPE 2 DIABETES MELLITUS WITH BOTH EYES AFFECTED BY MODERATE NONPROLIFERATIVE RETINOPATHY WITHOUT MACULAR EDEMA, WITHOUT LONG-TERM CURRENT USE OF INSULIN: Primary | ICD-10-CM

## 2023-08-05 ENCOUNTER — LAB VISIT (OUTPATIENT)
Dept: LAB | Facility: HOSPITAL | Age: 73
End: 2023-08-05
Attending: STUDENT IN AN ORGANIZED HEALTH CARE EDUCATION/TRAINING PROGRAM
Payer: MEDICARE

## 2023-08-05 DIAGNOSIS — E11.3393 CONTROLLED TYPE 2 DIABETES MELLITUS WITH BOTH EYES AFFECTED BY MODERATE NONPROLIFERATIVE RETINOPATHY WITHOUT MACULAR EDEMA, WITHOUT LONG-TERM CURRENT USE OF INSULIN: ICD-10-CM

## 2023-08-05 LAB
ALBUMIN SERPL BCP-MCNC: 4.3 G/DL (ref 3.5–5.2)
ALP SERPL-CCNC: 73 U/L (ref 55–135)
ALT SERPL W/O P-5'-P-CCNC: 31 U/L (ref 10–44)
ANION GAP SERPL CALC-SCNC: 15 MMOL/L (ref 8–16)
AST SERPL-CCNC: 35 U/L (ref 10–40)
BASOPHILS # BLD AUTO: 0.03 K/UL (ref 0–0.2)
BASOPHILS NFR BLD: 0.4 % (ref 0–1.9)
BILIRUB SERPL-MCNC: 0.7 MG/DL (ref 0.1–1)
BUN SERPL-MCNC: 17 MG/DL (ref 8–23)
CALCIUM SERPL-MCNC: 10.5 MG/DL (ref 8.7–10.5)
CHLORIDE SERPL-SCNC: 103 MMOL/L (ref 95–110)
CHOLEST SERPL-MCNC: 104 MG/DL (ref 120–199)
CHOLEST/HDLC SERPL: 2.6 {RATIO} (ref 2–5)
CO2 SERPL-SCNC: 24 MMOL/L (ref 23–29)
CREAT SERPL-MCNC: 1.2 MG/DL (ref 0.5–1.4)
DIFFERENTIAL METHOD: ABNORMAL
EOSINOPHIL # BLD AUTO: 0.6 K/UL (ref 0–0.5)
EOSINOPHIL NFR BLD: 7.7 % (ref 0–8)
ERYTHROCYTE [DISTWIDTH] IN BLOOD BY AUTOMATED COUNT: 14.6 % (ref 11.5–14.5)
EST. GFR  (NO RACE VARIABLE): >60 ML/MIN/1.73 M^2
ESTIMATED AVG GLUCOSE: 189 MG/DL (ref 68–131)
GLUCOSE SERPL-MCNC: 126 MG/DL (ref 70–110)
HBA1C MFR BLD: 8.2 % (ref 4–5.6)
HCT VFR BLD AUTO: 42.6 % (ref 40–54)
HDLC SERPL-MCNC: 40 MG/DL (ref 40–75)
HDLC SERPL: 38.5 % (ref 20–50)
HGB BLD-MCNC: 14.2 G/DL (ref 14–18)
IMM GRANULOCYTES # BLD AUTO: 0.01 K/UL (ref 0–0.04)
IMM GRANULOCYTES NFR BLD AUTO: 0.1 % (ref 0–0.5)
LDLC SERPL CALC-MCNC: 45.8 MG/DL (ref 63–159)
LYMPHOCYTES # BLD AUTO: 2.7 K/UL (ref 1–4.8)
LYMPHOCYTES NFR BLD: 35.2 % (ref 18–48)
MCH RBC QN AUTO: 33.1 PG (ref 27–31)
MCHC RBC AUTO-ENTMCNC: 33.3 G/DL (ref 32–36)
MCV RBC AUTO: 99 FL (ref 82–98)
MONOCYTES # BLD AUTO: 0.7 K/UL (ref 0.3–1)
MONOCYTES NFR BLD: 8.6 % (ref 4–15)
NEUTROPHILS # BLD AUTO: 3.6 K/UL (ref 1.8–7.7)
NEUTROPHILS NFR BLD: 48 % (ref 38–73)
NONHDLC SERPL-MCNC: 64 MG/DL
NRBC BLD-RTO: 0 /100 WBC
PLATELET # BLD AUTO: 261 K/UL (ref 150–450)
PMV BLD AUTO: 11.1 FL (ref 9.2–12.9)
POTASSIUM SERPL-SCNC: 4.4 MMOL/L (ref 3.5–5.1)
PROT SERPL-MCNC: 7.8 G/DL (ref 6–8.4)
RBC # BLD AUTO: 4.29 M/UL (ref 4.6–6.2)
SODIUM SERPL-SCNC: 142 MMOL/L (ref 136–145)
TRIGL SERPL-MCNC: 91 MG/DL (ref 30–150)
WBC # BLD AUTO: 7.55 K/UL (ref 3.9–12.7)

## 2023-08-05 PROCEDURE — 80061 LIPID PANEL: CPT | Performed by: STUDENT IN AN ORGANIZED HEALTH CARE EDUCATION/TRAINING PROGRAM

## 2023-08-05 PROCEDURE — 83036 HEMOGLOBIN GLYCOSYLATED A1C: CPT | Performed by: STUDENT IN AN ORGANIZED HEALTH CARE EDUCATION/TRAINING PROGRAM

## 2023-08-05 PROCEDURE — 85025 COMPLETE CBC W/AUTO DIFF WBC: CPT | Performed by: STUDENT IN AN ORGANIZED HEALTH CARE EDUCATION/TRAINING PROGRAM

## 2023-08-05 PROCEDURE — 36415 COLL VENOUS BLD VENIPUNCTURE: CPT | Mod: PO | Performed by: STUDENT IN AN ORGANIZED HEALTH CARE EDUCATION/TRAINING PROGRAM

## 2023-08-05 PROCEDURE — 80053 COMPREHEN METABOLIC PANEL: CPT | Performed by: STUDENT IN AN ORGANIZED HEALTH CARE EDUCATION/TRAINING PROGRAM

## 2023-08-08 ENCOUNTER — OFFICE VISIT (OUTPATIENT)
Dept: FAMILY MEDICINE | Facility: CLINIC | Age: 73
End: 2023-08-08
Payer: MEDICARE

## 2023-08-08 VITALS
HEART RATE: 86 BPM | OXYGEN SATURATION: 97 % | BODY MASS INDEX: 25.92 KG/M2 | WEIGHT: 165.13 LBS | SYSTOLIC BLOOD PRESSURE: 120 MMHG | DIASTOLIC BLOOD PRESSURE: 70 MMHG | HEIGHT: 67 IN | RESPIRATION RATE: 18 BRPM

## 2023-08-08 DIAGNOSIS — B35.4 TINEA CORPORIS: ICD-10-CM

## 2023-08-08 DIAGNOSIS — E78.2 MIXED DIABETIC HYPERLIPIDEMIA ASSOCIATED WITH TYPE 2 DIABETES MELLITUS: ICD-10-CM

## 2023-08-08 DIAGNOSIS — I10 BENIGN ESSENTIAL HTN: ICD-10-CM

## 2023-08-08 DIAGNOSIS — Z00.00 ROUTINE GENERAL MEDICAL EXAMINATION AT A HEALTH CARE FACILITY: Primary | ICD-10-CM

## 2023-08-08 DIAGNOSIS — E11.69 MIXED DIABETIC HYPERLIPIDEMIA ASSOCIATED WITH TYPE 2 DIABETES MELLITUS: ICD-10-CM

## 2023-08-08 DIAGNOSIS — E11.3393 CONTROLLED TYPE 2 DIABETES MELLITUS WITH BOTH EYES AFFECTED BY MODERATE NONPROLIFERATIVE RETINOPATHY WITHOUT MACULAR EDEMA, WITHOUT LONG-TERM CURRENT USE OF INSULIN: ICD-10-CM

## 2023-08-08 PROCEDURE — 99999 PR PBB SHADOW E&M-EST. PATIENT-LVL IV: ICD-10-PCS | Mod: PBBFAC,,, | Performed by: STUDENT IN AN ORGANIZED HEALTH CARE EDUCATION/TRAINING PROGRAM

## 2023-08-08 PROCEDURE — 99214 OFFICE O/P EST MOD 30 MIN: CPT | Mod: S$PBB,,, | Performed by: STUDENT IN AN ORGANIZED HEALTH CARE EDUCATION/TRAINING PROGRAM

## 2023-08-08 PROCEDURE — 99214 PR OFFICE/OUTPT VISIT, EST, LEVL IV, 30-39 MIN: ICD-10-PCS | Mod: S$PBB,,, | Performed by: STUDENT IN AN ORGANIZED HEALTH CARE EDUCATION/TRAINING PROGRAM

## 2023-08-08 PROCEDURE — 99214 OFFICE O/P EST MOD 30 MIN: CPT | Mod: PBBFAC,PO | Performed by: STUDENT IN AN ORGANIZED HEALTH CARE EDUCATION/TRAINING PROGRAM

## 2023-08-08 PROCEDURE — 99999 PR PBB SHADOW E&M-EST. PATIENT-LVL IV: CPT | Mod: PBBFAC,,, | Performed by: STUDENT IN AN ORGANIZED HEALTH CARE EDUCATION/TRAINING PROGRAM

## 2023-08-08 RX ORDER — ORAL SEMAGLUTIDE 3 MG/1
3 TABLET ORAL DAILY
Qty: 30 TABLET | Refills: 0 | Status: SHIPPED | OUTPATIENT
Start: 2023-08-08 | End: 2023-09-11

## 2023-08-08 RX ORDER — ATORVASTATIN CALCIUM 10 MG/1
10 TABLET, FILM COATED ORAL DAILY
Qty: 90 TABLET | Refills: 4 | Status: SHIPPED | OUTPATIENT
Start: 2023-08-08 | End: 2023-09-08 | Stop reason: SDUPTHER

## 2023-08-08 RX ORDER — KETOCONAZOLE 20 MG/ML
SHAMPOO, SUSPENSION TOPICAL
Qty: 120 ML | Refills: 2 | Status: SHIPPED | OUTPATIENT
Start: 2023-08-10 | End: 2023-11-30

## 2023-08-08 NOTE — PROGRESS NOTES
"[facility]    Patient Name: Jorge Becerra  YOB: 1950    PRESENTING HISTORY     History of Present Illness:  Mr. Jorge Becerra is a 72 y.o. male here for routine f/u.     Home blood sugars running about 160 average. A1c elevated >8.   Checks BG in morning  Dietary adherence is poor:  Yest:   Donuts, coffee  Bread, mashed ptoatoes, jasmyn, apple, milk  Rest of day snacks  Water, sugar free soda, cake    On Jardiance 25mg, metformin 1000mg BID.         On review of labs Cr elevation. Will recheck next week. No proteinuria.       Total cholesterol about 100, will try backing down on statin.       C/o spots on BLE. Small. Not painful or itchy.       HTN- very well controlled.     ROS      OBJECTIVE:   Vital Signs:  Vitals:    08/08/23 1406   BP: 136/80   Pulse: 86   Resp: 18   SpO2: 97%   Weight: 74.9 kg (165 lb 2 oz)   Height: 5' 7" (1.702 m)          Physical Exam  Vitals and nursing note reviewed.   Constitutional:       General: He is not in acute distress.     Appearance: He is not toxic-appearing or diaphoretic.   HENT:      Head: Normocephalic and atraumatic.      Right Ear: External ear normal.      Left Ear: External ear normal.   Eyes:      General: No scleral icterus.     Conjunctiva/sclera: Conjunctivae normal.      Pupils: Pupils are equal, round, and reactive to light.   Neck:      Thyroid: No thyromegaly.      Vascular: No carotid bruit.   Cardiovascular:      Rate and Rhythm: Normal rate and regular rhythm.      Heart sounds: Normal heart sounds. No murmur heard.  Pulmonary:      Effort: Pulmonary effort is normal. No respiratory distress.      Breath sounds: Normal breath sounds. No wheezing or rales.   Musculoskeletal:         General: No tenderness or deformity. Normal range of motion.      Cervical back: Normal range of motion and neck supple.      Right lower leg: No edema.      Left lower leg: No edema.   Lymphadenopathy:      Cervical: No cervical adenopathy. "   Skin:     General: Skin is warm and dry.      Comments: Small, punctate areas lacking pigmentation in circular pattern. Scattered to BLE.    Neurological:      Mental Status: He is alert and oriented to person, place, and time.      Gait: Gait is intact.   Psychiatric:         Mood and Affect: Mood and affect normal.         Cognition and Memory: Memory normal.         Judgment: Judgment normal.         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  Dispense: 30 tablet; Refill: 0  -     BASIC METABOLIC PANEL; Future; Expected date: 08/08/2023    Mixed diabetic hyperlipidemia associated with type 2 diabetes mellitus  -     atorvastatin (LIPITOR) 10 MG tablet; Take 1 tablet (10 mg total) by mouth once daily.  Dispense: 90 tablet; Refill: 4  -     BASIC METABOLIC PANEL; Future; Expected date: 08/08/2023    Tinea corporis  -     ketoconazole (NIZORAL) 2 % shampoo; Apply topically twice a week.  Dispense: 120 mL; Refill: 2  Vs vitiligo. Trial of above.     Benign essential HTN  Continue current medications             Mehul Royal MD   Internal Medicine

## 2023-08-15 ENCOUNTER — LAB VISIT (OUTPATIENT)
Dept: LAB | Facility: HOSPITAL | Age: 73
End: 2023-08-15
Attending: STUDENT IN AN ORGANIZED HEALTH CARE EDUCATION/TRAINING PROGRAM
Payer: MEDICARE

## 2023-08-15 DIAGNOSIS — E11.3393 CONTROLLED TYPE 2 DIABETES MELLITUS WITH BOTH EYES AFFECTED BY MODERATE NONPROLIFERATIVE RETINOPATHY WITHOUT MACULAR EDEMA, WITHOUT LONG-TERM CURRENT USE OF INSULIN: ICD-10-CM

## 2023-08-15 DIAGNOSIS — E11.69 MIXED DIABETIC HYPERLIPIDEMIA ASSOCIATED WITH TYPE 2 DIABETES MELLITUS: ICD-10-CM

## 2023-08-15 DIAGNOSIS — E78.2 MIXED DIABETIC HYPERLIPIDEMIA ASSOCIATED WITH TYPE 2 DIABETES MELLITUS: ICD-10-CM

## 2023-08-15 LAB
ANION GAP SERPL CALC-SCNC: 17 MMOL/L (ref 8–16)
BUN SERPL-MCNC: 12 MG/DL (ref 8–23)
CALCIUM SERPL-MCNC: 10.3 MG/DL (ref 8.7–10.5)
CHLORIDE SERPL-SCNC: 101 MMOL/L (ref 95–110)
CO2 SERPL-SCNC: 23 MMOL/L (ref 23–29)
CREAT SERPL-MCNC: 1.2 MG/DL (ref 0.5–1.4)
EST. GFR  (NO RACE VARIABLE): >60 ML/MIN/1.73 M^2
GLUCOSE SERPL-MCNC: 230 MG/DL (ref 70–110)
POTASSIUM SERPL-SCNC: 4.7 MMOL/L (ref 3.5–5.1)
SODIUM SERPL-SCNC: 141 MMOL/L (ref 136–145)

## 2023-08-15 PROCEDURE — 80048 BASIC METABOLIC PNL TOTAL CA: CPT | Performed by: STUDENT IN AN ORGANIZED HEALTH CARE EDUCATION/TRAINING PROGRAM

## 2023-08-15 PROCEDURE — 36415 COLL VENOUS BLD VENIPUNCTURE: CPT | Mod: PO | Performed by: STUDENT IN AN ORGANIZED HEALTH CARE EDUCATION/TRAINING PROGRAM

## 2023-08-24 ENCOUNTER — PATIENT MESSAGE (OUTPATIENT)
Dept: FAMILY MEDICINE | Facility: CLINIC | Age: 73
End: 2023-08-24
Payer: MEDICARE

## 2023-09-08 ENCOUNTER — OFFICE VISIT (OUTPATIENT)
Dept: CARDIOLOGY | Facility: CLINIC | Age: 73
End: 2023-09-08
Payer: MEDICARE

## 2023-09-08 ENCOUNTER — PATIENT MESSAGE (OUTPATIENT)
Dept: FAMILY MEDICINE | Facility: CLINIC | Age: 73
End: 2023-09-08
Payer: MEDICARE

## 2023-09-08 VITALS
HEART RATE: 76 BPM | RESPIRATION RATE: 16 BRPM | OXYGEN SATURATION: 97 % | WEIGHT: 160 LBS | SYSTOLIC BLOOD PRESSURE: 124 MMHG | DIASTOLIC BLOOD PRESSURE: 64 MMHG | HEIGHT: 67 IN | BODY MASS INDEX: 25.11 KG/M2

## 2023-09-08 DIAGNOSIS — E11.69 MIXED DIABETIC HYPERLIPIDEMIA ASSOCIATED WITH TYPE 2 DIABETES MELLITUS: ICD-10-CM

## 2023-09-08 DIAGNOSIS — E11.9 TYPE 2 DIABETES MELLITUS WITHOUT COMPLICATION, WITHOUT LONG-TERM CURRENT USE OF INSULIN: Primary | ICD-10-CM

## 2023-09-08 DIAGNOSIS — I12.9 HYPERTENSION ASSOCIATED WITH STAGE 2 CHRONIC KIDNEY DISEASE DUE TO TYPE 2 DIABETES MELLITUS: ICD-10-CM

## 2023-09-08 DIAGNOSIS — N18.2 HYPERTENSION ASSOCIATED WITH STAGE 2 CHRONIC KIDNEY DISEASE DUE TO TYPE 2 DIABETES MELLITUS: ICD-10-CM

## 2023-09-08 DIAGNOSIS — E11.22 HYPERTENSION ASSOCIATED WITH STAGE 2 CHRONIC KIDNEY DISEASE DUE TO TYPE 2 DIABETES MELLITUS: ICD-10-CM

## 2023-09-08 DIAGNOSIS — E78.2 MIXED DIABETIC HYPERLIPIDEMIA ASSOCIATED WITH TYPE 2 DIABETES MELLITUS: ICD-10-CM

## 2023-09-08 DIAGNOSIS — R53.1 DECREASED STRENGTH: ICD-10-CM

## 2023-09-08 PROCEDURE — 99214 PR OFFICE/OUTPT VISIT, EST, LEVL IV, 30-39 MIN: ICD-10-PCS | Mod: S$PBB,,, | Performed by: INTERNAL MEDICINE

## 2023-09-08 PROCEDURE — 99214 OFFICE O/P EST MOD 30 MIN: CPT | Mod: PBBFAC,PN | Performed by: INTERNAL MEDICINE

## 2023-09-08 PROCEDURE — 99999 PR PBB SHADOW E&M-EST. PATIENT-LVL IV: CPT | Mod: PBBFAC,,, | Performed by: INTERNAL MEDICINE

## 2023-09-08 PROCEDURE — 99999 PR PBB SHADOW E&M-EST. PATIENT-LVL IV: ICD-10-PCS | Mod: PBBFAC,,, | Performed by: INTERNAL MEDICINE

## 2023-09-08 PROCEDURE — 99214 OFFICE O/P EST MOD 30 MIN: CPT | Mod: S$PBB,,, | Performed by: INTERNAL MEDICINE

## 2023-09-08 RX ORDER — ATORVASTATIN CALCIUM 20 MG/1
20 TABLET, FILM COATED ORAL NIGHTLY
Qty: 90 TABLET | Refills: 3 | Status: SHIPPED | OUTPATIENT
Start: 2023-09-08 | End: 2024-09-07

## 2023-09-08 NOTE — ASSESSMENT & PLAN NOTE
Muscle strengthening right lower extremity seemed to have improved.  He is still continuing to do my exercises.

## 2023-09-08 NOTE — ASSESSMENT & PLAN NOTE
Arterial hypertension is reasonably controlled at 124/64 mm Hg continue on amlodipine 5 mg once a day, fosinopril 40 mg daily and maintain on low-salt carbohydrate restricted diet.

## 2023-09-08 NOTE — ASSESSMENT & PLAN NOTE
His A1c was elevated and more recent medication semaglutide was added to his regimen.  Continue on metformin and continue on Jardiance the goal is to get his A1c 6.5 or less.  And also to keep his LDL cholesterol below 60 ideally.

## 2023-09-08 NOTE — PROGRESS NOTES
Subjective:    Patient ID:  Jorge Becerra is a 72 y.o. male patient here for evaluation Follow-up      History of Present Illness:   Defer year old gentleman with history of arterial hypertension type 2 diabetes for at least 20 years duration seeking follow-up evaluation.  He is doing remarkably well he is no occurrence of any angina shortness of breath PND orthopnea noted.  More recently he was seen by primary care advised him to cut back on Lipitor to 10 mg and start him on Rebylsus so far he has been tolerating this fairly well in the last 2 weeks.    His effort capacity is good no anginal symptoms are noted no nausea heartburn belching burping no constipation      Review of patient's allergies indicates:  No Known Allergies    Past Medical History:   Diagnosis Date    Diabetes mellitus, type 2     Hypertension     Non Hodgkin's lymphoma      Past Surgical History:   Procedure Laterality Date    TONSILLECTOMY       Social History     Tobacco Use    Smoking status: Never    Smokeless tobacco: Never   Substance Use Topics    Alcohol use: Never        Review of Systems:    As noted in HPI in addition      REVIEW OF SYSTEMS  CARDIOVASCULAR: No recent chest pain, palpitations, arm, neck, or jaw pain  RESPIRATORY: No recent fever, cough chills, SOB or congestion  : No blood in the urine  GI: No Nausea, vomiting, constipation, diarrhea, blood, or reflux.  MUSCULOSKELETAL: No myalgias  NEURO: No lightheadedness or dizziness  EYES: No Double vision, blurry, vision or headache              Objective        Vitals:    09/08/23 1109   BP: 124/64   Pulse: 76   Resp: 16       LIPIDS - LAST 2   Lab Results   Component Value Date    CHOL 104 (L) 08/05/2023    CHOL 90 (L) 08/01/2022    CHOL 90 (L) 08/01/2022    HDL 40 08/05/2023    HDL 38 (L) 08/01/2022    HDL 38 (L) 08/01/2022    LDLCALC 45.8 (L) 08/05/2023    LDLCALC 28.4 (L) 08/01/2022    LDLCALC 28.4 (L) 08/01/2022    TRIG 91 08/05/2023    TRIG 118 08/01/2022    TRIG 118  "08/01/2022    CHOLHDL 38.5 08/05/2023    CHOLHDL 42.2 08/01/2022    CHOLHDL 42.2 08/01/2022       CBC - LAST 2  Lab Results   Component Value Date    WBC 7.55 08/05/2023    WBC 6.30 02/01/2022    RBC 4.29 (L) 08/05/2023    RBC 3.67 (L) 02/01/2022    HGB 14.2 08/05/2023    HGB 10.8 (L) 02/01/2022    HCT 42.6 08/05/2023    HCT 35.2 (L) 02/01/2022    MCV 99 (H) 08/05/2023    MCV 96 02/01/2022    MCH 33.1 (H) 08/05/2023    MCH 29.4 02/01/2022    MCHC 33.3 08/05/2023    MCHC 30.7 (L) 02/01/2022    RDW 14.6 (H) 08/05/2023    RDW 16.0 (H) 02/01/2022     08/05/2023     05/02/2023    MPV 11.1 08/05/2023    MPV 10.4 05/02/2023    GRAN 3.6 08/05/2023    GRAN 48.0 08/05/2023    LYMPH 2.7 08/05/2023    LYMPH 35.2 08/05/2023    MONO 0.7 08/05/2023    MONO 8.6 08/05/2023    BASO 0.03 08/05/2023    BASO 0.02 02/01/2022    NRBC 0 08/05/2023    NRBC 0 02/01/2022       CHEMISTRY & LIVER FUNCTION - LAST 2  Lab Results   Component Value Date     08/15/2023     08/05/2023    K 4.7 08/15/2023    K 4.4 08/05/2023     08/15/2023     08/05/2023    CO2 23 08/15/2023    CO2 24 08/05/2023    ANIONGAP 17 (H) 08/15/2023    ANIONGAP 15 08/05/2023    BUN 12 08/15/2023    BUN 17 08/05/2023    CREATININE 1.2 08/15/2023    CREATININE 1.2 08/05/2023     (H) 08/15/2023     (H) 08/05/2023    CALCIUM 10.3 08/15/2023    CALCIUM 10.5 08/05/2023    MG 1.9 02/07/2023    MG 1.9 08/18/2021    ALBUMIN 4.3 08/05/2023    ALBUMIN 4.2 02/07/2023    PROT 7.8 08/05/2023    PROT 7.5 02/07/2023    ALKPHOS 73 08/05/2023    ALKPHOS 74 02/07/2023    ALT 31 08/05/2023    ALT 29 02/07/2023    AST 35 08/05/2023    AST 35 02/07/2023    BILITOT 0.7 08/05/2023    BILITOT 0.8 02/07/2023        CARDIAC PROFILE - LAST 2  No results found for: "BNP", "CPK", "CPKMB", "LDH", "TROPONINI", "TROPONINIHS"     COAGULATION - LAST 2  Lab Results   Component Value Date    INR 1.0 05/31/2021       ENDOCRINE & PSA - LAST 2  Lab Results "   Component Value Date    HGBA1C 8.2 (H) 08/05/2023    HGBA1C 8.6 (H) 02/07/2023    MICROALBUR 1.2 10/31/2019    MICROALBUR 1.7 10/15/2018    TSH 2.409 02/07/2023    TSH 2.520 05/04/2021    PSA 0.65 10/23/2020        ECHOCARDIOGRAM RESULTS  Results for orders placed during the hospital encounter of 02/15/23    Echo    Interpretation Summary  · The left ventricle is normal in size with concentric remodeling and normal systolic function.  · The estimated ejection fraction is 68%.  · Normal left ventricular diastolic function.  · Mild left atrial enlargement.  · Mild right ventricular enlargement with normal right ventricular systolic function.  · Mild right atrial enlargement.  · Normal central venous pressure (3 mmHg).  · The estimated PA systolic pressure is 27 mmHg.      CURRENT/PREVIOUS VISIT EKG  Results for orders placed or performed in visit on 01/26/23   IN OFFICE EKG 12-LEAD (to [place])    Collection Time: 01/26/23 12:09 PM    Narrative    Test Reason : E11.22,I12.9,N18.2,E11.69,E78.2,E11.3393,R00.2,    Vent. Rate : 074 BPM     Atrial Rate : 074 BPM     P-R Int : 124 ms          QRS Dur : 084 ms      QT Int : 394 ms       P-R-T Axes : -27 -24 032 degrees     QTc Int : 437 ms    Normal sinus rhythm  Minimal voltage criteria for LVH, may be normal variant ( R in aVL )  Borderline Abnormal ECG  No previous ECGs available  Confirmed by Vinicius Uribe MD ([id number]) on 1/29/2023 2:05:24 PM    Referred By: RODERICK MEADOWS           Confirmed By:Vinicius Uribe MD     No valid procedures specified.   Results for orders placed during the hospital encounter of 02/15/23    Nuclear Stress - Cardiology Interpreted    Interpretation Summary    Normal myocardial perfusion scan. There is no evidence of myocardial ischemia or infarction.    The gated perfusion images showed an ejection fraction of 65% at rest. The gated perfusion images showed an ejection fraction of 73% post stress. Normal ejection fraction is greater than 53%.     There is normal wall motion at rest and post stress.    LV cavity size is normal at rest and normal at stress.    The ECG portion of the study is negative for ischemia.    The patient reported no chest pain during the stress test.    During stress, rare PACs are noted. , During stress, rare PVCs are noted.    The patient exercised for 3 minutes 3 seconds on a Charli protocol, corresponding to a functional capacity of 5 METS, achieving a peak heart rate of 142 bpm, which is 97 % of the age predicted maximum heart rate.    No valid procedures specified.    PHYSICAL EXAM  CONSTITUTIONAL: Well built, well nourished in no apparent distress  NECK: no carotid bruit, no JVD  LUNGS: CTA  CHEST WALL: no tenderness  HEART: regular rate and rhythm, S1, S2 normal, no murmur, click, rub or gallop   ABDOMEN: soft, non-tender; bowel sounds normal; no masses,  no organomegaly  EXTREMITIES: Extremities normal, no edema, no calf tenderness noted  NEURO: AAO X 3    I HAVE REVIEWED :    The vital signs, nurses notes, and all the pertinent radiology and labs.        Current Outpatient Medications   Medication Instructions    allopurinoL (ZYLOPRIM) 300 mg, Oral, Every other day    amLODIPine (NORVASC) 5 MG tablet TAKE 1 TABLET BY MOUTH EVERY DAY    aspirin (ECOTRIN) 81 mg, Oral, Daily    atorvastatin (LIPITOR) 10 mg, Oral, Daily    cholecalciferol, vitamin D3, 10 mcg (400 unit) Cap capsule 1 tablet, Oral, Daily    donepeziL (ARICEPT) 5 MG tablet TAKE 1 TABLET BY MOUTH EVERY DAY IN THE EVENING    empagliflozin (JARDIANCE) 25 mg, Oral, Daily    ferrous sulfate (FEOSOL) 325 mg, Oral, With breakfast    fosinopriL (MONOPRIL) 40 mg, Oral, Daily    ketoconazole (NIZORAL) 2 % shampoo Topical (Top), Twice weekly    magnesium oxide 400 mg magnesium Cap 1 tablet, Oral, 2 times daily    metFORMIN (GLUCOPHAGE) 1000 MG tablet TAKE 1 TABLET BY MOUTH TWICE A DAY    RYBELSUS 3 mg, Oral, Daily    vit C/E/zinc/lutein/zeaxanthin (OCUVITE EYE HEALTH ORAL) 1  tablet, Oral, 2 times daily          Assessment & Plan     Hypertension associated with stage 2 chronic 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    Mixed diabetic hyperlipidemia associated with type 2 diabetes mellitus  I have encouraged him to stay on Lipitor 20 mg once daily continue to maintain on low-fat low-cholesterol diet and daily physical activity as tolerated.    Type 2 diabetes mellitus without complication, without long-term current use of insulin  His A1c was elevated and more recent medication semaglutide was added to his regimen.  Continue on metformin and continue on Jardiance the goal is to get his A1c 6.5 or less.  And also to keep his LDL cholesterol below 60 ideally.    Decreased strength  Muscle strengthening right lower extremity seemed to have improved.  He is still continuing to do my exercises.          Follow up in about 6 months (around 3/8/2024).

## 2023-09-11 ENCOUNTER — PATIENT MESSAGE (OUTPATIENT)
Dept: FAMILY MEDICINE | Facility: CLINIC | Age: 73
End: 2023-09-11
Payer: MEDICARE

## 2023-09-11 DIAGNOSIS — E11.3393 CONTROLLED TYPE 2 DIABETES MELLITUS WITH BOTH EYES AFFECTED BY MODERATE NONPROLIFERATIVE RETINOPATHY WITHOUT MACULAR EDEMA, WITHOUT LONG-TERM CURRENT USE OF INSULIN: Primary | ICD-10-CM

## 2023-09-11 RX ORDER — ORAL SEMAGLUTIDE 7 MG/1
7 TABLET ORAL DAILY
Qty: 90 TABLET | Refills: 0 | Status: SHIPPED | OUTPATIENT
Start: 2023-09-11 | End: 2023-11-20 | Stop reason: SDUPTHER

## 2023-10-12 NOTE — ASSESSMENT & PLAN NOTE
I have encouraged him to stay on Lipitor 20 mg once daily continue to maintain on low-fat low-cholesterol diet and daily physical activity as tolerated.   Rhomboid Transposition Flap Text: The defect edges were debeveled with a #15 scalpel blade. Given the location of the defect and the proximity to free margins a rhomboid transposition flap was deemed most appropriate. Using a sterile surgical marker, an appropriate rhomboid flap was drawn incorporating the defect. The area thus outlined was incised deep to adipose tissue with a #15 scalpel blade. The skin margins were undermined to an appropriate distance in all directions utilizing iris scissors. Following this, the designed flap was carried over into the primary defect and sutured into place.

## 2023-10-20 ENCOUNTER — TELEPHONE (OUTPATIENT)
Dept: ADMINISTRATIVE | Facility: CLINIC | Age: 73
End: 2023-10-20
Payer: MEDICARE

## 2023-10-23 ENCOUNTER — OFFICE VISIT (OUTPATIENT)
Dept: FAMILY MEDICINE | Facility: CLINIC | Age: 73
End: 2023-10-23
Payer: MEDICARE

## 2023-10-23 VITALS
WEIGHT: 161.38 LBS | HEART RATE: 91 BPM | HEIGHT: 67 IN | SYSTOLIC BLOOD PRESSURE: 121 MMHG | DIASTOLIC BLOOD PRESSURE: 78 MMHG | BODY MASS INDEX: 25.33 KG/M2 | OXYGEN SATURATION: 97 % | TEMPERATURE: 98 F

## 2023-10-23 DIAGNOSIS — E78.2 MIXED DIABETIC HYPERLIPIDEMIA ASSOCIATED WITH TYPE 2 DIABETES MELLITUS: ICD-10-CM

## 2023-10-23 DIAGNOSIS — I15.2 HYPERTENSION ASSOCIATED WITH TYPE 2 DIABETES MELLITUS: ICD-10-CM

## 2023-10-23 DIAGNOSIS — E11.59 HYPERTENSION ASSOCIATED WITH TYPE 2 DIABETES MELLITUS: ICD-10-CM

## 2023-10-23 DIAGNOSIS — E11.3393 CONTROLLED TYPE 2 DIABETES MELLITUS WITH BOTH EYES AFFECTED BY MODERATE NONPROLIFERATIVE RETINOPATHY WITHOUT MACULAR EDEMA, WITHOUT LONG-TERM CURRENT USE OF INSULIN: ICD-10-CM

## 2023-10-23 DIAGNOSIS — Z00.00 ENCOUNTER FOR PREVENTIVE HEALTH EXAMINATION: Primary | ICD-10-CM

## 2023-10-23 DIAGNOSIS — E11.69 MIXED DIABETIC HYPERLIPIDEMIA ASSOCIATED WITH TYPE 2 DIABETES MELLITUS: ICD-10-CM

## 2023-10-23 PROCEDURE — 99999 PR PBB SHADOW E&M-EST. PATIENT-LVL IV: CPT | Mod: PBBFAC,,, | Performed by: NURSE PRACTITIONER

## 2023-10-23 PROCEDURE — 99999 PR PBB SHADOW E&M-EST. PATIENT-LVL IV: ICD-10-PCS | Mod: PBBFAC,,, | Performed by: NURSE PRACTITIONER

## 2023-10-23 PROCEDURE — G0439 PR MEDICARE ANNUAL WELLNESS SUBSEQUENT VISIT: ICD-10-PCS | Mod: ,,, | Performed by: NURSE PRACTITIONER

## 2023-10-23 PROCEDURE — 99999PBSHW FLU VACCINE - QUADRIVALENT - ADJUVANTED: ICD-10-PCS | Mod: PBBFAC,,,

## 2023-10-23 PROCEDURE — 99999PBSHW FLU VACCINE - QUADRIVALENT - ADJUVANTED: Mod: PBBFAC,,,

## 2023-10-23 PROCEDURE — G0439 PPPS, SUBSEQ VISIT: HCPCS | Mod: ,,, | Performed by: NURSE PRACTITIONER

## 2023-10-23 PROCEDURE — 99214 OFFICE O/P EST MOD 30 MIN: CPT | Mod: PBBFAC,PO | Performed by: NURSE PRACTITIONER

## 2023-10-23 PROCEDURE — G0008 ADMIN INFLUENZA VIRUS VAC: HCPCS | Mod: PBBFAC,PO

## 2023-10-23 NOTE — PROGRESS NOTES
Patient verified by name and . Patient received high dose flu vaccine in right deltoid. Patient tolerated injection well. Patient advised to wait in clinic for 15 minutes in case of adverse reactions. Patient demonstrated understanding.

## 2023-10-23 NOTE — PROGRESS NOTES
"  Jorge Becerra presented for a  Medicare AWV and comprehensive Health Risk Assessment today. The following components were reviewed and updated:    Medical history  Family History  Social history  Allergies and Current Medications  Health Risk Assessment  Health Maintenance  Care Team         ** See Completed Assessments for Annual Wellness Visit within the encounter summary.**         The following assessments were completed:  Living Situation  CAGE  Depression Screening  Timed Get Up and Go  Whisper Test  Cognitive Function Screening  Nutrition Screening  ADL Screening  PAQ Screening    Clock in media     Vitals:    10/23/23 1559   BP: 121/78   Pulse: 91   Temp: 97.6 °F (36.4 °C)   TempSrc: Oral   SpO2: 97%   Weight: 73.2 kg (161 lb 6 oz)   Height: 5' 7" (1.702 m)     Body mass index is 25.28 kg/m².  Physical Exam  Constitutional:       Appearance: He is well-developed.   HENT:      Head: Normocephalic and atraumatic.      Right Ear: Hearing normal.      Left Ear: Hearing normal.      Nose: Nose normal.   Eyes:      General: Lids are normal.      Conjunctiva/sclera: Conjunctivae normal.      Pupils: Pupils are equal, round, and reactive to light.   Cardiovascular:      Rate and Rhythm: Normal rate.   Pulmonary:      Effort: Pulmonary effort is normal.   Abdominal:      Palpations: Abdomen is soft.   Musculoskeletal:         General: Normal range of motion.      Cervical back: Normal range of motion and neck supple.   Skin:     General: Skin is warm and dry.   Neurological:      Mental Status: He is alert and oriented to person, place, and time.               Diagnoses and health risks identified today and associated recommendations/orders:    1. Encounter for preventive health examination  Discussed health maintenance guidelines appropriate for age.    Review for Opioid Screening: Patient does not have rx for Opioids.    Review for Substance Use Disorders: Patient does not use substance.      2. Hypertension " associated with type 2 diabetes mellitus  Controlled, continue current medication regimen  Low salt diet  Increase physical activity  Followed by pcp      3. Mixed diabetic hyperlipidemia associated with type 2 diabetes mellitus  Controlled, continue current medication regimen  Patient taking statin  Followed by pcp      4. 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 Recommendations were developed using the USPSTF age appropriate recommendations. Education, counseling, and referrals were provided as needed. After Visit Summary printed and given to patient which includes a list of additional screenings\tests needed.    Follow up for One year for Annual Wellness Visit.    Katey Nolen NP      I offered to discuss advanced care planning, including how to pick a person who would make decisions for you if you were unable to make them for yourself, called a health care power of , and what kind of decisions you might make such as use of life sustaining treatments such as ventilators and tube feeding when faced with a life limiting illness recorded on a living will that they will need to know. (How you want to be cared for as you near the end of your natural life)     X Patient has one on file

## 2023-10-23 NOTE — PATIENT INSTRUCTIONS
Counseling and Referral of Other Preventative  (Italic type indicates deductible and co-insurance are waived)    Patient Name: Jorge Becerra  Today's Date: 10/23/2023    Health Maintenance       Date Due Completion Date    Shingles Vaccine (1 of 2) Never done ---    Foot Exam 08/01/2023 8/1/2022    Influenza Vaccine (1) 09/01/2023 10/27/2022    COVID-19 Vaccine (4 - 2023-24 season) 09/01/2023 9/20/2022    Hemoglobin A1c 11/05/2023 8/5/2023    Eye Exam 05/17/2024 5/17/2023    Diabetes Urine Screening 08/05/2024 8/5/2023    Lipid Panel 08/05/2024 8/5/2023    Low Dose Statin 09/08/2024 9/8/2023    Colorectal Cancer Screening 01/18/2026 1/18/2021    TETANUS VACCINE 04/14/2026 4/14/2016 (Done)    Override on 4/14/2016: Done ([facility])        No orders of the defined types were placed in this encounter.      The following information is provided to all patients.  This information is to help you find resources for any of the problems found today that may be affecting your health:                Living healthy guide: www.[facility].[place].gov      Understanding Diabetes: www.diabetes.org      Eating healthy: www.cdc.gov/healthyweight      CDC home safety checklist: www.cdc.gov/steadi/patient.html      Agency on Aging: www.goea.[place].[facility]      Alcoholics anonymous (AA): www.aa.org      Physical Activity: www.donna.nih.gov/jk3nvti      Tobacco use: www.quitwithusla.org

## 2023-11-14 ENCOUNTER — OFFICE VISIT (OUTPATIENT)
Dept: FAMILY MEDICINE | Facility: CLINIC | Age: 73
End: 2023-11-14
Payer: MEDICARE

## 2023-11-14 VITALS
HEIGHT: 67 IN | RESPIRATION RATE: 18 BRPM | SYSTOLIC BLOOD PRESSURE: 124 MMHG | DIASTOLIC BLOOD PRESSURE: 74 MMHG | OXYGEN SATURATION: 97 % | HEART RATE: 82 BPM | BODY MASS INDEX: 24.92 KG/M2 | WEIGHT: 158.75 LBS

## 2023-11-14 DIAGNOSIS — E11.3393 CONTROLLED TYPE 2 DIABETES MELLITUS WITH BOTH EYES AFFECTED BY MODERATE NONPROLIFERATIVE RETINOPATHY WITHOUT MACULAR EDEMA, WITHOUT LONG-TERM CURRENT USE OF INSULIN: ICD-10-CM

## 2023-11-14 DIAGNOSIS — E78.2 MIXED DIABETIC HYPERLIPIDEMIA ASSOCIATED WITH TYPE 2 DIABETES MELLITUS: Primary | ICD-10-CM

## 2023-11-14 DIAGNOSIS — E11.69 MIXED DIABETIC HYPERLIPIDEMIA ASSOCIATED WITH TYPE 2 DIABETES MELLITUS: Primary | ICD-10-CM

## 2023-11-14 DIAGNOSIS — I10 BENIGN ESSENTIAL HTN: ICD-10-CM

## 2023-11-14 PROCEDURE — 99214 OFFICE O/P EST MOD 30 MIN: CPT | Mod: PBBFAC,PO | Performed by: STUDENT IN AN ORGANIZED HEALTH CARE EDUCATION/TRAINING PROGRAM

## 2023-11-14 PROCEDURE — 99999 PR PBB SHADOW E&M-EST. PATIENT-LVL IV: ICD-10-PCS | Mod: PBBFAC,,, | Performed by: STUDENT IN AN ORGANIZED HEALTH CARE EDUCATION/TRAINING PROGRAM

## 2023-11-14 PROCEDURE — 99214 PR OFFICE/OUTPT VISIT, EST, LEVL IV, 30-39 MIN: ICD-10-PCS | Mod: S$PBB,,, | Performed by: STUDENT IN AN ORGANIZED HEALTH CARE EDUCATION/TRAINING PROGRAM

## 2023-11-14 PROCEDURE — 99214 OFFICE O/P EST MOD 30 MIN: CPT | Mod: S$PBB,,, | Performed by: STUDENT IN AN ORGANIZED HEALTH CARE EDUCATION/TRAINING PROGRAM

## 2023-11-14 PROCEDURE — 99999 PR PBB SHADOW E&M-EST. PATIENT-LVL IV: CPT | Mod: PBBFAC,,, | Performed by: STUDENT IN AN ORGANIZED HEALTH CARE EDUCATION/TRAINING PROGRAM

## 2023-11-14 NOTE — PROGRESS NOTES
"[facility] CLINIC NOTE    Patient Name: Jorge Becerra  YOB: 1950    PRESENTING HISTORY     History of Present Illness:  Mr. Jorge Becerra is a 72 y.o. male here for short f/u.     Walking more 1 hour 2-3 times weekly.     T2DM- running 120s. Tolerating medication. Has lost about 8 lbs (we need to monitor, don't want him losing any more).     HTN- very well controlled.     I reduced his statin at last OV.     Still seeing MD Morales, getting good reports.     ROS      OBJECTIVE:   Vital Signs:  Vitals:    11/14/23 1303   BP: 124/74   Pulse: 82   Resp: 18   SpO2: 97%   Weight: 72 kg (158 lb 11.7 oz)   Height: 5' 7" (1.702 m)          Physical Exam: Normal, no change.     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

## 2023-11-20 DIAGNOSIS — E11.3393 CONTROLLED TYPE 2 DIABETES MELLITUS WITH BOTH EYES AFFECTED BY MODERATE NONPROLIFERATIVE RETINOPATHY WITHOUT MACULAR EDEMA, WITHOUT LONG-TERM CURRENT USE OF INSULIN: ICD-10-CM

## 2023-11-21 NOTE — TELEPHONE ENCOUNTER
Care Due:                  Date            Visit Type   Department     Provider  --------------------------------------------------------------------------------                                EP -                              PRIMARY      JERMAINE Welch  Last Visit: 11-      CARE ([facility])   MEDICINE       N[place]                              EP -                              PRIMARY      [facility]    Mehul Welch  Next Visit: 05-      CARE ([facility])   MEDICINE       N[place]ari                                                            Last  Test          Frequency    Reason                     Performed    Due Date  --------------------------------------------------------------------------------    HBA1C.......  6 months...  empagliflozin, metFORMIN,   08- 02-                             semaglutide..............    Uric Acid...  12 months..  allopurinoL..............  02- 01-    [facility] Embedded Care Due Messages. Reference number: [id number].   11/20/2023 7:57:48 PM CST

## 2023-11-21 NOTE — TELEPHONE ENCOUNTER
Please see patient comment below:  Patient comment: Have 30 days left.          LR--9-11-23        LOV--11-14-23       FOV--5-22-24

## 2023-11-22 RX ORDER — ORAL SEMAGLUTIDE 7 MG/1
7 TABLET ORAL DAILY
Qty: 90 TABLET | Refills: 3 | Status: SHIPPED | OUTPATIENT
Start: 2023-11-22

## 2023-11-29 LAB
LEFT EYE DM RETINOPATHY: POSITIVE
RIGHT EYE DM RETINOPATHY: POSITIVE

## 2023-11-30 DIAGNOSIS — B35.4 TINEA CORPORIS: ICD-10-CM

## 2023-11-30 RX ORDER — KETOCONAZOLE 20 MG/ML
SHAMPOO, SUSPENSION TOPICAL
Qty: 120 ML | Refills: 2 | Status: SHIPPED | OUTPATIENT
Start: 2023-11-30

## 2023-11-30 NOTE — TELEPHONE ENCOUNTER
Refill Routing Note   Medication(s) are not appropriate for processing by [facility] for the following reason(s):        Outside of protocol    [facility] action(s):  Route               Appointments  past 12m or future 3m with PCP    Date Provider   Last Visit   11/14/2023 Mehul Royal MD   Next Visit   5/22/2024 Mehul Royal MD   ED visits in past 90 days: 0        Note composed:8:54 AM 11/30/2023

## 2023-12-01 ENCOUNTER — PATIENT OUTREACH (OUTPATIENT)
Dept: ADMINISTRATIVE | Facility: HOSPITAL | Age: 73
End: 2023-12-01
Payer: MEDICARE

## 2023-12-01 NOTE — PROGRESS NOTES
Population Health Chart Review & Patient Outreach Details    Outreach Performed: NO    Additional Pop Health Notes:           Updates Requested / Reviewed:      Updated Care Coordination Note, Care Everywhere, , Care Team Updated, and Immunizations Reconciliation Completed or Queried: [place]         Health Maintenance Topics Overdue:    Health Maintenance Due   Topic Date Due    Shingles Vaccine (1 of 2) Never done    RSV Vaccine (Age 60+ and Pregnant patients) (1 - 1-dose 60+ series) Never done    COVID-19 Vaccine (4 - 2023-24 season) 09/01/2023    Hemoglobin A1c  11/05/2023         Health Maintenance Topic(s) Outreach Outcomes & Actions Taken:    Eye Exam - Outreach Outcomes & Actions Taken  : Diabetic Eye External Records Uploaded, Care Team & History Updated if Applicable

## 2024-01-12 DIAGNOSIS — I10 BENIGN ESSENTIAL HTN: ICD-10-CM

## 2024-01-12 RX ORDER — AMLODIPINE BESYLATE 5 MG/1
5 TABLET ORAL DAILY
Qty: 90 TABLET | Refills: 3 | Status: SHIPPED | OUTPATIENT
Start: 2024-01-12

## 2024-01-12 NOTE — TELEPHONE ENCOUNTER
Refill Decision Note   Jorge Becerra  is requesting a refill authorization.  Brief Assessment and Rationale for Refill:  Approve     Medication Therapy Plan:         Comments:     Note composed:12:29 PM 01/12/2024

## 2024-01-12 NOTE — TELEPHONE ENCOUNTER
No care due was identified.  [facility] Embedded Care Due Messages. Reference number: [id number].   1/12/2024 12:12:36 AM CST

## 2024-01-25 DIAGNOSIS — G31.84 MILD COGNITIVE IMPAIRMENT: ICD-10-CM

## 2024-01-25 DIAGNOSIS — E11.3393 CONTROLLED TYPE 2 DIABETES MELLITUS WITH BOTH EYES AFFECTED BY MODERATE NONPROLIFERATIVE RETINOPATHY WITHOUT MACULAR EDEMA, WITHOUT LONG-TERM CURRENT USE OF INSULIN: ICD-10-CM

## 2024-01-25 RX ORDER — DONEPEZIL HYDROCHLORIDE 5 MG/1
TABLET, FILM COATED ORAL
Qty: 90 TABLET | Refills: 3 | Status: SHIPPED | OUTPATIENT
Start: 2024-01-25

## 2024-01-25 RX ORDER — METFORMIN HYDROCHLORIDE 1000 MG/1
TABLET ORAL
Qty: 180 TABLET | Refills: 0 | Status: SHIPPED | OUTPATIENT
Start: 2024-01-25 | End: 2024-04-23

## 2024-01-25 NOTE — TELEPHONE ENCOUNTER
Refill Routing Note   Medication(s) are not appropriate for processing by [facility] for the following reason(s):        Outside of protocol    [facility] action(s):  Route     Requires labs : Yes  Uric acid          Pharmacist review requested: Yes     Appointments  past 12m or future 3m with PCP    Date Provider   Last Visit   11/14/2023 Mehul Royal MD   Next Visit   5/22/2024 Mehul Royal MD   ED visits in past 90 days: 0        Note composed:11:18 AM 01/25/2024

## 2024-01-25 NOTE — TELEPHONE ENCOUNTER
Care Due:                  Date            Visit Type   Department     Provider  --------------------------------------------------------------------------------                                EP -                              PRIMARY      JERMAINE Welch  Last Visit: 11-      CARE ([facility])   MEDICINE       Naccari                              EP -                              PRIMARY      [facility] FAMILY Mehul Welch  Next Visit: 05-      CARE ([facility])   MEDICINE       Naccari                                                            Last  Test          Frequency    Reason                     Performed    Due Date  --------------------------------------------------------------------------------    HBA1C.......  6 months...  empagliflozin, metFORMIN,   08- 02-                             semaglutide..............    Uric Acid...  12 months..  allopurinoL..............  02- 01-    [facility] Embedded Care Due Messages. Reference number: [id number].   1/25/2024 12:13:34 AM CST

## 2024-01-25 NOTE — TELEPHONE ENCOUNTER
Refill Routing Note   Medication(s) are not appropriate for processing by [facility] for the following reason(s):      Medication outside of protocol  Drug-disease interaction    [facility] action(s):  Route Care Due:  None identified     Medication Therapy Plan: Drug-Disease: metFORMIN and Hypertension associated with stage 2 chronic kidney disease due to type 2 diabetes mellitus,  FLOS    Pharmacist review requested: Yes     Appointments  past 12m or future 3m with PCP    Date Provider   Last Visit   11/14/2023 Mehul Royal MD   Next Visit   5/22/2024 Mehul Royal MD   ED visits in past 90 days: 0        Note composed:8:44 AM 01/25/2024

## 2024-01-30 DIAGNOSIS — E11.3393 CONTROLLED TYPE 2 DIABETES MELLITUS WITH BOTH EYES AFFECTED BY MODERATE NONPROLIFERATIVE RETINOPATHY WITHOUT MACULAR EDEMA, WITHOUT LONG-TERM CURRENT USE OF INSULIN: ICD-10-CM

## 2024-01-30 RX ORDER — EMPAGLIFLOZIN 25 MG/1
25 TABLET, FILM COATED ORAL
Qty: 90 TABLET | Refills: 0 | Status: SHIPPED | OUTPATIENT
Start: 2024-01-30 | End: 2024-04-26

## 2024-01-30 NOTE — TELEPHONE ENCOUNTER
No care due was identified.  [facility] Embedded Care Due Messages. Reference number: [id number].   1/30/2024 12:17:10 AM CST

## 2024-01-30 NOTE — TELEPHONE ENCOUNTER
Refill Decision Note   Jorge Becerra  is requesting a refill authorization.  Brief Assessment and Rationale for Refill:  Defer  Approve     Medication Therapy Plan:  : JARDIANCE and Hypertension associated with type 2 diabetes mellitus    Medication Reconciliation Completed: No   Comments:     No Care Gaps recommended.     Note composed:10:40 AM 01/30/2024

## 2024-01-30 NOTE — TELEPHONE ENCOUNTER
Refill Routing Note   Medication(s) are not appropriate for processing by [facility] for the following reason(s):        Drug-disease interaction    [facility] action(s):  Defer        Medication Therapy Plan: : JARDIANCE and Hypertension associated with type 2 diabetes mellitus    Pharmacist review requested: Yes     Appointments  past 12m or future 3m with PCP    Date Provider   Last Visit   11/14/2023 Mehul Royal MD   Next Visit   5/30/2024 Mehul Royal MD   ED visits in past 90 days: 0        Note composed:10:07 AM 01/30/2024

## 2024-03-02 NOTE — TELEPHONE ENCOUNTER
----- Message from Linda Jeffers NP sent at 2/20/2023  2:18 PM CST -----  Looks ok no change in treatment plan  
Portal message read by pt.   
abnormal

## 2024-03-13 ENCOUNTER — PATIENT MESSAGE (OUTPATIENT)
Dept: FAMILY MEDICINE | Facility: CLINIC | Age: 74
End: 2024-03-13
Payer: MEDICARE

## 2024-03-21 DIAGNOSIS — E11.22 HYPERTENSION ASSOCIATED WITH STAGE 2 CHRONIC KIDNEY DISEASE DUE TO TYPE 2 DIABETES MELLITUS: ICD-10-CM

## 2024-03-21 DIAGNOSIS — I12.9 HYPERTENSION ASSOCIATED WITH STAGE 2 CHRONIC KIDNEY DISEASE DUE TO TYPE 2 DIABETES MELLITUS: ICD-10-CM

## 2024-03-21 DIAGNOSIS — N18.2 HYPERTENSION ASSOCIATED WITH STAGE 2 CHRONIC KIDNEY DISEASE DUE TO TYPE 2 DIABETES MELLITUS: ICD-10-CM

## 2024-03-21 RX ORDER — FOSINOPRIL SODIUM 40 MG/1
40 TABLET ORAL
Qty: 90 TABLET | Refills: 1 | Status: SHIPPED | OUTPATIENT
Start: 2024-03-21

## 2024-03-21 NOTE — TELEPHONE ENCOUNTER
Care Due:                  Date            Visit Type   Department     Provider  --------------------------------------------------------------------------------                                EP -                              [facility]      [facility]    Mehul Welch  Last Visit: 11-      CARE ([facility])   MEDICINE       Naccari                              EP -                              PRIMARY      [facility]    Mehul Gutierrezon  Next Visit: 05-      CARE ([facility])   MEDICINE       Naccari                                                            Last  Test          Frequency    Reason                     Performed    Due Date  --------------------------------------------------------------------------------    Uric Acid...  12 months..  allopurinoL..............  Not Found    Overdue    [employer] Embedded Care Due Messages. Reference number: [id number].   3/21/2024 2:44:31 AM CDT

## 2024-03-29 DIAGNOSIS — Z87.39 HISTORY OF GOUT: ICD-10-CM

## 2024-03-29 NOTE — TELEPHONE ENCOUNTER
Care Due:                  Date            Visit Type   Department     Provider  --------------------------------------------------------------------------------                                EP -                              PRIMARY      JERMAINE Welch  Last Visit: 11-      CARE ([facility])   MEDICINE       Naccari                              EP -                              PRIMARY      [facility]    Mehul Welch  Next Visit: 05-      CARE ([facility])   MEDICINE       Naccari                                                            Last  Test          Frequency    Reason                     Performed    Due Date  --------------------------------------------------------------------------------    HBA1C.......  6 months...  JARDIANCE, metFORMIN,      08- 02-                             semaglutide..............    [employer] Embedded Care Due Messages. Reference number: [id number].   3/29/2024 12:38:36 AM CDT

## 2024-03-30 NOTE — TELEPHONE ENCOUNTER
Refill Routing Note   Refill Routing Note   Medication(s) are not appropriate for processing by [facility] for the following reason(s):        Required labs outdated    [facility] action(s):  Defer   Requires labs : Yes      Medication Therapy Plan: A1C, URIC ACID      Appointments  past 12m or future 3m with PCP    Date Provider   Last Visit   11/14/2023 Mehul Royal MD   Next Visit   5/30/2024 Mehul Royal MD   ED visits in past 90 days: 0        Note composed:5:05 AM 03/30/2024

## 2024-04-02 RX ORDER — ALLOPURINOL 300 MG/1
300 TABLET ORAL EVERY OTHER DAY
Qty: 45 TABLET | Refills: 1 | Status: SHIPPED | OUTPATIENT
Start: 2024-04-02

## 2024-04-12 DIAGNOSIS — B35.4 TINEA CORPORIS: ICD-10-CM

## 2024-04-12 RX ORDER — KETOCONAZOLE 20 MG/ML
SHAMPOO, SUSPENSION TOPICAL
Qty: 120 ML | Refills: 2 | Status: SHIPPED | OUTPATIENT
Start: 2024-04-15

## 2024-04-12 NOTE — TELEPHONE ENCOUNTER
Refill Routing Note   Medication(s) are not appropriate for processing by [facility] for the following reason(s):        Outside of protocol    [facility] action(s):  Route               Appointments  past 12m or future 3m with PCP    Date Provider   Last Visit   11/14/2023 Mehul Royal MD   Next Visit   5/30/2024 Mehul Royal MD   ED visits in past 90 days: 0        Note composed:8:40 AM 04/12/2024

## 2024-04-21 DIAGNOSIS — E11.3393 CONTROLLED TYPE 2 DIABETES MELLITUS WITH BOTH EYES AFFECTED BY MODERATE NONPROLIFERATIVE RETINOPATHY WITHOUT MACULAR EDEMA, WITHOUT LONG-TERM CURRENT USE OF INSULIN: ICD-10-CM

## 2024-04-22 ENCOUNTER — OFFICE VISIT (OUTPATIENT)
Dept: CARDIOLOGY | Facility: CLINIC | Age: 74
End: 2024-04-22
Payer: MEDICARE

## 2024-04-22 VITALS
SYSTOLIC BLOOD PRESSURE: 124 MMHG | WEIGHT: 158 LBS | OXYGEN SATURATION: 99 % | HEART RATE: 78 BPM | HEIGHT: 67 IN | RESPIRATION RATE: 16 BRPM | BODY MASS INDEX: 24.8 KG/M2 | DIASTOLIC BLOOD PRESSURE: 78 MMHG

## 2024-04-22 DIAGNOSIS — E11.59 HYPERTENSION ASSOCIATED WITH TYPE 2 DIABETES MELLITUS: ICD-10-CM

## 2024-04-22 DIAGNOSIS — I15.2 HYPERTENSION ASSOCIATED WITH TYPE 2 DIABETES MELLITUS: ICD-10-CM

## 2024-04-22 DIAGNOSIS — E11.9 TYPE 2 DIABETES MELLITUS WITHOUT COMPLICATION, WITHOUT LONG-TERM CURRENT USE OF INSULIN: ICD-10-CM

## 2024-04-22 DIAGNOSIS — G31.84 MILD COGNITIVE IMPAIRMENT: ICD-10-CM

## 2024-04-22 DIAGNOSIS — I70.0 AORTIC ATHEROSCLEROSIS: Primary | ICD-10-CM

## 2024-04-22 DIAGNOSIS — R00.2 PALPITATIONS: ICD-10-CM

## 2024-04-22 DIAGNOSIS — E78.5 DYSLIPIDEMIA: ICD-10-CM

## 2024-04-22 PROCEDURE — 99999 PR PBB SHADOW E&M-EST. PATIENT-LVL IV: CPT | Mod: PBBFAC,,, | Performed by: INTERNAL MEDICINE

## 2024-04-22 PROCEDURE — 99213 OFFICE O/P EST LOW 20 MIN: CPT | Mod: S$PBB,,, | Performed by: INTERNAL MEDICINE

## 2024-04-22 PROCEDURE — 99214 OFFICE O/P EST MOD 30 MIN: CPT | Mod: PBBFAC,PN | Performed by: INTERNAL MEDICINE

## 2024-04-22 NOTE — ASSESSMENT & PLAN NOTE
He is presently on semaglutide tablets 7 mg, Jardiance 25 mg daily and continue the same.  Calorie restricted diet   See telephone encounter dated 6/27/22

## 2024-04-22 NOTE — ASSESSMENT & PLAN NOTE
Blood pressure is very well controlled 124/78 mm Hg I have encouraged him to continue on present therapy to include amlodipine 5 mg a day.  And fosinopril 40 mg daily maintain the same regimen

## 2024-04-22 NOTE — ASSESSMENT & PLAN NOTE
Aortic arteriosclerosis blood pressure has been stable on with amlodipine and Monocryl and I have encouraged him to continue on Lipitor 20 mg nightly as well.

## 2024-04-22 NOTE — PROGRESS NOTES
Subjective:    Patient ID:  Jorge Becerra is a 73 y.o. male patient here for evaluation Follow-up      History of Present Illness:     Patient is a 73-year-old with history of arterial hypertension type 2 diabetes diabetic retinopathy seeking follow-up evaluation.  Seem to be doing fairly well he denies having episodes of shortness of breath no PND orthopnea noted.  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  MUSCULOSKELETAL: No myalgias  NEURO: No lightheadedness or dizziness  EYES: No Double vision, blurry, vision or headache              Objective        Vitals:    04/22/24 0917   BP: 124/78   Pulse: 78   Resp: 16       LIPIDS - LAST 2   Lab Results   Component Value Date    CHOL 104 (L) 08/05/2023    CHOL 90 (L) 08/01/2022    CHOL 90 (L) 08/01/2022    HDL 40 08/05/2023    HDL 38 (L) 08/01/2022    HDL 38 (L) 08/01/2022    LDLCALC 45.8 (L) 08/05/2023    LDLCALC 28.4 (L) 08/01/2022    LDLCALC 28.4 (L) 08/01/2022    TRIG 91 08/05/2023    TRIG 118 08/01/2022    TRIG 118 08/01/2022    CHOLHDL 38.5 08/05/2023    CHOLHDL 42.2 08/01/2022    CHOLHDL 42.2 08/01/2022       CBC - LAST 2  Lab Results  "  Component Value Date    WBC 7.55 08/05/2023    WBC 6.30 02/01/2022    RBC 4.29 (L) 08/05/2023    RBC 3.67 (L) 02/01/2022    HGB 14.2 08/05/2023    HGB 10.8 (L) 02/01/2022    HCT 42.6 08/05/2023    HCT 35.2 (L) 02/01/2022    MCV 99 (H) 08/05/2023    MCV 96 02/01/2022    MCH 33.1 (H) 08/05/2023    MCH 29.4 02/01/2022    MCHC 33.3 08/05/2023    MCHC 30.7 (L) 02/01/2022    RDW 14.6 (H) 08/05/2023    RDW 16.0 (H) 02/01/2022     08/05/2023     05/02/2023    MPV 11.1 08/05/2023    MPV 10.4 05/02/2023    GRAN 3.6 08/05/2023    GRAN 48.0 08/05/2023    LYMPH 2.7 08/05/2023    LYMPH 35.2 08/05/2023    MONO 0.7 08/05/2023    MONO 8.6 08/05/2023    BASO 0.03 08/05/2023    BASO 0.02 02/01/2022    NRBC 0 08/05/2023    NRBC 0 02/01/2022       CHEMISTRY & LIVER FUNCTION - LAST 2  Lab Results   Component Value Date     08/15/2023     08/05/2023    K 4.7 08/15/2023    K 4.4 08/05/2023     08/15/2023     08/05/2023    CO2 23 08/15/2023    CO2 24 08/05/2023    ANIONGAP 17 (H) 08/15/2023    ANIONGAP 15 08/05/2023    BUN 17 10/10/2023    BUN 12 08/15/2023    CREATININE 1.07 10/10/2023    CREATININE 1.2 08/15/2023     (H) 08/15/2023     (H) 08/05/2023    CALCIUM 10.1 10/10/2023    CALCIUM 10.3 08/15/2023    MG 1.9 02/07/2023    MG 1.9 08/18/2021    ALBUMIN 4.3 08/05/2023    ALBUMIN 4.2 02/07/2023    PROT 7.8 08/05/2023    PROT 7.5 02/07/2023    ALKPHOS 73 08/05/2023    ALKPHOS 74 02/07/2023    ALT 31 08/05/2023    ALT 29 02/07/2023    AST 35 08/05/2023    AST 35 02/07/2023    BILITOT 0.7 08/05/2023    BILITOT 0.8 02/07/2023        CARDIAC PROFILE - LAST 2  No results found for: "BNP", "CPK", "CPKMB", "LDH", "TROPONINI", "TROPONINIHS"     COAGULATION - LAST 2  Lab Results   Component Value Date    INR 1.0 05/31/2021       ENDOCRINE & PSA - LAST 2  Lab Results   Component Value Date    HGBA1C 8.2 (H) 08/05/2023    HGBA1C 8.6 (H) 02/07/2023    MICROALBUR 1.2 10/31/2019    MICROALBUR 1.7 " 10/15/2018    TSH 2.409 02/07/2023    TSH 2.520 05/04/2021    PSA 0.65 10/23/2020        ECHOCARDIOGRAM RESULTS  Results for orders placed during the hospital encounter of 02/15/23    Echo    Interpretation Summary  · The left ventricle is normal in size with concentric remodeling and normal systolic function.  · The estimated ejection fraction is 68%.  · Normal left ventricular diastolic function.  · Mild left atrial enlargement.  · Mild right ventricular enlargement with normal right ventricular systolic function.  · Mild right atrial enlargement.  · Normal central venous pressure (3 mmHg).  · The estimated PA systolic pressure is 27 mmHg.      CURRENT/PREVIOUS VISIT EKG  Results for orders placed or performed in visit on 01/26/23   IN OFFICE EKG 12-LEAD (to [facility])    Collection Time: 01/26/23 12:09 PM    Narrative    Test Reason : E11.22,I12.9,N18.2,E11.69,E78.2,E11.3393,R00.2,    Vent. Rate : 074 BPM     Atrial Rate : 074 BPM     P-R Int : 124 ms          QRS Dur : 084 ms      QT Int : 394 ms       P-R-T Axes : -27 -24 032 degrees     QTc Int : 437 ms    Normal sinus rhythm  Minimal voltage criteria for LVH, may be normal variant ( R in aVL )  Borderline Abnormal ECG  No previous ECGs available  Confirmed by Vinicius Uribe MD ([id number]) on 1/29/2023 2:05:24 PM    Referred By: RODERICK MEADOWS           Confirmed By:Vinicius Uribe MD     No valid procedures specified.   Results for orders placed during the hospital encounter of 02/15/23    Nuclear Stress - Cardiology Interpreted    Interpretation Summary    Normal myocardial perfusion scan. There is no evidence of myocardial ischemia or infarction.    The gated perfusion images showed an ejection fraction of 65% at rest. The gated perfusion images showed an ejection fraction of 73% post stress. Normal ejection fraction is greater than 53%.    There is normal wall motion at rest and post stress.    LV cavity size is normal at rest and normal at stress.    The ECG  portion of the study is negative for ischemia.    The patient reported no chest pain during the stress test.    During stress, rare PACs are noted. , During stress, rare PVCs are noted.    The patient exercised for 3 minutes 3 seconds on a Charli protocol, corresponding to a functional capacity of 5 METS, achieving a peak heart rate of 142 bpm, which is 97 % of the age predicted maximum heart rate.    No valid procedures specified.    PHYSICAL EXAM  CONSTITUTIONAL: Well built, well nourished in no apparent distress  NECK: no carotid bruit, no JVD  LUNGS: CTA  CHEST WALL: no tenderness  HEART: regular rate and rhythm, S1, S2 normal, no murmur, click, rub or gallop   ABDOMEN: soft, non-tender; bowel sounds normal; no masses,  no organomegaly  EXTREMITIES: Extremities normal, no edema, no calf tenderness noted  NEURO: AAO X 3    I HAVE REVIEWED :    The vital signs, nurses notes, and all the pertinent radiology and labs.        Current Outpatient Medications   Medication Instructions    allopurinoL (ZYLOPRIM) 300 mg, Oral, Every other day    amLODIPine (NORVASC) 5 mg, Oral, Daily    aspirin (ECOTRIN) 81 mg, Oral, Daily    atorvastatin (LIPITOR) 20 mg, Oral, Nightly    cholecalciferol, vitamin D3, 10 mcg (400 unit) Cap capsule 1 tablet, Oral, Daily    donepeziL (ARICEPT) 5 MG tablet TAKE 1 TABLET BY MOUTH EVERY DAY IN THE EVENING    ferrous sulfate (FEOSOL) 325 mg, Oral, With breakfast    fosinopriL (MONOPRIL) 40 mg, Oral    JARDIANCE 25 mg, Oral    ketoconazole (NIZORAL) 2 % shampoo Topical (Top), Twice weekly    magnesium oxide 400 mg magnesium Cap 1 tablet, Oral, 2 times daily    metFORMIN (GLUCOPHAGE) 1000 MG tablet TAKE 1 TABLET BY MOUTH TWICE A DAY    RYBELSUS 7 mg, Oral, Daily    vit C/E/zinc/lutein/zeaxanthin (OCUVITE EYE HEALTH ORAL) 1 tablet, Oral, 2 times daily          Assessment & Plan     Hypertension associated with type 2 diabetes mellitus  Blood pressure is very well controlled 124/78 mm Hg I have  encouraged him to continue on present therapy to include amlodipine 5 mg a day.  And fosinopril 40 mg daily maintain the same regimen    Palpitations  Palpitations has been under control no significant arrhythmias noted.  And continue on present regimen including aspirin therapy    Aortic atherosclerosis  Aortic arteriosclerosis blood pressure has been stable on with amlodipine and Monocryl and I have encouraged him to continue on Lipitor 20 mg nightly as well.    Dyslipidemia  Continue on present therapy to include Lipitor 20 mg nightly low-fat low-cholesterol diet    Type 2 diabetes mellitus without complication, without long-term current use of insulin  He is presently on semaglutide tablets 7 mg, Jardiance 25 mg daily and continue the same.  Calorie restricted diet    Mild cognitive impairment  Presently on Aricept stable          Follow up in about 6 months (around 10/22/2024).

## 2024-04-22 NOTE — ASSESSMENT & PLAN NOTE
Palpitations has been under control no significant arrhythmias noted.  And continue on present regimen including aspirin therapy

## 2024-04-23 RX ORDER — METFORMIN HYDROCHLORIDE 1000 MG/1
TABLET ORAL
Qty: 180 TABLET | Refills: 0 | Status: SHIPPED | OUTPATIENT
Start: 2024-04-23 | End: 2024-05-30 | Stop reason: SDUPTHER

## 2024-04-25 DIAGNOSIS — E11.3393 CONTROLLED TYPE 2 DIABETES MELLITUS WITH BOTH EYES AFFECTED BY MODERATE NONPROLIFERATIVE RETINOPATHY WITHOUT MACULAR EDEMA, WITHOUT LONG-TERM CURRENT USE OF INSULIN: ICD-10-CM

## 2024-04-25 NOTE — TELEPHONE ENCOUNTER
No care due was identified.  [facility] Embedded Care Due Messages. Reference number: [id number].   4/25/2024 12:14:47 AM CDT

## 2024-04-25 NOTE — TELEPHONE ENCOUNTER
Refill Routing Note   Medication(s) are not appropriate for processing by [facility] for the following reason(s):        Required labs outdated    [facility] action(s):  Defer             Appointments  past 12m or future 3m with PCP    Date Provider   Last Visit   11/14/2023 Mehul Royal MD   Next Visit   5/30/2024 Mehul Royal MD   ED visits in past 90 days: 0        Note composed:1:49 PM 04/25/2024

## 2024-04-26 RX ORDER — EMPAGLIFLOZIN 25 MG/1
25 TABLET, FILM COATED ORAL
Qty: 90 TABLET | Refills: 0 | Status: SHIPPED | OUTPATIENT
Start: 2024-04-26 | End: 2024-05-30 | Stop reason: SDUPTHER

## 2024-05-10 NOTE — ASSESSMENT & PLAN NOTE
----- Message from Elizabeth Cueto sent at 5/10/2024  2:49 PM CDT -----  Contact: self  Type:  Sooner Apoointment Request    Caller is requesting a sooner appointment.  Caller declined first available appointment listed below.  Caller will not accept being placed on the waitlist and is requesting a message be sent to doctor.  Name of Caller:Júnior Torres  When is the first available appointment?11/2024  Symptoms:remission from colon cancer/colon recheck  Would the patient rather a call back or a response via [facility]?   Best Call Back Number:[phone]  Additional Information: n/a   Continue on present therapy to include Lipitor 20 mg nightly low-fat low-cholesterol diet

## 2024-05-17 ENCOUNTER — LAB VISIT (OUTPATIENT)
Dept: LAB | Facility: HOSPITAL | Age: 74
End: 2024-05-17
Attending: STUDENT IN AN ORGANIZED HEALTH CARE EDUCATION/TRAINING PROGRAM
Payer: MEDICARE

## 2024-05-17 DIAGNOSIS — E78.2 MIXED DIABETIC HYPERLIPIDEMIA ASSOCIATED WITH TYPE 2 DIABETES MELLITUS: ICD-10-CM

## 2024-05-17 DIAGNOSIS — E11.69 MIXED DIABETIC HYPERLIPIDEMIA ASSOCIATED WITH TYPE 2 DIABETES MELLITUS: ICD-10-CM

## 2024-05-17 DIAGNOSIS — E11.3393 CONTROLLED TYPE 2 DIABETES MELLITUS WITH BOTH EYES AFFECTED BY MODERATE NONPROLIFERATIVE RETINOPATHY WITHOUT MACULAR EDEMA, WITHOUT LONG-TERM CURRENT USE OF INSULIN: ICD-10-CM

## 2024-05-17 LAB
ALBUMIN SERPL BCP-MCNC: 4.1 G/DL (ref 3.5–5.2)
ALP SERPL-CCNC: 58 U/L (ref 55–135)
ALT SERPL W/O P-5'-P-CCNC: 19 U/L (ref 10–44)
ANION GAP SERPL CALC-SCNC: 13 MMOL/L (ref 8–16)
AST SERPL-CCNC: 26 U/L (ref 10–40)
BILIRUB SERPL-MCNC: 0.8 MG/DL (ref 0.1–1)
BUN SERPL-MCNC: 13 MG/DL (ref 8–23)
CALCIUM SERPL-MCNC: 10.4 MG/DL (ref 8.7–10.5)
CHLORIDE SERPL-SCNC: 102 MMOL/L (ref 95–110)
CHOLEST SERPL-MCNC: 119 MG/DL (ref 120–199)
CHOLEST/HDLC SERPL: 2.7 {RATIO} (ref 2–5)
CO2 SERPL-SCNC: 25 MMOL/L (ref 23–29)
CREAT SERPL-MCNC: 0.9 MG/DL (ref 0.5–1.4)
EST. GFR  (NO RACE VARIABLE): >60 ML/MIN/1.73 M^2
ESTIMATED AVG GLUCOSE: 154 MG/DL (ref 68–131)
GLUCOSE SERPL-MCNC: 109 MG/DL (ref 70–110)
HBA1C MFR BLD: 7 % (ref 4–5.6)
HDLC SERPL-MCNC: 44 MG/DL (ref 40–75)
HDLC SERPL: 37 % (ref 20–50)
LDLC SERPL CALC-MCNC: 61.4 MG/DL (ref 63–159)
NONHDLC SERPL-MCNC: 75 MG/DL
POTASSIUM SERPL-SCNC: 4.5 MMOL/L (ref 3.5–5.1)
PROT SERPL-MCNC: 7.4 G/DL (ref 6–8.4)
SODIUM SERPL-SCNC: 140 MMOL/L (ref 136–145)
TRIGL SERPL-MCNC: 68 MG/DL (ref 30–150)

## 2024-05-17 PROCEDURE — 36415 COLL VENOUS BLD VENIPUNCTURE: CPT | Mod: PO | Performed by: STUDENT IN AN ORGANIZED HEALTH CARE EDUCATION/TRAINING PROGRAM

## 2024-05-17 PROCEDURE — 83036 HEMOGLOBIN GLYCOSYLATED A1C: CPT | Performed by: STUDENT IN AN ORGANIZED HEALTH CARE EDUCATION/TRAINING PROGRAM

## 2024-05-17 PROCEDURE — 80061 LIPID PANEL: CPT | Performed by: STUDENT IN AN ORGANIZED HEALTH CARE EDUCATION/TRAINING PROGRAM

## 2024-05-17 PROCEDURE — 80053 COMPREHEN METABOLIC PANEL: CPT | Performed by: STUDENT IN AN ORGANIZED HEALTH CARE EDUCATION/TRAINING PROGRAM

## 2024-05-30 ENCOUNTER — LAB VISIT (OUTPATIENT)
Dept: LAB | Facility: HOSPITAL | Age: 74
End: 2024-05-30
Attending: STUDENT IN AN ORGANIZED HEALTH CARE EDUCATION/TRAINING PROGRAM
Payer: MEDICARE

## 2024-05-30 ENCOUNTER — OFFICE VISIT (OUTPATIENT)
Dept: FAMILY MEDICINE | Facility: CLINIC | Age: 74
End: 2024-05-30
Payer: MEDICARE

## 2024-05-30 VITALS
DIASTOLIC BLOOD PRESSURE: 68 MMHG | WEIGHT: 159.63 LBS | SYSTOLIC BLOOD PRESSURE: 114 MMHG | BODY MASS INDEX: 25.06 KG/M2 | OXYGEN SATURATION: 98 % | HEIGHT: 67 IN | RESPIRATION RATE: 16 BRPM | HEART RATE: 80 BPM

## 2024-05-30 DIAGNOSIS — C49.21 SARCOMA OF RIGHT THIGH: ICD-10-CM

## 2024-05-30 DIAGNOSIS — R41.3 OTHER AMNESIA: ICD-10-CM

## 2024-05-30 DIAGNOSIS — I10 BENIGN ESSENTIAL HTN: ICD-10-CM

## 2024-05-30 DIAGNOSIS — E11.3393 CONTROLLED TYPE 2 DIABETES MELLITUS WITH BOTH EYES AFFECTED BY MODERATE NONPROLIFERATIVE RETINOPATHY WITHOUT MACULAR EDEMA, WITHOUT LONG-TERM CURRENT USE OF INSULIN: ICD-10-CM

## 2024-05-30 DIAGNOSIS — H91.90 HEARING LOSS, UNSPECIFIED HEARING LOSS TYPE, UNSPECIFIED LATERALITY: ICD-10-CM

## 2024-05-30 DIAGNOSIS — Z00.00 ROUTINE GENERAL MEDICAL EXAMINATION AT A HEALTH CARE FACILITY: Primary | ICD-10-CM

## 2024-05-30 DIAGNOSIS — G31.84 MILD COGNITIVE IMPAIRMENT: ICD-10-CM

## 2024-05-30 LAB
TREPONEMA PALLIDUM IGG+IGM AB [PRESENCE] IN SERUM OR PLASMA BY IMMUNOASSAY: NONREACTIVE
TSH SERPL DL<=0.005 MIU/L-ACNC: 3.08 UIU/ML (ref 0.4–4)
VIT B12 SERPL-MCNC: 248 PG/ML (ref 210–950)

## 2024-05-30 PROCEDURE — 36415 COLL VENOUS BLD VENIPUNCTURE: CPT | Mod: PO | Performed by: STUDENT IN AN ORGANIZED HEALTH CARE EDUCATION/TRAINING PROGRAM

## 2024-05-30 PROCEDURE — 86593 SYPHILIS TEST NON-TREP QUANT: CPT | Performed by: STUDENT IN AN ORGANIZED HEALTH CARE EDUCATION/TRAINING PROGRAM

## 2024-05-30 PROCEDURE — G2211 COMPLEX E/M VISIT ADD ON: HCPCS | Mod: S$PBB,,, | Performed by: STUDENT IN AN ORGANIZED HEALTH CARE EDUCATION/TRAINING PROGRAM

## 2024-05-30 PROCEDURE — 82607 VITAMIN B-12: CPT | Performed by: STUDENT IN AN ORGANIZED HEALTH CARE EDUCATION/TRAINING PROGRAM

## 2024-05-30 PROCEDURE — 84443 ASSAY THYROID STIM HORMONE: CPT | Performed by: STUDENT IN AN ORGANIZED HEALTH CARE EDUCATION/TRAINING PROGRAM

## 2024-05-30 PROCEDURE — 99215 OFFICE O/P EST HI 40 MIN: CPT | Mod: PBBFAC,PO | Performed by: STUDENT IN AN ORGANIZED HEALTH CARE EDUCATION/TRAINING PROGRAM

## 2024-05-30 PROCEDURE — 84425 ASSAY OF VITAMIN B-1: CPT | Performed by: STUDENT IN AN ORGANIZED HEALTH CARE EDUCATION/TRAINING PROGRAM

## 2024-05-30 PROCEDURE — 99999 PR PBB SHADOW E&M-EST. PATIENT-LVL V: CPT | Mod: PBBFAC,,, | Performed by: STUDENT IN AN ORGANIZED HEALTH CARE EDUCATION/TRAINING PROGRAM

## 2024-05-30 PROCEDURE — 99215 OFFICE O/P EST HI 40 MIN: CPT | Mod: S$PBB,,, | Performed by: STUDENT IN AN ORGANIZED HEALTH CARE EDUCATION/TRAINING PROGRAM

## 2024-05-30 RX ORDER — METFORMIN HYDROCHLORIDE 1000 MG/1
1000 TABLET ORAL 2 TIMES DAILY
Qty: 180 TABLET | Refills: 4 | Status: SHIPPED | OUTPATIENT
Start: 2024-05-30

## 2024-05-30 RX ORDER — METHOCARBAMOL 750 MG/1
750 TABLET, FILM COATED ORAL
COMMUNITY
Start: 2024-03-04

## 2024-05-30 NOTE — PROGRESS NOTES
"[facility] CLINIC NOTE    Patient Name: Jorge Becerra  YOB: 1950    PRESENTING HISTORY     History of Present Illness:  Mr. Jorge Becerra is a 73 y.o. male here for routine f/u.     HTN is well controlled.     DM2 control has improved significantly./ On metformin, SGLT2, added Rybelsus (needle phobia). Will  leave meds as is. No sig AEs.     2 falls- 1st time wasmoving box and feet got tanlged.    2nd time walking in dark with power outage, reached out for bed and missed, falling.   No sig injuries.   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   Has done PT in the past with benefit.   His orthopedic surgeon recommended PT.     Wife is concerned about memory loss.   Chronic and progressive. He doesn't remember things she tells him which causes strife.   He is sometimes yelling at his wife.   Already on donepezil for many years for MCI.     Hearing loss  Chronic, bilateral.   Probably would not wear hearing aids if got them.     ROS      OBJECTIVE:   Vital Signs:  Vitals:    05/30/24 0914   BP: 114/68   Pulse: 80   Resp: 16   SpO2: 98%   Weight: 72.4 kg (159 lb 9.8 oz)   Height: 5' 7" (1.702 m)         Physical Exam  Vitals and nursing note reviewed.   Constitutional:       Appearance: He is not diaphoretic.   HENT:      Head: Normocephalic and atraumatic.      Right Ear: Tympanic membrane and external ear normal. There is no impacted cerumen.      Left Ear: External ear normal. There is impacted cerumen.      Ears:      Comments: Unable to hear on whisper test bilaterally  Eyes:      General: No scleral icterus.     Conjunctiva/sclera: Conjunctivae normal.      Pupils: Pupils are equal, round, and reactive to light.   Neck:      Thyroid: No thyromegaly.   Cardiovascular:      Rate and Rhythm: Normal rate and regular rhythm.      Heart sounds: Normal heart sounds. No murmur heard.  Pulmonary:      Effort: Pulmonary effort is normal. No respiratory distress.      Breath sounds: Normal breath " sounds. No wheezing or rales.   Musculoskeletal:         General: No tenderness or deformity. Normal range of motion.      Cervical back: Normal range of motion and neck supple.      Right lower leg: No edema.      Left lower leg: No edema.   Lymphadenopathy:      Cervical: No cervical adenopathy.   Skin:     General: Skin is warm and dry.      Findings: No erythema or rash.   Neurological:      General: No focal deficit present.      Mental Status: He is alert and oriented to person, place, and time.      Cranial Nerves: No cranial nerve deficit.      Gait: Gait is intact. Gait normal.      Deep Tendon Reflexes: Reflexes normal.      Comments: Shuffling gait.   Unable to walk on toes or heels.   Able to stand from seated without assistance or using hands.    Psychiatric:         Mood and Affect: Mood and affect normal.         Cognition and Memory: Memory normal.         Judgment: Judgment normal.           Attempted irrigation of left TM without return. Still obstructed.     ASSESSMENT & PLAN:     Hearing loss, unspecified hearing loss type, unspecified laterality  -     Ambulatory referral/consult to ENT; Future; Expected date: 06/06/2024  -     Ambulatory referral/consult to Audiology; Future; Expected date: 06/06/2024    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, without long-term current use of insulin  -     metFORMIN (GLUCOPHAGE) 1000 MG tablet; Take 1 tablet (1,000 mg total) by mouth 2 (two) times daily.  Dispense: 180 tablet; Refill: 4  -     empagliflozin (JARDIANCE) 25 mg tablet; Take 1 tablet (25 mg total) by mouth once daily.  Dispense: 90 tablet; Refill: 4    Mild cognitive impairment  -     MRI Brain Without Contrast; Future; Expected date: 05/30/2024  -     TSH; Future; Expected date: 05/30/2024  -     VITAMIN B1; Future; Expected date: 05/30/2024  -     VITAMIN B12; Future; Expected date: 05/30/2024  -     Treponema  Pallidium Antibodies IgG, IgM; Future; Expected date: 05/30/2024  -     Neuropsychological testing; Future  -     Ambulatory referral/consult to Neurology; Future; Expected date: 06/06/2024    Other amnesia  -     MRI Brain Without Contrast; Future; Expected date: 05/30/2024  -     TSH; Future; Expected date: 05/30/2024  -     VITAMIN B1; Future; Expected date: 05/30/2024  -     VITAMIN B12; Future; Expected date: 05/30/2024  -     Treponema Pallidium Antibodies IgG, IgM; Future; Expected date: 05/30/2024    Sarcoma of right thigh  -     Ambulatory referral/consult to Physical/Occupational Therapy; Future; Expected date: 06/06/2024    Benign essential HTN  Stable, continue current medications            Mehul Royal  Internal Medicine    I spent a total of 45 minutes on the day of the visit.  This includes face-to-face time and non face-to-face time preparing to see the patient (e.g., review of tests) , obtaining and/or reviewing separately obtain history, documenting clinical information in the electronic or other health record, independently interpreting results and communicating results to the patient/family/caregiver, or care coordinator.    Visit complexity inherent to evaluation and management associated with medical care services that serve as the continuing focal point for all needed health care services and/or with medical care services that are part of ongoing care related to a patient's single, serious condition or a complex condition provided today

## 2024-05-31 ENCOUNTER — TELEPHONE (OUTPATIENT)
Dept: NEUROLOGY | Facility: CLINIC | Age: 74
End: 2024-05-31
Payer: MEDICARE

## 2024-05-31 NOTE — TELEPHONE ENCOUNTER
----- Message from Jennifer Copeland sent at 5/30/2024  5:22 PM CDT -----  Type:  Sooner Appointment Request    Caller is requesting a sooner appointment.  Caller declined first available appointment listed below.  Caller will not accept being placed on the waitlist and is requesting a message be sent to doctor.  Name of Caller: Pt's wife   When is the first available appointment? Panels were closed   Symptoms: G31.84 (ICD-10-CM) - Mild cognitive impairment  Would the patient rather a call back or a response via [facility]? Call back   Best Call Back Number: [phone]

## 2024-05-31 NOTE — TELEPHONE ENCOUNTER
Staff called the number below, but was unsuccessful with speaking with someone. The caller answered the phone and hung up twice.

## 2024-06-04 ENCOUNTER — CLINICAL SUPPORT (OUTPATIENT)
Dept: AUDIOLOGY | Facility: CLINIC | Age: 74
End: 2024-06-04
Payer: MEDICARE

## 2024-06-04 DIAGNOSIS — H91.90 HEARING LOSS, UNSPECIFIED HEARING LOSS TYPE, UNSPECIFIED LATERALITY: ICD-10-CM

## 2024-06-04 PROCEDURE — 99499 UNLISTED E&M SERVICE: CPT | Mod: S$PBB,,, | Performed by: AUDIOLOGIST

## 2024-06-06 ENCOUNTER — CLINICAL SUPPORT (OUTPATIENT)
Dept: REHABILITATION | Facility: HOSPITAL | Age: 74
End: 2024-06-06
Attending: STUDENT IN AN ORGANIZED HEALTH CARE EDUCATION/TRAINING PROGRAM
Payer: MEDICARE

## 2024-06-06 ENCOUNTER — TELEPHONE (OUTPATIENT)
Dept: NEUROLOGY | Facility: CLINIC | Age: 74
End: 2024-06-06
Payer: MEDICARE

## 2024-06-06 DIAGNOSIS — R26.9 GAIT ABNORMALITY: Primary | ICD-10-CM

## 2024-06-06 DIAGNOSIS — C49.21 SARCOMA OF RIGHT THIGH: ICD-10-CM

## 2024-06-06 LAB — VIT B1 BLD-MCNC: 60 UG/L (ref 38–122)

## 2024-06-06 PROCEDURE — 97112 NEUROMUSCULAR REEDUCATION: CPT | Mod: PO

## 2024-06-06 PROCEDURE — 97161 PT EVAL LOW COMPLEX 20 MIN: CPT | Mod: PO

## 2024-06-06 NOTE — PLAN OF CARE
[facility]   Physical Therapy Initial Evaluation      Name: Jorge Becerra  Clinic Number: [id number]    Therapy Diagnosis:   Encounter Diagnoses   Name Primary?    Sarcoma of right thigh     Gait abnormality Yes        Physician: Mehul Royal MD    Physician Orders: PT Eval and Treat   Medical Diagnosis from Referral: Sarcoma of right thigh.  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    Falls: no    Imaging: See epic    Prior Therapy: in 2022 after sx.  Social History: in [address] house lives with their spouse  Occupation: Retired.  Prior Level of Function: Independent.  Current Level of Function: Modified independent.    Pain:pt reports no pain  Current 0/10, worst 0/10, best 0/10   Location:  NA   Description: NA  Aggravating Factors: NA  Easing Factors: NA    Patients goals: return to previous LOF.     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    Medications:   Jorge has a current medication list which includes the following prescription(s): allopurinol, amlodipine, aspirin, atorvastatin, cholecalciferol (vitamin d3), donepezil, empagliflozin, ferrous sulfate, fosinopril, ketoconazole, magnesium oxide, metformin,  methocarbamol, rybelsus, and vit c/e/zinc/lutein/zeaxanthin.    Allergies:   Review of patient's allergies indicates:  No Known Allergies     Objective        Hip  Right   Left  Pain/Dysfunction with Movement    AROM PROM MMT AROM PROM MMT    Flexion 45 80 3-       Extension 5 25 3-       Abduction 40 60 3-       Adduction          Internal rotation 5 25 3-       External rotation WNL  3+         Posture;flexed hips.  Right knee;  ROM 0-120.  Strength   FLEX;3+/5  EXT;3+/5    Gait;guarded, flexed hips.  No tenderness on palpation.     Intake Outcome Measure for FOTO upper leg Survey    Therapist reviewed FOTO scores for Jorge Becerra on 6/6/2024.   FOTO report - see Media section or FOTO account episode details.    Intake Score: 58%         Treatment     Total Treatment time (time-based codes) separate from Evaluation: 8 minutes     Jorge received the treatments listed below:      neuromuscular re-education activities to improve: Balance, Coordination, Kinesthetic, Sense, Proprioception, and Posture for 8 minutes. The following activities were included:  Bike 8'      Patient Education and Home Exercises     Education provided:   - Role of PT.POC    Written Home Exercises Provided: Cont with HEP issued in previous facility.    Assessment     Jorge is a 73 y.o. male referred to outpatient Physical Therapy with a medical diagnosis of sarcoma of right thigh. Patient presents with ROM and strength deficits,poor posture,gait abnormality due to above listed deficits.    Patient prognosis is Good.   Patient will benefit from skilled outpatient Physical Therapy to address the deficits stated above and in the chart below, provide patient /family education, and to maximize patientt's level of independence.     Plan of care discussed with patient: Yes  Patient's spiritual, cultural and educational needs considered and patient is agreeable to the plan of care and goals as stated below:     Anticipated Barriers for therapy:  no    Medical Necessity is demonstrated by the following  History  Co-morbidities and personal factors that may impact the plan of care [x] LOW: no personal factors / co-morbidities  [] MODERATE: 1-2 personal factors / co-morbidities  [] HIGH: 3+ personal factors / co-morbidities    Moderate / High Support Documentation:   Co-morbidities affecting plan of care:     Personal Factors:   no deficits     Examination  Body Structures and Functions, activity limitations and participation restrictions that may impact the plan of care [x] LOW: addressing 1-2 elements  [] MODERATE: 3+ elements  [] HIGH: 4+ elements (please support below)    Moderate / High Support Documentation:      Clinical Presentation [x] LOW: stable  [] MODERATE: Evolving  [] HIGH: Unstable     Decision Making/ Complexity Score: low       Goals:  SHORT TERM GOALS:  3 weeks  Progress Date met   Recent signs and systems trend is improving in order to progress towards Long term goals.  [] Met  [] Not Met  [] Progressing    Patient will be independent with Home Exercise Program  in order to further progress and return to maximal function. [] Met  [] Not Met  [] Progressing    Pain rating at Worst: 5 /10 in order to progress towards increased independence with activity. [] Met  [] Not Met  [] Progressing    Patient will be able to correct postural deviations in sitting and standing, to decrease pain and promote postural awareness for injury prevention.  [] Met  [] Not Met  [] Progressing    Patient will improve functional outcome (FOTO) score: by 5% to increase self-worth & perceived functional ability towards long term goals [] Met  [] Not Met  [] Progressing      LONG TERM GOALS: 6 weeks  Progress Date met   Patient will return to normal activites of daily living, recreational, and work related activities with less pain and limitation.  [] Met  [] Not Met  [] Progressing    Patient will improve range of motion  to stated goals in order to return to maximal  functional potential. ROM of right hip WNL/WFL in all planes. [] Met  [] Not Met  [] Progressing    Patient will improve Strength to stated goals of appropriate musculature in order to improve functional independence. Strength of right hip and knee 5/5 in all planes. [] Met  [] Not Met  [] Progressing    Pain Rating at Best: 1/10 to improve Quality of Life.  [] Met  [] Not Met  [] Progressing    Patient will meet predicted functional outcome (FOTO) score: 45% to increase self-worth & perceived functional ability. [] Met  [] Not Met  [] Progressing    Patient will have met/partially met personal goal of: return to previous LOF. [] Met  [] Not Met  [] Progressing         Plan     Plan of care Certification: 6/6/2024 to 8/23/24.    Outpatient Physical Therapy 2 times weekly for 6 weeks to include the following interventions: Electrical Stimulation PRN, Gait Training, Manual Therapy, Moist Heat/ Ice, Neuromuscular Re-ed, Patient Education, Therapeutic Activities, Therapeutic Exercise, and dry needling PRN.IMS PRN. .     Moe Sebastian PT        Physician's Signature: _________________________________________ Date: ________________

## 2024-06-06 NOTE — TELEPHONE ENCOUNTER
Pt has a referral for mild cognitive impairment. Wants to see MD only.  Please call to schedule.

## 2024-06-07 ENCOUNTER — HOSPITAL ENCOUNTER (OUTPATIENT)
Dept: RADIOLOGY | Facility: HOSPITAL | Age: 74
Discharge: HOME | End: 2024-06-07
Attending: STUDENT IN AN ORGANIZED HEALTH CARE EDUCATION/TRAINING PROGRAM
Payer: MEDICARE

## 2024-06-07 DIAGNOSIS — R41.3 OTHER AMNESIA: ICD-10-CM

## 2024-06-07 DIAGNOSIS — G31.84 MILD COGNITIVE IMPAIRMENT: ICD-10-CM

## 2024-06-07 PROCEDURE — 70551 MRI BRAIN STEM W/O DYE: CPT | Mod: TC,PO

## 2024-06-07 PROCEDURE — 70551 MRI BRAIN STEM W/O DYE: CPT | Mod: 26,,, | Performed by: RADIOLOGY

## 2024-06-12 ENCOUNTER — CLINICAL SUPPORT (OUTPATIENT)
Dept: REHABILITATION | Facility: HOSPITAL | Age: 74
End: 2024-06-12
Payer: MEDICARE

## 2024-06-12 DIAGNOSIS — R26.9 GAIT ABNORMALITY: Primary | ICD-10-CM

## 2024-06-12 PROCEDURE — 97112 NEUROMUSCULAR REEDUCATION: CPT | Mod: PO

## 2024-06-12 PROCEDURE — 97110 THERAPEUTIC EXERCISES: CPT | Mod: PO

## 2024-06-12 NOTE — PROGRESS NOTES
[facility]   Physical Therapy Treatment Note      Name: Jorge Becerra  Clinic Number: [id number]    Therapy Diagnosis:   Encounter Diagnosis   Name Primary?    Gait abnormality Yes     Physician: Mehul Royal MD    Visit Date: 6/12/2024    Physician Orders: PT Eval and Treat   Medical Diagnosis from Referral: Sarcoma of right thigh.  Evaluation Date: 6/6/2024  Authorization Period Expiration: 5/30/25  Plan of Care Expiration: 8/23/24  Progress Note Due: 7/5/24  Date of Surgery: 2021  Visit # / Visits authorized: 1/ 12   FOTO: 1/ 3     Precautions: Standard, Diabetes, and cancer      Time In: 1300  Time Out: 1340  Total Billable Time: 40 minutes     PTA Visit #: 0/5       Subjective     Patient reports: no c/o pain.  He was compliant with home exercise program.  Response to previous treatment: first visit following eval  Functional change: no    Pain: 0/10  Location: left thigh.     Objective      Objective Measures updated at progress report unless specified.     Treatment     Jorge received the treatments listed below:      therapeutic exercises to develop strength, endurance, ROM, flexibility, posture, and core stabilization for 10 minutes including:  Bike 10'.    manual therapy techniques: dry needling were applied to the:  for 0 minutes, including:      neuromuscular re-education activities to improve: Balance, Coordination, Kinesthetic, Sense, Proprioception, and Posture for 30 minutes. The following activities were included:  HS stretch 3x30  Piriformis stretch 3x30.  // bars  Mini squats 30  HR/TR 30  Gastroc stretch 3x30,    Shuttle 37# 6'  therapeutic activities to improve functional performance for 0  minutes, including:      gait training to improve functional mobility and safety for 0  minutes, including:      direct contact modalities after being cleared for contraindications:     supervised modalities after being cleared for contradictions: TENS:  Jorge received TENS  electrical stimulation for pain to the . Pt received continuous mode at a rate of 2 pps for 0 minutes. Jorge tolerated treatment well without any adverse effects.     Patient Education and Home Exercises       Education provided:   Gait pattern ed.  - Posture ed.    Written Home Exercises Provided:  to be issued .   Assessment     Performed well.    Jorge Is progressing well towards his goals.   Patient prognosis is Good.     Patient will continue to benefit from skilled outpatient physical therapy to address the deficits listed in the problem list box on initial evaluation, provide pt/family education and to maximize pt's level of independence in the home and community environment.     Patient's spiritual, cultural and educational needs considered and pt agreeable to plan of care and goals.     Anticipated barriers to physical therapy: no    Goals:   SHORT TERM GOALS:  3 weeks  Progress Date met   Recent signs and systems trend is improving in order to progress towards Long term goals.  [] Met  [] Not Met  [] Progressing     Patient will be independent with Home Exercise Program  in order to further progress and return to maximal function. [] Met  [] Not Met  [] Progressing     Pain rating at Worst: 5 /10 in order to progress towards increased independence with activity. [] Met  [] Not Met  [] Progressing     Patient will be able to correct postural deviations in sitting and standing, to decrease pain and promote postural awareness for injury prevention.  [] Met  [] Not Met  [] Progressing     Patient will improve functional outcome (FOTO) score: by 5% to increase self-worth & perceived functional ability towards long term goals [] Met  [] Not Met  [] Progressing        LONG TERM GOALS: 6 weeks  Progress Date met   Patient will return to normal activites of daily living, recreational, and work related activities with less pain and limitation.  [] Met  [] Not Met  [] Progressing     Patient will improve range of  motion  to stated goals in order to return to maximal functional potential. ROM of right hip WNL/WFL in all planes. [] Met  [] Not Met  [] Progressing     Patient will improve Strength to stated goals of appropriate musculature in order to improve functional independence. Strength of right hip and knee 5/5 in all planes. [] Met  [] Not Met  [] Progressing     Pain Rating at Best: 1/10 to improve Quality of Life.  [] Met  [] Not Met  [] Progressing     Patient will meet predicted functional outcome (FOTO) score: 45% to increase self-worth & perceived functional ability. [] Met  [] Not Met  [] Progressing     Patient will have met/partially met personal goal of: return to previous LOF. [] Met  [] Not Met  [] Progressing           Plan     Per POC.Progress as able.    Moe Sebastian, PT

## 2024-06-14 DIAGNOSIS — E11.3393 CONTROLLED TYPE 2 DIABETES MELLITUS WITH BOTH EYES AFFECTED BY MODERATE NONPROLIFERATIVE RETINOPATHY WITHOUT MACULAR EDEMA, WITHOUT LONG-TERM CURRENT USE OF INSULIN: ICD-10-CM

## 2024-06-14 RX ORDER — ORAL SEMAGLUTIDE 3 MG/1
3 TABLET ORAL
Qty: 30 TABLET | Refills: 0 | OUTPATIENT
Start: 2024-06-14

## 2024-06-14 NOTE — TELEPHONE ENCOUNTER
Care Due:                  Date            Visit Type   Department     Provider  --------------------------------------------------------------------------------                                EP -                              PRIMARY      JERMAINE Welch  Last Visit: 05-      CARE ([facility])   MEDICINE       N[place]                              EP -                              PRIMARY      [facility]    Mehul Welch  Next Visit: 12-      CARE ([facility])   MEDICINE       N[place]ari                                                            Last  Test          Frequency    Reason                     Performed    Due Date  --------------------------------------------------------------------------------    CBC.........  12 months..  allopurinoL..............  08- 07-    Uric Acid...  12 months..  allopurinoL..............  Not Found    Overdue    [employer] Embedded Care Due Messages. Reference number: [id number].   6/14/2024 8:17:55 AM CDT

## 2024-06-14 NOTE — TELEPHONE ENCOUNTER
Provider Staff:  Action required for this patient    Requires labs      Please see care gap opportunities below in Care Due Message.    Thanks!  [facility]     Appointments      Date Provider   Last Visit   5/30/2024 Mehul Royal MD   Next Visit   12/5/2024 Mehul Royal MD     Refill Decision Note   Jorge Becerra  is requesting a refill authorization.  Brief Assessment and Rationale for Refill:  Quick Discontinue     Medication Therapy Plan:  FOVS; PATIENT WAS INCREASED TO 7MG ON 09/11/23      Comments:     Note composed:8:49 AM 06/14/2024

## 2024-06-18 ENCOUNTER — CLINICAL SUPPORT (OUTPATIENT)
Dept: REHABILITATION | Facility: HOSPITAL | Age: 74
End: 2024-06-18
Payer: MEDICARE

## 2024-06-18 DIAGNOSIS — R26.9 GAIT ABNORMALITY: Primary | ICD-10-CM

## 2024-06-18 PROCEDURE — 97110 THERAPEUTIC EXERCISES: CPT | Mod: PO

## 2024-06-18 PROCEDURE — 97112 NEUROMUSCULAR REEDUCATION: CPT | Mod: PO

## 2024-06-18 NOTE — PROGRESS NOTES
[facility]   Physical Therapy Treatment Note      Name: Jorge Becerra  Clinic Number: [id number]    Therapy Diagnosis:   Encounter Diagnosis   Name Primary?    Gait abnormality Yes     Physician: Mehul Royal MD    Visit Date: 6/18/2024    Physician Orders: PT Eval and Treat   Medical Diagnosis from Referral: Sarcoma of right thigh.  Evaluation Date: 6/6/2024  Authorization Period Expiration: 5/30/25  Plan of Care Expiration: 8/23/24  Progress Note Due: 7/5/24  Date of Surgery: 2021  Visit # / Visits authorized: 2/ 12   FOTO: 1/ 3     Precautions: Standard, Diabetes, and cancer      Time In: 1300  Time Out: 1340  Total Billable Time: 40 minutes     PTA Visit #: 0/5     Subjective     Patient reports: no c/o pain.  He was compliant with home exercise program.  Response to previous treatment: positive.  Functional change: no    Pain: 0/10  Location: left thigh.     Objective      Objective Measures updated at progress report unless specified.     Treatment     Jorge received the treatments listed below:      therapeutic exercises to develop strength, endurance, ROM, flexibility, posture, and core stabilization for 10 minutes including:  Bike 10'.    manual therapy techniques: dry needling were applied to the:  for 0 minutes, including:    neuromuscular re-education activities to improve: Balance, Coordination, Kinesthetic, Sense, Proprioception, and Posture for 30 minutes. The following activities were included:  HS stretch 3x30  Piriformis stretch 3x30.  // bars  Mini squats 30  HR/TR 30  Gastroc stretch 3x30,  DF-100 22# FL;3X10, EXT;3X10    Shuttle 37# 6'  therapeutic activities to improve functional performance for 0  minutes, including:      gait training to improve functional mobility and safety for 0  minutes, including:      direct contact modalities after being cleared for contraindications:     supervised modalities after being cleared for contradictions: TENS:  Jorge  received TENS electrical stimulation for pain to the . Pt received continuous mode at a rate of 2 pps for 0 minutes. Jorge tolerated treatment well without any adverse effects.     Patient Education and Home Exercises       Education provided:   Gait pattern ed.  - Posture ed.    Written Home Exercises Provided:  to be issued .   Assessment     Performed well.    Jorge Is progressing well towards his goals.   Patient prognosis is Good.     Patient will continue to benefit from skilled outpatient physical therapy to address the deficits listed in the problem list box on initial evaluation, provide pt/family education and to maximize pt's level of independence in the home and community environment.     Patient's spiritual, cultural and educational needs considered and pt agreeable to plan of care and goals.     Anticipated barriers to physical therapy: no    Goals:   SHORT TERM GOALS:  3 weeks  Progress Date met   Recent signs and systems trend is improving in order to progress towards Long term goals.  [] Met  [] Not Met  [x] Progressing     Patient will be independent with Home Exercise Program  in order to further progress and return to maximal function. [] Met  [] Not Met  [x] Progressing     Pain rating at Worst: 5 /10 in order to progress towards increased independence with activity. [] Met  [] Not Met  [x] Progressing     Patient will be able to correct postural deviations in sitting and standing, to decrease pain and promote postural awareness for injury prevention.  [] Met  [] Not Met  [x] Progressing     Patient will improve functional outcome (FOTO) score: by 5% to increase self-worth & perceived functional ability towards long term goals [] Met  [] Not Met  [x] Progressing        LONG TERM GOALS: 6 weeks  Progress Date met   Patient will return to normal activites of daily living, recreational, and work related activities with less pain and limitation.  [] Met  [] Not Met  [] Progressing     Patient will  improve range of motion  to stated goals in order to return to maximal functional potential. ROM of right hip WNL/WFL in all planes. [] Met  [] Not Met  [] Progressing     Patient will improve Strength to stated goals of appropriate musculature in order to improve functional independence. Strength of right hip and knee 5/5 in all planes. [] Met  [] Not Met  [] Progressing     Pain Rating at Best: 1/10 to improve Quality of Life.  [] Met  [] Not Met  [] Progressing     Patient will meet predicted functional outcome (FOTO) score: 45% to increase self-worth & perceived functional ability. [] Met  [] Not Met  [] Progressing     Patient will have met/partially met personal goal of: return to previous LOF. [] Met  [] Not Met  [] Progressing           Plan     Per POC.Progress as able.    Moe Sebastian, PT

## 2024-06-20 ENCOUNTER — CLINICAL SUPPORT (OUTPATIENT)
Dept: REHABILITATION | Facility: HOSPITAL | Age: 74
End: 2024-06-20
Payer: MEDICARE

## 2024-06-20 DIAGNOSIS — R26.9 GAIT ABNORMALITY: Primary | ICD-10-CM

## 2024-06-20 PROCEDURE — 97110 THERAPEUTIC EXERCISES: CPT | Mod: PO

## 2024-06-20 PROCEDURE — 97112 NEUROMUSCULAR REEDUCATION: CPT | Mod: PO

## 2024-06-20 NOTE — PROGRESS NOTES
[facility]   Physical Therapy Treatment Note      Name: Jorge Becerra  Clinic Number: [id number]    Therapy Diagnosis:   Encounter Diagnosis   Name Primary?    Gait abnormality Yes     Physician: Mehul Royal MD    Visit Date: 6/20/2024    Physician Orders: PT Eval and Treat   Medical Diagnosis from Referral: Sarcoma of right thigh.  Evaluation Date: 6/6/2024  Authorization Period Expiration: 5/30/25  Plan of Care Expiration: 8/23/24  Progress Note Due: 7/5/24  Date of Surgery: 2021  Visit # / Visits authorized: 2/ 12   FOTO: 1/ 3     Precautions: Standard, Diabetes, and cancer      Time In: 1630  Time Out: 1710  Total Billable Time: 40 minutes     PTA Visit #: 0/5     Subjective     Patient reports: no c/o pain.  He was compliant with home exercise program.  Response to previous treatment: positive.  Functional change: no    Pain: 0/10  Location: left thigh.     Objective      Objective Measures updated at progress report unless specified.     Treatment     Jorge received the treatments listed below:      therapeutic exercises to develop strength, endurance, ROM, flexibility, posture, and core stabilization for 10 minutes including:  Bike 10'.    manual therapy techniques: dry needling were applied to the:  for 0 minutes, including:    neuromuscular re-education activities to improve: Balance, Coordination, Kinesthetic, Sense, Proprioception, and Posture for 30 minutes. The following activities were included:  HS stretch 3x30  Piriformis stretch 3x30.  // bars  Mini squats 30  HR/TR 30  Gastroc stretch 3x30,  DF-100 22# FL;3X10, EXT;3X10  EFX 3'.  Shuttle 43# 6'  therapeutic activities to improve functional performance for 0  minutes, including:      gait training to improve functional mobility and safety for 0  minutes, including:      direct contact modalities after being cleared for contraindications:     supervised modalities after being cleared for contradictions: TENS:  Jorge  received TENS electrical stimulation for pain to the . Pt received continuous mode at a rate of 2 pps for 0 minutes. Jorge tolerated treatment well without any adverse effects.     Patient Education and Home Exercises       Education provided:   Gait pattern ed.  - Posture ed.    Written Home Exercises Provided:  to be issued .   Assessment     Endurance improving.  Performed well.    Jorge Is progressing well towards his goals.   Patient prognosis is Good.     Patient will continue to benefit from skilled outpatient physical therapy to address the deficits listed in the problem list box on initial evaluation, provide pt/family education and to maximize pt's level of independence in the home and community environment.     Patient's spiritual, cultural and educational needs considered and pt agreeable to plan of care and goals.     Anticipated barriers to physical therapy: no    Goals:   SHORT TERM GOALS:  3 weeks  Progress Date met   Recent signs and systems trend is improving in order to progress towards Long term goals.  [] Met  [] Not Met  [x] Progressing     Patient will be independent with Home Exercise Program  in order to further progress and return to maximal function. [] Met  [] Not Met  [x] Progressing     Pain rating at Worst: 5 /10 in order to progress towards increased independence with activity. [] Met  [] Not Met  [x] Progressing     Patient will be able to correct postural deviations in sitting and standing, to decrease pain and promote postural awareness for injury prevention.  [] Met  [] Not Met  [x] Progressing     Patient will improve functional outcome (FOTO) score: by 5% to increase self-worth & perceived functional ability towards long term goals [] Met  [] Not Met  [x] Progressing        LONG TERM GOALS: 6 weeks  Progress Date met   Patient will return to normal activites of daily living, recreational, and work related activities with less pain and limitation.  [] Met  [] Not Met  [x]  Progressing     Patient will improve range of motion  to stated goals in order to return to maximal functional potential. ROM of right hip WNL/WFL in all planes. [] Met  [] Not Met  [x] Progressing     Patient will improve Strength to stated goals of appropriate musculature in order to improve functional independence. Strength of right hip and knee 5/5 in all planes. [] Met  [] Not Met  [x] Progressing     Pain Rating at Best: 1/10 to improve Quality of Life.  [] Met  [] Not Met  [x] Progressing     Patient will meet predicted functional outcome (FOTO) score: 45% to increase self-worth & perceived functional ability. [] Met  [] Not Met  [x] Progressing     Patient will have met/partially met personal goal of: return to previous LOF. [] Met  [] Not Met  [x] Progressing           Plan     Per POC.Progress as able.    Moe Sebastian, PT

## 2024-06-26 ENCOUNTER — CLINICAL SUPPORT (OUTPATIENT)
Dept: REHABILITATION | Facility: HOSPITAL | Age: 74
End: 2024-06-26
Payer: MEDICARE

## 2024-06-26 DIAGNOSIS — R26.9 GAIT ABNORMALITY: Primary | ICD-10-CM

## 2024-06-26 PROCEDURE — 97110 THERAPEUTIC EXERCISES: CPT | Mod: PO

## 2024-06-26 PROCEDURE — 97112 NEUROMUSCULAR REEDUCATION: CPT | Mod: PO

## 2024-06-28 ENCOUNTER — CLINICAL SUPPORT (OUTPATIENT)
Dept: REHABILITATION | Facility: HOSPITAL | Age: 74
End: 2024-06-28
Payer: MEDICARE

## 2024-06-28 DIAGNOSIS — R26.9 GAIT ABNORMALITY: Primary | ICD-10-CM

## 2024-06-28 PROCEDURE — 97110 THERAPEUTIC EXERCISES: CPT | Mod: PO

## 2024-06-28 PROCEDURE — 97112 NEUROMUSCULAR REEDUCATION: CPT | Mod: PO

## 2024-06-28 NOTE — PROGRESS NOTES
[facility]   Physical Therapy Treatment Note      Name: Jorge Becerra  [facility] Number: [id number]    Therapy Diagnosis:   Encounter Diagnosis   Name Primary?    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  Evaluation Date: 6/6/2024  Authorization Period Expiration: 5/30/25  Plan of Care Expiration: 8/23/24  Progress Note Due: 7/5/24  Date of Surgery: 2021  Visit # / Visits authorized: 4/ 12 FOTO next visit.  FOTO: 1/ 3     Precautions: Standard, Diabetes, and cancer      Time In: 0823  Time Out: 0905  Total Billable Time: 40 minutes     PTA Visit #: 0/5     Subjective     Patient reports: no c/o pain.  He was compliant with home exercise program.  Response to previous treatment: positive.  Functional change: no    Pain: 0/10  Location: left thigh.     Objective      Objective Measures updated at progress report unless specified.     Treatment     Jorge received the treatments listed below:      therapeutic exercises to develop strength, endurance, ROM, flexibility, posture, and core stabilization for 10 minutes including:  Bike 10' L2.    manual therapy techniques: dry needling were applied to the:  for 0 minutes, including:    neuromuscular re-education activities to improve: Balance, Coordination, Kinesthetic, Sense, Proprioception, and Posture for 30 minutes. The following activities were included:  HS stretch 3x30  Piriformis stretch 3x30.  // bars  Mini squats 30  HR/TR 30  Gastroc stretch 3x30,  DF-100 22# FL;3X10, EXT;3X10  EFX 3'.  Shuttle 43# 6'  therapeutic activities to improve functional performance for 0  minutes, including:      gait training to improve functional mobility and safety for 0  minutes, including:      direct contact modalities after being cleared for contraindications:     supervised modalities after being cleared for  contradictions: TENS:  Jorge received TENS electrical stimulation for pain to the . Pt received continuous mode at a rate of 2 pps for 0 minutes. Jorge tolerated treatment well without any adverse effects.     Patient Education and Home Exercises       Education provided:   Gait pattern ed.  - Posture ed.    Written Home Exercises Provided:  to be issued .   Assessment     Endurance improving.  Performed well.    Jorge Is progressing well towards his goals.   Patient prognosis is Good.     Patient will continue to benefit from skilled outpatient physical therapy to address the deficits listed in the problem list box on initial evaluation, provide pt/family education and to maximize pt's level of independence in the home and community environment.     Patient's spiritual, cultural and educational needs considered and pt agreeable to plan of care and goals.     Anticipated barriers to physical therapy: no    Goals:   SHORT TERM GOALS:  3 weeks  Progress Date met   Recent signs and systems trend is improving in order to progress towards Long term goals.  [] Met  [] Not Met  [x] Progressing     Patient will be independent with Home Exercise Program  in order to further progress and return to maximal function. [] Met  [] Not Met  [x] Progressing     Pain rating at Worst: 5 /10 in order to progress towards increased independence with activity. [] Met  [] Not Met  [x] Progressing     Patient will be able to correct postural deviations in sitting and standing, to decrease pain and promote postural awareness for injury prevention.  [] Met  [] Not Met  [x] Progressing     Patient will improve functional outcome (FOTO) score: by 5% to increase self-worth & perceived functional ability towards long term goals [] Met  [] Not Met  [x] Progressing        LONG TERM GOALS: 6 weeks  Progress Date met   Patient will return to normal activites of daily living, recreational, and work related activities with less pain and limitation.   [] Met  [] Not Met  [x] Progressing     Patient will improve range of motion  to stated goals in order to return to maximal functional potential. ROM of right hip WNL/WFL in all planes. [] Met  [] Not Met  [x] Progressing     Patient will improve Strength to stated goals of appropriate musculature in order to improve functional independence. Strength of right hip and knee 5/5 in all planes. [] Met  [] Not Met  [x] Progressing     Pain Rating at Best: 1/10 to improve Quality of Life.  [] Met  [] Not Met  [x] Progressing     Patient will meet predicted functional outcome (FOTO) score: 45% to increase self-worth & perceived functional ability. [] Met  [] Not Met  [x] Progressing     Patient will have met/partially met personal goal of: return to previous LOF. [] Met  [] Not Met  [x] Progressing           Plan     Per POC.Progress as able.    Moe Sebastian, PT

## 2024-07-02 ENCOUNTER — CLINICAL SUPPORT (OUTPATIENT)
Dept: REHABILITATION | Facility: HOSPITAL | Age: 74
End: 2024-07-02
Payer: MEDICARE

## 2024-07-02 DIAGNOSIS — R26.9 GAIT ABNORMALITY: Primary | ICD-10-CM

## 2024-07-02 PROCEDURE — 97110 THERAPEUTIC EXERCISES: CPT | Mod: PO

## 2024-07-02 PROCEDURE — 97112 NEUROMUSCULAR REEDUCATION: CPT | Mod: PO

## 2024-07-02 NOTE — PROGRESS NOTES
[facility]   Physical Therapy Treatment Note      Name: Jorge Becerra  [facility] Number: [id number]    Therapy Diagnosis:   Encounter Diagnosis   Name Primary?    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  Evaluation Date: 6/6/2024  Authorization Period Expiration: 5/30/25  Plan of Care Expiration: 8/23/24  Progress Note Due: 7/5/24  Date of Surgery: 2021  Visit # / Visits authorized: 6/ 12   FOTO: 1/ 3   FOTO; 2/3;53/100  Precautions: Standard, Diabetes, and cancer      Time In: 1425  Time Out: 1518  Total Billable Time: 53 minutes     PTA Visit #: 0/5     Subjective     Patient reports: no c/o pain.  He was compliant with home exercise program.  Response to previous treatment: positive.  Functional change: no    Pain: 0/10  Location: left thigh.     Objective      Objective Measures updated at progress report unless specified.     Treatment     Jorge received the treatments listed below:      therapeutic exercises to develop strength, endurance, ROM, flexibility, posture, and core stabilization for 10 minutes including:  Bike 10' L2.    manual therapy techniques: dry needling were applied to the:  for 0 minutes, including:    neuromuscular re-education activities to improve: Balance, Coordination, Kinesthetic, Sense, Proprioception, and Posture for 43 minutes. The following activities were included:  HS stretch 3x30  Piriformis stretch 3x30.  // bars  Mini squats 30  HR/TR 30  Gastroc stretch 3x30,  DF-100 22# FL;3X10, EXT;3X10  EFX 3'.  Shuttle 43# 6'  therapeutic activities to improve functional performance for 0  minutes, including:      gait training to improve functional mobility and safety for 0  minutes, including:      direct contact modalities after being cleared for contraindications:     supervised modalities after being cleared for  VS taken and WNL. Pt. Resting quietly in bed, brief clean and dry. Personal alarm in place, bed in lowest position. contradictions: TENS:  Jorge received TENS electrical stimulation for pain to the . Pt received continuous mode at a rate of 2 pps for 0 minutes. Jorge tolerated treatment well without any adverse effects.     Patient Education and Home Exercises       Education provided:   Gait pattern ed.  - Posture ed.    Written Home Exercises Provided:  to be issued .   Assessment     Endurance improving.  Performed well.    Jorge Is progressing well towards his goals.   Patient prognosis is Good.     Patient will continue to benefit from skilled outpatient physical therapy to address the deficits listed in the problem list box on initial evaluation, provide pt/family education and to maximize pt's level of independence in the home and community environment.     Patient's spiritual, cultural and educational needs considered and pt agreeable to plan of care and goals.     Anticipated barriers to physical therapy: no    Goals:   SHORT TERM GOALS:  3 weeks  Progress Date met   Recent signs and systems trend is improving in order to progress towards Long term goals.  [] Met  [] Not Met  [x] Progressing     Patient will be independent with Home Exercise Program  in order to further progress and return to maximal function. [] Met  [] Not Met  [x] Progressing     Pain rating at Worst: 5 /10 in order to progress towards increased independence with activity. [] Met  [] Not Met  [x] Progressing     Patient will be able to correct postural deviations in sitting and standing, to decrease pain and promote postural awareness for injury prevention.  [] Met  [] Not Met  [x] Progressing     Patient will improve functional outcome (FOTO) score: by 5% to increase self-worth & perceived functional ability towards long term goals [] Met  [] Not Met  [x] Progressing        LONG TERM GOALS: 6 weeks  Progress Date met   Patient will return to normal activites of daily living, recreational, and work related activities with less pain and limitation.   [] Met  [] Not Met  [x] Progressing     Patient will improve range of motion  to stated goals in order to return to maximal functional potential. ROM of right hip WNL/WFL in all planes. [] Met  [] Not Met  [x] Progressing     Patient will improve Strength to stated goals of appropriate musculature in order to improve functional independence. Strength of right hip and knee 5/5 in all planes. [] Met  [] Not Met  [x] Progressing     Pain Rating at Best: 1/10 to improve Quality of Life.  [] Met  [] Not Met  [x] Progressing     Patient will meet predicted functional outcome (FOTO) score: 45% to increase self-worth & perceived functional ability. [] Met  [] Not Met  [x] Progressing     Patient will have met/partially met personal goal of: return to previous LOF. [] Met  [] Not Met  [x] Progressing           Plan     Per POC.Progress as able.    Moe Sebastian, PT

## 2024-07-05 ENCOUNTER — TELEPHONE (OUTPATIENT)
Dept: NEUROLOGY | Facility: CLINIC | Age: 74
End: 2024-07-05
Payer: MEDICARE

## 2024-07-05 NOTE — TELEPHONE ENCOUNTER
Spoke with patient's CG to coordinate scheduling appointments with [facility].    Educated patient's CG about clinic operations and informed that patient will see a NP for initial/pre-visit then see MD afterwards.    Patient is scheduled:  Pre-Visit with Smita on 9/04/2024  In-Person with Dr. Nino on 10/02/2024

## 2024-07-05 NOTE — TELEPHONE ENCOUNTER
----- Message from Zabrina Menjivar MA sent at 7/2/2024 11:47 AM CDT -----  Contact: [phone]    ----- Message -----  From: Carey Pro  Sent: 7/1/2024  12:05 PM CDT  To: Mica GERONIMO Staff    PATIENTCALL     Pt care giver wife  is calling to speak with Sandro in provider office. She is returning his call she is asking for a return call please call.

## 2024-07-08 DIAGNOSIS — E11.3393 CONTROLLED TYPE 2 DIABETES MELLITUS WITH BOTH EYES AFFECTED BY MODERATE NONPROLIFERATIVE RETINOPATHY WITHOUT MACULAR EDEMA, WITHOUT LONG-TERM CURRENT USE OF INSULIN: ICD-10-CM

## 2024-07-08 DIAGNOSIS — I12.9 HYPERTENSION ASSOCIATED WITH STAGE 2 CHRONIC KIDNEY DISEASE DUE TO TYPE 2 DIABETES MELLITUS: ICD-10-CM

## 2024-07-08 DIAGNOSIS — E11.22 HYPERTENSION ASSOCIATED WITH STAGE 2 CHRONIC KIDNEY DISEASE DUE TO TYPE 2 DIABETES MELLITUS: ICD-10-CM

## 2024-07-08 DIAGNOSIS — N18.2 HYPERTENSION ASSOCIATED WITH STAGE 2 CHRONIC KIDNEY DISEASE DUE TO TYPE 2 DIABETES MELLITUS: ICD-10-CM

## 2024-07-08 RX ORDER — ORAL SEMAGLUTIDE 7 MG/1
7 TABLET ORAL DAILY
Qty: 90 TABLET | Refills: 1 | Status: SHIPPED | OUTPATIENT
Start: 2024-07-08

## 2024-07-08 RX ORDER — ORAL SEMAGLUTIDE 3 MG/1
3 TABLET ORAL
Qty: 30 TABLET | Refills: 0 | OUTPATIENT
Start: 2024-07-08

## 2024-07-08 RX ORDER — FOSINOPRIL SODIUM 40 MG/1
40 TABLET ORAL DAILY
Qty: 90 TABLET | Refills: 3 | Status: SHIPPED | OUTPATIENT
Start: 2024-07-08

## 2024-07-08 NOTE — TELEPHONE ENCOUNTER
Refill Decision Note   Jorge Becerra  is requesting a refill authorization.  Brief Assessment and Rationale for Refill:  Approve  Quick Discontinue     Medication Therapy Plan:         Comments:     Note composed:6:11 PM 07/08/2024

## 2024-07-08 NOTE — TELEPHONE ENCOUNTER
No care due was identified.  [facility] Embedded Care Due Messages. Reference number: [id number].   7/08/2024 8:13:21 AM CDT

## 2024-07-10 ENCOUNTER — DOCUMENTATION ONLY (OUTPATIENT)
Dept: REHABILITATION | Facility: HOSPITAL | Age: 74
End: 2024-07-10
Payer: MEDICARE

## 2024-07-10 ENCOUNTER — CLINICAL SUPPORT (OUTPATIENT)
Dept: REHABILITATION | Facility: HOSPITAL | Age: 74
End: 2024-07-10
Payer: MEDICARE

## 2024-07-10 DIAGNOSIS — R26.9 GAIT ABNORMALITY: Primary | ICD-10-CM

## 2024-07-10 PROCEDURE — 97110 THERAPEUTIC EXERCISES: CPT | Mod: KX,PO,CQ

## 2024-07-10 PROCEDURE — 97112 NEUROMUSCULAR REEDUCATION: CPT | Mod: KX,PO,CQ

## 2024-07-10 NOTE — PROGRESS NOTES
"[facility]   Physical Therapy Treatment Note      Name: Jorge Becerra  Clinic Number: [id number]    Therapy Diagnosis:   Encounter Diagnosis   Name Primary?    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  Evaluation Date: 6/6/2024  Authorization Period Expiration: 5/30/25  Plan of Care Expiration: 8/23/24  Progress Note Due: 7/5/24  Date of Surgery: 2021  Visit # / Visits authorized: 7/ 12   FOTO: 1/ 3   FOTO; 2/3;53/100  Precautions: Standard, Diabetes, and cancer      Time In: 1130  Time Out: 1215  Total Billable Time: 45 minutes     PTA Visit #: 1/5     Subjective     Patient reports: no c/o pain. "I have been very blessed throughout this whole thing. I have had very little pain."   He was compliant with home exercise program.  Response to previous treatment: positive.  Functional change: no    Pain: 0/10  Location: left thigh.     Objective      Objective Measures updated at progress report unless specified.     Treatment     Jorge received the treatments listed below:      therapeutic exercises to develop strength, endurance, ROM, flexibility, posture, and core stabilization for 10 minutes including:  Bike 10' L2.    manual therapy techniques: dry needling were applied to the:  for 0 minutes, including:    neuromuscular re-education activities to improve: Balance, Coordination, Kinesthetic, Sense, Proprioception, and Posture for 30 minutes. The following activities were included:    Shuttle 43# 6'    HS stretch 3x30  Piriformis stretch 3x30.  Prone quad stretch 3 x 30 with rope     // bars  Mini squats 30  HR/TR 30  Gastroc stretch 3x30,    Not performed today   DF-100 22# FL;3X10, EXT;3X10  EFX 3'.    therapeutic activities to improve functional performance for 0  minutes, including:      gait training to improve functional mobility and safety for 0  " minutes, including:      direct contact modalities after being cleared for contraindications:     supervised modalities after being cleared for contradictions: TENS:  Jorge received TENS electrical stimulation for pain to the . Pt received continuous mode at a rate of 2 pps for 0 minutes. Jorge tolerated treatment well without any adverse effects.     Patient Education and Home Exercises       Education provided:   Gait pattern ed.  - Posture ed.    Written Home Exercises Provided:  to be issued .   Assessment     Endurance improving. Tolerated the addition of prone quad stretch without incident. Minimal cues required for form correction.       Jorge Is progressing well towards his goals.   Patient prognosis is Good.     Patient will continue to benefit from skilled outpatient physical therapy to address the deficits listed in the problem list box on initial evaluation, provide pt/family education and to maximize pt's level of independence in the home and community environment.     Patient's spiritual, cultural and educational needs considered and pt agreeable to plan of care and goals.     Anticipated barriers to physical therapy: no    Goals:   SHORT TERM GOALS:  3 weeks  Progress Date met   Recent signs and systems trend is improving in order to progress towards Long term goals.  [] Met  [] Not Met  [x] Progressing     Patient will be independent with Home Exercise Program  in order to further progress and return to maximal function. [] Met  [] Not Met  [x] Progressing     Pain rating at Worst: 5 /10 in order to progress towards increased independence with activity. [] Met  [] Not Met  [x] Progressing     Patient will be able to correct postural deviations in sitting and standing, to decrease pain and promote postural awareness for injury prevention.  [] Met  [] Not Met  [x] Progressing     Patient will improve functional outcome (FOTO) score: by 5% to increase self-worth & perceived functional ability towards  long term goals [] Met  [] Not Met  [x] Progressing        LONG TERM GOALS: 6 weeks  Progress Date met   Patient will return to normal activites of daily living, recreational, and work related activities with less pain and limitation.  [] Met  [] Not Met  [x] Progressing     Patient will improve range of motion  to stated goals in order to return to maximal functional potential. ROM of right hip WNL/WFL in all planes. [] Met  [] Not Met  [x] Progressing     Patient will improve Strength to stated goals of appropriate musculature in order to improve functional independence. Strength of right hip and knee 5/5 in all planes. [] Met  [] Not Met  [x] Progressing     Pain Rating at Best: 1/10 to improve Quality of Life.  [] Met  [] Not Met  [x] Progressing     Patient will meet predicted functional outcome (FOTO) score: 45% to increase self-worth & perceived functional ability. [] Met  [] Not Met  [x] Progressing     Patient will have met/partially met personal goal of: return to previous LOF. [] Met  [] Not Met  [x] Progressing           Plan     Per POC.Progress as able.    Sonia Brown, PTA

## 2024-07-12 ENCOUNTER — CLINICAL SUPPORT (OUTPATIENT)
Dept: REHABILITATION | Facility: HOSPITAL | Age: 74
End: 2024-07-12
Payer: MEDICARE

## 2024-07-12 DIAGNOSIS — R26.9 GAIT ABNORMALITY: Primary | ICD-10-CM

## 2024-07-12 PROCEDURE — 97112 NEUROMUSCULAR REEDUCATION: CPT | Mod: PO

## 2024-07-12 PROCEDURE — 97110 THERAPEUTIC EXERCISES: CPT | Mod: PO

## 2024-07-12 NOTE — PROGRESS NOTES
[facility]   Physical Therapy Treatment Note      Name: Jorge Becerra  Clinic Number: [id number]    Therapy Diagnosis:   Encounter Diagnosis   Name Primary?    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  Evaluation Date: 6/6/2024  Authorization Period Expiration: 5/30/25  Plan of Care Expiration: 8/23/24  Progress Note Due: 7/5/24  Date of Surgery: 2021  Visit # / Visits authorized: 7/ 12   FOTO: 1/ 3   FOTO; 2/3;53/100  Precautions: Standard, Diabetes, and cancer      Time In: 1340  Time Out: 1422  Total Billable Time: 40 minutes     PTA Visit #: 0/5     Subjective     Patient reports: no c/o pain.   He was compliant with home exercise program.  Response to previous treatment: positive.  Functional change: no    Pain: 0/10  Location: left thigh.     Objective      Objective Measures updated at progress report unless specified.     Treatment     Jorge received the treatments listed below:      therapeutic exercises to develop strength, endurance, ROM, flexibility, posture, and core stabilization for 10 minutes including:  Bike 10' L2.    manual therapy techniques: dry needling were applied to the:  for 0 minutes, including:    neuromuscular re-education activities to improve: Balance, Coordination, Kinesthetic, Sense, Proprioception, and Posture for 30 minutes. The following activities were included:    Shuttle 43# 6'    HS stretch 3x30  Piriformis stretch 3x30.  Prone quad stretch 3 x 30 with rope NP    // bars  Mini squats 30  HR/TR 30  Gastroc stretch 3x30,    Not performed today   DF-100 22# FL;3X10, EXT;3X10  EFX 3'.    therapeutic activities to improve functional performance for 0  minutes, including:      gait training to improve functional mobility and safety for 0  minutes, including:      direct contact modalities after being cleared for  contraindications:     supervised modalities after being cleared for contradictions: TENS:  Jorge received TENS electrical stimulation for pain to the . Pt received continuous mode at a rate of 2 pps for 0 minutes. Jorge tolerated treatment well without any adverse effects.     Patient Education and Home Exercises       Education provided:   Gait pattern ed.  - Posture ed.    Written Home Exercises Provided:  to be issued .   Assessment     Endurance improving. Tolerated the addition of prone quad stretch without incident. Minimal cues required for form correction.       Jorge Is progressing well towards his goals.   Patient prognosis is Good.     Patient will continue to benefit from skilled outpatient physical therapy to address the deficits listed in the problem list box on initial evaluation, provide pt/family education and to maximize pt's level of independence in the home and community environment.     Patient's spiritual, cultural and educational needs considered and pt agreeable to plan of care and goals.     Anticipated barriers to physical therapy: no    Goals:   SHORT TERM GOALS:  3 weeks  Progress Date met   Recent signs and systems trend is improving in order to progress towards Long term goals.  [] Met  [] Not Met  [x] Progressing     Patient will be independent with Home Exercise Program  in order to further progress and return to maximal function. [] Met  [] Not Met  [x] Progressing     Pain rating at Worst: 5 /10 in order to progress towards increased independence with activity. [] Met  [] Not Met  [x] Progressing     Patient will be able to correct postural deviations in sitting and standing, to decrease pain and promote postural awareness for injury prevention.  [] Met  [] Not Met  [x] Progressing     Patient will improve functional outcome (FOTO) score: by 5% to increase self-worth & perceived functional ability towards long term goals [] Met  [] Not Met  [x] Progressing        LONG TERM  GOALS: 6 weeks  Progress Date met   Patient will return to normal activites of daily living, recreational, and work related activities with less pain and limitation.  [] Met  [] Not Met  [x] Progressing     Patient will improve range of motion  to stated goals in order to return to maximal functional potential. ROM of right hip WNL/WFL in all planes. [] Met  [] Not Met  [x] Progressing     Patient will improve Strength to stated goals of appropriate musculature in order to improve functional independence. Strength of right hip and knee 5/5 in all planes. [] Met  [] Not Met  [x] Progressing     Pain Rating at Best: 1/10 to improve Quality of Life.  [] Met  [] Not Met  [x] Progressing     Patient will meet predicted functional outcome (FOTO) score: 45% to increase self-worth & perceived functional ability. [] Met  [] Not Met  [x] Progressing     Patient will have met/partially met personal goal of: return to previous LOF. [] Met  [] Not Met  [x] Progressing           Plan     Per POC.Progress as able.    Moe Sebastian, PT

## 2024-07-13 DIAGNOSIS — B35.4 TINEA CORPORIS: ICD-10-CM

## 2024-07-15 RX ORDER — KETOCONAZOLE 20 MG/ML
SHAMPOO, SUSPENSION TOPICAL
Qty: 120 ML | Refills: 2 | Status: SHIPPED | OUTPATIENT
Start: 2024-07-15

## 2024-07-17 ENCOUNTER — CLINICAL SUPPORT (OUTPATIENT)
Dept: REHABILITATION | Facility: HOSPITAL | Age: 74
End: 2024-07-17
Payer: MEDICARE

## 2024-07-17 DIAGNOSIS — R26.9 GAIT ABNORMALITY: Primary | ICD-10-CM

## 2024-07-17 PROCEDURE — 97110 THERAPEUTIC EXERCISES: CPT | Mod: PO

## 2024-07-17 PROCEDURE — 97112 NEUROMUSCULAR REEDUCATION: CPT | Mod: PO

## 2024-07-17 NOTE — PROGRESS NOTES
[facility]   Physical Therapy Treatment Note      Name: Jorge Becerra  Clinic Number: [id number]    Therapy Diagnosis:   Encounter Diagnosis   Name Primary?    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  Evaluation Date: 6/6/2024  Authorization Period Expiration: 5/30/25  Plan of Care Expiration: 8/23/24  Progress Note Due: 7/5/24  Date of Surgery: 2021  Visit # / Visits authorized: 8/ 12   FOTO: 1/ 3   FOTO; 2/3;53/100  Precautions: Standard, Diabetes, and cancer      Time In: 1600  Time Out: 1640  Total Billable Time: 40 minutes     PTA Visit #: 0/5     Subjective     Patient reports: no c/o pain.   He was compliant with home exercise program.  Response to previous treatment: positive.  Functional change: no    Pain: 0/10  Location: left thigh.     Objective      Objective Measures updated at progress report unless specified.     Treatment     Jorge received the treatments listed below:      therapeutic exercises to develop strength, endurance, ROM, flexibility, posture, and core stabilization for 10 minutes including:  Bike 10' L2.    manual therapy techniques: dry needling were applied to the:  for 0 minutes, including:    neuromuscular re-education activities to improve: Balance, Coordination, Kinesthetic, Sense, Proprioception, and Posture for 30 minutes. The following activities were included:    Shuttle 43# 6'    HS stretch 3x30  Piriformis stretch 3x30.  Prone quad stretch 3 x 30 with rope NP    // bars  Mini squats 30  HR/TR 30  Gastroc stretch 3x30,    DF-100 22# FL;3X10, EXT;3X10  EFX 3'.    therapeutic activities to improve functional performance for 0  minutes, including:      gait training to improve functional mobility and safety for 0  minutes, including:      direct contact modalities after being cleared for contraindications:      supervised modalities after being cleared for contradictions: TENS:  Jorge received TENS electrical stimulation for pain to the . Pt received continuous mode at a rate of 2 pps for 0 minutes. Jorge tolerated treatment well without any adverse effects.     Patient Education and Home Exercises       Education provided:   Gait pattern ed.  - Posture ed.    Written Home Exercises Provided:  to be issued .   Assessment     Endurance improving. Tolerated the addition of prone quad stretch without incident. Minimal cues required for form correction.       Jorge Is progressing well towards his goals.   Patient prognosis is Good.     Patient will continue to benefit from skilled outpatient physical therapy to address the deficits listed in the problem list box on initial evaluation, provide pt/family education and to maximize pt's level of independence in the home and community environment.     Patient's spiritual, cultural and educational needs considered and pt agreeable to plan of care and goals.     Anticipated barriers to physical therapy: no    Goals:   SHORT TERM GOALS:  3 weeks  Progress Date met   Recent signs and systems trend is improving in order to progress towards Long term goals.  [] Met  [] Not Met  [x] Progressing     Patient will be independent with Home Exercise Program  in order to further progress and return to maximal function. [] Met  [] Not Met  [x] Progressing     Pain rating at Worst: 5 /10 in order to progress towards increased independence with activity. [] Met  [] Not Met  [x] Progressing     Patient will be able to correct postural deviations in sitting and standing, to decrease pain and promote postural awareness for injury prevention.  [] Met  [] Not Met  [x] Progressing     Patient will improve functional outcome (FOTO) score: by 5% to increase self-worth & perceived functional ability towards long term goals [] Met  [] Not Met  [x] Progressing        LONG TERM GOALS: 6 weeks  Progress  Date met   Patient will return to normal activites of daily living, recreational, and work related activities with less pain and limitation.  [] Met  [] Not Met  [x] Progressing     Patient will improve range of motion  to stated goals in order to return to maximal functional potential. ROM of right hip WNL/WFL in all planes. [] Met  [] Not Met  [x] Progressing     Patient will improve Strength to stated goals of appropriate musculature in order to improve functional independence. Strength of right hip and knee 5/5 in all planes. [] Met  [] Not Met  [x] Progressing     Pain Rating at Best: 1/10 to improve Quality of Life.  [] Met  [] Not Met  [x] Progressing     Patient will meet predicted functional outcome (FOTO) score: 45% to increase self-worth & perceived functional ability. [] Met  [] Not Met  [x] Progressing     Patient will have met/partially met personal goal of: return to previous LOF. [] Met  [] Not Met  [x] Progressing           Plan     Per POC.Progress as able.    Moe Sebastian, PT

## 2024-07-19 ENCOUNTER — CLINICAL SUPPORT (OUTPATIENT)
Dept: REHABILITATION | Facility: HOSPITAL | Age: 74
End: 2024-07-19
Payer: MEDICARE

## 2024-07-19 DIAGNOSIS — R26.9 GAIT ABNORMALITY: Primary | ICD-10-CM

## 2024-07-19 PROCEDURE — 97112 NEUROMUSCULAR REEDUCATION: CPT | Mod: PO

## 2024-07-19 PROCEDURE — 97110 THERAPEUTIC EXERCISES: CPT | Mod: PO

## 2024-07-19 NOTE — PROGRESS NOTES
[facility]   Physical Therapy Treatment Note      Name: Jorge Becerra  Clinic Number: [id number]    Therapy Diagnosis:   Encounter Diagnosis   Name Primary?    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  Evaluation Date: 6/6/2024  Authorization Period Expiration: 5/30/25  Plan of Care Expiration: 8/23/24  Progress Note Due: 7/5/24  Date of Surgery: 2021  Visit # / Visits authorized: 9/ 12   FOTO: 1/ 3   FOTO; 2/3;53/100  Precautions: Standard, Diabetes, and cancer      Time In: 1123  Time Out: 1205  Total Billable Time: 40 minutes     PTA Visit #: 0/5     Subjective     Patient reports: no c/o pain.   He was compliant with home exercise program.  Response to previous treatment: positive.  Functional change: no    Pain: 0/10  Location: left thigh.     Objective      Objective Measures updated at progress report unless specified.     Treatment     Jorge received the treatments listed below:      therapeutic exercises to develop strength, endurance, ROM, flexibility, posture, and core stabilization for 10 minutes including:  Bike 10' L2.    manual therapy techniques: dry needling were applied to the:  for 0 minutes, including:    neuromuscular re-education activities to improve: Balance, Coordination, Kinesthetic, Sense, Proprioception, and Posture for 30 minutes. The following activities were included:    Shuttle 43# 6'    HS stretch 3x30  Piriformis stretch 3x30.  Prone quad stretch 3 x 30 with rope NP    // bars  Mini squats 30  HR/TR 30  Gastroc stretch 3x30,    DF-100 22# FL;3X10, EXT;3X10  EFX 3'.    therapeutic activities to improve functional performance for 0  minutes, including:      gait training to improve functional mobility and safety for 0  minutes, including:      direct contact modalities after being cleared for contraindications:      supervised modalities after being cleared for contradictions: TENS:  Jorge received TENS electrical stimulation for pain to the . Pt received continuous mode at a rate of 2 pps for 0 minutes. Jorge tolerated treatment well without any adverse effects.     Patient Education and Home Exercises       Education provided:   Gait pattern ed.  - Posture ed.    Written Home Exercises Provided:  to be issued .   Assessment     Endurance improving. Tolerated the addition of prone quad stretch without incident. Minimal cues required for form correction.       Jorge Is progressing well towards his goals.   Patient prognosis is Good.     Patient will continue to benefit from skilled outpatient physical therapy to address the deficits listed in the problem list box on initial evaluation, provide pt/family education and to maximize pt's level of independence in the home and community environment.     Patient's spiritual, cultural and educational needs considered and pt agreeable to plan of care and goals.     Anticipated barriers to physical therapy: no    Goals:   SHORT TERM GOALS:  3 weeks  Progress Date met   Recent signs and systems trend is improving in order to progress towards Long term goals.  [] Met  [] Not Met  [x] Progressing     Patient will be independent with Home Exercise Program  in order to further progress and return to maximal function. [] Met  [] Not Met  [x] Progressing     Pain rating at Worst: 5 /10 in order to progress towards increased independence with activity. [] Met  [] Not Met  [x] Progressing     Patient will be able to correct postural deviations in sitting and standing, to decrease pain and promote postural awareness for injury prevention.  [] Met  [] Not Met  [x] Progressing     Patient will improve functional outcome (FOTO) score: by 5% to increase self-worth & perceived functional ability towards long term goals [] Met  [] Not Met  [x] Progressing        LONG TERM GOALS: 6 weeks  Progress  Date met   Patient will return to normal activites of daily living, recreational, and work related activities with less pain and limitation.  [] Met  [] Not Met  [x] Progressing     Patient will improve range of motion  to stated goals in order to return to maximal functional potential. ROM of right hip WNL/WFL in all planes. [] Met  [] Not Met  [x] Progressing     Patient will improve Strength to stated goals of appropriate musculature in order to improve functional independence. Strength of right hip and knee 5/5 in all planes. [] Met  [] Not Met  [x] Progressing     Pain Rating at Best: 1/10 to improve Quality of Life.  [] Met  [] Not Met  [x] Progressing     Patient will meet predicted functional outcome (FOTO) score: 45% to increase self-worth & perceived functional ability. [] Met  [] Not Met  [x] Progressing     Patient will have met/partially met personal goal of: return to previous LOF. [] Met  [] Not Met  [x] Progressing           Plan     Per POC.Progress as able.    Moe Sebastian, PT

## 2024-07-22 NOTE — PROGRESS NOTES
Pt not seen today.     PRINCIPAL DISCHARGE DIAGNOSIS  Diagnosis: Cellulitis of right leg  Assessment and Plan of Treatment: 75 years old female with h/o HTN, chronic lymphedema with RLE wound present to with complain of pain and right lower extremity wound. Patient reported seeing a blister on right lower leg > 1 weeks ago, blister burst on 8/5/23. Initially clear liquid discharge but later become yellowish discharge, associated with severe worsening pain for 1 day. Reported one episode of fever 1 week ago. Patient went to PCP and was sent in to ED with concern for infection.   Hemodynamically stable, afebrile, sat well at RA. WBC 11.17, plt 358, Cr 1.44, glucose 150. Right lower extremity CT with finding suggestive of cellulitis. No definite fluid collection. LE doppler negative for DVT.   ·  Problem: Cellulitis of right lower extremity.   ·  Plan: Right lower extremity CT with finding suggestive of cellulitis. No definite fluid collection. LE doppler negative for DVT  seen by  ID and can change abx to oral doxy and levaquin for 5 more days   follow with  Dr Montoya   Problem/Plan - 2:  ·  Problem: CAROL (acute kidney injury).   ·  Plan: Cr 1.44, improving comparing to earlier this month  Hold losartan and NSAIDs  monitor renal function, renally dose medication.   Problem/Plan - 3:  ·  Problem: Benign essential HTN.   ·  Plan: BP stable off meds   Hold losartan given CAROL  If BP elevated, will consider BP med.

## 2024-07-24 ENCOUNTER — CLINICAL SUPPORT (OUTPATIENT)
Dept: REHABILITATION | Facility: HOSPITAL | Age: 74
End: 2024-07-24
Payer: MEDICARE

## 2024-07-24 DIAGNOSIS — R26.9 GAIT ABNORMALITY: Primary | ICD-10-CM

## 2024-07-24 PROCEDURE — 97112 NEUROMUSCULAR REEDUCATION: CPT | Mod: PO

## 2024-07-24 PROCEDURE — 97110 THERAPEUTIC EXERCISES: CPT | Mod: PO

## 2024-07-24 NOTE — PROGRESS NOTES
[facility]   Physical Therapy Treatment Note      Name: Jorge Becerra  [facility] Number: [id number]    Therapy Diagnosis:   Encounter Diagnosis   Name Primary?    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  Evaluation Date: 6/6/2024  Authorization Period Expiration: 5/30/25  Plan of Care Expiration: 8/23/24  Progress Note Due: 7/5/24  Date of Surgery: 2021  Visit # / Visits authorized: 9/ 12   FOTO: 1/ 3   FOTO; 2/3;53/100  Precautions: Standard, Diabetes, and cancer      Time In: 1600  Time Out: 1645  Total Billable Time: 40 minutes     PTA Visit #: 0/5     Subjective     Patient reports: no c/o pain.   He was compliant with home exercise program.  Response to previous treatment: positive.  Functional change: no    Pain: 0/10  Location: left thigh.     Objective      Objective Measures updated at progress report unless specified.     Treatment     Jorge received the treatments listed below:      therapeutic exercises to develop strength, endurance, ROM, flexibility, posture, and core stabilization for 10 minutes including:  Bike 10' L2.    manual therapy techniques: dry needling were applied to the:  for 0 minutes, including:    neuromuscular re-education activities to improve: Balance, Coordination, Kinesthetic, Sense, Proprioception, and Posture for 30 minutes. The following activities were included:    Shuttle 50# 6'    HS stretch 3x30  Piriformis stretch 3x30.  Prone quad stretch 3 x 30 with rope NP    // bars  Mini squats 30  HR/TR 30  Gastroc stretch 3x30,    DF-100 22# FL;3X10, EXT;3X10  EFX 3'.    therapeutic activities to improve functional performance for 0  minutes, including:      gait training to improve functional mobility and safety for 0  minutes, including:      direct contact modalities after being cleared for contraindications:      supervised modalities after being cleared for contradictions: TENS:  Jorge received TENS electrical stimulation for pain to the . Pt received continuous mode at a rate of 2 pps for 0 minutes. Jorge tolerated treatment well without any adverse effects.     Patient Education and Home Exercises       Education provided:   Gait pattern ed.  - Posture ed.    Written Home Exercises Provided:  to be issued .   Assessment     Endurance improving. Tolerated the addition of prone quad stretch without incident. Minimal cues required for form correction.       Jorge Is progressing well towards his goals.   Patient prognosis is Good.     Patient will continue to benefit from skilled outpatient physical therapy to address the deficits listed in the problem list box on initial evaluation, provide pt/family education and to maximize pt's level of independence in the home and community environment.     Patient's spiritual, cultural and educational needs considered and pt agreeable to plan of care and goals.     Anticipated barriers to physical therapy: no    Goals:   SHORT TERM GOALS:  3 weeks  Progress Date met   Recent signs and systems trend is improving in order to progress towards Long term goals.  [] Met  [] Not Met  [x] Progressing     Patient will be independent with Home Exercise Program  in order to further progress and return to maximal function. [] Met  [] Not Met  [x] Progressing     Pain rating at Worst: 5 /10 in order to progress towards increased independence with activity. [] Met  [] Not Met  [x] Progressing     Patient will be able to correct postural deviations in sitting and standing, to decrease pain and promote postural awareness for injury prevention.  [] Met  [] Not Met  [x] Progressing     Patient will improve functional outcome (FOTO) score: by 5% to increase self-worth & perceived functional ability towards long term goals [] Met  [] Not Met  [x] Progressing        LONG TERM GOALS: 6 weeks  Progress  Date met   Patient will return to normal activites of daily living, recreational, and work related activities with less pain and limitation.  [] Met  [] Not Met  [x] Progressing     Patient will improve range of motion  to stated goals in order to return to maximal functional potential. ROM of right hip WNL/WFL in all planes. [] Met  [] Not Met  [x] Progressing     Patient will improve Strength to stated goals of appropriate musculature in order to improve functional independence. Strength of right hip and knee 5/5 in all planes. [] Met  [] Not Met  [x] Progressing     Pain Rating at Best: 1/10 to improve Quality of Life.  [] Met  [] Not Met  [x] Progressing     Patient will meet predicted functional outcome (FOTO) score: 45% to increase self-worth & perceived functional ability. [] Met  [] Not Met  [x] Progressing     Patient will have met/partially met personal goal of: return to previous LOF. [] Met  [] Not Met  [x] Progressing           Plan     Per POC.Progress as able.    Moe Sebastian, PT

## 2024-07-25 ENCOUNTER — CLINICAL SUPPORT (OUTPATIENT)
Dept: REHABILITATION | Facility: HOSPITAL | Age: 74
End: 2024-07-25
Payer: MEDICARE

## 2024-07-25 DIAGNOSIS — R26.9 GAIT ABNORMALITY: Primary | ICD-10-CM

## 2024-07-25 PROCEDURE — 97112 NEUROMUSCULAR REEDUCATION: CPT | Mod: PO

## 2024-07-25 PROCEDURE — 97110 THERAPEUTIC EXERCISES: CPT | Mod: PO

## 2024-07-25 NOTE — PROGRESS NOTES
[facility]   Physical Therapy Treatment Note      Name: Jorge Becerra  [facility] Number: [id number]    Therapy Diagnosis:   Encounter Diagnosis   Name Primary?    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  Evaluation Date: 6/6/2024  Authorization Period Expiration: 5/30/25  Plan of Care Expiration: 8/23/24  Progress Note Due: 7/5/24  Date of Surgery: 2021  Visit # / Visits authorized: 9/ 12   FOTO: 1/ 3   FOTO; 2/3;53/100  Precautions: Standard, Diabetes, and cancer      Time In: 1600  Time Out: 1645  Total Billable Time: 40 minutes     PTA Visit #: 0/5     Subjective     Patient reports: no c/o pain.   He was compliant with home exercise program.  Response to previous treatment: positive.  Functional change: no    Pain: 0/10  Location: left thigh.     Objective      Objective Measures updated at progress report unless specified.     Treatment     Jorge received the treatments listed below:      therapeutic exercises to develop strength, endurance, ROM, flexibility, posture, and core stabilization for 10 minutes including:  Bike 10' L2.2.    manual therapy techniques: dry needling were applied to the:  for 0 minutes, including:    neuromuscular re-education activities to improve: Balance, Coordination, Kinesthetic, Sense, Proprioception, and Posture for 30 minutes. The following activities were included:    Shuttle 50# 6'    HS stretch 3x30  Piriformis stretch 3x30.  Prone quad stretch 3 x 30 with rope NP    // bars  Mini squats 30  HR/TR 30  Gastroc stretch 3x30,    DF-100 22# FL;3X10, EXT;3X10  EFX 3'.    therapeutic activities to improve functional performance for 0  minutes, including:      gait training to improve functional mobility and safety for 0  minutes, including:      direct contact modalities after being cleared for contraindications:      supervised modalities after being cleared for contradictions: TENS:  Jorge received TENS electrical stimulation for pain to the . Pt received continuous mode at a rate of 2 pps for 0 minutes. Jorge tolerated treatment well without any adverse effects.     Patient Education and Home Exercises       Education provided:   Gait pattern ed.  - Posture ed.    Written Home Exercises Provided:  to be issued .   Assessment     Endurance improving. Tolerated the addition of prone quad stretch without incident. Minimal cues required for form correction.       Jorge Is progressing well towards his goals.   Patient prognosis is Good.     Patient will continue to benefit from skilled outpatient physical therapy to address the deficits listed in the problem list box on initial evaluation, provide pt/family education and to maximize pt's level of independence in the home and community environment.     Patient's spiritual, cultural and educational needs considered and pt agreeable to plan of care and goals.     Anticipated barriers to physical therapy: no    Goals:   SHORT TERM GOALS:  3 weeks  Progress Date met   Recent signs and systems trend is improving in order to progress towards Long term goals.  [] Met  [] Not Met  [x] Progressing     Patient will be independent with Home Exercise Program  in order to further progress and return to maximal function. [] Met  [] Not Met  [x] Progressing     Pain rating at Worst: 5 /10 in order to progress towards increased independence with activity. [] Met  [] Not Met  [x] Progressing     Patient will be able to correct postural deviations in sitting and standing, to decrease pain and promote postural awareness for injury prevention.  [] Met  [] Not Met  [x] Progressing     Patient will improve functional outcome (FOTO) score: by 5% to increase self-worth & perceived functional ability towards long term goals [] Met  [] Not Met  [x] Progressing        LONG TERM GOALS: 6 weeks  Progress  Date met   Patient will return to normal activites of daily living, recreational, and work related activities with less pain and limitation.  [] Met  [] Not Met  [x] Progressing     Patient will improve range of motion  to stated goals in order to return to maximal functional potential. ROM of right hip WNL/WFL in all planes. [] Met  [] Not Met  [x] Progressing     Patient will improve Strength to stated goals of appropriate musculature in order to improve functional independence. Strength of right hip and knee 5/5 in all planes. [] Met  [] Not Met  [x] Progressing     Pain Rating at Best: 1/10 to improve Quality of Life.  [] Met  [] Not Met  [x] Progressing     Patient will meet predicted functional outcome (FOTO) score: 45% to increase self-worth & perceived functional ability. [] Met  [] Not Met  [x] Progressing     Patient will have met/partially met personal goal of: return to previous LOF. [] Met  [] Not Met  [x] Progressing           Plan     Per POC.Progress as able.    Moe Sebastian, PT

## 2024-07-26 ENCOUNTER — OFFICE VISIT (OUTPATIENT)
Dept: OTOLARYNGOLOGY | Facility: CLINIC | Age: 74
End: 2024-07-26
Payer: MEDICARE

## 2024-07-26 ENCOUNTER — CLINICAL SUPPORT (OUTPATIENT)
Dept: AUDIOLOGY | Facility: CLINIC | Age: 74
End: 2024-07-26
Payer: MEDICARE

## 2024-07-26 VITALS
DIASTOLIC BLOOD PRESSURE: 71 MMHG | SYSTOLIC BLOOD PRESSURE: 123 MMHG | HEIGHT: 67 IN | BODY MASS INDEX: 24.19 KG/M2 | WEIGHT: 154.13 LBS | HEART RATE: 81 BPM

## 2024-07-26 DIAGNOSIS — H90.3 SENSORINEURAL HEARING LOSS, BILATERAL: Primary | ICD-10-CM

## 2024-07-26 DIAGNOSIS — H61.22 IMPACTED CERUMEN OF LEFT EAR: ICD-10-CM

## 2024-07-26 DIAGNOSIS — H91.8X3 ASYMMETRICAL HEARING LOSS: ICD-10-CM

## 2024-07-26 DIAGNOSIS — H90.A21 SENSORINEURAL HEARING LOSS (SNHL) OF RIGHT EAR WITH RESTRICTED HEARING OF LEFT EAR: Primary | ICD-10-CM

## 2024-07-26 DIAGNOSIS — H90.3 ASYMMETRIC SNHL (SENSORINEURAL HEARING LOSS): ICD-10-CM

## 2024-07-26 PROCEDURE — 99214 OFFICE O/P EST MOD 30 MIN: CPT | Mod: PBBFAC,27,PO | Performed by: PHYSICIAN ASSISTANT

## 2024-07-26 PROCEDURE — 99999 PR PBB SHADOW E&M-EST. PATIENT-LVL IV: CPT | Mod: PBBFAC,,, | Performed by: PHYSICIAN ASSISTANT

## 2024-07-26 PROCEDURE — 99211 OFF/OP EST MAY X REQ PHY/QHP: CPT | Mod: PBBFAC,PO | Performed by: AUDIOLOGIST

## 2024-07-26 PROCEDURE — 99999 PR PBB SHADOW E&M-EST. PATIENT-LVL I: CPT | Mod: PBBFAC,,, | Performed by: AUDIOLOGIST

## 2024-07-26 RX ORDER — CHOLECALCIFEROL (VITAMIN D3) 25 MCG
1 TABLET,CHEWABLE ORAL DAILY
COMMUNITY

## 2024-07-26 NOTE — PROCEDURES
Ear Cerumen Removal    Date/Time: 7/26/2024 2:30 PM    Performed by: Manohar Nobles PA-C  Authorized by: Manohar Nobles PA-C      Local anesthetic:  None  Location details:  Left ear  Procedure type comment:  Suction and curette  Cerumen  Removal Results:  Cerumen completely removed  Patient tolerance:  Patient tolerated the procedure well with no immediate complications

## 2024-07-26 NOTE — PROGRESS NOTES
Jorge Becerra was seen 07/26/2024 for an audiological evaluation. Pt was accompanied by spouse during today's visit. Pertinent complaints today include hearing loss. Pt denies history of loud noise exposure and denies early onset of genetic family history of hearing loss. Otoscopy revealed no cerumen in both ears. The tympanic membrane was visualized AU prior to proceeding with the hearing testing.     Results reveal a mild-to-profound sensorineural hearing loss for the right ear and  mild-to-profound sensorineural hearing loss for the left ear.    Speech Reception Thresholds were  40 dBHL for the right ear and 25 dBHL for the left ear.    Word recognition scores were good for the right ear and excellent for the left ear.   Tympanograms were Type A for the right ear and Type A for the left ear.    Recommendations: 1) Follow up with ENT     2) Annual hearing evaluation     3) Hearing aid consult when ready    Audiogram results were reviewed in detail with patient and all questions were answered. Results will be reviewed by the referring provider at the completion of this note. All complaints were addressed during this visit to the patient's satisfaction.

## 2024-07-26 NOTE — PROGRESS NOTES
[facility]    Subjective:      Patient: Jorge Becerra Patient PCP: Mehul Royal MD         :  1950     Sex:  male      MRN:  [id number]          Date of Visit: 2024      Chief Complaint: Hearing Loss    Patient ID: Jorge Becerra is a 73 y.o. male who presents to clinic for evaluation of hearing loss. Pt states that he has had hearing loss for over 10 years. He served in the  and has h/o loud noise exposure. Pt denies ear pain/discharge, tinnitus or dizziness/vertigo. He does have issues with excessive cerumen in his left ear. He denies h/o otologic surgery.     He did have MRI Brain WO 2024 for evaluation of cognitive impairment that was normal.     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    Family History  His family history includes Diabetes in his mother.    Past Surgical History:   Procedure Laterality Date    TONSILLECTOMY       Social History     Tobacco Use    Smoking status: Never    Smokeless tobacco: Never   Substance and Sexual Activity    Alcohol use: Never    Drug use: Not on file    Sexual activity: Not on file     Medications  He has a current medication list which includes the following prescription(s): allopurinol, amlodipine, aspirin, atorvastatin, cholecalciferol (vitamin d3), cyanocobalamin (vitamin b-12), donepezil, empagliflozin, ferrous sulfate, fosinopril, ketoconazole, magnesium oxide, metformin, methocarbamol, rybelsus, and vit c/e/zinc/lutein/zeaxanthin.    Review of patient's allergies indicates:  No Known Allergies  All medications, allergies, and past history have been reviewed.    Objective:      Vitals:      2024     9:17 AM 2024     9:14 AM 2024     2:08 PM   Vitals - 1 value per visit   SYSTOLIC 124 114 123   DIASTOLIC 78 68 71   Pulse 78 80 81   Resp 16 16    SPO2 99 % 98 %    Weight (lb) 158  "159.61 154.1   Weight (kg) 71.668 72.4 69.9   Height 5' 7" (1.702 m) 5' 7" (1.702 m) 5' 7" (1.702 m)   BMI (Calculated) 24.7 25 24.1   Pain Score Zero Zero Zero       Body surface area is 1.82 meters squared.    Physical Exam  Constitutional:       General: He is not in acute distress.     Appearance: Normal appearance. He is not ill-appearing.   HENT:      Head: Normocephalic and atraumatic.      Right Ear: Tympanic membrane, ear canal and external ear normal.      Left Ear: Tympanic membrane, ear canal and external ear normal. There is impacted cerumen.      Nose: Septal deviation (rightward) present.      Mouth/Throat:      Lips: Pink. No lesions.      Mouth: Mucous membranes are moist. No oral lesions.      Tongue: No lesions.      Palate: No lesions.      Pharynx: Oropharynx is clear. Uvula midline. No pharyngeal swelling, oropharyngeal exudate, posterior oropharyngeal erythema or uvula swelling.      Tonsils: 0 on the right. 0 on the left.      Comments: S/p tonsillectomy.   Eyes:      General:         Right eye: No discharge.         Left eye: No discharge.      Extraocular Movements: Extraocular movements intact.      Conjunctiva/sclera: Conjunctivae normal.   Pulmonary:      Effort: Pulmonary effort is normal.   Neurological:      General: No focal deficit present.      Mental Status: He is alert and oriented to person, place, and time. Mental status is at baseline.   Psychiatric:         Mood and Affect: Mood normal.         Behavior: Behavior normal.         Thought Content: Thought content normal.         Judgment: Judgment normal.       Ear Cerumen Removal     Date/Time: 7/26/2024 2:30 PM     Performed by: Manohar Nobles PA-C  Authorized by: Manohar Nobles PA-C       Local anesthetic:  None  Location details:  Left ear  Procedure type comment:  Suction and curette  Cerumen  Removal Results:  Cerumen completely removed  Patient tolerance:  Patient tolerated the procedure well with no " immediate complications          Labs:  WBC   Date Value Ref Range Status   08/05/2023 7.55 3.90 - 12.70 K/uL Final     Platelets   Date Value Ref Range Status   08/05/2023 261 150 - 450 K/uL Final     Creatinine   Date Value Ref Range Status   05/17/2024 0.9 0.5 - 1.4 mg/dL Final     TSH   Date Value Ref Range Status   05/30/2024 3.075 0.400 - 4.000 uIU/mL Final     Glucose   Date Value Ref Range Status   05/17/2024 109 70 - 110 mg/dL Final     Hemoglobin A1C   Date Value Ref Range Status   05/17/2024 7.0 (H) 4.0 - 5.6 % Final     Comment:     ADA Screening Guidelines:  5.7-6.4%  Consistent with prediabetes  >or=6.5%  Consistent with diabetes    High levels of fetal hemoglobin interfere with the HbA1C  assay. Heterozygous hemoglobin variants (HbS, HgC, etc)do  not significantly interfere with this assay.   However, presence of multiple variants may affect accuracy.         Audiogram Summary:    All lab results and imaging results have been reviewed.    Assessment:        ICD-10-CM ICD-9-CM   1. Sensorineural hearing loss (SNHL) of right ear with restricted hearing of left ear  H90.A21 389.22   2. Asymmetric SNHL (sensorineural hearing loss)  H90.3 389.16   3. Impacted cerumen of left ear  H61.22 380.4            Plan:      Left ear cleaning performed today in office. We will proceed with 6 month routine ear cleanings for excessive cerumen production in left ear. Audiogram reviewed with pt in detail. Pt has right greater than left asymmetric mild to profound SNHL AU. Type A tymp AD and type A tymp AS. SRTs 40dB AD and 25dB AS. WRS 72%AD at 80dB and 100%AS at 65dB. Pt is a  and [facility] as [facility] is a [facility]. He did have MRI Brain WO 06/07/2024 for evaluation of cognitive impairment that was normal. We will proceed with MRI IAC w/wo to rule out low likelihood of AN/cerebellopontine angle mass due to asymmetric SNHL with 28%  difference in speech discrimination between his left and right ear. Follow up in 6 months for routine ear cleaning. We will monitor hearing loss with annual audiograms. Follow up sooner as needed for ENT issues/concern.

## 2024-07-30 ENCOUNTER — CLINICAL SUPPORT (OUTPATIENT)
Dept: REHABILITATION | Facility: HOSPITAL | Age: 74
End: 2024-07-30
Payer: MEDICARE

## 2024-07-30 DIAGNOSIS — R26.9 GAIT ABNORMALITY: Primary | ICD-10-CM

## 2024-07-30 PROCEDURE — 97112 NEUROMUSCULAR REEDUCATION: CPT | Mod: PO

## 2024-07-30 PROCEDURE — 97110 THERAPEUTIC EXERCISES: CPT | Mod: PO

## 2024-07-30 NOTE — PROGRESS NOTES
[facility]   Physical Therapy Treatment Note      Name: Jorge Becerra  [facility] Number: [id number]    Therapy Diagnosis:   Encounter Diagnosis   Name Primary?    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  Evaluation Date: 6/6/2024  Authorization Period Expiration: 5/30/25  Plan of Care Expiration: 8/23/24  Progress Note Due: 7/5/24  Date of Surgery: 2021  Visit # / Visits authorized: 9/ 12   FOTO: 1/ 3   FOTO; 2/3;53/100  Precautions: Standard, Diabetes, and cancer      Time In: 0910  Time Out: 0950  Total Billable Time: 40 minutes     PTA Visit #: 0/5     Subjective     Patient reports: no c/o pain.   He was compliant with home exercise program.  Response to previous treatment: positive.  Functional change: no    Pain: 0/10  Location: left thigh.     Objective      Objective Measures updated at progress report unless specified.     Treatment     Jorge received the treatments listed below:      therapeutic exercises to develop strength, endurance, ROM, flexibility, posture, and core stabilization for 10 minutes including:  Bike 10' L2.2.    manual therapy techniques: dry needling were applied to the:  for 0 minutes, including:    neuromuscular re-education activities to improve: Balance, Coordination, Kinesthetic, Sense, Proprioception, and Posture for 30 minutes. The following activities were included:    Shuttle 50# 6'    HS stretch 3x30  Piriformis stretch 3x30.  Prone quad stretch 3 x 30 with rope NP    // bars  Mini squats 30  HR/TR 30  Gastroc stretch 3x30,    DF-100 22# FL;3X10, EXT;3X10  EFX 3'.    therapeutic activities to improve functional performance for 0  minutes, including:      gait training to improve functional mobility and safety for 0  minutes, including:      direct contact modalities after being cleared for contraindications:      supervised modalities after being cleared for contradictions: TENS:  Jorge received TENS electrical stimulation for pain to the . Pt received continuous mode at a rate of 2 pps for 0 minutes. Jorge tolerated treatment well without any adverse effects.     Patient Education and Home Exercises       Education provided:   Gait pattern ed.  - Posture ed.    Written Home Exercises Provided:  to be issued .   Assessment     Endurance improving. Tolerated the addition of prone quad stretch without incident. Minimal cues required for form correction.       Jorge Is progressing well towards his goals.   Patient prognosis is Good.     Patient will continue to benefit from skilled outpatient physical therapy to address the deficits listed in the problem list box on initial evaluation, provide pt/family education and to maximize pt's level of independence in the home and community environment.     Patient's spiritual, cultural and educational needs considered and pt agreeable to plan of care and goals.     Anticipated barriers to physical therapy: no    Goals:   SHORT TERM GOALS:  3 weeks  Progress Date met   Recent signs and systems trend is improving in order to progress towards Long term goals.  [] Met  [] Not Met  [x] Progressing     Patient will be independent with Home Exercise Program  in order to further progress and return to maximal function. [] Met  [] Not Met  [x] Progressing     Pain rating at Worst: 5 /10 in order to progress towards increased independence with activity. [] Met  [] Not Met  [x] Progressing     Patient will be able to correct postural deviations in sitting and standing, to decrease pain and promote postural awareness for injury prevention.  [] Met  [] Not Met  [x] Progressing     Patient will improve functional outcome (FOTO) score: by 5% to increase self-worth & perceived functional ability towards long term goals [] Met  [] Not Met  [x] Progressing        LONG TERM GOALS: 6 weeks  Progress  Date met   Patient will return to normal activites of daily living, recreational, and work related activities with less pain and limitation.  [] Met  [] Not Met  [x] Progressing     Patient will improve range of motion  to stated goals in order to return to maximal functional potential. ROM of right hip WNL/WFL in all planes. [] Met  [] Not Met  [x] Progressing     Patient will improve Strength to stated goals of appropriate musculature in order to improve functional independence. Strength of right hip and knee 5/5 in all planes. [] Met  [] Not Met  [x] Progressing     Pain Rating at Best: 1/10 to improve Quality of Life.  [] Met  [] Not Met  [x] Progressing     Patient will meet predicted functional outcome (FOTO) score: 45% to increase self-worth & perceived functional ability. [] Met  [] Not Met  [x] Progressing     Patient will have met/partially met personal goal of: return to previous LOF. [] Met  [] Not Met  [x] Progressing           Plan     Per POC.Progress as able.    Moe Sebastian, PT

## 2024-08-01 ENCOUNTER — CLINICAL SUPPORT (OUTPATIENT)
Dept: REHABILITATION | Facility: HOSPITAL | Age: 74
End: 2024-08-01
Payer: MEDICARE

## 2024-08-01 DIAGNOSIS — R26.9 GAIT ABNORMALITY: Primary | ICD-10-CM

## 2024-08-01 PROCEDURE — 97112 NEUROMUSCULAR REEDUCATION: CPT | Mod: PO

## 2024-08-01 PROCEDURE — 97110 THERAPEUTIC EXERCISES: CPT | Mod: PO

## 2024-08-01 NOTE — PROGRESS NOTES
[facility]   Physical Therapy Treatment Note      Name: Jorge Becerra  Clinic Number: [id number]    Therapy Diagnosis:   Encounter Diagnosis   Name Primary?    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  Evaluation Date: 6/6/2024  Authorization Period Expiration: 5/30/25  Plan of Care Expiration: 8/23/24  Progress Note Due: 7/5/24  Date of Surgery: 2021  Visit # / Visits authorized: 10/ 12   FOTO: 1/ 3   FOTO; 2/3;53/100  Precautions: Standard, Diabetes, and cancer      Time In: 0910  Time Out: 0955  Total Billable Time: 40 minutes     PTA Visit #: 0/5     Subjective     Patient reports: no pain.   He was compliant with home exercise program.  Response to previous treatment: positive.  Functional change: no    Pain: 0/10  Location: left thigh.     Objective      Objective Measures updated at progress report unless specified.     Treatment     Jorge received the treatments listed below:      therapeutic exercises to develop strength, endurance, ROM, flexibility, posture, and core stabilization for 10 minutes including:  Bike 10' L2.2.    manual therapy techniques: dry needling were applied to the:  for 0 minutes, including:    neuromuscular re-education activities to improve: Balance, Coordination, Kinesthetic, Sense, Proprioception, and Posture for 30 minutes. The following activities were included:    Shuttle 50# 6'    HS stretch 3x30  Piriformis stretch 3x30.  Prone quad stretch 3 x 30 with rope NP    // bars  Mini squats 30  HR/TR 30  Gastroc stretch 3x30,    DF-100 22# FL;3X10, EXT;3X10  EFX 3'.    therapeutic activities to improve functional performance for 0  minutes, including:      gait training to improve functional mobility and safety for 0  minutes, including:      direct contact modalities after being cleared for contraindications:      supervised modalities after being cleared for contradictions: TENS:  Jorge received TENS electrical stimulation for pain to the . Pt received continuous mode at a rate of 2 pps for 0 minutes. Jorge tolerated treatment well without any adverse effects.     Patient Education and Home Exercises       Education provided:   Gait pattern ed.  - Posture ed.    Written Home Exercises Provided:  to be issued .   Assessment     Endurance improving. Tolerated the addition of prone quad stretch without incident. Minimal cues required for form correction.       Jorge Is progressing well towards his goals.   Patient prognosis is Good.     Patient will continue to benefit from skilled outpatient physical therapy to address the deficits listed in the problem list box on initial evaluation, provide pt/family education and to maximize pt's level of independence in the home and community environment.     Patient's spiritual, cultural and educational needs considered and pt agreeable to plan of care and goals.     Anticipated barriers to physical therapy: no    Goals:   SHORT TERM GOALS:  3 weeks  Progress Date met   Recent signs and systems trend is improving in order to progress towards Long term goals.  [] Met  [] Not Met  [x] Progressing     Patient will be independent with Home Exercise Program  in order to further progress and return to maximal function. [] Met  [] Not Met  [x] Progressing     Pain rating at Worst: 5 /10 in order to progress towards increased independence with activity. [] Met  [] Not Met  [x] Progressing     Patient will be able to correct postural deviations in sitting and standing, to decrease pain and promote postural awareness for injury prevention.  [] Met  [] Not Met  [x] Progressing     Patient will improve functional outcome (FOTO) score: by 5% to increase self-worth & perceived functional ability towards long term goals [] Met  [] Not Met  [x] Progressing        LONG TERM GOALS: 6 weeks  Progress  Date met   Patient will return to normal activites of daily living, recreational, and work related activities with less pain and limitation.  [] Met  [] Not Met  [x] Progressing     Patient will improve range of motion  to stated goals in order to return to maximal functional potential. ROM of right hip WNL/WFL in all planes. [] Met  [] Not Met  [x] Progressing     Patient will improve Strength to stated goals of appropriate musculature in order to improve functional independence. Strength of right hip and knee 5/5 in all planes. [] Met  [] Not Met  [x] Progressing     Pain Rating at Best: 1/10 to improve Quality of Life.  [] Met  [] Not Met  [x] Progressing     Patient will meet predicted functional outcome (FOTO) score: 45% to increase self-worth & perceived functional ability. [] Met  [] Not Met  [x] Progressing     Patient will have met/partially met personal goal of: return to previous LOF. [] Met  [] Not Met  [x] Progressing           Plan     Per POC.Progress as able.    Moe Sebastian, PT

## 2024-08-06 ENCOUNTER — CLINICAL SUPPORT (OUTPATIENT)
Dept: REHABILITATION | Facility: HOSPITAL | Age: 74
End: 2024-08-06
Payer: MEDICARE

## 2024-08-06 DIAGNOSIS — R26.9 GAIT ABNORMALITY: Primary | ICD-10-CM

## 2024-08-06 PROCEDURE — 97110 THERAPEUTIC EXERCISES: CPT | Mod: PO

## 2024-08-06 PROCEDURE — 97112 NEUROMUSCULAR REEDUCATION: CPT | Mod: PO

## 2024-08-07 ENCOUNTER — HOSPITAL ENCOUNTER (OUTPATIENT)
Dept: RADIOLOGY | Facility: HOSPITAL | Age: 74
Discharge: HOME | End: 2024-08-07
Attending: PHYSICIAN ASSISTANT
Payer: MEDICARE

## 2024-08-07 DIAGNOSIS — H90.A21 SENSORINEURAL HEARING LOSS (SNHL) OF RIGHT EAR WITH RESTRICTED HEARING OF LEFT EAR: ICD-10-CM

## 2024-08-07 LAB
CREAT SERPL-MCNC: 1 MG/DL (ref 0.5–1.4)
SAMPLE: NORMAL

## 2024-08-07 PROCEDURE — A9585 GADOBUTROL INJECTION: HCPCS | Mod: PO | Performed by: PHYSICIAN ASSISTANT

## 2024-08-07 PROCEDURE — 70553 MRI BRAIN STEM W/O & W/DYE: CPT | Mod: 26,,, | Performed by: RADIOLOGY

## 2024-08-07 PROCEDURE — 70553 MRI BRAIN STEM W/O & W/DYE: CPT | Mod: TC,PO

## 2024-08-07 PROCEDURE — 25500020 PHARM REV CODE 255: Mod: PO | Performed by: PHYSICIAN ASSISTANT

## 2024-08-07 PROCEDURE — 82565 ASSAY OF CREATININE: CPT | Mod: PO

## 2024-08-07 RX ORDER — GADOBUTROL 604.72 MG/ML
7.5 INJECTION INTRAVENOUS
Status: COMPLETED | OUTPATIENT
Start: 2024-08-07 | End: 2024-08-07

## 2024-08-07 RX ADMIN — GADOBUTROL 7.5 ML: 604.72 INJECTION INTRAVENOUS at 09:08

## 2024-08-09 DIAGNOSIS — E11.3393 CONTROLLED TYPE 2 DIABETES MELLITUS WITH BOTH EYES AFFECTED BY MODERATE NONPROLIFERATIVE RETINOPATHY WITHOUT MACULAR EDEMA, WITHOUT LONG-TERM CURRENT USE OF INSULIN: ICD-10-CM

## 2024-08-09 RX ORDER — ORAL SEMAGLUTIDE 3 MG/1
3 TABLET ORAL
Qty: 30 TABLET | Refills: 0 | OUTPATIENT
Start: 2024-08-09

## 2024-08-29 NOTE — PROGRESS NOTES
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 occasionally occur and might be overlooked during review.                Billing Statement:     I performed this consultation using real-time Telehealth tools, including a live video connection between my location and the patient's location (their home within the [facility]). Prior to initiating the consultation, I obtained informed verbal consent to perform this consultation using Telehealth tools and answered all the questions about the Telehealth interaction. The participants understood that only a limited neurological exam and limited neuropsychological testing could be performed using Telehealth tools.     I spent a total of 63 minutes (from 10:18 AM to 11:21 AM) on the day of clinical evaluation, engaging in person in a face-to-face consultation with the patient. More than 50% of this time was devoted to counseling on symptoms, treatment plans, risks, therapeutic options, lifestyle modifications, and safety concerns related to the above diagnoses.     A Review of Systems was completed, encompassing 10 of the 14 systems. All findings were negative, except those noted in the History of Present Illness (HPI) and Review of Systems (ROS) Sections. The systems reviewed were Constitutional (Const), Eyes, Ear/Nose/Throat  (ENT), Respiratory (Resp), Cardiovascular (CV), Gastrointestinal (GI), Genitourinary (), Musculoskeletal (MSK), Skin, and Neurological (Neuro).     I spent a total of 10 minutes reviewing and summarizing records from outside physicians on the day of the clinical evaluation. This review and summary were conducted as described in the History of Present Illness (HPI) and Assessment.     I performed a neurobehavioral status examination on the day of clinical evaluation that included a clinical assessment of thinking, reasoning, and judgment to ensure a comprehensive approach in managing the complex and evolving needs of the patient's neurocognitive condition. Please see above HPI and ROS for full details. This exam was performed on the day of clinical evaluation and included 36 minutes spent on direct face-to-face clinical observation and interview with the patient and 15 minutes spent interpreting test results and preparing the report. The total time of 36 minutes spent on the neurobehavioral status examination is not included in the time spent on evaluation and management coding.     Total Billing time spent on encounter/documentation for this patient's evaluation and management, not including the neurobehavioral status examination: 58 minutes.

## 2024-09-03 ENCOUNTER — TELEPHONE (OUTPATIENT)
Dept: NEUROLOGY | Facility: CLINIC | Age: 74
End: 2024-09-03
Payer: MEDICARE

## 2024-09-04 ENCOUNTER — TELEPHONE (OUTPATIENT)
Dept: NEUROLOGY | Facility: CLINIC | Age: 74
End: 2024-09-04
Payer: MEDICARE

## 2024-09-04 ENCOUNTER — OFFICE VISIT (OUTPATIENT)
Dept: NEUROLOGY | Facility: CLINIC | Age: 74
End: 2024-09-04
Payer: MEDICARE

## 2024-09-04 DIAGNOSIS — Z85.72 HX OF LYMPHOMA: ICD-10-CM

## 2024-09-04 DIAGNOSIS — G47.33 OBSTRUCTIVE SLEEP APNEA: ICD-10-CM

## 2024-09-04 DIAGNOSIS — D51.8 OTHER VITAMIN B12 DEFICIENCY ANEMIA: ICD-10-CM

## 2024-09-04 DIAGNOSIS — G31.84 MILD COGNITIVE IMPAIRMENT: Primary | ICD-10-CM

## 2024-09-04 DIAGNOSIS — E11.69 MIXED DIABETIC HYPERLIPIDEMIA ASSOCIATED WITH TYPE 2 DIABETES MELLITUS: ICD-10-CM

## 2024-09-04 DIAGNOSIS — E78.2 MIXED DIABETIC HYPERLIPIDEMIA ASSOCIATED WITH TYPE 2 DIABETES MELLITUS: ICD-10-CM

## 2024-09-04 NOTE — TELEPHONE ENCOUNTER
----- Message from Smita Fernandez NP sent at 9/4/2024 11:44 AM CDT -----  Hi Peeps,    Can you please scheduled labs and EKG before dr. Nino visit. Thanks

## 2024-09-04 NOTE — TELEPHONE ENCOUNTER
SW called patient to inform him that Smita Rebecca will be running a few minutes late but will be with them as soon as possible. They VU.

## 2024-09-04 NOTE — TELEPHONE ENCOUNTER
Called & spoke with patient's spouse to help schedule EKG and labs. Spouse request to go to [place] - asked if going to [facility] would be okay. Spouse accepted offer.    Called [facility] Cardiology for assistance with scheduling EKG.    Patient is scheduled on 9/12/2024 at 8:30am for EKG.    Called patient's spouse back and informed EKG appointment. Assisted with scheduling labs, patient is scheduled on 9/12/2024 at 9am.    Informed spouse that EKG-Cardiology is on the 2nd floor and lab is on the 1st floor.    Patient's spouse verbally understood.

## 2024-09-12 ENCOUNTER — HOSPITAL ENCOUNTER (OUTPATIENT)
Dept: CARDIOLOGY | Facility: HOSPITAL | Age: 74
Discharge: HOME | End: 2024-09-12
Attending: NURSE PRACTITIONER
Payer: MEDICARE

## 2024-09-12 DIAGNOSIS — G31.84 MILD COGNITIVE IMPAIRMENT: ICD-10-CM

## 2024-09-12 PROCEDURE — 93005 ELECTROCARDIOGRAM TRACING: CPT

## 2024-09-13 DIAGNOSIS — E78.2 MIXED DIABETIC HYPERLIPIDEMIA ASSOCIATED WITH TYPE 2 DIABETES MELLITUS: ICD-10-CM

## 2024-09-13 DIAGNOSIS — E11.69 MIXED DIABETIC HYPERLIPIDEMIA ASSOCIATED WITH TYPE 2 DIABETES MELLITUS: ICD-10-CM

## 2024-09-13 RX ORDER — ATORVASTATIN CALCIUM 20 MG/1
20 TABLET, FILM COATED ORAL NIGHTLY
Qty: 90 TABLET | Refills: 3 | Status: SHIPPED | OUTPATIENT
Start: 2024-09-13

## 2024-09-26 DIAGNOSIS — Z87.39 HISTORY OF GOUT: ICD-10-CM

## 2024-09-26 RX ORDER — ALLOPURINOL 300 MG/1
300 TABLET ORAL EVERY OTHER DAY
Qty: 45 TABLET | Refills: 1 | Status: SHIPPED | OUTPATIENT
Start: 2024-09-26

## 2024-09-26 NOTE — TELEPHONE ENCOUNTER
Care Due:                  Date            Visit Type   Department     Provider  --------------------------------------------------------------------------------                                EP -                              PRIMARY      JERMAINE Welch  Last Visit: 05-      CARE ([facility])   MEDICINE       Naccari                              EP -                              PRIMARY      [facility]    Mehul Welch  Next Visit: 12-      CARE ([facility])   MEDICINE       Naccari                                                            Last  Test          Frequency    Reason                     Performed    Due Date  --------------------------------------------------------------------------------    HBA1C.......  6 months...  empagliflozin, metFORMIN,   05- 11-                             semaglutide..............    Uric Acid...  12 months..  allopurinoL..............  Not Found    Overdue    [employer] Embedded Care Due Messages. Reference number: [id number].   9/26/2024 12:18:23 AM CDT

## 2024-09-26 NOTE — TELEPHONE ENCOUNTER
Refill Routing Note   Medication(s) are not appropriate for processing by [facility] for the following reason(s):        Required labs outdated    [facility] action(s):  Defer     Requires labs : Yes      Medication Therapy Plan: FOVS      Appointments  past 12m or future 3m with PCP    Date Provider   Last Visit   5/30/2024 Mehul Royal MD   Next Visit   12/5/2024 Mehul Royal MD   ED visits in past 90 days: 0        Note composed:9:55 AM 09/26/2024

## 2024-09-30 ENCOUNTER — OUTPATIENT CASE MANAGEMENT (OUTPATIENT)
Dept: NEUROLOGY | Facility: CLINIC | Age: 74
End: 2024-09-30
Payer: MEDICARE

## 2024-09-30 DIAGNOSIS — R41.89 COGNITIVE AND BEHAVIORAL CHANGES: Primary | ICD-10-CM

## 2024-09-30 DIAGNOSIS — R46.89 COGNITIVE AND BEHAVIORAL CHANGES: Primary | ICD-10-CM

## 2024-09-30 PROCEDURE — 99358 PROLONG SERVICE W/O CONTACT: CPT | Mod: S$PBB,,, | Performed by: PSYCHIATRY & NEUROLOGY

## 2024-09-30 NOTE — PROGRESS NOTES
[facility]  Brain Health and Cognitive Disorders Program    PATIENT: Jorge Becerra  DATE: 09/30/2024  MRN: [id number]  PRIMARY PROVIDER: Mehul Royal MD    Future Appointments   Date Time Provider Department Center   10/2/2024  9:15 AM Michael Nino MD [facility] [id number] [facility]   12/5/2024  9:30 AM Mehul Royal MD [facility] [place]     Pre-Clinical Chart Review:     I conducted a thorough review of previous records and/or communicated with other professionals or the patient's family for a total duration of 45 minutes (from 09:15 AM to 10:00 AM) on on 09/30/2024. This activity is directly related to the face-to-face Evaluation and Management service provided to the patient.    For billing purposes, the CPT code for prolonged evaluation and management services, including non-face-to-face review of records or communications with the patient's family or other medical professionals, is 89325.    See below for the review of these [facility] records:      Clinical Timeline:    (Event Date: 2022-01) The patient was diagnosed with mild cognitive impairment and prescribed donepezil during a primary care visit, exhibiting difficulty with leg strength and memory issues.  (Event Date: 2024-04) The cardiologist confirmed the patient has mild cognitive impairment and is currently on Aricept during an April follow-up for diabetes and hypertension management.  (Event Date: 2024-05) During a primary care visit, the patient showed signs of mild cognitive impairment, forgetfulness, and agitation, with an MMSE score of 29/30, and was scheduled for an MRI and neuropsychological testing.  (Event Date: 2024-09) The neurologist's evaluation in September highlighted significant cognitive impairments in memory, attention, and executive function, along with behavioral changes, suggesting a probable neurodegenerative condition.    Clinical History Summary:     In early January 2022, a 71-year-old male patient was evaluated  by his primary care physician. The patient had healed wounds but exhibited reduced leg strength and difficulty bending his leg to put on a sock, along with spasms. His chronic medical conditions include diabetes mellitus type 2 (DM2) with fasting glucose levels between 130-140 mg/dL and hypertension (HTN) managed with ACE inhibitors after discontinuing calcium channel blockers (CCB). He also has mild cognitive impairment and is on Donepezil (Aricept) 5 mg, taken once daily in the evening. In late April 2024, the same patient, now 73 years old, was evaluated by a cardiologist at [facility]. He has a history of arterial hypertension, type 2 diabetes, and diabetic retinopathy. The patient reported no episodes of shortness of breath, paroxysmal nocturnal dyspnea (PND), orthopnea, cough, congestion, fever, chills, nausea, vomiting, or changes in stool. Mild cognitive impairment persists, and he continues to take Donepezil (Aricept). By late May 2024, the patient was evaluated again by his primary care physician. 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 The patient experienced two falls but sustained no significant injuries. 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 His wife is concerned about his memory and behavioral changes, including occasional yelling. He has chronic bilateral hearing loss and mild cognitive impairment, for which he has been on Donepezil for many years. Upcoming evaluations include an MRI without contrast, TSH, Vitamin B1 and B12 levels, Treponema pallidum antibodies, and neuropsychological testing. He has also been referred to ENT and audiology for further hearing assessment. In early September 2024, the patient was evaluated by a neurologist at [facility]. He reported changes in short-term memory,  "attention, and concentration, with increased irritability and criticism of others, which improved after his wife mentioned divorce. He mumbles, has a sweet tooth, occasionally eats an entire pack of cookies while watching football, and reports adequate sleep with occasional naps. He was previously evaluated for obstructive sleep apnea (KIRILL) in 2018 but was not approved for a CPAP machine. 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 Cognitive and functional impairments observed included deficits in general memory, conversation fluency, name-finding, attention, concentration, planning, scheduling, working memory, grammar, and motor skills, including micrographia. Personality changes, including irritability, agitation, and binge eating, were also noted. The evaluation suggests a probable neurodegenerative condition, possibly Alzheimer's disease or another form of dementia. Additional diagnostics and management strategies, including screening for reversible causes of neurocognitive impairment and optimizing sleep hygiene, were discussed. A follow-up appointment is scheduled with  in early October 2024.      Relevant History of Baseline Neurocognitive Phenotype:    Educational History: HS. BS. 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 Following their  service, they worked for the [employer] for 26 years. The patient retired in 2023.      Relevant Neurotrauma History:    History of Traumatic Brain Injury: No History of Traumatic Brain Injury or Concussions  History of Brain/Spine Surgery:  fell out of a tree was out "several hours" maybe at age 8-10 YO  History of Toxin Exposure/Substance Abuse: No History of Toxin Exposure or Clinically Relevant Substance Abuse  History of Malnutrition/Vitamin Deficiency: No History of " Malnutrition or Clinically Relevant Vitamin Deficiency  History of Chronic Mood Disorder/Stress: lymphoma first Dx 2002 and sarcoma dx 202  History of Chronic Inflammatory State: No History of CNS inflammation or Clinically Relevant Chronic Inflammatory State      Relevant Family History:    Relevant General History:  Mother -  at 86 years old (natural causes).  Father -  at 92 years old (natural causes).  Sister - Alive, with a cyst under her armpits.  Two sons and one daughter - No known medical history ([facility]).  Relevant Neurocognitive Disorder History:  The patient/family denies a history of early/late onset cognitive impairment.  Relevant Movement Disorder History:  The patient/family denies a history of PD, PDD, tremor.  Known Genetic Profile: There is no relevant genetic testing available on record.      Neurologically Relevant Imaging:    MRI brain/head without contrast performed on 2024  Radiologist Interpretation: No acute abnormality  Provider Interpretation: The brain imaging appears normal with no significant cortical or subcortical atrophy observed. There are mild scattered subcortical white matter hyperintensities. A coronal window for assessing hippocampal volume loss is not available. Additionally, there is no susceptibility-weighted imaging (SWI) sequence.  T1-weighted (T1W) Image Summary: The radiographic interpretation indicates no significant cortical atrophy, with changes consistent with the patient's age. Additionally, the subcortical nuclei and hippocampi do not exhibit significant atrophy.  FLAIR/T2-weighted (T2W) Image Summary: The radiographic interpretation reveals scattered subcortical white matter hyperintensities, which are most prominently observed in the high dorsal corona radiata. There is no evidence of small-vessel infarcts or large vessel disease. Subtle T2 hyperintensities are noted in the juxtacortical region of the right parietal lobule's ventral surface.  Additionally, mild gliosis is present in the bilateral hippocampi.  Diffusion Weighted Imaging with ADC Mapping Summary: There are no significant hyperintensities or hypointensities observed on Diffusion-Weighted Imaging (DWI). Additionally, there is no apparent correlation with the Apparent Diffusion Coefficient (ADC).  Susceptibility-weighted imaging (SWI) and/or GRE Summary: There are no significant hypointensities to suggest cortical or subcortical hemosiderin deposition.            Differential Diganosis:    LOAD r/o ASRP  Need repeat MRI ARIA sequence and LP

## 2024-10-02 ENCOUNTER — OFFICE VISIT (OUTPATIENT)
Dept: NEUROLOGY | Facility: CLINIC | Age: 74
End: 2024-10-02
Payer: MEDICARE

## 2024-10-02 VITALS
SYSTOLIC BLOOD PRESSURE: 138 MMHG | HEIGHT: 67 IN | DIASTOLIC BLOOD PRESSURE: 69 MMHG | WEIGHT: 155.13 LBS | HEART RATE: 80 BPM | BODY MASS INDEX: 24.35 KG/M2

## 2024-10-02 DIAGNOSIS — E78.5 HYPERLIPIDEMIA, UNSPECIFIED HYPERLIPIDEMIA TYPE: ICD-10-CM

## 2024-10-02 DIAGNOSIS — F02.80 ALZHEIMER DISEASE: ICD-10-CM

## 2024-10-02 DIAGNOSIS — G31.84 MILD COGNITIVE IMPAIRMENT: Primary | ICD-10-CM

## 2024-10-02 DIAGNOSIS — E11.42 TYPE 2 DIABETES MELLITUS WITH DIABETIC POLYNEUROPATHY, WITHOUT LONG-TERM CURRENT USE OF INSULIN: ICD-10-CM

## 2024-10-02 DIAGNOSIS — G30.9 ALZHEIMER DISEASE: ICD-10-CM

## 2024-10-02 DIAGNOSIS — R63.4 WEIGHT LOSS: ICD-10-CM

## 2024-10-02 DIAGNOSIS — C49.21 SARCOMA OF RIGHT THIGH: ICD-10-CM

## 2024-10-02 DIAGNOSIS — G31.9 NEURODEGENERATIVE COGNITIVE IMPAIRMENT: ICD-10-CM

## 2024-10-02 PROCEDURE — 99417 PROLNG OP E/M EACH 15 MIN: CPT | Mod: S$PBB,,, | Performed by: PSYCHIATRY & NEUROLOGY

## 2024-10-02 PROCEDURE — 99999 PR PBB SHADOW E&M-EST. PATIENT-LVL IV: CPT | Mod: PBBFAC,,, | Performed by: PSYCHIATRY & NEUROLOGY

## 2024-10-02 PROCEDURE — 99214 OFFICE O/P EST MOD 30 MIN: CPT | Mod: PBBFAC | Performed by: PSYCHIATRY & NEUROLOGY

## 2024-10-02 PROCEDURE — 99215 OFFICE O/P EST HI 40 MIN: CPT | Mod: S$PBB,,, | Performed by: PSYCHIATRY & NEUROLOGY

## 2024-10-02 NOTE — PROGRESS NOTES
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 -1.1 STDs below the average score based on age and education.   Registration T3 (9) 8/9 Within Normal Limits.   Registration T4 (9) 7/9 Borderline Impairment: -0.7 STDs below the average score based on age and education.   Registration T5 (9) 8/9 Within Normal Limits.   Delayed Recall 30 sec (9) 7/9 Within Normal Limits.   Delayed Recall 10 min (3) 3/3 Within Normal Limits.   Delayed Recall 10 min (5) 5/5 Within Normal Limits.   Delayed Recall 10 min (9) 5/9 Borderline Impairment: -0.7 STDs below the average score based on age and education.   Delayed Recall Cued (9) 8/9 Within Normal Limits.   Delayed Recognition (9) 9/9 Within Normal Limits.   Executive   Three-step command 3/3 Within Normal Limits.   Serial Sevens 3/3 Within Normal Limits.   WORLD Backward 5/5 Within Normal Limits.   Digit Span Backwards 4 Borderline Impairment: -0.8 STDs below the average score based on age and education.   Digit Span - 2 2/2 Within Normal Limits.   Lexical Fluency - F 10 Mild Impairment: -1.3 STDs below the average score based on age and  education.   Lexical Fluency - A 11 Mild Impairment: -1.1 STDs below the average score based on age and education.   Lexical Fluency - S 10 Mild Impairment: -1.3 STDs below the average score based on age and education.   Semantic Fluency - Animals 15 Borderline Impairment: -0.9 STDs below the average score based on age and education.   Trials-1 1/1 Within Normal Limits.   Visuospatial   Intersecting Pentagons 1/1 Within Normal Limits.   Clock Draw 3/3 Within Normal Limits.   Complex Figure Copy 14/17 Mild Impairment: -1.4 STDs below the average score based on age and education.   Overlapping Images 9/12 Borderline Impairment based on age and education.   Picture Synthesis 3/3 Within Normal Limits.   Noise Pareidolia Test 5/5 Within Normal Limits.   Language   Naming-2 2/2 Within Normal Limits.   Naming-3 3/3 Within Normal Limits.   15-Item BNT 15/15 Within Normal Limits.   Repetition-1 1/1 Within Normal Limits.   Repetition-2 2/2 Within Normal Limits.   Repetition of Phrases 5/5 Within Normal Limits.   Verbal Agility 6/6 Within Normal Limits.   Surface Dyslexia 3/3 Within Normal Limits.   Following written command 1/1 Within Normal Limits.   Writing a complete sentence 1/1 Within Normal Limits.   Abstraction 2/2 Within Normal Limits.   Attention   Orientation-10 9/10 Borderline Impairment based on age and education.   Orientation-6 5/6 Borderline Impairment based on age and education.   Digit Span Forwards 5 Borderline Impairment: -1 STDs below the average score based on age and education.               Laboratories:     Metabolic Screening  Name Reference Previous Values   Cholesterol Total 120 - 199 mg/dL 104 (L)   2023-07-11      Triglycerides 30 - 150 mg/dL 91   2023-07-11      HDL 40 - 75 mg/dL 40   2023-07-11      LDL Cholesterol 63.0 - 159.0 mg/dL 45.8 (L)   2023-07-11      HDL/Cholesterol Ratio 20.0 - 50.0 % 38.5   2023-07-11      Total Cholesterol/HDL Ratio 2.0 - 5.0 2.6   2023-07-11      Non-HDL  Cholesterol mg/dL 64   2023-07-11      Hemoglobin A1C External 4.0 - 5.6 % 8.2 (H)   2023-07-11      Estimated Avg Glucose 68 - 131 mg/dL 189 (H)   2023-07-11      BUN 8 - 23 mg/dL 15   2023-07-11   17   2023-08-05      Neuroendocrine/Electrolyte Screening  Name Reference Previous Values   Calcium 8.7 - 10.5 mg/dL 10.2   2023-07-11   10.1   2023-08-05      Creatinine 0.5 - 1.4 mg/dL 1.07   2023-07-11   1.07   2023-08-05      TSH 0.400 - 4.000 uIU/mL 3.075   2023-07-11      Free T4 0.71 - 1.51 ng/dL 0.96   2023-07-11      Neuro-Nutritional Screening  Name Reference Previous Values   Thiamine 38 - 122 ug/L 60   2023-07-11      Vitamin B12 180 - 914 ng/L 248   2023-07-11      Folate 4.0 - 24.0 ng/mL 9.8   2023-07-11      Neuroinfectious Screening  Name Reference Previous Values   Treponema Pallidum Antibodies (IgG, IgM) Nonreactive Nonreactive   2023-07-11      Neurodegenerative Biomarkers - Serum  Name Reference Previous Values   M Phospho-Tau 217 <=0.185pg/mL 0.318 (H)   2023-07-11      Neurofilament Light Chain, Plasma <=37.9 pg/mL 24.5   2023-07-11      Neuro-Inflammatory Screening - Serum  Name Reference Previous Values   Methlymalonic Acid <0.40 umol/L 0.14   2023-07-11                Neurological Relevant Procedures:    Electrocardiogram performed on 09/12/2024  Formal Interpretation: Vent. Rate : 075 BPM Atrial Rate : 075 BPM P-R Int : 132 ms QRS Dur : 090 ms QT Int : 378 ms P-R-T Axes : 005 -29 001 degrees QTc Int : 422 ms  Provider Interpretation: The electrocardiogram (ECG) shows a normal sinus rhythm. There are voltage criteria present that suggest left ventricular hypertrophy. Overall, the ECG is abnormal.            Neurologically Relevant Imaging:    MRI brain/head without contrast performed on 06/07/2024  Radiologist Interpretation: No acute abnormality  Provider Interpretation: The brain imaging appears normal with no significant cortical or subcortical atrophy observed. There are  mild scattered subcortical white matter hyperintensities. A coronal window for assessing hippocampal volume loss is not available. Additionally, there is no susceptibility-weighted imaging (SWI) sequence.  T1-weighted (T1W) Image Summary: The radiographic interpretation indicates no significant cortical atrophy, with changes consistent with the patient's age. Additionally, the subcortical nuclei and hippocampi do not exhibit significant atrophy.  FLAIR/T2-weighted (T2W) Image Summary: The radiographic interpretation reveals scattered subcortical white matter hyperintensities, which are most prominently observed in the high dorsal corona radiata. There is no evidence of small-vessel infarcts or large vessel disease. Subtle T2 hyperintensities are noted in the juxtacortical region of the right parietal lobule's ventral surface. Additionally, mild gliosis is present in the bilateral hippocampi.  Diffusion Weighted Imaging with ADC Mapping Summary: There are no significant hyperintensities or hypointensities observed on Diffusion-Weighted Imaging (DWI). Additionally, there is no apparent correlation with the Apparent Diffusion Coefficient (ADC).  Susceptibility-weighted imaging (SWI) and/or GRE Summary: There are no significant hypointensities to suggest cortical or subcortical hemosiderin deposition.            Clinical Summary:     The patient is a 73-year-old with a relevant past medical history of DM2, HLD, HTN and right leg lymphoma/aarcoma s/p chemo/rad/surgery 2005 and 2022 who presents reporting a 3-year history of gradually progressive neurocognitive impairment.  Neurobehavioral Review of Systems Interpretation: The review of systems reveals a range of neurological and neurocognitive deficits affecting both cortical and subcortical regions. Memory issues, including general memory and mild conversational difficulties, suggest dysfunctions in the hippocampus and associated medial temporal structures. Challenges  in attention, processing speed, and planning indicate possible frontal lobe and prefrontal cortex involvement, while impairments in visuospatial abilities point to parietal lobe dysfunction. Gait disturbances, imbalance, and falls may correlate with subcortical structures and cerebellar network pathologies. Emotional and personality changes, along with issues in speech and alertness, further underscore disruptions in frontal-subcortical circuits and limbic system networks.  Neurological Examination Interpretation: The neurological examination revealed several abnormalities, including deficits in arousal, orientation, and attention, along with thought disorders, abnormal reflexes, difficulty rising, and issues with speed and volume of movements or speech. Impairments in arousal and orientation suggest dysfunction in the reticular activating system and frontal lobes, which are crucial for maintaining consciousness and executive functions. Attention deficits and thought disorders point to disruptions in the fronto-parietal networks and potential frontal lobe or thalamic involvement. Abnormal reflexes and difficulties in rising, speed, and volume may indicate basal ganglia or cerebellar dysfunction, affecting motor control and coordination. These findings collectively suggest a network pathology involving both cortical and subcortical regions, impacting cognitive and motor functions.  Neurocognitive Evaluation Interpretation: Executive predominant multidomain mild cognitive impairment.  Mild Visuospatial Impairment: The patient scored >1 standard deviation below the norm on at least one measure. Impairment appreciated in visuospatial apraxia, simultanagnosia  Mild Executive Impairment: The patient scored >1 standard deviation below the norm on at least one measure. Impairment appreciated in working memory, lexical fluency, semantic fluency  Mild Attention Impairment: The patient scored >0.5 standard deviation below the  norm on at least one measure. Impairment appreciated in orientation, verbal STM  Normal Memory Function  Normal Language Function  MMSE 29/30: Score suggestive of normal cognitive function to borderline cognitive impairment.  MOCA 26/30: Score suggestive of mild cognitive impairment.  Neurological Imaging Summary:  MRI brain/head without contrast performed on 06/07/2024  Provider Interpretation: The brain imaging appears normal with no significant cortical or subcortical atrophy observed. There are mild scattered subcortical white matter hyperintensities. A coronal window for assessing hippocampal volume loss is not available. Additionally, there is no susceptibility-weighted imaging (SWI) sequence.            Assessment:     The patient's clinical presentation is best described as Amnestic predominant multidomain Mild Cognitive Impairment.    The stage of the patient's clinical presentation meets the criteria for Mild Cognitive Impairment (MCI) as defined by the National [place] [employer] Aging-Alzheimer's Association(Hola et al., 2011, Alzheimer's & Dementia). This diagnosis is characterized by concerns regarding an intraindividual change in cognition, impairment in one or more cognitive domains, and preservation of independence in functional abilities. Notably, the patient is not demented.   [employer] Instrumental Activities of Daily Living Scale: 3/8 suggestive of Mild dependence.  Functional Assessment Staging Tool (FAST Scale): 3/15 suggestive of Mild Dementia (Stage 4).  Clinical Dementia Rating Sum of Boxes (CDR-SOB): 3.5/18 suggestive of Mild cognitive impairment.  [facility] Functional Assessment Scale (FAS): 6.5/30 suggestive of Possible Functional Impairment.     The patient's clinical syndromic presentation is consistent with Alzheimer's Disease related Cognitive Impairment (Meghana et al. 2011; Ike et al., 2024).    Meets criteria for cognitive impairment without dementia.  Insidious onset over months to  years.  Clear-cut history of worsening cognition by report or observation.  Executive dysfunction: impaired reasoning, judgment, and problem-solving, deficits in other domains should be present.  There is no evidence of a) stroke temporarily related to the onset of cognitive symptoms or presence of extensive infarcts or severe white matter hyperintensity burden, b) core features of DLB other than dementia itself, c) prominent features of bvFTD, d) prominent features of semantic or non-fluent/agrammatic PPA or e) another active neurological disease, medical comorbidity or use of medications with effects on cognition    At present, all neurodegenerative diseases can only be diagnosed with 100% certainty through a brain autopsy. The suspected neuropathology underlying the patient's neurocognitive impairment is likely a mixture of Alzheimer's Disease Related Pathology (ADRP).    Serum fluid protein biomarkers include Phospho-Tau (217P) Serum: 0.318 (H) on 07/11/2023  There are no cerebrospinal fluid protein biomarkers available on record.  There are no dermatological protein biomarkers available on record.  There is no relevant genetic biomarkers available on record.  There are no radiographic biomarkers available on record.              Impression:     The patient presents with a three-year history of gradually progressive cognitive changes, which may have begun following an uncomplicated left lower leg surgery for sarcoma, after undergoing chemotherapy and radiation treatment. Following this treatment, he reported progressive cognitive changes to his primary care physician in January 2022. The patient has a medical history of type 2 diabetes mellitus and hypertension, both managed with medication. Despite these treatments, his family noticed increasing cognitive concerns, including memory and behavioral changes, over the next two years. By early 2024, the patient had lost 30 pounds, and his cognitive impairment was  being managed with Donepezil. By May 2024, he experienced falls and residual weakness, prompting further evaluations. In September 2024, a neurologist noted changes in memory, attention, concentration, and increased irritability, suggesting a possible neurodegenerative condition. Imaging showed no strategic infarct or vascular disease, but serum biomarkers indicated early Alzheimer's disease. The patient presents today with a two-year history of fluctuating, progressive amnestic predominant mild cognitive impairment. The neurobehavioral assessment suggests a probable neurodegenerative condition, likely Alzheimer's disease. We discussed these findings with the patient's family. There are no contraindications for anti-amyloid therapy, and we reviewed the risks and benefits of this treatment. We also discussed the next diagnostic assessments, which would include a lumbar puncture. On examination, there is mild evidence of parkinsonism. We considered the possibility of mixed pathology. Additionally, we discussed the value of a skin biopsy, and the patient is in agreement. We will schedule the skin biopsy and lumbar puncture, and following these procedures, we will discuss the next steps.    The above observations were discussed with the patient and their supporting historian(s). We have discussed the additional diagnostic(s) and/or management below.            Care Management Plan:       Diagnostic Screening for measurable forms of neurodegenerative pathology.  We have discussed opportunities for biomarker testing (CSF Alberto biomarkers, IDEAs Amyloid-PET, Syn-One skin biopsy).  We scheduled a lumbar puncture for assessment of Alberto CSF ADRP Biomarkers.  We scheduled a skin biopsy for assessment of Syn-One alpha-synuclein related pathology.  Optimize Neurocognitive Impairment and Quality  We have discussed the MIND Diet and other lifestyle behaviors that may help maintain brain health.  We have provided written/digital  reading material.  continue donepezil 5 mg switched a.m.  Optimize Behavioral Management and Quality  No indication for the use of memantine at this time  Optimize Cerebrovascular Health.  The patient has a documented history of hyperlipidemia and/or hypercholesteremia with long-term complications such as cerebrovascular disease, peripheral vascular disease, and/or aortic atherosclerosis. Collectively these risk factors may contribute to cerebral atherosclerosis, and cerebral hypoperfusion compounded neurocognitive disorder. We discussed maximizing cerebrovascular-related medical therapy, including but not limited to cholesterol medications and antiplatelet agents. We have discussed the value of aggressively controlling vascular risk factors like hypertension, hyperlipidemia, and Diabetes SBP<130, LDL<100, and A1C<7.0. We discussed the need to optimize lifestyle choices, including a heart-healthy diet (e.g., Mediterranean or DASH), increased cardiovascular exercise (goal 150 minutes of moderate-intensity per week), and staying cognitively and socially active.  We recommend switching patients from ongoing lipophilic statin therapy to hydrophilic statin therapy (Jasmina et al., 2015; Farnsworth et al., 2018; Mele et al., 2021, Guzman et al., 2021).  Continue aspirin 81 mg  Continue atorvastatin 20 mg daily  Optimize Sleep Hygiene and Quality  We discussed and recommended additional diagnostic/management of sleep disorder to optimize brain health and longevity.      Thank you for entrusting us with the care of your patient. Should you have any questions or concerns, please feel free to contact us at your convenience.     It was a pleasure to see the patient, and we look forward to their follow-up visit.     This note was dictated using the M*Modal Fluency Direct voice recognition program. Please be aware that word recognition errors may occasionally occur and might be overlooked during review.                     Billing  Statement:       I spent a total of 115 minutes (from 09:15 AM to 11:10 AM) on 09/30/2024, engaging in person in a face-to-face consultation with the patient. More than 50% of this time was devoted to counseling on symptoms, treatment plans, risks, therapeutic options, lifestyle modifications, and safety concerns related to the above diagnoses.     A Review of Systems was completed, encompassing 10 of the 14 systems. All findings were negative, except those noted in the History of Present Illness (HPI) and Review of Systems (ROS) Sections. The systems reviewed were Constitutional (Const), Eyes, Ear/Nose/Throat (ENT), Respiratory (Resp), Cardiovascular (CV), Gastrointestinal (GI), Genitourinary (), Musculoskeletal (MSK), Skin, and Neurological (Neuro).     I spent a total of 5 minutes to reviewing previous laboratory results on the day of the clinical evaluation. This review was an integral part of the face-to-face encounter. The laboratory findings were predominantly negative, with exceptions as noted in the History of Present Illness (HPI) and Assessment.     I spent a total of 5 minutes to reviewing previous diagnostic tests on the day of the clinical evaluation. This activity was directly associated with the face-to-face encounter. The findings from these diagnostic tests were generally within normal limits, with any exceptions as noted in the History of Present Illness (HPI) and Assessment.     I performed a neurobehavioral status examination on the day of clinical evaluation that included a clinical assessment of thinking, reasoning, and judgment to ensure a comprehensive approach in managing the complex and evolving needs of the patient's neurocognitive condition. Please see above HPI and ROS for full details. This exam was performed on the day of clinical evaluation and included 37 minutes spent on direct face-to-face clinical observation and interview with the patient and 13 minutes spent interpreting test  results and preparing the report. The total time of 37 minutes spent on the neurobehavioral status examination is not included in the time spent on evaluation and management coding.     I conducted a comprehensive neuropsychological evaluation on the day of clinical evaluation in response to reported concerns of a discernible deviation from the patient's previously estimated cognitive functioning levels. The goal of this evaluation was to gauge these changes and their impact on the patient's daily life. This assessment involved administering a series of standardized neurocognitive tests, which are critical in objectively measuring various cognitive functions such as memory, attention, executive functions, visuospatial skills, and language. These tests were carefully chosen based on the patient's presenting symptoms and medical history to ensure an accurate assessment of their current cognitive functioning. Informed consent was duly obtained from the patient prior to administering these tests. The face-to-face administration of these tests took 15 minutes of direct interaction with the patient. Additionally, I spent 21 minutes on interpreting the standardized test results within the broader context of the patient's overall clinical presentation. Developing a tailored treatment plan involved integrating these findings with the patient's medical history, symptomatology, and other available data to develop a comprehensive understanding of their neuropsychological status. Feedback was provided to the patient and their caregiver, discussing the implications of the test findings and outlining recommended next steps. The total duration of this neuropsychological evaluation was 36 minutes. It is important to note that the time spent on this evaluation is distinctly separate from any evaluation and management services provided on the same day. The detailed findings, interpretations, and recommendations from this assessment  are thoroughly documented in the neuropsychological assessment report above. This comprehensive cognitive assessment is essential for establishing a clear cognitive baseline, accurately diagnosing the patient's condition, facilitating targeted interventions and personalized care plans, and providing a basis for ongoing monitoring and adjustment of the care plan.     Total Billing time spent on encounter/documentation for this patient's evaluation and management, not including the neurobehavioral status examination and neuropsychological evaluation: 97 minutes.        Signing Physician:  Michael Nino MD

## 2024-10-18 DIAGNOSIS — B35.4 TINEA CORPORIS: ICD-10-CM

## 2024-10-18 RX ORDER — KETOCONAZOLE 20 MG/ML
SHAMPOO, SUSPENSION TOPICAL
Qty: 120 ML | Refills: 2 | Status: SHIPPED | OUTPATIENT
Start: 2024-10-21

## 2024-11-25 ENCOUNTER — PROCEDURE VISIT (OUTPATIENT)
Dept: NEUROLOGY | Facility: CLINIC | Age: 74
End: 2024-11-25
Payer: MEDICARE

## 2024-11-25 VITALS
BODY MASS INDEX: 24.03 KG/M2 | HEART RATE: 92 BPM | SYSTOLIC BLOOD PRESSURE: 121 MMHG | WEIGHT: 153.13 LBS | HEIGHT: 67 IN | DIASTOLIC BLOOD PRESSURE: 75 MMHG

## 2024-11-25 DIAGNOSIS — G47.52 SLEEP BEHAVIOR DISORDER, REM: Primary | ICD-10-CM

## 2024-11-25 PROCEDURE — 11105 PUNCH BX SKIN EA SEP/ADDL: CPT | Mod: PBBFAC | Performed by: NURSE PRACTITIONER

## 2024-11-25 PROCEDURE — 11104 PUNCH BX SKIN SINGLE LESION: CPT | Mod: PBBFAC | Performed by: NURSE PRACTITIONER

## 2024-11-25 NOTE — PROCEDURES
PRE-OP DIAGNOSIS:  POST-OP DIAGNOSIS: Same   PROCEDURE: skin punch biopsy     Performing Physician: Smita Fernandez NP  Supervising Physician (if applicable): Michael Nino MD.     PROCEDURE:   _  Shave Biopsy  X  Punch Biopsy  _  Incisional Biopsy     DESCRIPTION:  - Punch Size: 0.5 cm  - Number of Punch Biopsies: 3  + CPT 60147 (punch biopsy, neck) 1st procedure Right side   + CPT 19735 (punch biopsy, each additional lesion, thigh) 2nd procedure  + CPT 22113 (punch biopsy, each additional lesion, ankle) 3rd procedure     The area surrounding the skin lesion was prepared and draped in the  usual sterile manner. The lesion was removed in the usual manner by punch biopsy method noted above. Three full thickness cylindrical samples of the skin were removed from the upper back, lower thigh, and lower leg. The intent of the biopsy is to remove a sample of a cutaneous lesion for Syn-One diagnostic pathologic examination. Hemostasis was assured. The patient tolerated  the procedure well.     Closure:       _  Monsels for hemostasis                _  Suture   X Simple closure  _ None     Followup: The patient tolerated the procedure well without  complications.  Standard post-procedure care is explained and return  precautions are given.     Pathology/biopsy specimens were sent directly to [facility] for processing. Pathology was not sent via in-house [facility] laboratory. Pathology results will be returned within 4-6 weeks and scanned into [facility] Electronic Medical Record.

## 2024-12-02 DIAGNOSIS — E11.9 TYPE 2 DIABETES MELLITUS WITHOUT COMPLICATION: ICD-10-CM

## 2024-12-05 ENCOUNTER — LAB VISIT (OUTPATIENT)
Dept: LAB | Facility: HOSPITAL | Age: 74
End: 2024-12-05
Attending: STUDENT IN AN ORGANIZED HEALTH CARE EDUCATION/TRAINING PROGRAM
Payer: MEDICARE

## 2024-12-05 ENCOUNTER — OFFICE VISIT (OUTPATIENT)
Dept: FAMILY MEDICINE | Facility: CLINIC | Age: 74
End: 2024-12-05
Payer: MEDICARE

## 2024-12-05 VITALS
DIASTOLIC BLOOD PRESSURE: 70 MMHG | SYSTOLIC BLOOD PRESSURE: 130 MMHG | RESPIRATION RATE: 8 BRPM | WEIGHT: 157.63 LBS | HEART RATE: 77 BPM | OXYGEN SATURATION: 97 % | BODY MASS INDEX: 24.74 KG/M2 | HEIGHT: 67 IN

## 2024-12-05 DIAGNOSIS — I10 BENIGN ESSENTIAL HTN: ICD-10-CM

## 2024-12-05 DIAGNOSIS — E11.3393 CONTROLLED TYPE 2 DIABETES MELLITUS WITH BOTH EYES AFFECTED BY MODERATE NONPROLIFERATIVE RETINOPATHY WITHOUT MACULAR EDEMA, WITHOUT LONG-TERM CURRENT USE OF INSULIN: ICD-10-CM

## 2024-12-05 DIAGNOSIS — E11.3393 CONTROLLED TYPE 2 DIABETES MELLITUS WITH BOTH EYES AFFECTED BY MODERATE NONPROLIFERATIVE RETINOPATHY WITHOUT MACULAR EDEMA, WITHOUT LONG-TERM CURRENT USE OF INSULIN: Primary | ICD-10-CM

## 2024-12-05 LAB
ALBUMIN SERPL BCP-MCNC: 4.1 G/DL (ref 3.5–5.2)
ALP SERPL-CCNC: 60 U/L (ref 40–150)
ALT SERPL W/O P-5'-P-CCNC: 17 U/L (ref 10–44)
ANION GAP SERPL CALC-SCNC: 12 MMOL/L (ref 8–16)
AST SERPL-CCNC: 23 U/L (ref 10–40)
BILIRUB SERPL-MCNC: 0.5 MG/DL (ref 0.1–1)
BUN SERPL-MCNC: 14 MG/DL (ref 8–23)
CALCIUM SERPL-MCNC: 10.3 MG/DL (ref 8.7–10.5)
CHLORIDE SERPL-SCNC: 105 MMOL/L (ref 95–110)
CO2 SERPL-SCNC: 25 MMOL/L (ref 23–29)
CREAT SERPL-MCNC: 1.1 MG/DL (ref 0.5–1.4)
EST. GFR  (NO RACE VARIABLE): >60 ML/MIN/1.73 M^2
ESTIMATED AVG GLUCOSE: 146 MG/DL (ref 68–131)
GLUCOSE SERPL-MCNC: 158 MG/DL (ref 70–110)
HBA1C MFR BLD: 6.7 % (ref 4–5.6)
POTASSIUM SERPL-SCNC: 4.3 MMOL/L (ref 3.5–5.1)
PROT SERPL-MCNC: 7.7 G/DL (ref 6–8.4)
SODIUM SERPL-SCNC: 142 MMOL/L (ref 136–145)

## 2024-12-05 PROCEDURE — 99999 PR PBB SHADOW E&M-EST. PATIENT-LVL V: CPT | Mod: PBBFAC,,, | Performed by: STUDENT IN AN ORGANIZED HEALTH CARE EDUCATION/TRAINING PROGRAM

## 2024-12-05 PROCEDURE — 36415 COLL VENOUS BLD VENIPUNCTURE: CPT | Mod: PO | Performed by: STUDENT IN AN ORGANIZED HEALTH CARE EDUCATION/TRAINING PROGRAM

## 2024-12-05 PROCEDURE — 83036 HEMOGLOBIN GLYCOSYLATED A1C: CPT | Performed by: STUDENT IN AN ORGANIZED HEALTH CARE EDUCATION/TRAINING PROGRAM

## 2024-12-05 PROCEDURE — 99214 OFFICE O/P EST MOD 30 MIN: CPT | Mod: S$PBB,,, | Performed by: STUDENT IN AN ORGANIZED HEALTH CARE EDUCATION/TRAINING PROGRAM

## 2024-12-05 PROCEDURE — 99215 OFFICE O/P EST HI 40 MIN: CPT | Mod: PBBFAC,PO | Performed by: STUDENT IN AN ORGANIZED HEALTH CARE EDUCATION/TRAINING PROGRAM

## 2024-12-05 PROCEDURE — G2211 COMPLEX E/M VISIT ADD ON: HCPCS | Mod: S$PBB,,, | Performed by: STUDENT IN AN ORGANIZED HEALTH CARE EDUCATION/TRAINING PROGRAM

## 2024-12-05 PROCEDURE — 80053 COMPREHEN METABOLIC PANEL: CPT | Performed by: STUDENT IN AN ORGANIZED HEALTH CARE EDUCATION/TRAINING PROGRAM

## 2024-12-05 NOTE — PROGRESS NOTES
Patient ID: Jorge Becerra is a 73 y.o. male.    Chief Complaint: Follow-up    History of Present Illness    CHIEF COMPLAINT:  Patient presents today for follow-up on various health concerns.    NEUROLOGICAL CONCERNS:  He recently saw a neurologist who noted early signs of dementia. He is being evaluated for candidacy for advanced AD therapies.     DIABETES AND WEIGHT MANAGEMENT:  He reports well-controlled diabetes, regularly checking his blood sugar. He struggles with strong cravings for sweets, particularly candy and cake, which he notes has been a long-standing issue.    CARDIOVASCULAR HEALTH:  He is currently on two antihypertensive agents. Home blood pressure readings generally range from 110 to 129 mmHg in the morning, an improvement from previous readings of 140-160 mmHg.    GOUT:  He reports no current issues with gout and expresses satisfaction with the current management of his condition.    MOBILITY AND EXERCISE:  He uses a stationary bicycle for exercise due to difficulty with outdoor walking on uneven surfaces. He has a rolling walker but has not been using it regularly. He expresses willingness to retrieve the walker from storage to assist with mobility and exercise.    SENSORY HEALTH:  He recently visited an ear doctor and is planning to get hearing aids. An eye exam yesterday diagnosed cataracts, but they have not yet matured to the point of interfering with his vision. He reports no current vision issues, noting that his intermediate has reduced eye strain.    FOOT HEALTH:  He reports a sensation of blood rushing through one foot (right), particularly noticeable after exercising on the bicycle and upon waking in the morning.    URINARY FUNCTION:  He reports satisfactory urination habits for his age, able to urinate before bed without experiencing frequent nighttime urination.    RECENT PROCEDURES:  A neurologist performed skin biopsy from the sides of his legs, with results expected in  January.        Physical Exam    General: No acute distress. Well-developed. Well-nourished.  Eyes: EOMI. Sclerae anicteric.  HENT: Normocephalic. Atraumatic. Nares patent. Moist oral mucosa.  Cardiovascular: Regular rate. Regular rhythm. No murmurs. No rubs. No gallops. Normal S1, S2. Good pedal pulses.  Respiratory: Normal respiratory effort. Clear to auscultation bilaterally. No rales. No rhonchi. No wheezing.  Abdomen: Soft. Non-tender. Non-distended. Normoactive bowel sounds.  Musculoskeletal: No  obvious deformity.  Extremities: No lower extremity edema.  Neurological: Alert & oriented x3. No slurred speech. Normal gait.  Psychiatric: Normal mood. Normal affect. Good insight. Good judgment.  Skin: Warm. Dry. No rash. Healthy fingernails and toenails.         Assessment & Plan    IMPRESSION:  - Evaluated patient's diabetes management; A1C within goal range  - Considered reducing gout medication but decided against it to prevent potential flare-ups  - Assessed for neuropathy due to multiple risk factors including diabetes, surgery, and chemotherapy  - Reviewed recent eye exam results; cataracts present but not interfering with vision  - Awaiting results of neurological testing, including skin graft analysis, for potential early dementia diagnosis    [facility]  Defer to Neurology    TYPE 2 DIABETES:  - Explained the concept of neuropathy and its various causes, including diabetes.  - Discussed the importance of foot care and monitoring for diabetic patients.  - Patient to continue monitoring blood sugar.  - Patient to maintain vigilance for foot sores due to potential neuropathy.  - Continued Rybelsus, Metformin, and full-strength Jardiance for diabetes management.  - A1C blood test ordered to be performed during the current visit.    GOUT:  - Continued current gout medication at the same dose.    MOBILITY ASSISTANCE:  - Patient to use rolling walker for assisted walking to promote safe mobility.  - Recommend engaging  in regular exercise, particularly using the walker on smooth surfaces like indoor tracks.    GENERAL HEALTH AND DIETARY COUNSELING:  - Educated on the importance of regular exercise for overall health and brain function.    FOLLOW-UP:  - Follow up in the summer.              No follow-ups on file.    This note was generated with the assistance of ambient listening technology. Verbal consent was obtained by the patient and accompanying visitor(s) for the recording of patient appointment to facilitate this note. I attest to having reviewed and edited the generated note for accuracy, though some syntax or spelling errors may persist. Please contact the author of this note for any clarification.  Protective Sensation (w/ 10 gram monofilament):  Right: Intact  Left: Intact    Visual Inspection:  Normal -  Bilateral    Pedal Pulses:   Right: Present  Left: Present    Posterior Tibialis Pulses:   Right:Present  Left: Present

## 2024-12-06 ENCOUNTER — TELEPHONE (OUTPATIENT)
Dept: ADMINISTRATIVE | Facility: HOSPITAL | Age: 74
End: 2024-12-06
Payer: MEDICARE

## 2024-12-06 ENCOUNTER — PATIENT OUTREACH (OUTPATIENT)
Dept: ADMINISTRATIVE | Facility: HOSPITAL | Age: 74
End: 2024-12-06
Payer: MEDICARE

## 2024-12-06 NOTE — LETTER
AUTHORIZATION FOR RELEASE OF   CONFIDENTIAL INFORMATION    Charli Cerda OD    We are seeing Jorge Becerra, date of birth 1950, in the clinic at [facility]. Mehul Royal MD is the patient's PCP. Jorge Becerra has an outstanding lab/procedure at the time we reviewed his chart. In order to help keep his health information updated, he has authorized us to request the following medical record(s):       EYE EXAM            Please fax records to [phone] or email to [email or web address].    Thank you So much!               If you have any questions, please contact Maria Isabel Layne, Care Coordinator   at [phone].            Patient Name: Jorge Becerra  : 1950  Patient Phone #: [phone]

## 2024-12-06 NOTE — PROGRESS NOTES
Population Health Chart Review & Patient Outreach Details      Additional [facility] Health Notes:               Updates Requested / Reviewed:      Care Everywhere, , and Care Team Updated         Health Maintenance Topics Overdue:      [facility] Score: 1     Eye Exam    Shingles/Zoster Vaccine  RSV Vaccine                  Health Maintenance Topic(s) Outreach Outcomes & Actions Taken:    Eye Exam - Outreach Outcomes & Actions Taken  : External Records Requested & Care Team Updated if Applicable

## 2024-12-06 NOTE — TELEPHONE ENCOUNTER
----- Message from Mehul Royal MD sent at 12/5/2024  9:28 AM CST -----  Dr. Hathaway checked eyes yesterday.

## 2024-12-19 ENCOUNTER — PATIENT OUTREACH (OUTPATIENT)
Dept: ADMINISTRATIVE | Facility: HOSPITAL | Age: 74
End: 2024-12-19
Payer: MEDICARE

## 2025-01-12 DIAGNOSIS — I10 BENIGN ESSENTIAL HTN: ICD-10-CM

## 2025-01-12 RX ORDER — AMLODIPINE BESYLATE 5 MG/1
5 TABLET ORAL
Qty: 90 TABLET | Refills: 3 | Status: SHIPPED | OUTPATIENT
Start: 2025-01-12

## 2025-01-12 NOTE — TELEPHONE ENCOUNTER
Refill Decision Note   Jorge Becerra  is requesting a refill authorization.  Brief Assessment and Rationale for Refill:  Approve     Medication Therapy Plan:         Comments:     Note composed:2:16 PM 01/12/2025

## 2025-01-12 NOTE — TELEPHONE ENCOUNTER
No care due was identified.  [facility] Embedded Care Due Messages. Reference number: [id number].   1/12/2025 6:57:52 AM CST

## 2025-01-21 DIAGNOSIS — G31.84 MILD COGNITIVE IMPAIRMENT: ICD-10-CM

## 2025-01-21 RX ORDER — DONEPEZIL HYDROCHLORIDE 5 MG/1
TABLET, FILM COATED ORAL
Qty: 90 TABLET | Refills: 3 | Status: SHIPPED | OUTPATIENT
Start: 2025-01-21 | End: 2025-01-28 | Stop reason: SDUPTHER

## 2025-01-28 ENCOUNTER — OFFICE VISIT (OUTPATIENT)
Facility: CLINIC | Age: 75
End: 2025-01-28
Payer: MEDICARE

## 2025-01-28 DIAGNOSIS — G30.9 ALZHEIMER DISEASE: ICD-10-CM

## 2025-01-28 DIAGNOSIS — F02.80 ALZHEIMER DISEASE: ICD-10-CM

## 2025-01-28 DIAGNOSIS — G20.C PRIMARY PARKINSONISM: ICD-10-CM

## 2025-01-28 DIAGNOSIS — G31.83 DIFFUSE LEWY BODY DISEASE: ICD-10-CM

## 2025-01-28 DIAGNOSIS — F02.80 DIFFUSE LEWY BODY DISEASE: ICD-10-CM

## 2025-01-28 DIAGNOSIS — G31.84 MILD COGNITIVE IMPAIRMENT: Primary | ICD-10-CM

## 2025-01-28 DIAGNOSIS — C49.9 SARCOMA: ICD-10-CM

## 2025-01-28 DIAGNOSIS — G31.9 NEURODEGENERATIVE COGNITIVE IMPAIRMENT: ICD-10-CM

## 2025-01-28 PROCEDURE — 96116 NUBHVL XM PHYS/QHP 1ST HR: CPT | Mod: 59,95,, | Performed by: PSYCHIATRY & NEUROLOGY

## 2025-01-28 PROCEDURE — 98007 SYNCH AUDIO-VIDEO EST HI 40: CPT | Mod: 95,,, | Performed by: PSYCHIATRY & NEUROLOGY

## 2025-01-28 RX ORDER — DONEPEZIL HYDROCHLORIDE 10 MG/1
10 TABLET, FILM COATED ORAL EVERY MORNING
Qty: 30 TABLET | Refills: 5 | Status: SHIPPED | OUTPATIENT
Start: 2025-01-28

## 2025-01-28 NOTE — PROGRESS NOTES
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 with divided attention.  Executive  The patient's cognitive ability to process and respond to information has slowed.  The patient does have difficulty with working memory such as occasional reliance on external aids like notes.  The patient does misplace personal items (e.g., keys, cell phone, wallet) more frequently.  The patient does have difficulty keeping track of their medications.  The patient does have difficulty with planning/organizing/completing multistep tasks requires assistance from others.  The patient does have not difficulty with multitasking/multistep tasks.  The patient has not shown to have difficulty with judgement.  The patient's insight into their health and situation is intact.  Language  The patient's speech has been affected.  The patient's speech is fluent and non-effortful.  The patient does not forget places/people's names more frequently.  The patient does not have word-finding difficulties.  The patient does not make word substitutions.  The patient's speech is grammatically intact.  The patient does not appear to have impaired comprehension.  The patient does not appear to have difficulty comprehending and understanding written text.  Visuospatial  The patient has become confused or disoriented in new,  unfamiliar places.  The patient does not have trouble with navigation.  The patient does not get lost in familiar places.  The patient does not have visuospatial disorientation.  The patient has had problems with driving and/or parking.  The patient does not have difficulty recognizing objects or faces.  Motor/Coordination  The patient does have difficulty with walking.  The patient does feel imbalanced.  The patient has fallen.  The patient does not appear to have new motor deficits.  The patient does not have difficulty buttoning shirts, operating zippers, or manipulating tools/utensils.  The patient does not have a resting tremor.  The patient's handwriting has not become micrographic.  The patient denies restlessness.  The patient denies new and/or worsening simple repetitive behaviors.  The patient denies a change of self-stimulating behavior.  The patient's speech has not become simplified or become repetitive/stereotyped.  The patient denies having any new involuntary movements and/or muscle jerking.  The patient reports new muscle cramps and/or twitching.  The patient does not have swallowing difficulty.  Sensory  The patient denies new numbness, tingling, paresthesias, or pain.  The patient denies a loss of vision, blurry vision, or double vision.  The patient denies new loss of hearing or worsening tinnitus.  The patient denies anosmia.  Sleep  The patient reports difficulty sleeping.  The patient does have difficulty going to sleep.  The patient denies difficulty staying asleep or frequently awakening at night.  The patient does not snore or have witnessed apneas while sleeping.  When the patient wakes up in the morning, the patient does feel well-rested.  The patient denies dream-enactment behavior.  The patient denies symptoms suggestive of restless leg syndrome.  Neurobehavioral  The patient's personality has changed.  The patient does note have difficulty with self-control.  The patient denies new  impulsivity or rash/careless actions.  The patient does not appear to have a change in inertia.  The patient does not appear apathetic or has decreased motivation.  The patient is not exhibiting a diminished response to other people's needs and feelings.  The patient is not exhibiting symptoms of social withdrawal/indifference.  The patient is not exhibiting a diminished social interest, interrelatedness, or personal warmth.  The patient does not have symptoms to suggest a loss of manners or decorum.  The patient does not have symptoms of disinhibition and social inappropriateness.  The patient's personal hygiene is intact.  The patient does not have difficulty with flexible thinking.  The patient does not have symptoms of hyper-religiosity or dogmatism.  The patient's interests/pleasures have not become restrictive, simplified, interrupting, or repetitive.  The patient denies new/worsening complex repetitive/ritualistic compulsions and behaviors.  The patient has had changes in eating behavior.  The patient denies increased consumption of food or substances.  The patient denies oral exploration or consumption of inedible objects.  Psychiatric  The patient does have symptoms of irritability and mood lability.  The patient does not exhibit hyperactive behavior.  The patient does not have symptoms of agitation, aggression, or violent outbursts.  The patient's emotional expression has changed.  The patient does have emotional blunting.  The patient does not feel depressed.  The patient denies anxiety.  The patient does not exhibit cycling behavior.  The patient is not exhibited symptoms of paranoia.  The patient does not have delusions.  The patient does not have hallucinations.  Medical Review of Systems  The patient does not have constipation.  The patient does not have urinary incontinence.  The patient denies orthostatic lightheadedness.  The patient's weight is unstable. 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            Neurological Examination:     Mental Status  The patient's appearance is normal (hygiene is appropriate; attire is proper and clean).  Throughout the interview, the patient behaved abnormally and was not cooperative.  The patient behavior is appropriate to the clinical context without impropriety or improper language/conduct.  The patient's energy level is abnormal.  The patient's orientation is not entirely accurate.  The patient's attention/concentration is impaired.  The patient can complete three-step commands.  The patient fund of knowledge was appropriate for age, culture, and level of education.  The patient's thought process is not logical or goal-oriented.  The patient demonstrated appropriate insight based on actions, awareness of the patient's illness, plans for the future.  The patient demonstrated good judgment based on actions and plans for the future.  Cranial Nerves  The patient showed no evidence of anosmia 3/3 (coffee/vanilla/cinnamon).  The patient's pupils were normal.  The patient's visual fields were full to confrontation in all quadrants.  The patient's ocular pursuit in the horizontal and vertical plane was complete.  The patient's saccadic initiation, velocity, and amplitude are normal.  The patient demonstrated no square-wave jerks.  The patient's eyelid assessment showed no apraxia. There was no eyelid dysfunction, retraction, or prakash sign.  The patient blink rate was normal.  The patient's facial strength was normal.  The patient's facial expression was symmetric and appropriate to the context.  The patient's facial expression was normal without evidence of hypomimia.  The patient's facial sensation was intact to light touch bilaterally.  The patient's hearing was normal bilaterally.  The patient's oropharynx was non-obstructed with a Mallapati score 1/4.  "The soft palate elevates symmetrically.  The patient's uvula is mid-line.  The patient's tongue showed no evidence of scalloping.  The patient can protrude their tongue beyond The patient's lips for >10 sec.  The patient can move their extended tongue back and forth rapidly.  The patient's tongue showed no evidence of fasciculation or scalloping.  The patient's sternocleidomastoid and trapezius muscle strength was full bilaterally.  The patient had no significant evidence of anterocollis or retrocollis.  Speech/Language  The patient's speech was fluent, non-effortful, and the patient's rate was appropriate to the context.  The patient's speech timbre is abnormal.  The patient's speech rate is normal.  The patient's respirations are within normal range and appropriate to context.  The patient's speech timbre is normal.  The patient has no articulation (segmental features) errors.  The patient has no speech dysdiadochokinesia with repetition of syllables such as "/PA/, /TA/, /KA/, /OM/".  The patient's pitch assessment showed normal range, intonation (Pitch Pattern), and variability.  The patient's tone was not emotionally limited, and tempo was rhythmic (e.g., "Once upon a midnight dreary, while I pondered, weak and weary, Over many a quaint and curious volume of forgotten stefani" and "That government of the people, by the people, for the people, shall not perish from the earth").  The patient's speech is not dysarthric.  The patient's speech was without evidence of anomia.  The patient makes no phonological loop errors.  The patient's speech is grammatically intact; (no function/semantic word substitutions, phonemic/semantic paraphasias, or binary confusion).  Motor  The patient's bilateral upper extremity muscle bulk is appropriate.  The patient's bilateral upper extremity muscle tone is not increased.  There was no dystonia observed on examination.  Assessment of motor strength was symmetric and at minimal " anti-gravity.  Deltoid L +5/5 R +5/5 Biceps L +5/5 R +5/5 Triceps L +5/5 R +5/5 Wrist extension L +5/5 R +5/5 Finger abduction L +5/5 R +5/5 Hip flexion L +5/5 R +5/5 Hip extension L +5/5 R +5/5 Knee flexion L +5/5 R +5/5 Knee extension L +5/5 R +5/5 Ankle flexion L +5/5 R +5/5 Ankle extension L +5/5 R +5/5  There is no pronator or downward drift.  There is no myoclonus observed in the patient bilateral upper and lower extremities.  There are no fasciculations observed in the patient bilateral upper and lower extremities.  Coordination  The patient has no bilateral upper extremity limb dysmetria or past pointing on finger-nose-finger bilaterally.  The patient has no bilateral lower extremity limb dysmetria during shin rub.  The patient has no limb dysdiadochokinesia of the upper extremity on the pronation/supination test and screwing in a light bulb or lower extremity during tapping ball of each foot bilaterally.  The patient has no cerebellar rebound bilaterally.  The patient has no visible tremor.  The patient has no kinetic tremor bilaterally.  The patient has no postural tremor bilaterally.  The patient has no resting tremor bilaterally.  The patient has no evidence of interhemispheric motor control deficits.  The patient has no motor overflow bilaterally.  The patient demonstrates no alien limb phenomena.  The patient has no dyskinetic movements.  The patient has no akathisia.  The patient's bilateral upper extremity coordination with finger tapping, pronation/supination, and the open-close fist showed no slowing, no hypometria, and no dysrhythmia inconsistent with bradykinesia.  Higher Cortical Function  The patient had no hemineglect (e.g., line bisection/Margarette's test).  The patient showed no evidence of simultanagnosia (Navon hierarchical letters).  The patient showed no evidence of visuospatial constructional dysfunction.  The patient showed no evidence of angular gyrus disconnection  (insular-operculum).  The patient has no evidence of dysgraphia.  The patient showed no evidence of apraxia.  The patient showed no dysexecutive behavior.  Sensory  The patient's cortical sensory assessment demonstrated no neglect bilaterally.  The patient's sensation was intact to light touch, and vibratory sense in the bilateral upper and lower extremities.  Reflexes  Reflexes were abnormal.  There were no pathological reflexes; No Babinski, bilateral plantar toes go down, no Jaw Jerk Reflex, No Reyes, No Palmomental reflex, and No Palmar Grasp reflex.  There was no spreading/clonus.  Gait  The patient has normal posture sitting unaided.  The patient is unable to rise from a chair and sit back down without using their arms.  The patient's gait was abnormal.  The patient's posture while walking is normal.  The patient's gait initiation/inhibition was normal.  The patient's stance while walking is normal.  The patient's gait speed was abnormal (70-80 F 1.13 m/s M 1.26 m/s, >80 F 0.94 m/sec, M 0.97 m/sec).  The patient's stride including step-time, step-width, and step-length was normal.  The patient's arms swing is symmetric and of normal amplitude.  The patient takes turns in <4 steps.  When attempting to walk abnormally (heels, tiptoes, tandem), the patient makes no errors.  The patient has no evidence of posture/balance impairment.            Functional Capacity Assessment: Neurological Wellbeing, Intelligence, and Social Evaluation:     Instrumental Activities of Daily Living:   Communal Operations: Mild level of inability to perform independantly.  Finances: Moderate level of inability to perform independantly.  Housework: Normal level of functional independance.  Personal Care: Borderline level of inability to perform independantly.  Personal Health: Mild level of inability to perform independantly.  Basic Activities of Daily Living:   Axial Motor: Normal level of functional independance.  Grooming: Mild  level of inability to perform independantly.  Hygeine: Normal level of functional independance.  Oropharngeal Motor: Normal level of functional independance.  Personal Health: Normal level of functional independance.  Toiletry: Normal level of functional independance.  Functional Capacity Scales:   [place] Instrumental Activities of Daily Living Scale: Score: 3/8 suggestive of Mild dependence.  Functional Assessment Staging Tool (FAST Scale): Score: 3/15 suggestive of Mild Dementia (Stage 4).  [facility] Functional Assessment Scale (FAS): Score: 6.5/30 suggestive of Possible Functional Impairment.  Clinical Dementia Rating Sum of Boxes: Score: 3.5/18 suggestive of Mild cognitive impairment.            Neurocognitive Assessment:     Question Score Interpretation   Memory   Registration T1 (3) 3/3 Within Normal Limits.   Registration T1 (5) 3 Within Normal Limits.   Registration T1 (9) 3/9 Mild Impairment: -1.6 STDs below the average score based on age and education.   Registration T2 (9) 5/9 Mild Impairment: -1.1 STDs below the average score based on age and education.   Registration T3 (9) 8/9 Within Normal Limits.   Registration T4 (9) 7/9 Borderline Impairment: -0.7 STDs below the average score based on age and education.   Registration T5 (9) 8/9 Within Normal Limits.   Delayed Recall 30 sec (9) 7/9 Within Normal Limits.   Delayed Recall 10 min (3) 3/3 Within Normal Limits.   Delayed Recall 10 min (5) 5/5 Within Normal Limits.   Delayed Recall 10 min (9) 5/9 Borderline Impairment: -0.7 STDs below the average score based on age and education.   Delayed Recall Cued (9) 8/9 Within Normal Limits.   Delayed Recognition (9) 9/9 Within Normal Limits.   Executive   Three-step command 3/3 Within Normal Limits.   Serial Sevens 3/3 Within Normal Limits.   WORLD Backward 5/5 Within Normal Limits.   Digit Span Backwards 4 Borderline Impairment: -0.8 STDs below the average score based on age and education.   Digit Span - 2 2/2 Within  Normal Limits.   Lexical Fluency - F 10 Mild Impairment: -1.3 STDs below the average score based on age and education.   Lexical Fluency - A 11 Mild Impairment: -1.1 STDs below the average score based on age and education.   Lexical Fluency - S 10 Mild Impairment: -1.3 STDs below the average score based on age and education.   Semantic Fluency - Animals 15 Borderline Impairment: -0.9 STDs below the average score based on age and education.   Trials-1 1/1 Within Normal Limits.   Visuospatial   Intersecting Pentagons 1/1 Within Normal Limits.   Clock Draw 3/3 Within Normal Limits.   [place] Figure Copy 14/17 Borderline Impairment based on age and education.   Picture Synthesis 3/3 Within Normal Limits.   Language   Naming-2 2/2 Within Normal Limits.   Naming-3 3/3 Within Normal Limits.   15-Item BNT 15/15 Within Normal Limits.   Repetition-1 1/1 Within Normal Limits.   Repetition-2 2/2 Within Normal Limits.   Repetition of Phrases 5/5 Within Normal Limits.   Verbal Agility 6/6 Within Normal Limits.   Surface Dyslexia 3/3 Within Normal Limits.   Following written command 1/1 Within Normal Limits.   Writing a complete sentence 1/1 Within Normal Limits.   Abstraction 2/2 Within Normal Limits.   Attention   Orientation-10 9/10 Borderline Impairment based on age and education.   Orientation-6 5/6 Borderline Impairment based on age and education.   Digit Span Forwards 5 Borderline Impairment: -1 STDs below the average score based on age and education.     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          Laboratories:     Metabolic Screening  Name Reference Previous Values   Cholesterol Total 120 - 199 mg/dL 104 (L)   2023-07-11      Triglycerides 30 - 150 mg/dL 91   2023-07-11      HDL 40 - 75 mg/dL 40   2023-07-11      LDL Cholesterol 63.0 - 159.0 mg/dL 45.8 (L)   2023-07-11      HDL/Cholesterol Ratio 20.0 - 50.0 % 38.5   2023-07-11      Total  Cholesterol/HDL Ratio 2.0 - 5.0 2.6   2023-07-11      Non-HDL Cholesterol mg/dL 64   2023-07-11      Hemoglobin A1C External 4.0 - 5.6 % 8.2 (H)   2023-07-11      Estimated Avg Glucose 68 - 131 mg/dL 189 (H)   2023-07-11      BUN 8 - 23 mg/dL 15   2023-07-11   17   2023-08-05      Neuroendocrine/Electrolyte Screening  Name Reference Previous Values   Calcium 8.7 - 10.5 mg/dL 10.2   2023-07-11   10.1   2023-08-05      Creatinine 0.5 - 1.4 mg/dL 1.07   2023-07-11   1.07   2023-08-05      TSH 0.400 - 4.000 uIU/mL 3.075   2023-07-11      Free T4 0.71 - 1.51 ng/dL 0.96   2023-07-11      Neuro-Nutritional Screening  Name Reference Previous Values   Thiamine 38 - 122 ug/L 60   2023-07-11      Vitamin B12 180 - 914 ng/L 248   2023-07-11      Folate 4.0 - 24.0 ng/mL 9.8   2023-07-11      Neuroinfectious Screening  Name Reference Previous Values   Treponema Pallidum Antibodies (IgG, IgM) Nonreactive    2023-07-11      Neurodegenerative Biomarkers - Serum  Name Reference Previous Values   M Phospho-Tau 217 <=0.185pg/mL 0.318 (H)   2023-07-11      Neurofilament Light Chain, Plasma <=37.9 pg/mL 24.5   2023-07-11   24.5   2024-09-12      Phospho-Tau (181P) <0.97 pg/mL 2.16 (H)   2024-09-12      Neuro-Inflammatory Screening - Serum  Name Reference Previous Values   Methlymalonic Acid <0.40 umol/L 0.14   2023-07-11      Neurodegenerative Biomarkers - Skin  Name Reference Previous Values   Phosphorylation Alpha-Synuclein - Posterior Cervical (Right) normal Abnormal (H)   2024-11-25      Phosphorylation Alpha-Synuclein - Distal Thigh (Right) normal Abnormal (H)   2024-11-25      Phosphorylation Alpha-Synuclein - Distal Leg (Right) normal Normal   2024-11-25                Neurological Relevant Procedures:    Electrocardiogram performed on 09/12/2024  Formal Interpretation: Vent. Rate : 075 BPM Atrial Rate : 075 BPM P-R Int : 132 ms QRS Dur : 090 ms QT Int : 378 ms P-R-T Axes : 005 -29 001 degrees QTc Int : 422  ms  Provider Interpretation: The electrocardiogram (ECG) shows a normal sinus rhythm. There are voltage criteria present that suggest left ventricular hypertrophy. Overall, the ECG is abnormal.            Neurologically Relevant Imaging:    MRI brain/head without contrast performed on 06/07/2024  Radiologist Interpretation: No acute abnormality  Provider Interpretation: The brain imaging is normal, with no significant cortical or subcortical atrophy observed. Mild scattered subcortical white matter hyperintensities are present. A coronal window for evaluating hippocampal volume loss is not available. Additionally, there is no susceptibility-weighted imaging (SWI) sequence included.  T1-weighted (T1W) Image Summary: The radiographic interpretation indicates no significant cortical atrophy, with changes consistent with the patient's age. Additionally, the subcortical nuclei and hippocampi do not exhibit significant atrophy.  FLAIR/T2-weighted (T2W) Image Summary: The radiographic interpretation reveals scattered subcortical white matter hyperintensities, which are most prominently observed in the high dorsal corona radiata. There is no evidence of small-vessel infarcts or large vessel disease. Subtle T2 hyperintensities are noted in the juxtacortical region of the right parietal lobule's ventral surface. Additionally, mild gliosis is present in the bilateral hippocampi.  Diffusion Weighted Imaging with ADC Mapping Summary: There are no significant hyperintensities or hypointensities observed on Diffusion-Weighted Imaging (DWI). Additionally, there is no apparent correlation with the Apparent Diffusion Coefficient (ADC).  Susceptibility-weighted imaging (SWI) and/or GRE Summary: There are no significant hypointensities to suggest cortical or subcortical hemosiderin deposition.               Billing Statement:       I performed this consultation using real-time Telehealth tools, including a live video connection between  my location and the patient's location (their home within the [facility]). Prior to initiating the consultation, I obtained informed verbal consent to perform this consultation using Telehealth tools and answered all the questions about the Telehealth interaction. The participants understood that only a limited neurological exam and limited neuropsychological testing could be performed using Telehealth tools.     I spent a total of 45 minutes (from 08:45 AM to 09:30 AM) on 01/26/2025, engaging in person in a face-to-face consultation with the patient. More than 50% of this time was devoted to counseling on symptoms, treatment plans, risks, therapeutic options, lifestyle modifications, and safety concerns related to the above diagnoses.     A Review of Systems was completed, encompassing 10 of the 14 systems. All findings were negative, except those noted in the History of Present Illness (HPI) and Review of Systems (ROS) Sections. The systems reviewed were Constitutional (Const), Eyes, Ear/Nose/Throat (ENT), Respiratory (Resp), Cardiovascular (CV), Gastrointestinal (GI), Genitourinary (), Musculoskeletal (MSK), Skin, and Neurological (Neuro).     I spent a total of 10 minutes to reviewing previous laboratory results on the day of the clinical evaluation. This review was an integral part of the face-to-face encounter. The laboratory findings were predominantly negative, with exceptions as noted in the History of Present Illness (HPI) and Assessment.     I spent a total of 5 minutes to reviewing previous diagnostic tests on the day of the clinical evaluation. This activity was directly associated with the face-to-face encounter. The findings from these diagnostic tests were generally within normal limits, with any exceptions as noted in the History of Present Illness (HPI) and Assessment.     I performed a neurobehavioral status examination on the day of clinical evaluation that included a clinical  assessment of thinking, reasoning, and judgment to ensure a comprehensive approach in managing the complex and evolving needs of the patient's neurocognitive condition. Please see above HPI and ROS for full details. This exam was performed on the day of clinical evaluation and included 16 minutes spent on direct face-to-face clinical observation and interview with the patient and 15 minutes spent interpreting test results and preparing the report. The total time of 31 minutes spent on the neurobehavioral status examination is not included in the time spent on evaluation and management coding.     Total Billing time spent on encounter/documentation for this patient's evaluation and management, not including the Neurobehavioral Status Examination: 44 minutes.           This note was dictated using the M*Modal Fluency Direct voice recognition program. Please be aware that word recognition errors may occasionally occur and might be overlooked during review. [id number]        Signing Physician:  Michael Nino MD

## 2025-01-31 ENCOUNTER — TELEPHONE (OUTPATIENT)
Dept: NEUROLOGY | Facility: CLINIC | Age: 75
End: 2025-01-31
Payer: MEDICARE

## 2025-01-31 NOTE — TELEPHONE ENCOUNTER
Called and spoke with pt in regards to scheduling his LP vv with Dr Nino on 2/25. Pt confirmed date and time.

## 2025-01-31 NOTE — TELEPHONE ENCOUNTER
----- Message from Brian Nicolas sent at 1/29/2025 12:26 PM CST -----  Rosa,  Mr Becerra's LP is scheduled for next Wednesday 2/5, at 1pm.  Maria Isabel  ----- Message -----  From: Michael Nino MD  Sent: 1/28/2025   9:10 AM CST  To: [facility]; Tasha Paz MA; #    Good Morning,    Another patient that fell through the cracks - was supposed to be scheduled for LP back in 10/2024 - family has been waiting for a call from us for 3 months.    Please make this a priority today,    @Maria Isabel - Please schedule LP for [facility]  @Tasha - Please schedule 2 week fu post-LP and coordinate serum blood draw for APOE markers - If you cannot connect with them please send a message in the portal    Michael Aquino

## 2025-02-05 ENCOUNTER — HOSPITAL ENCOUNTER (OUTPATIENT)
Dept: RADIOLOGY | Facility: HOSPITAL | Age: 75
Discharge: HOME | End: 2025-02-05
Attending: PSYCHIATRY & NEUROLOGY
Payer: MEDICARE

## 2025-02-05 DIAGNOSIS — F02.80 ALZHEIMER DISEASE: ICD-10-CM

## 2025-02-05 DIAGNOSIS — F02.80 ALZHEIMER DISEASE: Primary | ICD-10-CM

## 2025-02-05 DIAGNOSIS — G31.84 MILD COGNITIVE IMPAIRMENT: ICD-10-CM

## 2025-02-05 DIAGNOSIS — G30.9 ALZHEIMER DISEASE: Primary | ICD-10-CM

## 2025-02-05 DIAGNOSIS — G30.9 ALZHEIMER DISEASE: ICD-10-CM

## 2025-02-05 PROCEDURE — 62328 DX LMBR SPI PNXR W/FLUOR/CT: CPT | Mod: ,,, | Performed by: RADIOLOGY

## 2025-02-05 PROCEDURE — 62328 DX LMBR SPI PNXR W/FLUOR/CT: CPT

## 2025-02-05 NOTE — PROCEDURES
Radiology Post-Procedure Note    Pre Op Diagnosis: memory loss    Post Op Diagnosis: Same    Procedure: lumbar puncture    Procedure performed by: Anuel Benitez MD, Adrian Ruggiero MD    Written Informed Consent Obtained: Yes    Specimen Removed: 2 mL CSF    Estimated Blood Loss: Minimal    Findings: Following written informed consent and sterile prep and drape, a 22 gauge spinal needle was inserted at L5-S1 intralaminar space under fluoroscopic surveillance. Additional attempts at L2-L3 were successful.  2 mL blood-tinged CSF removed, and given indadequacy of sample, the procedure was aborted and patient given instructions for follow-up    Patient tolerated procedure well.    Anuel Benitez MD  Diagnostic Radioliogy PGY-2

## 2025-02-05 NOTE — H&P
Radiology History & Physical      SUBJECTIVE:     Chief Complaint: memory loss    History of Present Illness:  Jorge Becerra is a 74 y.o. male w/ a hx of progressive memory loss, who was referred from neurology clinic for diagnostic lumbar puncture for workup or memory loss.    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 Authorizing Provider   allopurinoL (ZYLOPRIM) 300 MG tablet TAKE 1 TABLET (300 MG TOTAL) BY MOUTH EVERY OTHER DAY. 9/26/24   Mehul Royal MD   amLODIPine (NORVASC) 5 MG tablet TAKE 1 TABLET BY MOUTH EVERY DAY 1/12/25   Mehul Royal MD   aspirin (ECOTRIN) 81 MG EC tablet Take 81 mg by mouth once daily.    Provider, Historical   atorvastatin (LIPITOR) 20 MG tablet TAKE 1 TABLET BY MOUTH EVERY DAY IN THE EVENING 9/13/24   Vinicius Uribe MD   cholecalciferol, vitamin D3, 10 mcg (400 unit) Cap capsule Take 1 tablet by mouth once daily. 7/6/21   Provider, Historical   cyanocobalamin, vitamin B-12, 1,000 mcg Cap Take 1 tablet by mouth once daily.    Provider, Historical   donepeziL (ARICEPT) 10 MG tablet Take 1 tablet (10 mg total) by mouth every morning. 1/28/25   Michael Nino MD   empagliflozin (JARDIANCE) 25 mg tablet Take 1 tablet (25 mg total) by mouth once daily. 5/30/24   Mehul Royal MD   ferrous sulfate (FEOSOL) 325 mg (65 mg iron) Tab tablet Take 325 mg by mouth daily with breakfast.    Provider, Historical   fosinopriL (MONOPRIL) 40 MG tablet Take 1 tablet (40 mg total) by mouth once daily. 7/8/24   Mehul Royal MD   ketoconazole (NIZORAL) 2 % shampoo APPLY TOPICALLY TWICE A WEEK. 10/21/24   Mehul Royal MD   magnesium oxide 400 mg magnesium Cap Take 1 tablet  by mouth Daily. 9/1/20   Provider, Historical   metFORMIN (GLUCOPHAGE) 1000 MG tablet Take 1 tablet (1,000 mg total) by mouth 2 (two) times daily. 5/30/24   Mehul Royal MD   methocarbamoL (ROBAXIN) 750 MG Tab Take 750 mg by mouth. 3/4/24   Provider, Historical   RYBELSUS 7 mg tablet TAKE 1 TABLET (7 MG TOTAL) BY MOUTH ONCE DAILY 1/2/25   Mehul Royal MD   vit C/E/zinc/lutein/zeaxanthin (OCUVITE EYE HEALTH ORAL) Take 1 tablet by mouth 2 (two) times a day.  1/1/15   Provider, Historical     Anticoagulants/Antiplatelets: no anticoagulation    Allergies: Review of patient's allergies indicates:  No Known Allergies  Sedation History:  no adverse reactions    Review of Systems:   Hematological: no known coagulopathies  Respiratory: no shortness of breath  Cardiovascular: no chest pain  Gastrointestinal: no abdominal pain  Genito-Urinary: no dysuria  Musculoskeletal: negative  Neurological: no TIA or stroke symptoms         OBJECTIVE:     Vital Signs (Most Recent)       Physical Exam:  ASA: 2  Mallampati: 2   Access: 20GPIV    General: no acute distress  Mental Status: alert and oriented to person, place and time  HEENT: normocephalic, atraumatic  Chest: unlabored breathing  Heart: regular heart rate  Abdomen: nondistended  Extremity: moves all extremities    ASSESSMENT/PLAN:     Sedation Plan: local  Patient will undergo: lumbar puncture    Anuel Benitez  Diagnostic Radiology PGY-2

## 2025-02-11 DIAGNOSIS — B35.4 TINEA CORPORIS: ICD-10-CM

## 2025-02-11 RX ORDER — KETOCONAZOLE 20 MG/ML
SHAMPOO, SUSPENSION TOPICAL
Qty: 120 ML | Refills: 2 | Status: SHIPPED | OUTPATIENT
Start: 2025-02-13

## 2025-02-14 ENCOUNTER — TELEPHONE (OUTPATIENT)
Dept: FAMILY MEDICINE | Facility: CLINIC | Age: 75
End: 2025-02-14
Payer: MEDICARE

## 2025-02-14 NOTE — TELEPHONE ENCOUNTER
I have sent pt portal message in regards to r/s appt with Dr. Royal in 2025. Will await response.       Appointment Request  Jorge Becerra  Sent:   3:54 PM  To: [facility] Clinical Staff       Jorge Becerra  MRN: [id number] : 1950  Pt Home: [phone]    Entered: [phone]      Message    Appointment Request From: Jorge Becerra    With Provider: Mehul Royal MD [[facility]]    Preferred Date Range: 2025 - 2025    Preferred Times: Any Time    Reason for visit: Office Visit    Comments:  Reschedule 2025 follow up visit.    Primary Coverage(Effective 2013)    Payer Plan Group Number Group Name Effective From Effective To  MEDICARE MEDICARE PART A & B   2013     Secondary Coverage(Effective 2019)    Payer Plan Group Number Group Name Effective From Effective To  [facility] [id number]  2019     Patient Demographics    Address  [address] [address] Phone  [phone] (Home)  [phone] (Mobile) *Preferred* E-mail Address  [email or web address]    # of No Show in Current Department:0    Future Appointments  2025 - 2026     Date Visit Type Department Provider   2025  9:30 AM CST ESTABLISHED PATIENT - VIRTUAL [facility] - [facility] 8th Floor  Arrive at: Telehealth Michael Nino MD         2025 10:00 AM FL LUMBAR PUNCTURE DIAGNOSTIC [facility] - Xray (Inpatient) [facility] FLIP2 500 LB LIMIT         2025  9:30 AM EP - PRIMARY CARE ([facility]) [facility] - Family Medicine Mehul Royal MD

## 2025-02-15 ENCOUNTER — TELEPHONE (OUTPATIENT)
Facility: CLINIC | Age: 75
End: 2025-02-15
Payer: MEDICARE

## 2025-02-15 NOTE — TELEPHONE ENCOUNTER
----- Message from Gilles Jo sent at 2/15/2025  9:41 AM CST -----    ----- Message -----  From: Sandro Hernandez  Sent: 2/14/2025  12:14 PM CST  To: Michael Nino MD    LP was rescheduled to 2/25  Patient is scheduled virtually with you to go over LP results - will need to push this back    I'm looking at 3/11 patients  Can I swap for either these two patients?  Burak Hickman (9:30)  Ana Coffey (10:15)  ----- Message -----  From: Augusta Love  Sent: 2/14/2025  11:45 AM CST  To: Mica [facility] Staff    Name of Who is Calling:DEBO LATIF [[id number]]        What is the request in detail:Pt would like a callback to padilla 2/25 appt due to LB testing appt being rescheduled.Please advise thank you       Can the clinic reply by [facility]:YES        [facility]:[phone]

## 2025-02-24 DIAGNOSIS — Z00.00 ENCOUNTER FOR MEDICARE ANNUAL WELLNESS EXAM: ICD-10-CM

## 2025-02-25 ENCOUNTER — HOSPITAL ENCOUNTER (OUTPATIENT)
Dept: RADIOLOGY | Facility: HOSPITAL | Age: 75
Discharge: HOME | End: 2025-02-25
Attending: PSYCHIATRY & NEUROLOGY
Payer: MEDICARE

## 2025-02-25 DIAGNOSIS — G31.84 MILD COGNITIVE IMPAIRMENT: ICD-10-CM

## 2025-02-25 DIAGNOSIS — G30.9 ALZHEIMER DISEASE: ICD-10-CM

## 2025-02-25 DIAGNOSIS — F02.80 ALZHEIMER DISEASE: ICD-10-CM

## 2025-02-25 LAB
CLARITY CSF: CLEAR
COLOR CSF: COLORLESS
CSF TUBE NUMBER: 3
CSF TUBE NUMBER: 3
GLUCOSE CSF-MCNC: 62 MG/DL (ref 40–70)
LYMPHOCYTES NFR CSF MANUAL: 86 % (ref 40–80)
MONOS+MACROS NFR CSF MANUAL: 11 % (ref 15–45)
NEUTROPHILS NFR CSF MANUAL: 3 % (ref 0–6)
PROT CSF-MCNC: 66 MG/DL (ref 15–40)
RBC # CSF: 53 /CU MM
SPECIMEN VOL CSF: 2 ML
WBC # CSF: 2 /CU MM (ref 0–5)

## 2025-02-25 PROCEDURE — 0358U NEURO ALYS B-AMYL 1-42&1-40: CPT | Performed by: PSYCHIATRY & NEUROLOGY

## 2025-02-25 PROCEDURE — 82945 GLUCOSE OTHER FLUID: CPT | Performed by: PSYCHIATRY & NEUROLOGY

## 2025-02-25 PROCEDURE — 89051 BODY FLUID CELL COUNT: CPT | Performed by: PSYCHIATRY & NEUROLOGY

## 2025-02-25 PROCEDURE — 83520 IMMUNOASSAY QUANT NOS NONAB: CPT | Performed by: PSYCHIATRY & NEUROLOGY

## 2025-02-25 PROCEDURE — 62328 DX LMBR SPI PNXR W/FLUOR/CT: CPT | Mod: ,,, | Performed by: RADIOLOGY

## 2025-02-25 PROCEDURE — 86592 SYPHILIS TEST NON-TREP QUAL: CPT | Performed by: PSYCHIATRY & NEUROLOGY

## 2025-02-25 PROCEDURE — 84157 ASSAY OF PROTEIN OTHER: CPT | Performed by: PSYCHIATRY & NEUROLOGY

## 2025-02-25 PROCEDURE — 82232 ASSAY OF BETA-2 PROTEIN: CPT | Performed by: PSYCHIATRY & NEUROLOGY

## 2025-02-25 PROCEDURE — 82234 BETA-AMYLOID 1-42 (ABETA 42): CPT | Performed by: PSYCHIATRY & NEUROLOGY

## 2025-02-25 PROCEDURE — 82042 OTHER SOURCE ALBUMIN QUAN EA: CPT | Performed by: PSYCHIATRY & NEUROLOGY

## 2025-02-25 PROCEDURE — 62328 DX LMBR SPI PNXR W/FLUOR/CT: CPT

## 2025-02-25 NOTE — PROCEDURES
Radiology Post-Procedure Note    Pre Op Diagnosis: Memory Loss    Post Op Diagnosis: Same    Procedure: lumbar puncture    Procedure performed by: Moe Irizarry PA-C, Anuel Benitez MD    Written Informed Consent Obtained: Yes    Specimen Removed: 12 mL CSF    Estimated Blood Loss: Minimal    Findings: Following written informed consent and sterile prep and drape, a 22 gauge spinal needle was inserted at L4 - L5 intralaminar space under fluoroscopic surveillance.  12 mL clear CSF removed and sent to the lab for further analysis.  There were no complications.    Patient tolerated procedure well.  See report for further details.     Moe Irizarry PA-C  Interventional Radiology

## 2025-02-25 NOTE — H&P
Radiology History & Physical      SUBJECTIVE:     Chief Complaint: Memory Loss    History of Present Illness:  Jorge Becerra is a 74 y.o. male Jorge Becerra is a 74 y.o. male w/ a hx of progressive memory loss, who was referred from neurology clinic for diagnostic lumbar puncture for workup of memory loss.     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 Authorizing Provider   allopurinoL (ZYLOPRIM) 300 MG tablet TAKE 1 TABLET (300 MG TOTAL) BY MOUTH EVERY OTHER DAY. 9/26/24   Mehul Royal MD   amLODIPine (NORVASC) 5 MG tablet TAKE 1 TABLET BY MOUTH EVERY DAY 1/12/25   Mehul Royal MD   aspirin (ECOTRIN) 81 MG EC tablet Take 81 mg by mouth once daily.    Provider, Historical   atorvastatin (LIPITOR) 20 MG tablet TAKE 1 TABLET BY MOUTH EVERY DAY IN THE EVENING 9/13/24   Vinicius Uribe MD   cholecalciferol, vitamin D3, 10 mcg (400 unit) Cap capsule Take 1 tablet by mouth once daily. 7/6/21   Provider, Historical   cyanocobalamin, vitamin B-12, 1,000 mcg Cap Take 1 tablet by mouth once daily.    Provider, Historical   donepeziL (ARICEPT) 10 MG tablet Take 1 tablet (10 mg total) by mouth every morning. 1/28/25   Michael Nino MD   empagliflozin (JARDIANCE) 25 mg tablet Take 1 tablet (25 mg total) by mouth once daily. 5/30/24   Mehul Royal MD   ferrous sulfate (FEOSOL) 325 mg (65 mg iron) Tab tablet Take 325 mg by mouth daily with breakfast.    Provider, Historical   fosinopriL (MONOPRIL) 40 MG tablet Take 1 tablet (40 mg total) by mouth once daily. 7/8/24   Mehul Royal MD   ketoconazole (NIZORAL) 2 % shampoo APPLY TOPICALLY TWICE A WEEK. 2/13/25   Mehul Royal MD   magnesium oxide 400  mg magnesium Cap Take 1 tablet by mouth Daily. 9/1/20   Provider, Historical   metFORMIN (GLUCOPHAGE) 1000 MG tablet Take 1 tablet (1,000 mg total) by mouth 2 (two) times daily. 5/30/24   Mehul Royal MD   methocarbamoL (ROBAXIN) 750 MG Tab Take 750 mg by mouth. 3/4/24   Provider, Historical   RYBELSUS 7 mg tablet TAKE 1 TABLET (7 MG TOTAL) BY MOUTH ONCE DAILY 1/2/25   Mehul Royal MD   vit C/E/zinc/lutein/zeaxanthin (OCUVITE EYE HEALTH ORAL) Take 1 tablet by mouth 2 (two) times a day.  1/1/15   Provider, Historical     Anticoagulants/Antiplatelets: no anticoagulation    Allergies: Review of patient's allergies indicates:  No Known Allergies  Sedation History:  no adverse reactions    Review of Systems:   Hematological: no known coagulopathies  Respiratory: no shortness of breath  Cardiovascular: no chest pain  Gastrointestinal: no abdominal pain  Genito-Urinary: no dysuria  Musculoskeletal: negative  Neurological: no TIA or stroke symptoms         OBJECTIVE:     Vital Signs (Most Recent)       Physical Exam:    General: no acute distress  Mental Status: alert and oriented to person, place and time  HEENT: normocephalic, atraumatic  Chest: unlabored breathing  Heart: regular heart rate  Abdomen: distended  Extremity: moves all extremities    ASSESSMENT/PLAN:     Sedation Plan: Local  Patient will undergo: lumbar puncture    Moe Irizarry PA-C  Interventional Radiology

## 2025-02-26 LAB — NSE CSF-MCNC: 22 NG/ML

## 2025-02-27 DIAGNOSIS — G30.9 ALZHEIMER DISEASE: Primary | ICD-10-CM

## 2025-02-27 DIAGNOSIS — T36.5X5A ADVERSE EFFECT OF AMINOGLYCOSIDES, INITIAL ENCOUNTER: ICD-10-CM

## 2025-02-27 DIAGNOSIS — F02.80 ALZHEIMER DISEASE: Primary | ICD-10-CM

## 2025-02-27 DIAGNOSIS — Z87.39 HISTORY OF GOUT: ICD-10-CM

## 2025-02-27 LAB
AL BETA42 CSF-MCNC: 842 PG/ML
ALBUMIN CSF-MCNC: 48.5 MG/DL
B2 MICROGLOB CSF-MCNC: 1.4 MCG/ML (ref 0.7–1.8)
BETA-AMYLOID RATIO, CSF: 0.11 RATIO
IMMUNOLOGIST REVIEW: NORMAL
IMMUNOLOGIST REVIEW: NORMAL
P-TAU181 CSF IA-MCNC: 12.1 PG/ML
TAU PROT CSF-MCNC: 133 PG/ML
TAU PROT/AL BETA42 CSF-RTO: 0.01 RATIO
VDRL CSF QL: NEGATIVE

## 2025-02-27 RX ORDER — ALLOPURINOL 300 MG/1
300 TABLET ORAL EVERY OTHER DAY
Qty: 45 TABLET | Refills: 4 | Status: SHIPPED | OUTPATIENT
Start: 2025-02-27

## 2025-02-27 NOTE — TELEPHONE ENCOUNTER
No care due was identified.  [facility] Embedded Care Due Messages. Reference number: [id number].   2/27/2025 8:30:28 AM CST

## 2025-02-27 NOTE — TELEPHONE ENCOUNTER
Refill Routing Note   Medication(s) are not appropriate for processing by [facility] for the following reason(s):        Required labs outdated-uric acid     [facility] action(s):  Defer               Appointments  past 12m or future 3m with PCP    Date Provider   Last Visit   2024 Mehul Royal MD   Next Visit   2025 Mehul Royal MD   ED visits in past 90 days: 0        Note composed:10:55 AM 2025

## 2025-03-01 ENCOUNTER — TELEPHONE (OUTPATIENT)
Facility: CLINIC | Age: 75
End: 2025-03-01
Payer: MEDICARE

## 2025-03-01 NOTE — TELEPHONE ENCOUNTER
----- Message from Michael Nino MD sent at 2/27/2025 10:00 AM CST -----  Done  ----- Message -----  From: Sandro Hernandez  Sent: 2/27/2025   9:14 AM CST  To: Michael Nino MD    Patient walked in for lab for APOEFront desk didn't specify requiring whole bloodCan you reorder?  ----- Message -----  From: Soledad May  Sent: 2/27/2025   8:08 AM CST  To: [facility] Staff    There was an issue with the APOEG test ordered on this patient from 2/25/25.Not processed correctly by the laboratory, requires whole blood.The order has been cancelled. You will need to reorder and contact the patient for recollection if clinically indicated.If there are any questions, please call the Send[facility] lab at [phone]. [phone].  Anyone in the Sendout lab will be able to assist you.

## 2025-03-10 ENCOUNTER — PATIENT MESSAGE (OUTPATIENT)
Dept: FAMILY MEDICINE | Facility: CLINIC | Age: 75
End: 2025-03-10
Payer: MEDICARE

## 2025-03-10 DIAGNOSIS — E11.3393 CONTROLLED TYPE 2 DIABETES MELLITUS WITH BOTH EYES AFFECTED BY MODERATE NONPROLIFERATIVE RETINOPATHY WITHOUT MACULAR EDEMA, WITHOUT LONG-TERM CURRENT USE OF INSULIN: Primary | ICD-10-CM

## 2025-03-11 RX ORDER — DAPAGLIFLOZIN 10 MG/1
10 TABLET, FILM COATED ORAL DAILY
Qty: 30 TABLET | Refills: 3 | Status: SHIPPED | OUTPATIENT
Start: 2025-03-11

## 2025-03-18 ENCOUNTER — OFFICE VISIT (OUTPATIENT)
Facility: CLINIC | Age: 75
End: 2025-03-18
Payer: MEDICARE

## 2025-03-18 DIAGNOSIS — G20.C PRIMARY PARKINSONISM: ICD-10-CM

## 2025-03-18 DIAGNOSIS — G31.84 MCI (MILD COGNITIVE IMPAIRMENT): Primary | ICD-10-CM

## 2025-03-18 DIAGNOSIS — G31.9 NEURODEGENERATIVE COGNITIVE IMPAIRMENT: ICD-10-CM

## 2025-03-18 DIAGNOSIS — G31.89 SYNUCLEINOPATHY: ICD-10-CM

## 2025-03-18 RX ORDER — CARBIDOPA AND LEVODOPA 25; 100 MG/1; MG/1
TABLET ORAL
Qty: 90 TABLET | Refills: 1 | Status: SHIPPED | OUTPATIENT
Start: 2025-03-18

## 2025-03-18 NOTE — PROGRESS NOTES
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 patient's cognitive ability to process and respond to information has slowed.  The patient does have difficulty with working memory such as occasional reliance on external aids like notes.  The patient does misplace personal items (e.g., keys, cell phone, wallet) more frequently.  The patient does have difficulty keeping track of their medications.  The patient does have difficulty with planning/organizing/completing multistep tasks requires assistance from others.  The patient does have not difficulty with multitasking/multistep tasks.  The patient has not shown to have difficulty with judgement.  The patient's insight into their health and situation is intact.  Language  The patient's speech has been affected.  The patient's speech is fluent and non-effortful.  The patient does not forget places/people's names more frequently.  The patient does not have word-finding difficulties.  The patient does not make word substitutions.  The patient's speech is grammatically intact.  The patient does not appear to have impaired comprehension.  The patient does not appear to have difficulty comprehending and understanding written text.  Visuospatial  The patient has become confused or disoriented in new, unfamiliar places.  The  patient does not have trouble with navigation.  The patient does not get lost in familiar places.  The patient does not have visuospatial disorientation.  The patient has had problems with driving and/or parking.  The patient does not have difficulty recognizing objects or faces.  Motor/Coordination  The patient does have difficulty with walking.  The patient does feel imbalanced.  The patient has fallen.  The patient does not appear to have new motor deficits.  The patient does not have difficulty buttoning shirts, operating zippers, or manipulating tools/utensils.  The patient does not have a resting tremor.  The patient's handwriting has not become micrographic.  The patient denies restlessness.  The patient denies new and/or worsening simple repetitive behaviors.  The patient denies a change of self-stimulating behavior.  The patient's speech has not become simplified or become repetitive/stereotyped.  The patient denies having any new involuntary movements and/or muscle jerking.  The patient reports new muscle cramps and/or twitching.  The patient does not have swallowing difficulty.  Sensory  The patient denies new numbness, tingling, paresthesias, or pain.  The patient denies a loss of vision, blurry vision, or double vision.  The patient denies new loss of hearing or worsening tinnitus.  The patient denies anosmia.  Sleep  The patient reports difficulty sleeping.  The patient does have difficulty going to sleep.  The patient denies difficulty staying asleep or frequently awakening at night.  The patient does not snore or have witnessed apneas while sleeping.  When the patient wakes up in the morning, the patient does feel well-rested.  The patient denies dream-enactment behavior.  The patient denies symptoms suggestive of restless leg syndrome.  Neurobehavioral  The patient's personality has changed.  The patient does note have difficulty with self-control.  The patient denies new impulsivity or  rash/careless actions.  The patient does not appear to have a change in inertia.  The patient does not appear apathetic or has decreased motivation.  The patient is not exhibiting a diminished response to other people's needs and feelings.  The patient is not exhibiting symptoms of social withdrawal/indifference.  The patient is not exhibiting a diminished social interest, interrelatedness, or personal warmth.  The patient does not have symptoms to suggest a loss of manners or decorum.  The patient does not have symptoms of disinhibition and social inappropriateness.  The patient's personal hygiene is intact.  The patient does not have difficulty with flexible thinking.  The patient does not have symptoms of hyper-religiosity or dogmatism.  The patient's interests/pleasures have not become restrictive, simplified, interrupting, or repetitive.  The patient denies new/worsening complex repetitive/ritualistic compulsions and behaviors.  The patient has had changes in eating behavior.  The patient denies increased consumption of food or substances.  The patient denies oral exploration or consumption of inedible objects.  Psychiatric  The patient does have symptoms of irritability and mood lability.  The patient does not exhibit hyperactive behavior.  The patient does not have symptoms of agitation, aggression, or violent outbursts.  The patient's emotional expression has changed.  The patient does have emotional blunting.  The patient does not feel depressed.  The patient denies anxiety.  The patient does not exhibit cycling behavior.  The patient is not exhibited symptoms of paranoia.  The patient does not have delusions.  The patient does not have hallucinations.  Medical Review of Systems  The patient does not have constipation.  The patient does not have urinary incontinence.  The patient denies orthostatic lightheadedness.  The patient's weight is unstable. 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            Neurological Examination:     Mental Status  The patient's appearance is normal (hygiene is appropriate; attire is proper and clean).  Throughout the interview, the patient behaved abnormally and was not cooperative.  The patient behavior is appropriate to the clinical context without impropriety or improper language/conduct.  The patient's energy level is abnormal.  The patient's orientation is not entirely accurate.  The patient's attention/concentration is impaired.  The patient can complete three-step commands.  The patient fund of knowledge was appropriate for age, culture, and level of education.  The patient's thought process is not logical or goal-oriented.  The patient demonstrated appropriate insight based on actions, awareness of the patient's illness, plans for the future.  The patient demonstrated good judgment based on actions and plans for the future.  Cranial Nerves  The patient showed no evidence of anosmia 3/3 (coffee/vanilla/cinnamon).  The patient's pupils were normal.  The patient's visual fields were full to confrontation in all quadrants.  The patient's ocular pursuit in the horizontal and vertical plane was complete.  The patient's saccadic initiation, velocity, and amplitude are normal.  The patient demonstrated no square-wave jerks.  The patient's eyelid assessment showed no apraxia. There was no eyelid dysfunction, retraction, or prakash sign.  The patient blink rate was normal.  The patient's facial strength was normal.  The patient's facial expression was symmetric and appropriate to the context.  The patient's facial expression was normal without evidence of hypomimia.  The patient's facial sensation was intact to light touch bilaterally.  The patient's hearing was normal bilaterally.  The patient's oropharynx was non-obstructed with a Mallapati score 1/4. The soft palate  "elevates symmetrically.  The patient's uvula is mid-line.  The patient's tongue showed no evidence of scalloping.  The patient can protrude their tongue beyond The patient's lips for >10 sec.  The patient can move their extended tongue back and forth rapidly.  The patient's tongue showed no evidence of fasciculation or scalloping.  The patient's sternocleidomastoid and trapezius muscle strength was full bilaterally.  The patient had no significant evidence of anterocollis or retrocollis.  Speech/Language  The patient's speech was fluent, non-effortful, and the patient's rate was appropriate to the context.  The patient's speech timbre is abnormal.  The patient's speech rate is normal.  The patient's respirations are within normal range and appropriate to context.  The patient's speech timbre is normal.  The patient has no articulation (segmental features) errors.  The patient has no speech dysdiadochokinesia with repetition of syllables such as "/PA/, /TA/, /KA/, /OM/".  The patient's pitch assessment showed normal range, intonation (Pitch Pattern), and variability.  The patient's tone was not emotionally limited, and tempo was rhythmic (e.g., "Once upon a midnight dreary, while I pondered, weak and weary, Over many a quaint and curious volume of forgotten stefani" and "That government of the people, by the people, for the people, shall not perish from the earth").  The patient's speech is not dysarthric.  The patient's speech was without evidence of anomia.  The patient makes no phonological loop errors.  The patient's speech is grammatically intact; (no function/semantic word substitutions, phonemic/semantic paraphasias, or binary confusion).  Motor  The patient's bilateral upper extremity muscle bulk is appropriate.  The patient's bilateral upper extremity muscle tone is not increased.  There was no dystonia observed on examination.  Assessment of motor strength was symmetric and at minimal anti-gravity.  Deltoid L " +5/5 R +5/5 Biceps L +5/5 R +5/5 Triceps L +5/5 R +5/5 Wrist extension L +5/5 R +5/5 Finger abduction L +5/5 R +5/5 Hip flexion L +5/5 R +5/5 Hip extension L +5/5 R +5/5 Knee flexion L +5/5 R +5/5 Knee extension L +5/5 R +5/5 Ankle flexion L +5/5 R +5/5 Ankle extension L +5/5 R +5/5  There is no pronator or downward drift.  There is no myoclonus observed in the patient bilateral upper and lower extremities.  There are no fasciculations observed in the patient bilateral upper and lower extremities.  Coordination  The patient has no bilateral upper extremity limb dysmetria or past pointing on finger-nose-finger bilaterally.  The patient has no bilateral lower extremity limb dysmetria during shin rub.  The patient has no limb dysdiadochokinesia of the upper extremity on the pronation/supination test and screwing in a light bulb or lower extremity during tapping ball of each foot bilaterally.  The patient has no cerebellar rebound bilaterally.  The patient has no visible tremor.  The patient has no kinetic tremor bilaterally.  The patient has no postural tremor bilaterally.  The patient has no resting tremor bilaterally.  The patient has no evidence of interhemispheric motor control deficits.  The patient has no motor overflow bilaterally.  The patient demonstrates no alien limb phenomena.  The patient has no dyskinetic movements.  The patient has no akathisia.  The patient's bilateral upper extremity coordination with finger tapping, pronation/supination, and the open-close fist showed no slowing, no hypometria, and no dysrhythmia inconsistent with bradykinesia.  Higher Cortical Function  The patient had no hemineglect (e.g., line bisection/Margarette's test).  The patient showed no evidence of simultanagnosia (Navon hierarchical letters).  The patient showed no evidence of visuospatial constructional dysfunction.  The patient showed no evidence of angular gyrus disconnection (insular-operculum).  The patient has no  evidence of dysgraphia.  The patient showed no evidence of apraxia.  The patient showed no dysexecutive behavior.  Sensory  The patient's cortical sensory assessment demonstrated no neglect bilaterally.  The patient's sensation was intact to light touch, and vibratory sense in the bilateral upper and lower extremities.  Reflexes  Reflexes were abnormal.  There were no pathological reflexes; No Babinski, bilateral plantar toes go down, no Jaw Jerk Reflex, No Reyes, No Palmomental reflex, and No Palmar Grasp reflex.  There was no spreading/clonus.  Gait  The patient has normal posture sitting unaided.  The patient is unable to rise from a chair and sit back down without using their arms.  The patient's gait was abnormal.  The patient's posture while walking is normal.  The patient's gait initiation/inhibition was normal.  The patient's stance while walking is normal.  The patient's gait speed was abnormal (70-80 F 1.13 m/s M 1.26 m/s, >80 F 0.94 m/sec, M 0.97 m/sec).  The patient's stride including step-time, step-width, and step-length was normal.  The patient's arms swing is symmetric and of normal amplitude.  The patient takes turns in <4 steps.  When attempting to walk abnormally (heels, tiptoes, tandem), the patient makes no errors.  The patient has no evidence of posture/balance impairment.            Functional Capacity Assessment: Neurological Wellbeing, Intelligence, and Social Evaluation:     Instrumental Activities of Daily Living:   Communal Operations: Mild level of inability to perform independantly.  Finances: Moderate level of inability to perform independantly.  Housework: Normal level of functional independance.  Personal Care: Borderline level of inability to perform independantly.  Personal Health: Mild level of inability to perform independantly.  Basic Activities of Daily Living:   Axial Motor: Normal level of functional independance.  Grooming: Mild level of inability to perform  independantly.  Hygeine: Normal level of functional independance.  Oropharngeal Motor: Normal level of functional independance.  Personal Health: Normal level of functional independance.  Toiletry: Normal level of functional independance.  Functional Capacity Scales:   [place] Instrumental Activities of Daily Living Scale: Score: 3/8 suggestive of Mild dependence.  Functional Assessment Staging Tool (FAST Scale): Score: 3/15 suggestive of Mild Dementia (Stage 4).  [facility] Functional Assessment Scale (FAS): Score: 6.5/30 suggestive of Possible Functional Impairment.  Clinical Dementia Rating Sum of Boxes: Score: 3.5/18 suggestive of Mild cognitive impairment.            Neurocognitive Assessment:     Question Score Interpretation   Memory   Registration T1 (3) 3/3 Within Normal Limits.   Registration T1 (5) 3 Within Normal Limits.   Registration T1 (9) 3/9 Mild Impairment: -1.6 STDs below the average score based on age and education.   Registration T2 (9) 5/9 Mild Impairment: -1.1 STDs below the average score based on age and education.   Registration T3 (9) 8/9 Within Normal Limits.   Registration T4 (9) 7/9 Borderline Impairment: -0.7 STDs below the average score based on age and education.   Registration T5 (9) 8/9 Within Normal Limits.   Delayed Recall 30 sec (9) 7/9 Within Normal Limits.   Delayed Recall 10 min (3) 3/3 Within Normal Limits.   Delayed Recall 10 min (5) 5/5 Within Normal Limits.   Delayed Recall 10 min (9) 5/9 Borderline Impairment: -0.7 STDs below the average score based on age and education.   Delayed Recall Cued (9) 8/9 Within Normal Limits.   Delayed Recognition (9) 9/9 Within Normal Limits.   Executive   Three-step command 3/3 Within Normal Limits.   Serial Sevens 3/3 Within Normal Limits.   WORLD Backward 5/5 Within Normal Limits.   Digit Span Backwards 4 Borderline Impairment: -0.8 STDs below the average score based on age and education.   Digit Span - 2 2/2 Within Normal Limits.   Lexical  Fluency - F 10 Mild Impairment: -1.3 STDs below the average score based on age and education.   Lexical Fluency - A 11 Mild Impairment: -1.1 STDs below the average score based on age and education.   Lexical Fluency - S 10 Mild Impairment: -1.3 STDs below the average score based on age and education.   Semantic Fluency - Animals 15 Borderline Impairment: -0.9 STDs below the average score based on age and education.   Trials-1 1/1 Within Normal Limits.   Visuospatial   Intersecting Pentagons 1/1 Within Normal Limits.   Clock Draw 3/3 Within Normal Limits.   [place] Figure Copy 14/17 Borderline Impairment based on age and education.   Picture Synthesis 3/3 Within Normal Limits.   Language   Naming-2 2/2 Within Normal Limits.   Naming-3 3/3 Within Normal Limits.   15-Item BNT 15/15 Within Normal Limits.   Repetition-1 1/1 Within Normal Limits.   Repetition-2 2/2 Within Normal Limits.   Repetition of Phrases 5/5 Within Normal Limits.   Verbal Agility 6/6 Within Normal Limits.   Surface Dyslexia 3/3 Within Normal Limits.   Following written command 1/1 Within Normal Limits.   Writing a complete sentence 1/1 Within Normal Limits.   Abstraction 2/2 Within Normal Limits.   Attention   Orientation-10 9/10 Borderline Impairment based on age and education.   Orientation-6 5/6 Borderline Impairment based on age and education.   Digit Span Forwards 5 Borderline Impairment: -1 STDs below the average score based on age and education.     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          Laboratories:     Autoimmune/Paraneoplastic Screening  Name Reference Previous Values   PNEOE, CRMP-5-IgG, Csf Negative   2024-05-17   Negative   2024-05-30   Negative   2024-08-07   Negative   2024-09-12   Negative   2024-12-05   Negative   2025-02-25      AMPA-R Ab CBA, CSF Negative Negative   2025-02-25      PNEOE, Amphiphysin Ab, Csf Negative Negative   2025-02-25       PNEOE AGNA-1, Csf Negative Negative   2025-02-25      PNEOE JULIUS-1, Csf Negative Negative   2025-02-25      PNEOE JULIUS-2, Csf Negative Negative   2025-02-25      PNEOE JULIUS-3, Csf Negative Negative   2025-02-25      CASPR2-IgG CBA, CSF Negative Negative   2025-02-25      DPPX Ab CBA, CSF Negative Negative   2025-02-25      WALTER-B-R Ab, CBA, CSF Negative Negative   2025-02-25      CHRISTA Antibody, CSF <=0.02 nmol/L 0.00   2025-02-25      GFAP IFA, CSF Negative Negative   2025-02-25      IgLON5 CBA, CSF Negative Negative   2025-02-25      LGI1-IgG CBA,CSF Negative Negative   2025-02-25      mGluR1 Ab CBA, CSF Negative Negative   2025-02-25      NMDA-R Ab CBA, CSF Negative Negative   2025-02-25      PNEOE, PCA-Tr, Csf Negative Negative   2025-02-25      PNEOE, PCA-2, Csf Negative Negative   2025-02-25      Neurochondrin IFA, CSF Negative Negative   2025-02-25      PDE 10A Ab IFA, CSF Negative Negative   2025-02-25      TRIM46 Ab IFA, CSF Negative Negative   2025-02-25      Standard Screening - CSF  Name Reference Previous Values   Wbc, Cell Count CSF 0 - 5 /cu mm 2   2025-02-25      RBC, Cell Count CSF 0 /cu mm 53   2025-02-25      Protein, CSF 15 - 40 mg/dL 66 (H)   2025-02-25      Glucose CSF 40 - 70 mg/dL 62   2025-02-25      Neurodegenerative Biomarkers - CSF  Name Reference Previous Values   p-Tau/Abeta42 <=0.028 ratio 0.014   2025-02-25      Total Tau <=238 pg/mL 133   2025-02-25      Phospho-Tau(181P) <=21.7 pg/mL 12.1   2025-02-25      Beta-Amyloid Ratio, CSF >=0.073 0.112   2025-02-25      Neuron Specific Enolase, CSF ng/mL 22 (H)   2025-02-25      Neurodegenerative Biomarkers - Serum  Name Reference Previous Values   Abeta42  842   2025-02-25      M Phospho-Tau 217 <=0.185pg/mL 0.318 (H)   2024-09-12      Neurofilament Light Chain, Plasma <=37.9 pg/mL 24.5   2024-09-12      Neuroinfectious Screening - CSF  Name Reference Previous Values   VDRL, CSF Negative Negative   2025-02-25       Neuro-Inflammatory Screening - CSF  Name Reference Previous Values   Beta-2 Microglobulin, CSF 0.70 - 1.80 mcg/mL 1.40   2025-02-25      Blood Brain Barrier Screening  Name Reference Previous Values   Albumin, CSF <=27.0 mg/dL 48.5 (H)   2025-02-25      Neuroendocrine/Electrolyte Screening  Name Reference Previous Values   Sodium 136 - 145 mmol/L 142   2024-09-12   142   2024-12-05      Potassium 3.5 - 5.1 mmol/L 4.0   2024-09-12   4.3   2024-12-05      Chloride 95 - 110 mmol/L 103   2024-09-12   105   2024-12-05      CO2 23 - 29 mmol/L 23   2024-09-12   25   2024-12-05      Creatinine 0.5 - 1.4 mg/dL 1.1   2024-09-12   1.1   2024-12-05      Calcium 8.7 - 10.5 mg/dL 10.0   2024-09-12   10.3   2024-12-05      Anion Gap 8 - 16 mmol/L 16   2024-09-12   12   2024-12-05      TSH 0.400 - 4.000 uIU/mL 3.075   2024-05-30   3.282   2024-09-12      Magnesium 1.6 - 2.6 mg/dL 1.6   2024-09-12      Free T4 0.71 - 1.51 ng/dL 0.96   2024-09-12      Metabolic Screening  Name Reference Previous Values   Glucose 70 - 110 mg/dL 189 (H)   2024-09-12   158 (H)   2024-12-05      BUN 8 - 23 mg/dL 13   2024-09-12   14   2024-12-05      Albumin 3.5 - 5.2 g/dL 4.2   2024-09-12   4.1   2024-12-05      Hemoglobin A1C External 4.0 - 5.6 % 6.7 (H)   2024-12-05      Estimated Avg Glucose 68 - 131 mg/dL 146 (H)   2024-12-05      Neuro-Nutritional Screening  Name Reference Previous Values   PROTEIN TOTAL 6.0 - 8.4 g/dL 7.5   2024-09-12   7.7   2024-12-05      Vitamin B12 180 - 914 ng/L 248   2024-05-30   626   2024-09-12      Folate 4.0 - 24.0 ng/mL 9.8   2024-09-12      Thiamine 38 - 122 ug/L 60   2024-05-30      Liver Function Screening  Name Reference Previous Values   BILIRUBIN TOTAL 0.1 - 1.0 mg/dL 0.5   2024-09-12   0.5   2024-12-05      ALP 55 - 135 U/L 57   2024-09-12   60   2024-12-05      AST 10 - 40 U/L 25   2024-09-12   23   2024-12-05      ALT 10 - 44 U/L 17   2024-09-12   17   2024-12-05       Hematology Screening - Serum  Name Reference Previous Values   WBC 3.90 - 12.70 K/uL 8.05   2024-09-12      RBC 4.00 - 5.40 M/uL 4.06 (L)   2024-09-12      Hemoglobin 12.0 - 16.0 g/dL 13.6 (L)   2024-09-12      Hematocrit 37.0 - 48.5 % 41.3   2024-09-12      MCV 82 - 98 fL 102 (H)   2024-09-12      MCH 27.0 - 31.0 pg 33.5 (H)   2024-09-12      MCHC 32.0 - 36.0 g/dL 32.9   2024-09-12      RDW 11.5 - 14.5 % 14.6 (H)   2024-09-12      Platelet Count 150 - 450 K/uL 252   2024-09-12      MPV 9.2 - 12.9 fL 10.5   2024-09-12      Neuroinfectious Screening  Name Reference Previous Values   Treponema Pallidum Antibodies (IgG, IgM) Nonreactive    2024-05-30 2024-09-12      Neuro-Inflammatory Screening - Serum  Name Reference Previous Values   Methlymalonic Acid <0.40 umol/L 0.14   2024-09-12      Neurodegenerative Biomarkers - Skin  Name Reference Previous Values   Phosphorylation Alpha-Synuclein - Posterior Cervical (Right) normal Abnormal (H)   2024-11-25      Phosphorylation Alpha-Synuclein - Distal Thigh (Right) normal Abnormal (H)   2024-11-25      Phosphorylation Alpha-Synuclein - Distal Leg (Right) normal Normal   2024-11-25                Neurological Relevant Procedures:    Electrocardiogram performed on 09/12/2024  Formal Interpretation: Vent. Rate : 075 BPM Atrial Rate : 075 BPM P-R Int : 132 ms QRS Dur : 090 ms QT Int : 378 ms P-R-T Axes : 005 -29 001 degrees QTc Int : 422 ms  Provider Interpretation: The electrocardiogram (ECG) shows a normal sinus rhythm. There are voltage criteria present that suggest left ventricular hypertrophy. Overall, the ECG is abnormal.            Neurologically Relevant Imaging:    MRI brain/head without contrast performed on 06/07/2024  Radiologist Interpretation: No acute abnormality  Provider Interpretation: The brain imaging is normal, with no significant cortical or subcortical atrophy observed. Mild scattered subcortical white matter hyperintensities are  present. A coronal window for evaluating hippocampal volume loss is not available. Additionally, there is no susceptibility-weighted imaging (SWI) sequence included.  T1-weighted (T1W) Image Summary: The radiographic interpretation indicates no significant cortical atrophy, with changes consistent with the patient's age. Additionally, the subcortical nuclei and hippocampi do not exhibit significant atrophy.  FLAIR/T2-weighted (T2W) Image Summary: The radiographic interpretation reveals scattered subcortical white matter hyperintensities, which are most prominently observed in the high dorsal corona radiata. There is no evidence of small-vessel infarcts or large vessel disease. Subtle T2 hyperintensities are noted in the juxtacortical region of the right parietal lobule's ventral surface. Additionally, mild gliosis is present in the bilateral hippocampi.  Diffusion Weighted Imaging with ADC Mapping Summary: There are no significant hyperintensities or hypointensities observed on Diffusion-Weighted Imaging (DWI). Additionally, there is no apparent correlation with the Apparent Diffusion Coefficient (ADC).  Susceptibility-weighted imaging (SWI) and/or GRE Summary: There are no significant hypointensities to suggest cortical or subcortical hemosiderin deposition.               Billing Statement:       I performed this consultation using real-time Telehealth tools, including a live video connection between my location and the patient's location (their home within the [facility]). Prior to initiating the consultation, I obtained informed verbal consent to perform this consultation using Telehealth tools and answered all the questions about the Telehealth interaction. The participants understood that only a limited neurological exam and limited neuropsychological testing could be performed using Telehealth tools.     I spent a total of 45 minutes (from 02:30 PM to 03:15 PM) on 03/17/2025, engaging in person in a  face-to-face consultation with the patient. More than 50% of this time was devoted to counseling on symptoms, treatment plans, risks, therapeutic options, lifestyle modifications, and safety concerns related to the above diagnoses.     A Review of Systems was completed, encompassing 10 of the 14 systems. All findings were negative, except those noted in the History of Present Illness (HPI) and Review of Systems (ROS) Sections. The systems reviewed were Constitutional (Const), Eyes, Ear/Nose/Throat (ENT), Respiratory (Resp), Cardiovascular (CV), Gastrointestinal (GI), Genitourinary (), Musculoskeletal (MSK), Skin, and Neurological (Neuro).     I spent a total of 10 minutes to reviewing previous laboratory results on the day of the clinical evaluation. This review was an integral part of the face-to-face encounter. The laboratory findings were predominantly negative, with exceptions as noted in the History of Present Illness (HPI) and Assessment.     I spent a total of 5 minutes reviewing and summarizing records from outside physicians on the day of the clinical evaluation. This review and summary were conducted as described in the History of Present Illness (HPI) and Assessment.     I performed a neurobehavioral status examination on the day of clinical evaluation that included a clinical assessment of thinking, reasoning, and judgment to ensure a comprehensive approach in managing the complex and evolving needs of the patient's neurocognitive condition. Please see above HPI and ROS for full details. This exam was performed on the day of clinical evaluation and included 15 minutes spent on direct face-to-face clinical observation and interview with the patient and 20 minutes spent interpreting test results and preparing the report. The total time of 35 minutes spent on the neurobehavioral status examination is not included in the time spent on evaluation and management coding.     Total Billing time spent on  encounter/documentation for this patient's evaluation and management, not including the Neurobehavioral Status Examination: 45 minutes.           This note was dictated using the MSLID[employer] Fluency Direct voice recognition program. Please be aware that word recognition errors may occasionally occur and might be overlooked during review. [id number]        Signing Physician:  Michael Nino MD

## 2025-03-21 ENCOUNTER — CLINICAL SUPPORT (OUTPATIENT)
Dept: REHABILITATION | Facility: HOSPITAL | Age: 75
End: 2025-03-21
Attending: PSYCHIATRY & NEUROLOGY
Payer: MEDICARE

## 2025-03-21 VITALS — HEART RATE: 70 BPM | DIASTOLIC BLOOD PRESSURE: 68 MMHG | SYSTOLIC BLOOD PRESSURE: 119 MMHG

## 2025-03-21 DIAGNOSIS — G31.89 SYNUCLEINOPATHY: ICD-10-CM

## 2025-03-21 DIAGNOSIS — Z74.09 IMPAIRED FUNCTIONAL MOBILITY, BALANCE, GAIT, AND ENDURANCE: ICD-10-CM

## 2025-03-21 DIAGNOSIS — G20.C PRIMARY PARKINSONISM: ICD-10-CM

## 2025-03-21 DIAGNOSIS — R29.898 RIGHT LEG WEAKNESS: Primary | ICD-10-CM

## 2025-03-21 PROCEDURE — 97110 THERAPEUTIC EXERCISES: CPT | Mod: PO

## 2025-03-21 PROCEDURE — 97162 PT EVAL MOD COMPLEX 30 MIN: CPT | Mod: PO

## 2025-03-21 NOTE — PROGRESS NOTES
Outpatient Rehab    Physical Therapy Evaluation    Patient Name: Jorge Becerra  MRN: [id number]  YOB: 1950  Encounter Date: 3/21/2025    Therapy Diagnosis:   Encounter Diagnoses   Name Primary?    Synucleinopathy     Primary parkinsonism     Right leg weakness Yes    Impaired functional mobility, balance, gait, and endurance      Physician: Michael Nino MD    Physician Orders: Eval and Treat  Medical Diagnosis: Synucleinopathy  Primary parkinsonism    Visit # / Visits Authorized:  1 / 1  Date of Evaluation: 3/21/2025  Insurance Authorization Period: 3/18/2025 to 3/18/2026  Plan of Care Certification:  3/21/2025 to 6/1/2025      PT/PTA:     Number of PTA visits since last PT visit:   Time In: 0801   Time Out: 0845  Total Time: 44   Total Billable Time: 44 minutes    Intake Outcome Measure for FOTO Survey    Therapist reviewed FOTO scores for Jorge Becerra on 3/21/2025.   FOTO report - see Media section or FOTO account episode details.     Intake Score: 60%    Precautions     Standard, history of CA, Fall      Subjective   History of Present Illness  Jorge is a 74 y.o. male who reports to physical therapy with a chief concern of strengthen RLE, balance, Gait.     The patient reports a medical diagnosis of G31.89 (ICD-10-CM) - Synucleinopathy  G20.C (ICD-10-CM) - Primary parkinsonism.            Dominant Hand: Right  History of Present Condition/Illness: Pt recently saw Dr. Nino who states he presents with some Parkinson's deficits and wants him to attend P to address. 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 In remission at [facility], follows up every 4 months. Started ambulated using front wheeled walker per [facility] physician request in February. Denies any falls where he fell to the floor, but has stumbled a few times before using the walker. He has some difficulty putting sock onto right right foot due to difficulty crossing right lower extremity.     Additional  Lymphedema History      DM, hypertension, Cholesterol, History of sarcoma, History of Non hodgkin's lymphoma     Activities of Daily Living  Social history was obtained from Patient.    General Prior Level of Function Comments: independent  General Current Level of Function Comments: use of shower bench in shower, difficulty getting on socks on RLE, impaired balance, use of FWW  Patient Responsibilities: Personal ADL, Health management, Yard work, Driving, Community mobility    Previously independent with activities of daily living? Yes     Currently independent with activities of daily living? No  Activities currently needing assistance include Dressing - lower body.        Previously independent with instrumental activities of daily living? Yes     Currently independent with instrumental activities of daily living? No  Activities currently needing assistance include: Driving.            Pain  No Pain Reported: Yes                 Review of Systems  Patient reports: Cancer History and Diabetes        Living Arrangements  Living Situation  Housing: Home independently  Living Arrangements: Spouse/significant other  Support Systems: Spouse/significant other    Home Setup  Type of Structure: House  Home Access: Level entry  Number of Levels in Home: One level  Existing Accessibility Options: Bathroom modifications  Patient is able to live on main floor of home: Yes  Primary Bedroom: 1st floor  Primary Bathroom: 1st floor  Location of Additional Bathrooms: 1st floor  Bathroom Toilet: Standard  Bathroom Shower/Tub: Other (Comment)  Other Shower/Tub Comment: shower bench  Kitchen: 1st floor  Laundry: 1st floor    Equipment/Treatments  Mobility Equipment: Straight cane, Manual wheelchair  Patient Expressive Communication Modes: Verbal  Current Home Medical Treatments: Blood pressure monitoring, Blood glucose monitoring        Employment  Patient does not report that: Does the patient's condition impact their ability to  work?  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    Jorge Becerra  has a past surgical history that includes Tonsillectomy.    Jorge has a current medication list which includes the following prescription(s): allopurinol, amlodipine, aspirin, atorvastatin, carbidopa-levodopa  mg, cholecalciferol (vitamin d3), cyanocobalamin (vitamin b-12), dapagliflozin propanediol, donepezil, empagliflozin, ferrous sulfate, fosinopril, ketoconazole, magnesium oxide, metformin, methocarbamol, rybelsus, and vit c/e/zinc/lutein/zeaxanthin.    Review of patient's allergies indicates:  No Known Allergies     Objective   Vital Signs  /68   Pulse 70   BP Location: Right arm  BP Position: Sitting  BP Cuff Size: Adult         Communication  The patient's current expressive communication modes are Verbal.          Posture  Patient presents with a Forward head position.     Shoulders are Rounded.              Hip Range of Motion   Right Hip   Active (deg) Passive (deg) Pain   Flexion 90 100     Extension         ABduction 15 25     ADduction         External Rotation 90/90 5 30     External Rotation Prone 30 20     Internal Rotation 90/90         Internal Rotation Prone                            Hip Strength - Planes of Motion   Right Strength Right Pain Left Strength Left  Pain   Flexion (L2) 4-   4+     Extension 2   2+     ABduction 3   4-     ADduction 2+   3+     Internal Rotation           External Rotation               Knee Strength   Right Strength Right Pain Left Strength Left  Pain   Flexion (S2) 3+   4+     Prone Flexion 3-   3+     Extension (L3) 4   5            Ankle/Foot Strength - Planes of Motion   Right Strength Right Pain Left Strength Left  Pain   Dorsiflexion (L4) 3+   4+     Plantar Flexion  (S1) 3+   4     Inversion           Eversion           Great Toe Flexion           Great Toe Extension (L5)           Lesser Toes Flexion           Lesser Toes Extension                  Transfers Assessment  Sit to Stand Assistance: Independent  Bed to Chair Assistance: Independent  Toilet/Commode Transfer Assistance: Independent  Toilet/Commode Transfer Assistance Details: per pt report  Bathtub/Shower Transfer Assistance: Independent  Bathtub/Shower Transfer Assistance Details: per pt report  Car Transfer Assistance: Independent  Car Transfer Assistance Details: per pt report    Bed Mobility Assessment  Rolling Assistance: Independent  Sidelying to Sit Assistance: Independent  Sit to Sidelying Assistance: Independent  Scooting to Edge of Bed Assistance: Independent  Bridge/Boost to Head of Bed Assistance: Independent      Fall Risk  Functional mobility test results suggest the patient is: At Risk for Falls  Meneses Balance  Meneses Balance Scale  1. Sitting to Standing: Able to stand without using hands and stabilize independently  2. Standing Unsupported: Able to stand safely for 2 minutes  3. Sitting with Back Unsupported but Feet Supported on Floor or on a Stool: Able to sit safely and securely for 2 minutes  4. Standing to Sitting: Sits safely with minimal use of hands  5.  Transfers: Able to transfer safely with minor use of hands  6. Standing Unsupported with Eyes Closed: Able to stand 10 seconds with supervision  7. Standing Unsupported with Feet Together: Able to place feet together independently and stand 1 minute safely  8. Reach Forward with Outstretched Arm While Standing: Can reach forward 5 cm (2 inches)  9.  Object from Floor from a Standing Position: Able to  slipper but needs supervision  10. Turning to Look Behind Over Left and Right Shoulders While Standing: Looks behind one side only other side shows less weight shift  11. Turn 360 Degrees: Able to turn 360 degrees safely one side only  4 seconds or less  12. Place Alternate Foot on Step or Stool While Standing Unsupported: Able to stand independently and complete 8 steps in greater than 20 seconds  13. Standing Unsupported One Foot in Front: Able to take small step independently and hold 30 seconds  14. Standing on One Leg: Tries to lift leg unable to hold 3 seconds but remains standing independently  Meneses Balance Score: 44  Meneses Balance Fall Risk: Low    Interpretation:  41-56 = Low Fall Risk  21-40 = Medium Fall Risk  0-20 = High Fall Risk    Timed Up & Go (TUG)  Time: 15.6 seconds  Observations: Short strides  An older adult who takes >=12 seconds to complete the TUG is at risk for falling.    TU sec without AD  Sit to Stand Testing      The patient completed 10 repetitions of a sit to stand transfer in 30 seconds. use of posterior leg occasionally for support         Ambulation Assistance Required  Surface With  Assistive Device Without Assistive Device Details   Level Independent Supervision      Uneven         Curb           Stairs Assistance Required   Assistance Level Upper Extremity Support Pattern   Ascending Independent Two rails     Descending Supervision Without rails          Ambulation Details    10MWT with AD and without AD: 0.86 m/s    Gait Analysis  Base of Support: Normal  Gait Pattern: Waddling  Walking Speed: Decreased    Right Side Walking Observations  Decreased: Stance Time and Step Length          Ankle/Foot Observations During Gait  Right: Ankle Delayed Push Off  Gait Analysis Details  Decreased foot clearance on R         Treatment:  Therapeutic Exercise  TE 1: Reviewed SLR for HEP  TE 2: Reviewed SL hip abd for HEP  TE 3: Reviewed Bridges for HEP  TE 4: Reviewed seated Ball squeezes for HEP      Time Entry(in minutes):  PT Evaluation (Moderate) Time Entry: 36  Therapeutic Exercise Time Entry: 8    Assessment & Plan   Assessment  Jorge presents with a condition of Moderate complexity.   Presentation of Symptoms:  Stable  Will Comorbidities Impact Care: Yes  History of Sarcoma, history of Non-Hodgkin's Lymphoma, DM, HTN,     Functional Limitations: Ambulating on uneven surfaces, Completing self-care activities, Community integration, Increased risk of fall, Maintaining balance, Driving, Range of motion, Pain with ADLs/IADLs  Impairments: Abnormal gait, Activity intolerance, Impaired balance, Abnormal or restricted range of motion, Impaired physical strength, Lack of appropriate home exercise program, Safety issue  Personal Factors Affecting Prognosis: Schedule, Transportation    Patient Goal for Therapy (PT): Improve RLE ROM, strength, Gait, Balance  Prognosis: Good  Prognosis Details: Jorge is a 74 year old male with recent display of Parkinson's Disease characteristics. He has a history of right lower extremity weakness and impaired range of motion following surgery in 2021 when diagnosed with Sarcoma. He is currently in remission and follows up with [facility] every 4 months. He arrives ambulating with front wheeled walker for safety, but able to ambulate without assistive device with supervision. He ambulates with decreased right foot clearance, swing phase, and stance time. Gait speed of 0.86 m/s with and without front wheeled walker. Meneses Balance Assessment score of 44/56 placing him at low fall risk. Complaints of difficulty putting on socks on right foot due to right hip range of motion limitations and strength limitations. Lacking significant hip external rotation ROM on right side. Able to transfer and perform bed mobility with independence. He is appropriate for physical therapy at this time to address right hip strength and range of motion to improve gait and balance. Pt's goal is to ambulate safely without assistive device.    Plan  From a physical therapy perspective, the patient would benefit from: Skilled Rehab Services    Planned therapy interventions include: Therapeutic exercise, Therapeutic activities,  Neuromuscular re-education, Manual therapy, and Gait training.    Planned modalities to include: Electrical stimulation - attended and Electrical stimulation - passive/unattended.        Visit Frequency: 2 times Per Week for 8 Weeks.       This plan was discussed with Patient and Family.   Discussion participants: Agreed Upon Plan of Care  Plan details: Plan of Care Certification Date: 3/21/2025 to 6/1/2025          Patient's spiritual, cultural, and educational needs considered and patient agreeable to plan of care and goals.           Goals:   Active       Long Term Goals       Patient will report compliance with updated home exercise program for 4 days a week or more to maintain improvements post discharge.        Start:  03/21/25    Expected End:  06/02/25            Patient will increased lower extremity strength as evidenced by increase in manual muscle test  by 1/2 grade as appropriate to decrease gait deviations consistently.         Start:  03/21/25    Expected End:  06/02/25            Patient will demonstrate decreased fall risk by improving Meneess Balance Assessment score without AD from 49/56 to 56/56 to improve safety in household and during community outings.         Start:  03/21/25    Expected End:  06/02/25             Patient will increase gait speed as assessed by the timed 10MWT from 0.86 m/s to 1.0 m/s to increase safety and (I) with gait at a community level. (MDC for CVA= 0.14 m/s)         Start:  03/21/25    Expected End:  06/02/25               Short Term Goals       Patient will report compliance with home exercise program 4 days a week to improve carry over.        Start:  03/21/25    Expected End:  06/02/25            Patient will improve Timed Up and Go testing from  15.6 seconds to 12.7 seconds to demonstrate improvement in household mobility and reduced fall risk.       Start:  03/21/25    Expected End:  06/02/25            Patient will improve right hip external rotation by 10 degrees  to improve ability to don socks and shoes.       Start:  03/21/25    Expected End:  06/02/25                Maia De Paz PT

## 2025-03-26 ENCOUNTER — CLINICAL SUPPORT (OUTPATIENT)
Dept: REHABILITATION | Facility: HOSPITAL | Age: 75
End: 2025-03-26
Payer: MEDICARE

## 2025-03-26 DIAGNOSIS — M25.60 DECREASED RANGE OF MOTION: Primary | ICD-10-CM

## 2025-03-26 DIAGNOSIS — R29.898 RIGHT LEG WEAKNESS: ICD-10-CM

## 2025-03-26 DIAGNOSIS — Z74.09 IMPAIRED FUNCTIONAL MOBILITY, BALANCE, GAIT, AND ENDURANCE: ICD-10-CM

## 2025-03-26 PROCEDURE — 97110 THERAPEUTIC EXERCISES: CPT | Mod: PO

## 2025-03-26 PROCEDURE — 97530 THERAPEUTIC ACTIVITIES: CPT | Mod: PO

## 2025-03-26 NOTE — PROGRESS NOTES
Outpatient Rehab    Physical Therapy Visit    Patient Name: Jorge Becerra  MRN: [id number]  YOB: 1950  Encounter Date: 3/26/2025    Therapy Diagnosis:   Encounter Diagnoses   Name Primary?    Decreased range of motion Yes    Right leg weakness     Impaired functional mobility, balance, gait, and endurance      Physician: Michael Nino MD    Physician Orders: Eval and Treat  Medical Diagnosis: Synucleinopathy  Primary parkinsonism    Visit # / Visits Authorized:  1 / 20  Insurance Authorization Period: 3/20/2025 to 12/31/2025  Date of Evaluation: 3/21/2025  Plan of Care Certification: 3/21/2025 to 6/1/2025     PT/PTA: PT   Number of PTA visits since last PT visit:0  Time In: 0800   Time Out: 0845  Total Time: 45   Total Billable Time: 45    FOTO:  Intake Score: 60%  Survey Score 1:  %  Survey Score 2:  %    Precautions     Standard, history of CA, Fall      Subjective   He has been trying his strengthening program at home and states he will continue to improve with his ability to perform all his repetitions..  Family / care giver present for this visit:  (wife in lobby)  Pain reported as 0/10.      Objective            Treatment:  Therapeutic Exercise  TE 1: standing hip ext with green TB at ankles x 15 each  TE 2: heel raises off edge of 6 inch step with BUE support 2 x 10  TE 3: toe raises on floor 2 x 10  Therapeutic Activity  TA 1: GaitBetter at 1.0 with harness and safety switch donned x 7 minutes. 57% obstacles  TA 2: lateral stepping in bars with green TB at ankles x 4 laps  TA 3: mini squats with green TB at knees x 10  TA 4: 4 hurdles forward reciprocal stepping with BUE support x 4 laps  TA 5: 4 hurdles lateral stepping with BUE support x 4 laps  TA 6: heel tap on 6 inch step against green TB x 10 with LUE support  TA 7: gait on level surface 2 x 240 ft without AD and with CGA    Time Entry(in minutes):  Therapeutic Activity Time Entry: 35  Therapeutic Exercise Time Entry: 10    Assessment  & Plan   Assessment: Patient tolerated treatment session well today. Focus of session was on right lower extremity strengthening and functional mobility. He ambulates using front wheeled walking with limited right toe clearance. Performed gait better and hurdles in parallel bars to improve clearance. Given updated home exercise program to include lateral stepping and mini squats against resistance. He remains appropriate for PT at this time.  Evaluation/Treatment Tolerance: Patient tolerated treatment well    Patient will continue to benefit from skilled outpatient physical therapy to address the deficits listed in the problem list box on initial evaluation, provide pt/family education and to maximize pt's level of independence in the home and community environment.     Patient's spiritual, cultural, and educational needs considered and patient agreeable to plan of care and goals.     Education  Education was done with Patient. The patient's learning style includes Pictures/video. The patient Verbalizes understanding.         See Patient Instructions section.       Plan: Progress RLE strength and functional mobility including gait without AD    Goals:   Active       Long Term Goals       Patient will report compliance with updated home exercise program for 4 days a week or more to maintain improvements post discharge.  (Progressing)       Start:  03/21/25    Expected End:  06/02/25            Patient will increased lower extremity strength as evidenced by increase in manual muscle test  by 1/2 grade as appropriate to decrease gait deviations consistently.   (Progressing)       Start:  03/21/25    Expected End:  06/02/25            Patient will demonstrate decreased fall risk by improving Meneses Balance Assessment score without AD from 49/56 to 56/56 to improve safety in household and during community outings.   (Progressing)       Start:  03/21/25    Expected End:  06/02/25             Patient will increase gait speed  as assessed by the timed 10MWT from 0.86 m/s to 1.0 m/s to increase safety and (I) with gait at a community level. (MDC for CVA= 0.14 m/s)   (Progressing)       Start:  03/21/25    Expected End:  06/02/25               Short Term Goals       Patient will report compliance with home exercise program 4 days a week to improve carry over.  (Progressing)       Start:  03/21/25    Expected End:  06/02/25            Patient will improve Timed Up and Go testing from  15.6 seconds to 12.7 seconds to demonstrate improvement in household mobility and reduced fall risk. (Progressing)       Start:  03/21/25    Expected End:  06/02/25            Patient will improve right hip external rotation by 10 degrees to improve ability to don socks and shoes. (Progressing)       Start:  03/21/25    Expected End:  06/02/25                Maia De Paz PT

## 2025-03-27 ENCOUNTER — TELEPHONE (OUTPATIENT)
Dept: FAMILY MEDICINE | Facility: CLINIC | Age: 75
End: 2025-03-27
Payer: MEDICARE

## 2025-03-27 ENCOUNTER — PATIENT MESSAGE (OUTPATIENT)
Dept: FAMILY MEDICINE | Facility: CLINIC | Age: 75
End: 2025-03-27
Payer: MEDICARE

## 2025-03-27 NOTE — TELEPHONE ENCOUNTER
Spoke with Sandra with [facility] states Dapagliflozin requires a prior authorization. States it will be faxed over to clinical pharmacy. Can take up top 72 hours for determination     Case ID [id number]

## 2025-03-27 NOTE — TELEPHONE ENCOUNTER
Writer received prior authorization notice in Epic for Dapagliflozin.  Prior authorization questions answered and submitted in [facility]. Awaiting insurance decision regarding coverage.       PA Denied

## 2025-03-31 ENCOUNTER — CLINICAL SUPPORT (OUTPATIENT)
Dept: REHABILITATION | Facility: HOSPITAL | Age: 75
End: 2025-03-31
Payer: MEDICARE

## 2025-03-31 DIAGNOSIS — R29.898 RIGHT LEG WEAKNESS: ICD-10-CM

## 2025-03-31 DIAGNOSIS — M25.60 DECREASED RANGE OF MOTION: Primary | ICD-10-CM

## 2025-03-31 DIAGNOSIS — Z74.09 IMPAIRED FUNCTIONAL MOBILITY, BALANCE, GAIT, AND ENDURANCE: ICD-10-CM

## 2025-03-31 PROCEDURE — 97530 THERAPEUTIC ACTIVITIES: CPT | Mod: PO,CQ

## 2025-03-31 PROCEDURE — 97110 THERAPEUTIC EXERCISES: CPT | Mod: PO,CQ

## 2025-03-31 NOTE — PROGRESS NOTES
Outpatient Rehab    Physical Therapy Visit    Patient Name: Jorge Becerra  MRN: [id number]  YOB: 1950  Encounter Date: 3/31/2025    Therapy Diagnosis:   Encounter Diagnoses   Name Primary?    Decreased range of motion Yes    Right leg weakness     Impaired functional mobility, balance, gait, and endurance      Physician: Michael Nino MD    Physician Orders: Eval and Treat  Medical Diagnosis: Synucleinopathy  Primary parkinsonism    Visit # / Visits Authorized:  2 / 20  Insurance Authorization Period: 3/20/2025 to 12/31/2025  Date of Evaluation: 3/21/2025  Plan of Care Certification: 3/21/2025 to 6/1/2025       PT/PTA: PTA   Number of PTA visits since last PT visit:1  Time In: 0800   Time Out: 0845  Total Time: 45   Total Billable Time: 45    FOTO:  Intake Score:  %  Survey Score 1:  %  Survey Score 2:  %    Precautions     Standard, history of CA, Fall       Subjective   without comp-laints.  Family / care giver present for this visit:   Pain reported as 0/10.      Objective            Treatment:  Therapeutic Exercise  TE 1: NuStep Stepper Level 6 10 minutes  Therapeutic Activity  TA 1: Parallel bar stand 15 reps: heel raises, march, mini squats, hip abduction, hip extension, hamstring curls  TA 2: Parallel bars: tandem gait 3 minutes with light ue support  TA 3: ambulation with 4ww 240 feet sba with vc to increase upright  TA 4: ambulation with walking stick 240 feet with vc for sequencing  TA 5: ambulation without ad 240 feet standby assistance, verbal cues for head up  Stand against wall for Bilateral shoulder flexion 10 times  Stand against wall for Bilateral scapular retraction with green Theraband 10 times      Time Entry(in minutes):  Therapeutic Activity Time Entry: 35  Therapeutic Exercise Time Entry: 10    Assessment & Plan   Assessment: Patient did well with all activities, tolerated ambulation without ad without lob, did well with standing activities to improve posture       Patient  will continue to benefit from skilled outpatient physical therapy to address the deficits listed in the problem list box on initial evaluation, provide pt/family education and to maximize pt's level of independence in the home and community environment.     Patient's spiritual, cultural, and educational needs considered and patient agreeable to plan of care and goals.           Plan: Continue with poc to increase activities as patient tolerates.    Goals:   Active       Long Term Goals       Patient will report compliance with updated home exercise program for 4 days a week or more to maintain improvements post discharge.  (Progressing)       Start:  03/21/25    Expected End:  06/02/25            Patient will increased lower extremity strength as evidenced by increase in manual muscle test  by 1/2 grade as appropriate to decrease gait deviations consistently.   (Progressing)       Start:  03/21/25    Expected End:  06/02/25            Patient will demonstrate decreased fall risk by improving Meneses Balance Assessment score without AD from 49/56 to 56/56 to improve safety in household and during community outings.   (Progressing)       Start:  03/21/25    Expected End:  06/02/25             Patient will increase gait speed as assessed by the timed 10MWT from 0.86 m/s to 1.0 m/s to increase safety and (I) with gait at a community level. (MDC for CVA= 0.14 m/s)   (Progressing)       Start:  03/21/25    Expected End:  06/02/25               Short Term Goals       Patient will report compliance with home exercise program 4 days a week to improve carry over.  (Progressing)       Start:  03/21/25    Expected End:  06/02/25            Patient will improve Timed Up and Go testing from  15.6 seconds to 12.7 seconds to demonstrate improvement in household mobility and reduced fall risk. (Progressing)       Start:  03/21/25    Expected End:  06/02/25            Patient will improve right hip external rotation by 10 degrees to  improve ability to don socks and shoes. (Progressing)       Start:  03/21/25    Expected End:  06/02/25                Airam Borrego, PTA

## 2025-04-03 ENCOUNTER — CLINICAL SUPPORT (OUTPATIENT)
Dept: REHABILITATION | Facility: HOSPITAL | Age: 75
End: 2025-04-03
Payer: MEDICARE

## 2025-04-03 DIAGNOSIS — M25.60 DECREASED RANGE OF MOTION: Primary | ICD-10-CM

## 2025-04-03 DIAGNOSIS — R29.898 RIGHT LEG WEAKNESS: ICD-10-CM

## 2025-04-03 DIAGNOSIS — Z74.09 IMPAIRED FUNCTIONAL MOBILITY, BALANCE, GAIT, AND ENDURANCE: ICD-10-CM

## 2025-04-03 PROCEDURE — 97110 THERAPEUTIC EXERCISES: CPT | Mod: PO,CQ

## 2025-04-03 NOTE — PROGRESS NOTES
Outpatient Rehab    Physical Therapy Visit    Patient Name: Jorge Becerra  MRN: [id number]  YOB: 1950  Encounter Date: 4/3/2025    Therapy Diagnosis:   Encounter Diagnoses   Name Primary?    Decreased range of motion Yes    Right leg weakness     Impaired functional mobility, balance, gait, and endurance      Physician: Michael Nino MD    Physician Orders: Eval and Treat  Medical Diagnosis: Synucleinopathy  Primary parkinsonism    Visit # / Visits Authorized:  3 / 20  Insurance Authorization Period: 3/20/2025 to 12/31/2025  Date of Evaluation: 3/21/2025  Plan of Care Certification: 3/21/2025 to 6/1/2025     PT/PTA: PTA   Number of PTA visits since last PT visit:2  Time In: 0715   Time Out: 0758  Total Time: 43   Total Billable Time: 43    FOTO:  Intake Score:  %  Survey Score 1:  %  Survey Score 2:  %    Precautions     Standard, history of CA, Fall        Subjective   without complaints, reports doing HEP daily.  Pain reported as 0/10.      Objective            Treatment:  Therapeutic Exercise  TE 1: Recumbent bike Level 4 15 minutes  TE 2: Rows with Green Tband 3x10  TE 3: supine piriformis stretch with over pressure 3x30 seconds R L  TE 4: Supine butterfly stretch 3x30 seconds  TE 5: supine clams with blue Tband 3 sets of 10 with 3 second hold  TE 6: Bridges 3x10  TE 7: stand retro shoulder rolls 30 times  Therapeutic Activity  TA 1: ambulation without AD 2x120 feet VC to increase upright, left RW with wife in front waiting area    Time Entry(in minutes):  Therapeutic Activity Time Entry: 5  Therapeutic Exercise Time Entry: 38    Assessment & Plan   Assessment: Patient tolerated [id number] without reports of pain, improved upright during ambulation with VC.       Patient will continue to benefit from skilled outpatient physical therapy to address the deficits listed in the problem list box on initial evaluation, provide pt/family education and to maximize pt's level of independence in the home  and community environment.     Patient's spiritual, cultural, and educational needs considered and patient agreeable to plan of care and goals.           Plan: Continue with POC to increase activities as patient tolerates.    Goals:   Active       Long Term Goals       Patient will report compliance with updated home exercise program for 4 days a week or more to maintain improvements post discharge.  (Progressing)       Start:  03/21/25    Expected End:  06/02/25            Patient will increased lower extremity strength as evidenced by increase in manual muscle test  by 1/2 grade as appropriate to decrease gait deviations consistently.   (Progressing)       Start:  03/21/25    Expected End:  06/02/25            Patient will demonstrate decreased fall risk by improving Meneses Balance Assessment score without AD from 49/56 to 56/56 to improve safety in household and during community outings.   (Progressing)       Start:  03/21/25    Expected End:  06/02/25             Patient will increase gait speed as assessed by the timed 10MWT from 0.86 m/s to 1.0 m/s to increase safety and (I) with gait at a community level. (MDC for CVA= 0.14 m/s)   (Progressing)       Start:  03/21/25    Expected End:  06/02/25               Short Term Goals       Patient will report compliance with home exercise program 4 days a week to improve carry over.  (Progressing)       Start:  03/21/25    Expected End:  06/02/25            Patient will improve Timed Up and Go testing from  15.6 seconds to 12.7 seconds to demonstrate improvement in household mobility and reduced fall risk. (Progressing)       Start:  03/21/25    Expected End:  06/02/25            Patient will improve right hip external rotation by 10 degrees to improve ability to don socks and shoes. (Progressing)       Start:  03/21/25    Expected End:  06/02/25                Airam Borrego, PTA

## 2025-04-07 ENCOUNTER — CLINICAL SUPPORT (OUTPATIENT)
Dept: REHABILITATION | Facility: HOSPITAL | Age: 75
End: 2025-04-07
Payer: MEDICARE

## 2025-04-07 DIAGNOSIS — M25.60 DECREASED RANGE OF MOTION: Primary | ICD-10-CM

## 2025-04-07 DIAGNOSIS — Z74.09 IMPAIRED FUNCTIONAL MOBILITY, BALANCE, GAIT, AND ENDURANCE: ICD-10-CM

## 2025-04-07 DIAGNOSIS — R29.898 RIGHT LEG WEAKNESS: ICD-10-CM

## 2025-04-07 PROCEDURE — 97530 THERAPEUTIC ACTIVITIES: CPT | Mod: PO

## 2025-04-07 PROCEDURE — 97110 THERAPEUTIC EXERCISES: CPT | Mod: PO

## 2025-04-07 NOTE — PROGRESS NOTES
Outpatient Rehab    Physical Therapy Visit    Patient Name: Jorge Becerra  MRN: [id number]  YOB: 1950  Encounter Date: 4/7/2025    Therapy Diagnosis:   Encounter Diagnoses   Name Primary?    Decreased range of motion Yes    Right leg weakness     Impaired functional mobility, balance, gait, and endurance      Physician: Michael Nino MD    Physician Orders: Eval and Treat  Medical Diagnosis: Synucleinopathy  Primary parkinsonism    Visit # / Visits Authorized:  4 / 20  Insurance Authorization Period: 3/20/2025 to 12/31/2025  Date of Evaluation: 3/21/2025  Plan of Care Certification: 3/21/2025 to 6/1/2025     PT/PTA: PT   Number of PTA visits since last PT visit:2  Time In: 0800   Time Out: 0843  Total Time: 43   Total Billable Time: 43    FOTO:  Intake Score: 60%  Survey Score 1:  %  Survey Score 2:  %    Precautions     Standard, history of CA, Fall        Subjective   He has been doing his HEP which is helping with his leg strength.  Pain reported as 0/10.      Objective            Treatment:  Therapeutic Exercise  TE 1: Recumbent bike Level 4 10 minutes  TE 2: standing on airex hip abd x 20 each donning 2# ankle weights  TE 3: Standing on airex, marching with 2# ankle weights x 30 alternating  TE 4: mini lunges without UE support, front leg on 6 inch step x 10 each  Therapeutic Activity  TA 1: lateral stepping on airex balance beam x 6 laps  TA 2: tandem on airex balance beam x 5 laps  TA 3: reciprocal stepping in ladder x 5 laps x 2 trials  TA 4: ambulation with large stride x 240 ft x 2 trials  TA 5: anti rotation walk outs using 2 grey TB x 8 each      Time Entry(in minutes):  Therapeutic Activity Time Entry: 23  Therapeutic Exercise Time Entry: 20    Assessment & Plan   Assessment: Patient tolerated treatment session well. Progression of balance and resistance training to improve gait pattern. Use of ladder drill for increasing stride length bilaterally. He remains appropriate for PT at this  time.  Evaluation/Treatment Tolerance: Patient tolerated treatment well    Patient will continue to benefit from skilled outpatient physical therapy to address the deficits listed in the problem list box on initial evaluation, provide pt/family education and to maximize pt's level of independence in the home and community environment.     Patient's spiritual, cultural, and educational needs considered and patient agreeable to plan of care and goals.           Plan:      Goals:   Active       Long Term Goals       Patient will report compliance with updated home exercise program for 4 days a week or more to maintain improvements post discharge.  (Progressing)       Start:  03/21/25    Expected End:  06/02/25            Patient will increased lower extremity strength as evidenced by increase in manual muscle test  by 1/2 grade as appropriate to decrease gait deviations consistently.   (Progressing)       Start:  03/21/25    Expected End:  06/02/25            Patient will demonstrate decreased fall risk by improving Meneses Balance Assessment score without AD from 49/56 to 56/56 to improve safety in household and during community outings.   (Progressing)       Start:  03/21/25    Expected End:  06/02/25             Patient will increase gait speed as assessed by the timed 10MWT from 0.86 m/s to 1.0 m/s to increase safety and (I) with gait at a community level. (MDC for CVA= 0.14 m/s)   (Progressing)       Start:  03/21/25    Expected End:  06/02/25               Short Term Goals       Patient will report compliance with home exercise program 4 days a week to improve carry over.  (Met)       Start:  03/21/25    Expected End:  06/02/25    Resolved:  04/07/25         Patient will improve Timed Up and Go testing from  15.6 seconds to 12.7 seconds to demonstrate improvement in household mobility and reduced fall risk. (Progressing)       Start:  03/21/25    Expected End:  06/02/25            Patient will improve right hip  external rotation by 10 degrees to improve ability to don socks and shoes. (Progressing)       Start:  03/21/25    Expected End:  06/02/25                Maia De Paz, PT

## 2025-04-11 ENCOUNTER — CLINICAL SUPPORT (OUTPATIENT)
Dept: REHABILITATION | Facility: HOSPITAL | Age: 75
End: 2025-04-11
Payer: MEDICARE

## 2025-04-11 DIAGNOSIS — M25.60 DECREASED RANGE OF MOTION: Primary | ICD-10-CM

## 2025-04-11 DIAGNOSIS — R29.898 RIGHT LEG WEAKNESS: ICD-10-CM

## 2025-04-11 DIAGNOSIS — Z74.09 IMPAIRED FUNCTIONAL MOBILITY, BALANCE, GAIT, AND ENDURANCE: ICD-10-CM

## 2025-04-11 PROCEDURE — 97110 THERAPEUTIC EXERCISES: CPT | Mod: PO

## 2025-04-11 PROCEDURE — 97116 GAIT TRAINING THERAPY: CPT | Mod: PO

## 2025-04-11 NOTE — PROGRESS NOTES
"  Outpatient Rehab    Physical Therapy Visit    Patient Name: Jorge Becerra  MRN: [id number]  YOB: 1950  Encounter Date: 4/11/2025    Therapy Diagnosis:   Encounter Diagnoses   Name Primary?    Decreased range of motion Yes    Right leg weakness     Impaired functional mobility, balance, gait, and endurance      Physician: Michael Nino MD    Physician Orders: Eval and Treat  Medical Diagnosis: Synucleinopathy  Primary parkinsonism    Visit # / Visits Authorized:  5 / 20  Insurance Authorization Period: 3/20/2025 to 12/31/2025  Date of Evaluation: 3/21/25  Plan of Care Certification: 3/21/25 to 6/2/25     PT/PTA: PT   Number of PTA visits since last PT visit:0  Time In: 0800   Time Out: 0845  Total Time: 45   Total Billable Time: 45    FOTO:  Intake Score:  %  Survey Score 1:  %  Survey Score 2:  %         Subjective   " I am doing good! Those exercises have been helping me.".  Pain reported as 0/10.      Objective            Treatment:  Therapeutic Exercise  TE 1: sci fit level 3  bilateral upper extremity/ lower extremity 10 minutes  TE 2: shuttle leg press bilateral lower extremity 62# 3x10  TE 3: shuttle leg press singleright  lower extremity 37 # 3x10  TE 4: shuttle leg press single left lower extremity 50# 3x10  Gait Training  GT 1: pt ambulated 150 ft with no AD CGA with cues for upright posture  GT 2: pt ambulated 120 ft x 3 trials throughout session with SPV with RW  GT 3: pt ambulated 160 ft with SBA no AD with cues for upright posture x 2 trials    Time Entry(in minutes):  Gait Training Time Entry: 15  Therapeutic Exercise Time Entry: 30    Assessment & Plan   Assessment: Pt tolerated session well. working on bilateral LE strength on shuttle leg press with no adverse effects. Pt able to walk with no AD with CGA/ SBA today.  ot remains motivated to improve and a good candidate for PT.       Patient will continue to benefit from skilled outpatient physical therapy to address the deficits " Call to Mom, she has not set up live well account so she can not schedule virtual  appt.     Resent link to her cell phone and she reports she will work on this.        listed in the problem list box on initial evaluation, provide pt/family education and to maximize pt's level of independence in the home and community environment.     Patient's spiritual, cultural, and educational needs considered and patient agreeable to plan of care and goals.     Education  Education was done with Patient. The patient's learning style includes Demonstration and Listening. The patient Demonstrates understanding.         Educated about safety with walking . Pt reports understanding.        Plan: Continue with POC to increase activities as patient tolerates.    Goals:   Active       Long Term Goals       Patient will report compliance with updated home exercise program for 4 days a week or more to maintain improvements post discharge.  (Progressing)       Start:  03/21/25    Expected End:  06/02/25            Patient will increased lower extremity strength as evidenced by increase in manual muscle test  by 1/2 grade as appropriate to decrease gait deviations consistently.   (Progressing)       Start:  03/21/25    Expected End:  06/02/25            Patient will demonstrate decreased fall risk by improving Meneses Balance Assessment score without AD from 49/56 to 56/56 to improve safety in household and during community outings.   (Progressing)       Start:  03/21/25    Expected End:  06/02/25             Patient will increase gait speed as assessed by the timed 10MWT from 0.86 m/s to 1.0 m/s to increase safety and (I) with gait at a community level. (MDC for CVA= 0.14 m/s)   (Progressing)       Start:  03/21/25    Expected End:  06/02/25               Short Term Goals       Patient will report compliance with home exercise program 4 days a week to improve carry over.  (Met)       Start:  03/21/25    Expected End:  06/02/25    Resolved:  04/07/25         Patient will improve Timed Up and Go testing from  15.6 seconds to 12.7 seconds to demonstrate improvement in household mobility and reduced fall risk.  (Progressing)       Start:  03/21/25    Expected End:  06/02/25            Patient will improve right hip external rotation by 10 degrees to improve ability to don socks and shoes. (Progressing)       Start:  03/21/25    Expected End:  06/02/25                Cande Hess, PT

## 2025-04-14 ENCOUNTER — CLINICAL SUPPORT (OUTPATIENT)
Dept: REHABILITATION | Facility: HOSPITAL | Age: 75
End: 2025-04-14
Payer: MEDICARE

## 2025-04-14 DIAGNOSIS — M25.60 DECREASED RANGE OF MOTION: Primary | ICD-10-CM

## 2025-04-14 DIAGNOSIS — Z74.09 IMPAIRED FUNCTIONAL MOBILITY, BALANCE, GAIT, AND ENDURANCE: ICD-10-CM

## 2025-04-14 DIAGNOSIS — R29.898 RIGHT LEG WEAKNESS: ICD-10-CM

## 2025-04-14 PROCEDURE — 97110 THERAPEUTIC EXERCISES: CPT | Mod: PO

## 2025-04-14 PROCEDURE — 97530 THERAPEUTIC ACTIVITIES: CPT | Mod: PO

## 2025-04-14 NOTE — PROGRESS NOTES
Outpatient Rehab    Physical Therapy Visit    Patient Name: Jorge Becerra  MRN: [id number]  YOB: 1950  Encounter Date: 4/14/2025    Therapy Diagnosis:   Encounter Diagnoses   Name Primary?    Decreased range of motion Yes    Right leg weakness     Impaired functional mobility, balance, gait, and endurance      Physician: Michael Nino MD    Physician Orders: Eval and Treat  Medical Diagnosis: Synucleinopathy  Primary parkinsonism    Visit # / Visits Authorized:  6 / 20  Insurance Authorization Period: 3/20/2025 to 12/31/2025  Date of Evaluation: 3/21/25  Plan of Care Certification: 3/21/25 to 6/2/25     PT/PTA: PT   Number of PTA visits since last PT visit:0  Time In: 0800   Time Out: 0843  Total Time: 43   Total Billable Time: 43    FOTO:  Intake Score: 60%  Survey Score 1:  %  Survey Score 2:  %    Precautions     Standard, history of CA, Fall        Subjective   [place] [employer] [place] and was able to walk around without walker. States he was fatigued but no stumbles..  Pain reported as 0/10.      Objective            Treatment:  Therapeutic Exercise  TE 1: sci fit level 3.5 bilateral upper extremity/ lower extremity 10 minutes  TE 2: lunge onto BOSU x 10 each  TE 3: heel raise with finger tip support 2 x 10  Therapeutic Activity  TA 1: Lateral step up and over using blue step x 10  TA 2: forward step up and over using blue step x 10  TA 3: lateral stepping on airex balance beam x 5 laps  TA 4: forward stepping on airex balance beam x 5 laps  TA 5: gait donning 2# ankle weights bilaterally x 240 feet without AD  TA 6: GAit around clinic and outside on variable surfaces, ascending ramp x 400 ft    Time Entry(in minutes):  Therapeutic Activity Time Entry: 23  Therapeutic Exercise Time Entry: 20    Assessment & Plan   Assessment: Pt tolerated treatment session well. Focus of treatment session was on functional mobility and activity tolerance. Demonstrated improvement in  ascending 6 inch stairs with right lower extremity. Able to ambulate over 400 feet without assistive device on variable surfaces outside with standby assistance. He remains appropriate for PT at this time.  Evaluation/Treatment Tolerance: Patient tolerated treatment well    Patient will continue to benefit from skilled outpatient physical therapy to address the deficits listed in the problem list box on initial evaluation, provide pt/family education and to maximize pt's level of independence in the home and community environment.     Patient's spiritual, cultural, and educational needs considered and patient agreeable to plan of care and goals.     Education  Education was done with Patient. The patient's learning style includes Listening. The patient Verbalizes understanding.         Progression of lateral stepping using blue TB.       Plan: Continue with POC to increase activities as patient tolerates.    Goals:   Active       Long Term Goals       Patient will report compliance with updated home exercise program for 4 days a week or more to maintain improvements post discharge.  (Progressing)       Start:  03/21/25    Expected End:  06/02/25            Patient will increased lower extremity strength as evidenced by increase in manual muscle test  by 1/2 grade as appropriate to decrease gait deviations consistently.   (Progressing)       Start:  03/21/25    Expected End:  06/02/25            Patient will demonstrate decreased fall risk by improving Meneses Balance Assessment score without AD from 49/56 to 56/56 to improve safety in household and during community outings.   (Progressing)       Start:  03/21/25    Expected End:  06/02/25             Patient will increase gait speed as assessed by the timed 10MWT from 0.86 m/s to 1.0 m/s to increase safety and (I) with gait at a community level. (MDC for CVA= 0.14 m/s)   (Progressing)       Start:  03/21/25    Expected End:  06/02/25               Short Term Goals        Patient will report compliance with home exercise program 4 days a week to improve carry over.  (Met)       Start:  03/21/25    Expected End:  06/02/25    Resolved:  04/07/25         Patient will improve Timed Up and Go testing from  15.6 seconds to 12.7 seconds to demonstrate improvement in household mobility and reduced fall risk. (Progressing)       Start:  03/21/25    Expected End:  06/02/25            Patient will improve right hip external rotation by 10 degrees to improve ability to don socks and shoes. (Progressing)       Start:  03/21/25    Expected End:  06/02/25                Maia De Paz, PT

## 2025-04-17 ENCOUNTER — CLINICAL SUPPORT (OUTPATIENT)
Dept: REHABILITATION | Facility: HOSPITAL | Age: 75
End: 2025-04-17
Payer: MEDICARE

## 2025-04-17 DIAGNOSIS — M25.60 DECREASED RANGE OF MOTION: Primary | ICD-10-CM

## 2025-04-17 DIAGNOSIS — R29.898 RIGHT LEG WEAKNESS: ICD-10-CM

## 2025-04-17 DIAGNOSIS — Z74.09 IMPAIRED FUNCTIONAL MOBILITY, BALANCE, GAIT, AND ENDURANCE: ICD-10-CM

## 2025-04-17 PROCEDURE — 97530 THERAPEUTIC ACTIVITIES: CPT | Mod: PO

## 2025-04-17 NOTE — PROGRESS NOTES
Outpatient Rehab    Physical Therapy Visit    Patient Name: Jorge Becerra  MRN: [id number]  YOB: 1950  Encounter Date: 4/17/2025    Therapy Diagnosis:   Encounter Diagnoses   Name Primary?    Decreased range of motion Yes    Right leg weakness     Impaired functional mobility, balance, gait, and endurance      Physician: Michael Nino MD    Physician Orders: Eval and Treat  Medical Diagnosis: Synucleinopathy  Primary parkinsonism    Visit # / Visits Authorized:  7 / 20  Insurance Authorization Period: 3/20/2025 to 12/31/2025  Date of Evaluation: 3/21/25  Plan of Care Certification: 3/21/25 to 6/2/25     PT/PTA: PT   Number of PTA visits since last PT visit:0  Time In: 0800   Time Out: 0843  Total Time: 43   Total Billable Time: 43    FOTO:  Intake Score: 60%  Survey Score 1:  %  Survey Score 2:  %    Precautions     Standard, history of CA, Fall        Subjective   He is feeling good today. No new complaints..  Pain reported as 0/10.      Objective            Treatment:  Therapeutic Exercise  TE 1: heel raise off of 6 inch step 2 x 10 with BUE support  Therapeutic Activity  TA 1: Treadmill with harness and safety clip donned x 0.9 to 1.1 gait without UE to UE support(1.1) for 10 minutes  TA 2: step up 6 inch stairs reciprocally x 5 trials  TA 3: step up on airex x 5 each  TA 4: Ladder Drill: reciprocal forward stepping x 5 laps  TA 5: gait without AD working on heel to toe pattern 2 x 240 ft    Time Entry(in minutes):  Therapeutic Activity Time Entry: 40  Therapeutic Exercise Time Entry: 3    Assessment & Plan   Assessment: Patient tolerated treatment session well. Focus of session today was on gait speed and pattern to reduce risk of falls. Use of ladder drill on floor to work on stride length and heel/toe off phases of gait. Treadmill working on trunk control during gait and gait speed. He demonstrates improved stride length post ladder drill exercise. He remains appropriate for PT at this  time.  Evaluation/Treatment Tolerance: Patient tolerated treatment well    Patient will continue to benefit from skilled outpatient physical therapy to address the deficits listed in the problem list box on initial evaluation, provide pt/family education and to maximize pt's level of independence in the home and community environment.     Patient's spiritual, cultural, and educational needs considered and patient agreeable to plan of care and goals.     Education  Education was done with Patient. The patient's learning style includes Listening. The patient Verbalizes understanding.         Continue with HEP       Plan: Progression with gait activities for stride length and gait speed.    Goals:   Active       Long Term Goals       Patient will report compliance with updated home exercise program for 4 days a week or more to maintain improvements post discharge.  (Progressing)       Start:  03/21/25    Expected End:  06/02/25            Patient will increased lower extremity strength as evidenced by increase in manual muscle test  by 1/2 grade as appropriate to decrease gait deviations consistently.   (Progressing)       Start:  03/21/25    Expected End:  06/02/25            Patient will demonstrate decreased fall risk by improving Meneses Balance Assessment score without AD from 49/56 to 56/56 to improve safety in household and during community outings.   (Progressing)       Start:  03/21/25    Expected End:  06/02/25             Patient will increase gait speed as assessed by the timed 10MWT from 0.86 m/s to 1.0 m/s to increase safety and (I) with gait at a community level. (MDC for CVA= 0.14 m/s)   (Progressing)       Start:  03/21/25    Expected End:  06/02/25               Short Term Goals       Patient will report compliance with home exercise program 4 days a week to improve carry over.  (Met)       Start:  03/21/25    Expected End:  06/02/25    Resolved:  04/07/25         Patient will improve Timed Up and Go  testing from  15.6 seconds to 12.7 seconds to demonstrate improvement in household mobility and reduced fall risk. (Progressing)       Start:  03/21/25    Expected End:  06/02/25            Patient will improve right hip external rotation by 10 degrees to improve ability to don socks and shoes. (Progressing)       Start:  03/21/25    Expected End:  06/02/25                Maia De Paz, PT

## 2025-04-21 ENCOUNTER — CLINICAL SUPPORT (OUTPATIENT)
Dept: REHABILITATION | Facility: HOSPITAL | Age: 75
End: 2025-04-21
Payer: MEDICARE

## 2025-04-21 DIAGNOSIS — Z74.09 IMPAIRED FUNCTIONAL MOBILITY, BALANCE, GAIT, AND ENDURANCE: ICD-10-CM

## 2025-04-21 DIAGNOSIS — R29.898 RIGHT LEG WEAKNESS: ICD-10-CM

## 2025-04-21 DIAGNOSIS — M25.60 DECREASED RANGE OF MOTION: Primary | ICD-10-CM

## 2025-04-21 PROCEDURE — 97110 THERAPEUTIC EXERCISES: CPT | Mod: PO,CQ

## 2025-04-21 PROCEDURE — 97530 THERAPEUTIC ACTIVITIES: CPT | Mod: PO,CQ

## 2025-04-21 NOTE — PROGRESS NOTES
"  Outpatient Rehab    Physical Therapy Visit    Patient Name: Jorge Becerra  MRN: [id number]  YOB: 1950  Encounter Date: 4/21/2025    Therapy Diagnosis:   Encounter Diagnoses   Name Primary?    Decreased range of motion Yes    Right leg weakness     Impaired functional mobility, balance, gait, and endurance      Physician: Michael Nino MD    Physician Orders: Eval and Treat  Medical Diagnosis: Synucleinopathy  Primary parkinsonism    Visit # / Visits Authorized:  8 / 20  Insurance Authorization Period: 3/20/2025 to 12/31/2025  Date of Evaluation: 3/21/25  Plan of Care Certification: 3/21/25 to 6/2/25                   PT/PTA: PTA   Number of PTA visits since last PT visit:1  Time In: 0800   Time Out: 0842  Total Time: 42   Total Billable Time: 42    FOTO:  Intake Score:  %  Survey Score 1:  %  Survey Score 2:  %    Precautions     Standard, history of CA, Fall      Subjective   Patient reports did some yard work "and I felt it later"..  Pain reported as 0/10.      Objective            Treatment:  Therapeutic Exercise  TE 1: Recumbent Bike Level 4 15 minutes  Therapeutic Activity  TA 1: Gait Better 10 minutes 1.1 mph Scanning, Hurdles, Puddles with Harness and safety switch attached  TA 2: ambulation with RW 2x120 feet, VC to increase upright and decrease BUE WB  TA 3: ambulation without AD with cane overhead 120 feet  TA 4: quadriped 5 times: LE R L alternating, UE R L alternating      Time Entry(in minutes):  Therapeutic Activity Time Entry: 27  Therapeutic Exercise Time Entry: 15    Assessment & Plan   Assessment: Patient tolerated activities without complaints, needed assistance R LE for improved alignment during quadriped activities, raised walker 1 notch to encourace increased head up.       Patient will continue to benefit from skilled outpatient physical therapy to address the deficits listed in the problem list box on initial evaluation, provide pt/family education and to maximize pt's level " of independence in the home and community environment.     Patient's spiritual, cultural, and educational needs considered and patient agreeable to plan of care and goals.           Plan: Contine with POC to increase activities as patient tolerates.    Goals:   Active       Long Term Goals       Patient will report compliance with updated home exercise program for 4 days a week or more to maintain improvements post discharge.  (Progressing)       Start:  03/21/25    Expected End:  06/02/25            Patient will increased lower extremity strength as evidenced by increase in manual muscle test  by 1/2 grade as appropriate to decrease gait deviations consistently.   (Progressing)       Start:  03/21/25    Expected End:  06/02/25            Patient will demonstrate decreased fall risk by improving Meneses Balance Assessment score without AD from 49/56 to 56/56 to improve safety in household and during community outings.   (Progressing)       Start:  03/21/25    Expected End:  06/02/25             Patient will increase gait speed as assessed by the timed 10MWT from 0.86 m/s to 1.0 m/s to increase safety and (I) with gait at a community level. (MDC for CVA= 0.14 m/s)   (Progressing)       Start:  03/21/25    Expected End:  06/02/25               Short Term Goals       Patient will report compliance with home exercise program 4 days a week to improve carry over.  (Met)       Start:  03/21/25    Expected End:  06/02/25    Resolved:  04/07/25         Patient will improve Timed Up and Go testing from  15.6 seconds to 12.7 seconds to demonstrate improvement in household mobility and reduced fall risk. (Progressing)       Start:  03/21/25    Expected End:  06/02/25            Patient will improve right hip external rotation by 10 degrees to improve ability to don socks and shoes. (Progressing)       Start:  03/21/25    Expected End:  06/02/25                Airam Borrego, PTA

## 2025-04-24 ENCOUNTER — CLINICAL SUPPORT (OUTPATIENT)
Dept: REHABILITATION | Facility: HOSPITAL | Age: 75
End: 2025-04-24
Payer: MEDICARE

## 2025-04-24 ENCOUNTER — DOCUMENTATION ONLY (OUTPATIENT)
Dept: REHABILITATION | Facility: HOSPITAL | Age: 75
End: 2025-04-24

## 2025-04-24 DIAGNOSIS — M25.60 DECREASED RANGE OF MOTION: Primary | ICD-10-CM

## 2025-04-24 DIAGNOSIS — Z74.09 IMPAIRED FUNCTIONAL MOBILITY, BALANCE, GAIT, AND ENDURANCE: ICD-10-CM

## 2025-04-24 DIAGNOSIS — R29.898 RIGHT LEG WEAKNESS: ICD-10-CM

## 2025-04-24 PROCEDURE — 97530 THERAPEUTIC ACTIVITIES: CPT | Mod: PO,CQ

## 2025-04-24 NOTE — PROGRESS NOTES
Outpatient Rehab    Physical Therapy Visit    Patient Name: Jorge Becerra  MRN: [id number]  YOB: 1950  Encounter Date: 4/24/2025    Therapy Diagnosis:   Encounter Diagnoses   Name Primary?    Decreased range of motion Yes    Right leg weakness     Impaired functional mobility, balance, gait, and endurance      Physician: Michael Nino MD    Physician Orders: Eval and Treat  Medical Diagnosis: Synucleinopathy  Primary parkinsonism    Visit # / Visits Authorized:  9 / 20  Insurance Authorization Period: 3/20/2025 to 12/31/2025  Date of Evaluation: 3/21/25  Plan of Care Certification: 3/21/25 to 6/2/25                   PT/PTA: PTA   Number of PTA visits since last PT visit:2  Time In: 0750   Time Out: 0830  Total Time: 40   Total Billable Time: 40    FOTO:  Intake Score:  %  Survey Score 1:  %  Survey Score 2:  %    Precautions     Standard, history of Cx, fall      Subjective   without complaints.  Pain reported as 0/10.      Objective            Treatment:  Therapeutic Activity  TA 1: Gait better 1.2 mph 10 minutes: 339 yards, Obstacles 48%, Scanning 87%, Score 706  TA 2: Floor Ladder: forward 7 laps, side step R L 2 times  TA 3: Quadriped 2x5:LE R L alternating, UE R L alternating  TA 4: Quadriped x5: opposite UE LE alternating  TA 5: Parallel bars: weight shift on foam cushion 2 minutes: ant/post, R/L  TA 6: Parallel bars: Rocker board 2 minutes: ant/post, R/L  TA 7: Ambulation 240 feet with SC in L hand, VC for sequencing    Time Entry(in minutes):  Therapeutic Activity Time Entry: 40    Assessment & Plan   Assessment: Jorge did well with balance activities with seated rest breaks. Improved sequencing with use of cane in L hand versus R hand.       Patient will continue to benefit from skilled outpatient physical therapy to address the deficits listed in the problem list box on initial evaluation, provide pt/family education and to maximize pt's level of independence in the home and community  environment.     Patient's spiritual, cultural, and educational needs considered and patient agreeable to plan of care and goals.           Plan: Continue with POC to icrease activities as patient tolerates.    Goals:   Active       Long Term Goals       Patient will report compliance with updated home exercise program for 4 days a week or more to maintain improvements post discharge.  (Progressing)       Start:  03/21/25    Expected End:  06/02/25            Patient will increased lower extremity strength as evidenced by increase in manual muscle test  by 1/2 grade as appropriate to decrease gait deviations consistently.   (Progressing)       Start:  03/21/25    Expected End:  06/02/25            Patient will demonstrate decreased fall risk by improving Meneses Balance Assessment score without AD from 49/56 to 56/56 to improve safety in household and during community outings.   (Progressing)       Start:  03/21/25    Expected End:  06/02/25             Patient will increase gait speed as assessed by the timed 10MWT from 0.86 m/s to 1.0 m/s to increase safety and (I) with gait at a community level. (MDC for CVA= 0.14 m/s)   (Progressing)       Start:  03/21/25    Expected End:  06/02/25               Short Term Goals       Patient will report compliance with home exercise program 4 days a week to improve carry over.  (Met)       Start:  03/21/25    Expected End:  06/02/25    Resolved:  04/07/25         Patient will improve Timed Up and Go testing from  15.6 seconds to 12.7 seconds to demonstrate improvement in household mobility and reduced fall risk. (Progressing)       Start:  03/21/25    Expected End:  06/02/25            Patient will improve right hip external rotation by 10 degrees to improve ability to don socks and shoes. (Progressing)       Start:  03/21/25    Expected End:  06/02/25                Airam Borrego, PTA

## 2025-04-24 NOTE — PROGRESS NOTES
PT/PTA met face to face to discuss pt's treatment plan and progress towards established goals. Pt will be seen by a physical therapist minimally every 6th visit or every 30 days.    Airam Borrego PTA

## 2025-04-28 ENCOUNTER — CLINICAL SUPPORT (OUTPATIENT)
Dept: REHABILITATION | Facility: HOSPITAL | Age: 75
End: 2025-04-28
Payer: MEDICARE

## 2025-04-28 DIAGNOSIS — R29.898 RIGHT LEG WEAKNESS: ICD-10-CM

## 2025-04-28 DIAGNOSIS — Z74.09 IMPAIRED FUNCTIONAL MOBILITY, BALANCE, GAIT, AND ENDURANCE: ICD-10-CM

## 2025-04-28 DIAGNOSIS — M25.60 DECREASED RANGE OF MOTION: Primary | ICD-10-CM

## 2025-04-28 PROCEDURE — 97530 THERAPEUTIC ACTIVITIES: CPT | Mod: PO,CQ

## 2025-04-28 PROCEDURE — 97112 NEUROMUSCULAR REEDUCATION: CPT | Mod: PO,CQ

## 2025-04-28 NOTE — PROGRESS NOTES
Outpatient Rehab    Physical Therapy Visit    Patient Name: Jorge Becerra  MRN: [id number]  YOB: 1950  Encounter Date: 4/28/2025    Therapy Diagnosis:   Encounter Diagnoses   Name Primary?    Decreased range of motion Yes    Right leg weakness     Impaired functional mobility, balance, gait, and endurance      Physician: Michael Nino MD    Physician Orders: Eval and Treat  Medical Diagnosis: Synucleinopathy  Primary parkinsonism    Visit # / Visits Authorized:  10 / 20  Insurance Authorization Period: 3/20/2025 to 12/31/2025  Date of Evaluation: 3/21/25  Plan of Care Certification: 3/21/25 to 6/2/25                 PT/PTA: PTA   Number of PTA visits since last PT visit:3  Time In: 0755   Time Out: 0838  Total Time: 43   Total Billable Time: 43    FOTO:  Intake Score:  %  Survey Score 1:  %  Survey Score 2:  %    Precautions     Standard, history of cancer, fall      Subjective   Using the cane in my left hand, seems to work better..  Pain reported as 0/10.      Objective            Treatment:  Balance/Neuromuscular Re-Education  NMR 1: Recumbent Bike Level 5 15 minutes  Therapeutic Activity  TA 1: ambulation with own sc with quad pad flat surface 2x120 with assist for height adjustment sba  TA 2: ambulation with own sc with quad pad 450 feet even/uneven surface, up/down 30 foot ramp sba  TA 3: Gait Better 1.3-1.4 mph 10 minutes: 372 yards, Obstacles 74%, Scanning 91%, Score 803    Time Entry(in minutes):  Neuromuscular Re-Education Time Entry: 15  Therapeutic Activity Time Entry: 28    Assessment & Plan   Assessment: Patient tolerated ambulation with SC without imbalance on even or unever surfaces.  Able to increase mph on VR TM       Patient will continue to benefit from skilled outpatient physical therapy to address the deficits listed in the problem list box on initial evaluation, provide pt/family education and to maximize pt's level of independence in the home and community environment.      Patient's spiritual, cultural, and educational needs considered and patient agreeable to plan of care and goals.           Plan: Continue with POC to icrease activities as patient tolerates.    Goals:   Active       Long Term Goals       Patient will report compliance with updated home exercise program for 4 days a week or more to maintain improvements post discharge.  (Progressing)       Start:  03/21/25    Expected End:  06/02/25            Patient will increased lower extremity strength as evidenced by increase in manual muscle test  by 1/2 grade as appropriate to decrease gait deviations consistently.   (Progressing)       Start:  03/21/25    Expected End:  06/02/25            Patient will demonstrate decreased fall risk by improving Meneses Balance Assessment score without AD from 49/56 to 56/56 to improve safety in household and during community outings.   (Progressing)       Start:  03/21/25    Expected End:  06/02/25             Patient will increase gait speed as assessed by the timed 10MWT from 0.86 m/s to 1.0 m/s to increase safety and (I) with gait at a community level. (MDC for CVA= 0.14 m/s)   (Progressing)       Start:  03/21/25    Expected End:  06/02/25               Short Term Goals       Patient will report compliance with home exercise program 4 days a week to improve carry over.  (Met)       Start:  03/21/25    Expected End:  06/02/25    Resolved:  04/07/25         Patient will improve Timed Up and Go testing from  15.6 seconds to 12.7 seconds to demonstrate improvement in household mobility and reduced fall risk. (Progressing)       Start:  03/21/25    Expected End:  06/02/25            Patient will improve right hip external rotation by 10 degrees to improve ability to don socks and shoes. (Progressing)       Start:  03/21/25    Expected End:  06/02/25                Airam Borrego, PTA

## 2025-05-01 ENCOUNTER — CLINICAL SUPPORT (OUTPATIENT)
Dept: REHABILITATION | Facility: HOSPITAL | Age: 75
End: 2025-05-01
Payer: MEDICARE

## 2025-05-01 DIAGNOSIS — Z74.09 IMPAIRED FUNCTIONAL MOBILITY, BALANCE, GAIT, AND ENDURANCE: ICD-10-CM

## 2025-05-01 DIAGNOSIS — M25.60 DECREASED RANGE OF MOTION: Primary | ICD-10-CM

## 2025-05-01 DIAGNOSIS — R29.898 RIGHT LEG WEAKNESS: ICD-10-CM

## 2025-05-01 PROCEDURE — 97530 THERAPEUTIC ACTIVITIES: CPT | Mod: PO

## 2025-05-01 PROCEDURE — 97112 NEUROMUSCULAR REEDUCATION: CPT | Mod: PO

## 2025-05-01 NOTE — PROGRESS NOTES
Outpatient Rehab    Physical Therapy Progress Note    Patient Name: Jorge Becerra  MRN: [id number]  YOB: 1950  Encounter Date: 5/1/2025    Therapy Diagnosis:   Encounter Diagnoses   Name Primary?    Decreased range of motion Yes    Right leg weakness     Impaired functional mobility, balance, gait, and endurance      Physician: Michael Nino MD    Physician Orders: Eval and Treat  Medical Diagnosis: Synucleinopathy  Primary parkinsonism    Visit # / Visits Authorized:  11 / 20  Insurance Authorization Period: 3/20/2025 to 12/31/2025  Date of Evaluation:      PT/PTA: PT   Number of PTA visits since last PT visit:3  Time In: 0845 3/21/25  Plan of Care Certification: 3/21/25 to 6/2/25    Time Out: 0928  Total Time: 43   Total Billable Time: 43    FOTO:  Intake Score: 60%  Survey Score 1: 73%  Survey Score 2:  %    Precautions     Standard, history of cancer, fall      Subjective             Objective         Meneses Balance  Meneses Balance Scale  1. Sitting to Standing: Able to stand without using hands and stabilize independently  2. Standing Unsupported: Able to stand safely for 2 minutes  3. Sitting with Back Unsupported but Feet Supported on Floor or on a Stool: Able to sit safely and securely for 2 minutes  4. Standing to Sitting: Sits safely with minimal use of hands  5.  Transfers: Able to transfer safely with minor use of hands  6. Standing Unsupported with Eyes Closed: Able to stand 10 seconds safely  7. Standing Unsupported with Feet Together: Able to place feet together independently and stand 1 minute safely  8. Reach Forward with Outstretched Arm While Standing: Can reach forward 12 cm (5 inches)  9.  Object from Floor from a Standing Position: Able to  slipper but needs supervision  10. Turning to Look Behind Over Left and Right Shoulders While Standing: Looks behind from both sides and weight shifts well  11. Turn 360 Degrees: Able to turn 360 degrees safely one side only 4  seconds or less  12. Place Alternate Foot on Step or Stool While Standing Unsupported: Able to complete 4 steps without aid with supervision  13. Standing Unsupported One Foot in Front: Able to place foot ahead independently and hold 30 seconds  14. Standing on One Leg: Tries to lift leg unable to hold 3 seconds but remains standing independently  Meneses Balance Score: 47  Meneses Balance Fall Risk: Low    Interpretation:  41-56 = Low Fall Risk  21-40 = Medium Fall Risk  0-20 = High Fall Risk    Timed Up & Go (TUG)  Time: 13 seconds     An older adult who takes >=12 seconds to complete the TUG is at risk for falling.    13 seconds with hurry cane, 12 seconds without AD  Sit to Stand Testing      The patient completed 10 repetitions of a sit to stand transfer in 30 seconds. without support from posterior leg on chair         Ambulation Details    10MWT with AD and without AD: 0.86 m/s        Treatment:  Balance/Neuromuscular Re-Education  NMR 1: forward stepping on airex balance beam x 4 laps with fingertip support  NMR 2: lateral stepping on airex balance beam with intermittent UE support x 4 laps  NMR 3: DF/PF on rockerboard with intermittent UE support x 30  Therapeutic Activity  TA 1: Gait on Gait Better 1.4-1.6, 10 minutes, obstacles 61%, memory: 50% for 437 yards  TA 2: reassessment of functional mobility    Time Entry(in minutes):  Neuromuscular Re-Education Time Entry: 15  Therapeutic Activity Time Entry: 28    Assessment & Plan   Assessment: Progress note performed on patient today. He ambulates using hurry cane on variable surfaces and able to ambulate without assistive device on level surfaces. He continues to have slight decreased stance time on RLE. Improvement in Meneses Balance score from 44/56 to 47/56 indicating low fall risk. He has met 0/2 short term goals and 2/4 long term goals. He remains appropriate for PT at this time.  Evaluation/Treatment Tolerance: Patient tolerated treatment well    Patient will  continue to benefit from skilled outpatient physical therapy to address the deficits listed in the problem list box on initial evaluation, provide pt/family education and to maximize pt's level of independence in the home and community environment.     Patient's spiritual, cultural, and educational needs considered and patient agreeable to plan of care and goals.     Education  Education was done with Patient. The patient's learning style includes Listening. The patient Verbalizes understanding.         Progress with PT       Plan: Progress dynamic balance    Goals:   Active       Long Term Goals       Patient will report compliance with updated home exercise program for 4 days a week or more to maintain improvements post discharge.  (Met)       Start:  03/21/25    Expected End:  06/02/25    Resolved:  05/01/25         Patient will increased lower extremity strength as evidenced by increase in manual muscle test  by 1/2 grade as appropriate to decrease gait deviations consistently.   (Met)       Start:  03/21/25    Expected End:  06/02/25    Resolved:  05/01/25         Patient will demonstrate decreased fall risk by improving Meneses Balance Assessment score without AD from 49/56 to 56/56 to improve safety in household and during community outings.   ( Error)       Start:  03/21/25    Expected End:  06/02/25    Resolved:  05/01/25          Patient will increase gait speed as assessed by the timed 10MWT from 0.86 m/s to 1.0 m/s to increase safety and (I) with gait at a community level. (MDC for CVA= 0.14 m/s)   (Progressing)       Start:  03/21/25    Expected End:  06/02/25               Short Term Goals       Patient will report compliance with home exercise program 4 days a week to improve carry over.  (Met)       Start:  03/21/25    Expected End:  06/02/25    Resolved:  04/07/25         Patient will improve Timed Up and Go testing from  15.6 seconds to 12.7 seconds to demonstrate improvement in household  mobility and reduced fall risk. (Progressing)       Start:  03/21/25    Expected End:  06/02/25            Patient will improve right hip external rotation by 10 degrees to improve ability to don socks and shoes. (Ongoing)       Start:  03/21/25    Expected End:  06/02/25                Maia De Paz PT

## 2025-05-05 ENCOUNTER — CLINICAL SUPPORT (OUTPATIENT)
Dept: REHABILITATION | Facility: HOSPITAL | Age: 75
End: 2025-05-05
Payer: MEDICARE

## 2025-05-05 DIAGNOSIS — R29.898 RIGHT LEG WEAKNESS: ICD-10-CM

## 2025-05-05 DIAGNOSIS — Z74.09 IMPAIRED FUNCTIONAL MOBILITY, BALANCE, GAIT, AND ENDURANCE: ICD-10-CM

## 2025-05-05 DIAGNOSIS — M25.60 DECREASED RANGE OF MOTION: Primary | ICD-10-CM

## 2025-05-05 PROCEDURE — 97110 THERAPEUTIC EXERCISES: CPT | Mod: PO,CQ

## 2025-05-05 PROCEDURE — 97530 THERAPEUTIC ACTIVITIES: CPT | Mod: PO,CQ

## 2025-05-05 NOTE — PROGRESS NOTES
Outpatient Rehab    Physical Therapy Visit    Patient Name: Jorge Becerra  MRN: [id number]  YOB: 1950  Encounter Date: 5/5/2025    Therapy Diagnosis:   Encounter Diagnoses   Name Primary?    Decreased range of motion Yes    Right leg weakness     Impaired functional mobility, balance, gait, and endurance      Physician: Michael Nino MD    Physician Orders: Eval and Treat  Medical Diagnosis: Synucleinopathy  Primary parkinsonism    Visit # / Visits Authorized:  12 / 20  Insurance Authorization Period: 3/20/2025 to 12/31/2025  Date of Evaluation: 3/21/25  Plan of Care Certification: 3/21/25 to 6/2/25                 PT/PTA: PTA   Number of PTA visits since last PT visit:1  Time In: 0800   Time Out: 0840  Total Time (in minutes): 40   Total Billable Time (in minutes): 40    FOTO:  Intake Score:  %  Survey Score 2:  %  Survey Score 3:  %         Subjective   without complaints.  Pain reported as 0/10.      Objective            Treatment:  Therapeutic Exercise  TE 1: Shuttle: 62# B LP 3 minutes  TE 2: Shuttle: 43# U LP 3x10 R L  TE 3: Shuttle: 31# R jump 2x10  Therapeutic Activity  TA 1: GaitBetter 1.5 mph 10 minutes using B rails with obstacles, no data 2nd to Gait Better malfunction  TA 2: ambulation with own SC with quad pad, vc to increase head up  TA3: Parallel bars: weight shift on foam cushion 2 minutes: ant/post feet together, R/L feet apart  TA4: Mini squats in front of chair to improve form 6 times    Time Entry(in minutes):  Therapeutic Activity Time Entry: 15  Therapeutic Exercise Time Entry: 25    Assessment & Plan   Assessment: Patient tolerated activities with correction for mini squats to improve increased WB on heels with good follow through.       Patient will continue to benefit from skilled outpatient physical therapy to address the deficits listed in the problem list box on initial evaluation, provide pt/family education and to maximize pt's level of independence in the home and  community environment.     Patient's spiritual, cultural, and educational needs considered and patient agreeable to plan of care and goals.           Plan: Continue with POC to increase activities as patient tolerates.    Goals:   Active       Long Term Goals       Patient will report compliance with updated home exercise program for 4 days a week or more to maintain improvements post discharge.  (Met)       Start:  03/21/25    Expected End:  06/02/25    Resolved:  05/01/25         Patient will increased lower extremity strength as evidenced by increase in manual muscle test  by 1/2 grade as appropriate to decrease gait deviations consistently.   (Met)       Start:  03/21/25    Expected End:  06/02/25    Resolved:  05/01/25         Patient will demonstrate decreased fall risk by improving Meneses Balance Assessment score without AD from 49/56 to 56/56 to improve safety in household and during community outings.   ( Error)       Start:  03/21/25    Expected End:  06/02/25    Resolved:  05/01/25          Patient will increase gait speed as assessed by the timed 10MWT from 0.86 m/s to 1.0 m/s to increase safety and (I) with gait at a community level. (MDC for CVA= 0.14 m/s)   (Progressing)       Start:  03/21/25    Expected End:  06/02/25               Short Term Goals       Patient will report compliance with home exercise program 4 days a week to improve carry over.  (Met)       Start:  03/21/25    Expected End:  06/02/25    Resolved:  04/07/25         Patient will improve Timed Up and Go testing from  15.6 seconds to 12.7 seconds to demonstrate improvement in household mobility and reduced fall risk. (Progressing)       Start:  03/21/25    Expected End:  06/02/25            Patient will improve right hip external rotation by 10 degrees to improve ability to don socks and shoes. (Ongoing)       Start:  03/21/25    Expected End:  06/02/25                Airam Borrego, PTA

## 2025-05-08 ENCOUNTER — CLINICAL SUPPORT (OUTPATIENT)
Dept: REHABILITATION | Facility: HOSPITAL | Age: 75
End: 2025-05-08
Payer: MEDICARE

## 2025-05-08 DIAGNOSIS — Z74.09 IMPAIRED FUNCTIONAL MOBILITY, BALANCE, GAIT, AND ENDURANCE: ICD-10-CM

## 2025-05-08 DIAGNOSIS — R29.898 RIGHT LEG WEAKNESS: ICD-10-CM

## 2025-05-08 DIAGNOSIS — M25.60 DECREASED RANGE OF MOTION: Primary | ICD-10-CM

## 2025-05-08 PROCEDURE — 97110 THERAPEUTIC EXERCISES: CPT | Mod: PO,CQ

## 2025-05-08 PROCEDURE — 97530 THERAPEUTIC ACTIVITIES: CPT | Mod: PO,CQ

## 2025-05-08 NOTE — PROGRESS NOTES
Outpatient Rehab    Physical Therapy Visit    Patient Name: Jorge Becerra  MRN: [id number]  YOB: 1950  Encounter Date: 5/8/2025    Therapy Diagnosis:   Encounter Diagnoses   Name Primary?    Decreased range of motion Yes    Right leg weakness     Impaired functional mobility, balance, gait, and endurance      Physician: Michael Nino MD    Physician Orders: Eval and Treat  Medical Diagnosis: Synucleinopathy  Primary parkinsonism    Visit # / Visits Authorized:  13 / 20  Insurance Authorization Period: 3/20/2025 to 12/31/2025  Date of Evaluation: 3/21/25  Plan of Care Certification: 3/21/25 to 6/2/25                     PT/PTA: PTA   Number of PTA visits since last PT visit:2  Time In: 0750   Time Out: 0830  Total Time (in minutes): 40   Total Billable Time (in minutes): 40    FOTO:  Intake Score:  %  Survey Score 2:  %  Survey Score 3:  %    Precautions     Standard, history of cancer, fall        Subjective   without complaints, reports going to doctor soon to determine driving status.  Pain reported as 0/10.      Objective            Treatment:  Therapeutic Exercise  TE 1: SciFit Stepper Level 3 10 minutes with BUE  TE 2: Seated R Hip stretch into ER 1 minute (figure 4 stretch)  Therapeutic Activity  TA 1: ambulation with own SC with quad pad, vc to increase head up  TA 2: Gait Better VR TM 1.5 mph 10 minutes: 378 yards, Obstacles 81%, Scanning 96%  TA 3: 10MWT: 11 seconds x2 (0.91 m/s)  TA 4: Parallel bars: wieght shift on foam cushion 2 minutes: Ant/Post, R/L    Time Entry(in minutes):  Therapeutic Activity Time Entry: 25  Therapeutic Exercise Time Entry: 15    Assessment & Plan   Assessment: Jorge tolerated activities with good participation and effort.  Did well with all activities with minimal cues for proper form and good follow through.       Patient will continue to benefit from skilled outpatient physical therapy to address the deficits listed in the problem list box on initial  evaluation, provide pt/family education and to maximize pt's level of independence in the home and community environment.     Patient's spiritual, cultural, and educational needs considered and patient agreeable to plan of care and goals.           Plan: Continue with POC to increase activities as patient tolerates.    Goals:   Active       Long Term Goals       Patient will report compliance with updated home exercise program for 4 days a week or more to maintain improvements post discharge.  (Met)       Start:  03/21/25    Expected End:  06/02/25    Resolved:  05/01/25         Patient will increased lower extremity strength as evidenced by increase in manual muscle test  by 1/2 grade as appropriate to decrease gait deviations consistently.   (Met)       Start:  03/21/25    Expected End:  06/02/25    Resolved:  05/01/25         Patient will demonstrate decreased fall risk by improving Meneses Balance Assessment score without AD from 49/56 to 56/56 to improve safety in household and during community outings.   ( Error)       Start:  03/21/25    Expected End:  06/02/25    Resolved:  05/01/25          Patient will increase gait speed as assessed by the timed 10MWT from 0.86 m/s to 1.0 m/s to increase safety and (I) with gait at a community level. (MDC for CVA= 0.14 m/s)   (Progressing)       Start:  03/21/25    Expected End:  06/02/25               Short Term Goals       Patient will report compliance with home exercise program 4 days a week to improve carry over.  (Met)       Start:  03/21/25    Expected End:  06/02/25    Resolved:  04/07/25         Patient will improve Timed Up and Go testing from  15.6 seconds to 12.7 seconds to demonstrate improvement in household mobility and reduced fall risk. (Progressing)       Start:  03/21/25    Expected End:  06/02/25            Patient will improve right hip external rotation by 10 degrees to improve ability to don socks and shoes. (Ongoing)       Start:  03/21/25     Expected End:  06/02/25                Airam Borrego, PTA

## 2025-05-14 DIAGNOSIS — B35.4 TINEA CORPORIS: ICD-10-CM

## 2025-05-14 RX ORDER — KETOCONAZOLE 20 MG/ML
SHAMPOO, SUSPENSION TOPICAL
Qty: 120 ML | Refills: 2 | Status: SHIPPED | OUTPATIENT
Start: 2025-05-15

## 2025-05-15 ENCOUNTER — CLINICAL SUPPORT (OUTPATIENT)
Dept: REHABILITATION | Facility: HOSPITAL | Age: 75
End: 2025-05-15
Payer: MEDICARE

## 2025-05-15 DIAGNOSIS — Z74.09 IMPAIRED FUNCTIONAL MOBILITY, BALANCE, GAIT, AND ENDURANCE: ICD-10-CM

## 2025-05-15 DIAGNOSIS — R29.898 RIGHT LEG WEAKNESS: ICD-10-CM

## 2025-05-15 DIAGNOSIS — M25.60 DECREASED RANGE OF MOTION: Primary | ICD-10-CM

## 2025-05-15 PROCEDURE — 97110 THERAPEUTIC EXERCISES: CPT | Mod: PO,CQ

## 2025-05-15 PROCEDURE — 97530 THERAPEUTIC ACTIVITIES: CPT | Mod: PO,CQ

## 2025-05-15 NOTE — PROGRESS NOTES
Outpatient Rehab    Physical Therapy Visit    Patient Name: Jorge Becerra  MRN: [id number]  YOB: 1950  Encounter Date: 5/15/2025    Therapy Diagnosis:   Encounter Diagnoses   Name Primary?    Decreased range of motion Yes    Right leg weakness     Impaired functional mobility, balance, gait, and endurance      Physician: Michael Nino MD    Physician Orders: Eval and Treat  Medical Diagnosis: Synucleinopathy  Primary parkinsonism    Visit # / Visits Authorized:  14 / 20  Insurance Authorization Period: 3/20/2025 to 12/31/2025  Date of Evaluation: 3/21/2025  Plan of Care Certification: 3/21/2025 to 6/1/2025      PT/PTA: PTA   Number of PTA visits since last PT visit:3  Time In: 0840   Time Out: 0920  Total Time (in minutes): 40   Total Billable Time (in minutes): 40    FOTO:  Intake Score:  %  Survey Score 2:  %  Survey Score 3:  %    Precautions:       Subjective   without complaints,.  Pain reported as 0/10.      Objective            Treatment:  Therapeutic Exercise  TE 1: Recumbent Bike Level 4 15 minutes  TE 2: Shuttle: 62# B leg press 3 minutes, 50# U leg press 3x10 R L,  VC to decrease speed  Therapeutic Activity  TA 1: Parallel bars: Weight Shift on foam cushion 2 minutes: ant/post feet together, R/L feet apart  TA 2: Parallel bars: rocker board 1 minute: ant/post, R/L      Time Entry(in minutes):  Therapeutic Activity Time Entry: 15  Therapeutic Exercise Time Entry: 25    Assessment & Plan   Assessment: Jorge with good participation and effort focusing on LE strengthening and funcitonal mobility to improve balance.  Jorge tolerated activities with minimal rest breaks.       Patient will continue to benefit from skilled outpatient physical therapy to address the deficits listed in the problem list box on initial evaluation, provide pt/family education and to maximize pt's level of independence in the home and community environment.     Patient's spiritual, cultural, and educational needs  considered and patient agreeable to plan of care and goals.           Plan: Continue with POC to increase activities as patient tolerates.    Goals:   Active       Long Term Goals       Patient will report compliance with updated home exercise program for 4 days a week or more to maintain improvements post discharge.  (Met)       Start:  03/21/25    Expected End:  06/02/25    Resolved:  05/01/25         Patient will increased lower extremity strength as evidenced by increase in manual muscle test  by 1/2 grade as appropriate to decrease gait deviations consistently.   (Met)       Start:  03/21/25    Expected End:  06/02/25    Resolved:  05/01/25         Patient will demonstrate decreased fall risk by improving Meneses Balance Assessment score without AD from 49/56 to 56/56 to improve safety in household and during community outings.   ( Error)       Start:  03/21/25    Expected End:  06/02/25    Resolved:  05/01/25          Patient will increase gait speed as assessed by the timed 10MWT from 0.86 m/s to 1.0 m/s to increase safety and (I) with gait at a community level. (MDC for CVA= 0.14 m/s)   (Progressing)       Start:  03/21/25    Expected End:  06/02/25               Short Term Goals       Patient will report compliance with home exercise program 4 days a week to improve carry over.  (Met)       Start:  03/21/25    Expected End:  06/02/25    Resolved:  04/07/25         Patient will improve Timed Up and Go testing from  15.6 seconds to 12.7 seconds to demonstrate improvement in household mobility and reduced fall risk. (Progressing)       Start:  03/21/25    Expected End:  06/02/25            Patient will improve right hip external rotation by 10 degrees to improve ability to don socks and shoes. (Ongoing)       Start:  03/21/25    Expected End:  06/02/25                Airam Borrego, PTA

## 2025-05-19 ENCOUNTER — CLINICAL SUPPORT (OUTPATIENT)
Dept: REHABILITATION | Facility: HOSPITAL | Age: 75
End: 2025-05-19
Payer: MEDICARE

## 2025-05-19 DIAGNOSIS — R29.898 RIGHT LEG WEAKNESS: ICD-10-CM

## 2025-05-19 DIAGNOSIS — M25.60 DECREASED RANGE OF MOTION: Primary | ICD-10-CM

## 2025-05-19 DIAGNOSIS — Z74.09 IMPAIRED FUNCTIONAL MOBILITY, BALANCE, GAIT, AND ENDURANCE: ICD-10-CM

## 2025-05-19 PROCEDURE — 97112 NEUROMUSCULAR REEDUCATION: CPT | Mod: PO

## 2025-05-19 PROCEDURE — 97530 THERAPEUTIC ACTIVITIES: CPT | Mod: PO

## 2025-05-19 PROCEDURE — 97110 THERAPEUTIC EXERCISES: CPT | Mod: PO

## 2025-05-19 NOTE — PROGRESS NOTES
Outpatient Rehab    Physical Therapy Visit    Patient Name: Jorge Becerra  MRN: [id number]  YOB: 1950  Encounter Date: 5/19/2025    Therapy Diagnosis:   Encounter Diagnoses   Name Primary?    Decreased range of motion Yes    Right leg weakness     Impaired functional mobility, balance, gait, and endurance      Physician: Michael Nino MD    Physician Orders: Eval and Treat  Medical Diagnosis: Synucleinopathy  Primary parkinsonism    Visit # / Visits Authorized:  15 / 20  Insurance Authorization Period: 3/20/2025 to 12/31/2025  Date of Evaluation: 3/21/2025  Plan of Care Certification: 3/21/2025 to 6/1/2025      PT/PTA: PT   Number of PTA visits since last PT visit:3  Time In: 0802   Time Out: 0845  Total Time (in minutes): 43   Total Billable Time (in minutes): 43    FOTO:  Intake Score: 60%  Survey Score 2: 73%  Survey Score 3:  %    Precautions:     Standard, history of cancer, fall        Subjective   States he has been walking at home without AD, use of AD when leaving his house occasionally.  Pain reported as 0/10.      Objective         Ambulation Details    10MWT with AD and without AD: 0.86 m/s    Gait Analysis  Base of Support: Normal    Right Side Walking Observations  Decreased: Stance Time                 Treatment:  Therapeutic Exercise  TE 1: Shutle squat 75# 3 x 10  TE 2: Shuttle single leg squat 50# 2 x 10 each  Balance/Neuromuscular Re-Education  NMR 1: tandem stance 2 x 30 sec each  NMR 2: alternating toe taps on 6 inch step x 20  NMR 3: modified single leg stance on 6 inch step 3 x 10 sec each  Therapeutic Activity  TA 1: GaitBetter: 10 min, 1.3 mph, memory: 38%  TA 2: step up leading with RLE x 10 on 6 inch step  TA 3: ascending/descending 60 ramp without AD x 1    Time Entry(in minutes):  Neuromuscular Re-Education Time Entry: 8  Therapeutic Activity Time Entry: 20  Therapeutic Exercise Time Entry: 15    Assessment & Plan   Assessment: Jorge tolerated treatment session well  today. He ambulates with single point cane and without AD with good stability noted. Fatigues with right lower extremity strength training but able to complete. Will reassess at next visit.  Evaluation/Treatment Tolerance: Patient tolerated treatment well    Patient will continue to benefit from skilled outpatient physical therapy to address the deficits listed in the problem list box on initial evaluation, provide pt/family education and to maximize pt's level of independence in the home and community environment.     Patient's spiritual, cultural, and educational needs considered and patient agreeable to plan of care and goals.     Education  Education was done with Patient. The patient's learning style includes Listening. The patient Verbalizes understanding.         Reassessment with PT to determine plan of care going forward.       Plan: reassess at [place]    Goals:   Active       Long Term Goals       Patient will report compliance with updated home exercise program for 4 days a week or more to maintain improvements post discharge.  (Met)       Start:  03/21/25    Expected End:  06/02/25    Resolved:  05/01/25         Patient will increased lower extremity strength as evidenced by increase in manual muscle test  by 1/2 grade as appropriate to decrease gait deviations consistently.   (Met)       Start:  03/21/25    Expected End:  06/02/25    Resolved:  05/01/25         Patient will demonstrate decreased fall risk by improving Meneses Balance Assessment score without AD from 49/56 to 56/56 to improve safety in household and during community outings.   ( Error)       Start:  03/21/25    Expected End:  06/02/25    Resolved:  05/01/25          Patient will increase gait speed as assessed by the timed 10MWT from 0.86 m/s to 1.0 m/s to increase safety and (I) with gait at a community level. (MDC for CVA= 0.14 m/s)   (Progressing)       Start:  03/21/25    Expected End:  06/02/25               Short Term Goals        Patient will report compliance with home exercise program 4 days a week to improve carry over.  (Met)       Start:  03/21/25    Expected End:  06/02/25    Resolved:  04/07/25         Patient will improve Timed Up and Go testing from  15.6 seconds to 12.7 seconds to demonstrate improvement in household mobility and reduced fall risk. (Progressing)       Start:  03/21/25    Expected End:  06/02/25            Patient will improve right hip external rotation by 10 degrees to improve ability to don socks and shoes. (Progressing)       Start:  03/21/25    Expected End:  06/02/25                Maia De Paz, PT

## 2025-05-22 ENCOUNTER — CLINICAL SUPPORT (OUTPATIENT)
Dept: REHABILITATION | Facility: HOSPITAL | Age: 75
End: 2025-05-22
Payer: MEDICARE

## 2025-05-22 DIAGNOSIS — R29.898 RIGHT LEG WEAKNESS: ICD-10-CM

## 2025-05-22 DIAGNOSIS — Z74.09 IMPAIRED FUNCTIONAL MOBILITY, BALANCE, GAIT, AND ENDURANCE: ICD-10-CM

## 2025-05-22 DIAGNOSIS — M25.60 DECREASED RANGE OF MOTION: Primary | ICD-10-CM

## 2025-05-22 PROCEDURE — 97530 THERAPEUTIC ACTIVITIES: CPT | Mod: PO

## 2025-05-22 NOTE — PROGRESS NOTES
Outpatient Rehab    Physical Therapy Progress Note : Updated Plan of Care    Patient Name: Jorge Becerra  MRN: [id number]  YOB: 1950  Encounter Date: 5/22/2025    Therapy Diagnosis:   Encounter Diagnoses   Name Primary?    Decreased range of motion Yes    Right leg weakness     Impaired functional mobility, balance, gait, and endurance      Physician: Michael Nino MD    Physician Orders: Eval and Treat  Medical Diagnosis: Synucleinopathy  Primary parkinsonism    Visit # / Visits Authorized:  16 / 20  Insurance Authorization Period: 3/20/2025 to 12/31/2025  Date of Evaluation: 3/21/2025  Updated Plan of Care Certification: 5/22/2025 to 7/10/2025     PT/PTA: PT   Number of PTA visits since last PT visit:0  Time In: 0847   Time Out: 0930  Total Time (in minutes): 43   Total Billable Time (in minutes): 43    FOTO:  Intake Score: 60%  Survey Score 2: 73%  Survey Score 3:  %    Precautions:     Standard, history of cancer, fall        Subjective   He has been walking at home without walker. use of cane occasionally..  Pain reported as 0/10.      Objective         Fall Risk  Functional mobility test results suggest the patient is: At Risk for Falls  Meneses Balance  Meneses Balance Scale  1. Sitting to Standing: Able to stand without using hands and stabilize independently  2. Standing Unsupported: Able to stand safely for 2 minutes  3. Sitting with Back Unsupported but Feet Supported on Floor or on a Stool: Able to sit safely and securely for 2 minutes  4. Standing to Sitting: Sits safely with minimal use of hands  5.  Transfers: Able to transfer safely with minor use of hands  6. Standing Unsupported with Eyes Closed: Able to stand 10 seconds safely  7. Standing Unsupported with Feet Together: Able to place feet together independently and stand 1 minute safely  8. Reach Forward with Outstretched Arm While Standing: Can reach forward 12 cm (5 inches)  9.  Object from Floor from a Standing Position: Able  to  slipper but needs supervision  10. Turning to Look Behind Over Left and Right Shoulders While Standing: Looks behind from both sides and weight shifts well  11. Turn 360 Degrees: Able to turn 360 degrees safely one side only 4 seconds or less  12. Place Alternate Foot on Step or Stool While Standing Unsupported: Able to stand independently and complete 8 steps in greater than 20 seconds  13. Standing Unsupported One Foot in Front: Able to place foot ahead independently and hold 30 seconds  14. Standing on One Leg: Tries to lift leg unable to hold 3 seconds but remains standing independently  Meneses Balance Score: 48  Meneses Balance Fall Risk: Low    Interpretation:  41-56 = Low Fall Risk  21-40 = Medium Fall Risk  0-20 = High Fall Risk    Timed Up & Go (TUG)  Time: 12 seconds     An older adult who takes >=12 seconds to complete the TUG is at risk for falling.    12 sec Use of hurry cane, 12 seconds without AD  Sit to Stand Testing      The patient completed 11 repetitions of a sit to stand transfer in 30 seconds. without support from posterior leg on chair              Treatment:  Therapeutic Activity  TA 1: GaitBetter: 10 minutes, 1.3 mph. Memory: 77%, obstacles 58%  TA 2: 4 hurdles with step to pattern x 4 laps each leg leading  TA 3: 4 hurdles with attempted reciprocal stepping, Min A to maintain balance x 1 lap    Time Entry(in minutes):  Therapeutic Activity Time Entry: 43    Assessment & Plan   Plan  From a physical therapy perspective, the patient would benefit from: Skilled Rehab Services    Planned therapy interventions include: Therapeutic exercise, Therapeutic activities, Neuromuscular re-education, Manual therapy, and Gait training.    Planned modalities to include: Electrical stimulation - attended and Electrical stimulation - passive/unattended.        Visit Frequency: 1 times Per Week for 6 Weeks.       This plan was discussed with Patient.   Discussion participants: Agreed Upon Plan of  Care  Plan details: Plan of Care Certification Date: 5/22/2025 to 7/10/2025          The patient will continue to benefit from skilled outpatient physical therapy in order to address the deficits listed in the problem list on the initial evaluation, provide patient and family education, and maximize the patients level of independence in the home and community environments.     The patient's spiritual, cultural, and educational needs were considered, and the patient is agreeable to the plan of care and goals.     Education  Education was done with Patient. The patient's learning style includes Listening. The patient Verbalizes understanding.         Improvement with PT.       Goals:   Active       Long Term Goals       Patient will report compliance with updated home exercise program for 4 days a week or more to maintain improvements post discharge.  (Met)       Start:  03/21/25    Expected End:  06/02/25    Resolved:  05/01/25         Patient will increased lower extremity strength as evidenced by increase in manual muscle test  by 1/2 grade as appropriate to decrease gait deviations consistently.   (Met)       Start:  03/21/25    Expected End:  06/02/25    Resolved:  05/01/25         Patient will demonstrate decreased fall risk by improving Meneses Balance Assessment score without AD from 49/56 to 56/56 to improve safety in household and during community outings.   ( Error)       Start:  03/21/25    Expected End:  06/02/25    Resolved:  05/01/25          Patient will increase gait speed as assessed by the timed 10MWT from 0.86 m/s to 1.0 m/s to increase safety and (I) with gait at a community level. (MDC for CVA= 0.14 m/s)   (Progressing)       Start:  03/21/25    Expected End:  06/02/25               Short Term Goals       Patient will report compliance with home exercise program 4 days a week to improve carry over.  (Met)       Start:  03/21/25    Expected End:  06/02/25    Resolved:  04/07/25          Patient will improve Timed Up and Go testing from  15.6 seconds to 12.7 seconds to demonstrate improvement in household mobility and reduced fall risk. (Met)       Start:  03/21/25    Expected End:  06/02/25    Resolved:  05/27/25         Patient will improve right hip external rotation by 10 degrees to improve ability to don socks and shoes. (Progressing)       Start:  03/21/25    Expected End:  06/02/25                Maia De Paz, PT

## 2025-05-27 ENCOUNTER — CLINICAL SUPPORT (OUTPATIENT)
Dept: REHABILITATION | Facility: HOSPITAL | Age: 75
End: 2025-05-27
Payer: MEDICARE

## 2025-05-27 DIAGNOSIS — Z74.09 IMPAIRED FUNCTIONAL MOBILITY, BALANCE, GAIT, AND ENDURANCE: ICD-10-CM

## 2025-05-27 DIAGNOSIS — M25.60 DECREASED RANGE OF MOTION: Primary | ICD-10-CM

## 2025-05-27 DIAGNOSIS — R29.898 RIGHT LEG WEAKNESS: ICD-10-CM

## 2025-05-27 PROCEDURE — 97530 THERAPEUTIC ACTIVITIES: CPT | Mod: PO,CQ

## 2025-05-27 PROCEDURE — 97110 THERAPEUTIC EXERCISES: CPT | Mod: PO,CQ

## 2025-05-27 NOTE — PROGRESS NOTES
Outpatient Rehab    Physical Therapy Visit    Patient Name: Jorge Becerra  MRN: [id number]  YOB: 1950  Encounter Date: 5/27/2025    Therapy Diagnosis:   Encounter Diagnoses   Name Primary?    Decreased range of motion Yes    Right leg weakness     Impaired functional mobility, balance, gait, and endurance      Physician: Michael Nino MD    Physician Orders: Eval and Treat  Medical Diagnosis: Synucleinopathy  Primary parkinsonism    Visit # / Visits Authorized:  17 / 20  Insurance Authorization Period: 3/20/2025 to 12/31/2025  Date of Evaluation: 3/21/2025  Plan of Care Certification: 5/27/2025 to 7/10/2025      PT/PTA:     Number of PTA visits since last PT visit:   Time In:     Time Out:    Total Time (in minutes):     Total Billable Time (in minutes):      FOTO:  Intake Score:  %  Survey Score 2:  %  Survey Score 3:  %    Precautions:       Subjective             Objective            Treatment:       Time Entry(in minutes):       Assessment & Plan   Assessment:         The patient will continue to benefit from skilled outpatient physical therapy in order to address the deficits listed in the problem list on the initial evaluation, provide patient and family education, and maximize the patients level of independence in the home and community environments.     The patient's spiritual, cultural, and educational needs were considered, and the patient is agreeable to the plan of care and goals.           Plan:      Goals:   Active       Long Term Goals       Patient will report compliance with updated home exercise program for 4 days a week or more to maintain improvements post discharge.  (Met)       Start:  03/21/25    Expected End:  06/02/25    Resolved:  05/01/25         Patient will increased lower extremity strength as evidenced by increase in manual muscle test  by 1/2 grade as appropriate to decrease gait deviations consistently.   (Met)       Start:  03/21/25    Expected End:  06/02/25     Resolved:  05/01/25         Patient will demonstrate decreased fall risk by improving Meneses Balance Assessment score without AD from 49/56 to 56/56 to improve safety in household and during community outings.   ( Error)       Start:  03/21/25    Expected End:  06/02/25    Resolved:  05/01/25          Patient will increase gait speed as assessed by the timed 10MWT from 0.86 m/s to 1.0 m/s to increase safety and (I) with gait at a community level. (MDC for CVA= 0.14 m/s)   (Progressing)       Start:  03/21/25    Expected End:  06/02/25               Short Term Goals       Patient will report compliance with home exercise program 4 days a week to improve carry over.  (Met)       Start:  03/21/25    Expected End:  06/02/25    Resolved:  04/07/25         Patient will improve Timed Up and Go testing from  15.6 seconds to 12.7 seconds to demonstrate improvement in household mobility and reduced fall risk. (Met)       Start:  03/21/25    Expected End:  06/02/25    Resolved:  05/27/25         Patient will improve right hip external rotation by 10 degrees to improve ability to don socks and shoes. (Progressing)       Start:  03/21/25    Expected End:  06/02/25                Airam Borrego, PTA

## 2025-05-29 ENCOUNTER — DOCUMENTATION ONLY (OUTPATIENT)
Dept: REHABILITATION | Facility: HOSPITAL | Age: 75
End: 2025-05-29
Payer: MEDICARE

## 2025-06-03 ENCOUNTER — CLINICAL SUPPORT (OUTPATIENT)
Dept: REHABILITATION | Facility: HOSPITAL | Age: 75
End: 2025-06-03
Payer: MEDICARE

## 2025-06-03 ENCOUNTER — TELEPHONE (OUTPATIENT)
Facility: CLINIC | Age: 75
End: 2025-06-03
Payer: MEDICARE

## 2025-06-03 DIAGNOSIS — R29.898 RIGHT LEG WEAKNESS: ICD-10-CM

## 2025-06-03 DIAGNOSIS — M25.60 DECREASED RANGE OF MOTION: Primary | ICD-10-CM

## 2025-06-03 DIAGNOSIS — Z74.09 IMPAIRED FUNCTIONAL MOBILITY, BALANCE, GAIT, AND ENDURANCE: ICD-10-CM

## 2025-06-03 PROCEDURE — 97110 THERAPEUTIC EXERCISES: CPT | Mod: PO,CQ

## 2025-06-03 PROCEDURE — 97530 THERAPEUTIC ACTIVITIES: CPT | Mod: PO,CQ

## 2025-06-04 LAB
LEFT EYE DM RETINOPATHY: POSITIVE
RIGHT EYE DM RETINOPATHY: POSITIVE

## 2025-06-05 ENCOUNTER — PATIENT OUTREACH (OUTPATIENT)
Dept: ADMINISTRATIVE | Facility: HOSPITAL | Age: 75
End: 2025-06-05
Payer: MEDICARE

## 2025-06-17 ENCOUNTER — CLINICAL SUPPORT (OUTPATIENT)
Dept: REHABILITATION | Facility: HOSPITAL | Age: 75
End: 2025-06-17
Payer: MEDICARE

## 2025-06-17 DIAGNOSIS — M25.60 DECREASED RANGE OF MOTION: Primary | ICD-10-CM

## 2025-06-17 DIAGNOSIS — Z74.09 IMPAIRED FUNCTIONAL MOBILITY, BALANCE, GAIT, AND ENDURANCE: ICD-10-CM

## 2025-06-17 DIAGNOSIS — R29.898 RIGHT LEG WEAKNESS: ICD-10-CM

## 2025-06-17 PROCEDURE — 97530 THERAPEUTIC ACTIVITIES: CPT | Mod: PO,CQ

## 2025-06-17 PROCEDURE — 97110 THERAPEUTIC EXERCISES: CPT | Mod: PO,CQ

## 2025-06-17 NOTE — PROGRESS NOTES
"  Outpatient Rehab    Physical Therapy Visit    Patient Name: Jorge Becerra  MRN: [id number]  YOB: 1950  Encounter Date: 6/17/2025    Therapy Diagnosis:   Encounter Diagnoses   Name Primary?    Decreased range of motion Yes    Right leg weakness     Impaired functional mobility, balance, gait, and endurance      Physician: Michael Nino MD    Physician Orders: Eval and Treat  Medical Diagnosis: Synucleinopathy  Primary parkinsonism  Surgical Diagnosis: Not applicable for this Episode   Surgical Date: Not applicable for this Episode  Days Since Last Surgery: Not applicable for this Episode    Visit # / Visits Authorized:  19 / 20  Insurance Authorization Period: 3/20/2025 to 12/31/2025  Date of Evaluation: 3/21/2025  Plan of Care Certification: 5/27/2025 to 7/10/2025      PT/PTA: PTA   Number of PTA visits since last PT visit:3  Time In: 0800   Time Out: 0845  Total Time (in minutes): 45   Total Billable Time (in minutes): 45    FOTO:  Intake Score:  %  Survey Score 2:  %  Survey Score 3:  %    Precautions:       Subjective   Doing well, went to [facility] "it went good I go back in 6 months"..  Pain reported as 0/10.      Objective            Treatment:  Balance/Neuromuscular Re-Education  NMR 1: SciFit Stepper Level 3 15 minutes with B UE  Therapeutic Activity  TA 1: ambulation with own SC with quad pad, VC to increase upright  TA 2:  front of mirror for cane shoulder flexion 3x10 with seated rest break between sets, TC to decrease L shoulder hiking  TA 3: SLS with mirror 3x30 seconds R L same side support  TA 4: Parallel bars with mirror: B heel raises with mirror, VC to decrease B UE WB  TA 5: Parallel bars: Tandem walk 6 laps forward/backward with light UE support  TA 6: Parallel bars: Side step up/over 5 inch box R L 2 minutes    Time Entry(in minutes):  Therapeutic Activity Time Entry: 30  Therapeutic Exercise Time Entry: 15    Assessment & Plan   Assessment: Jorge gleason" participation and effort with focus on neuromuscular re-education and functional mobility to improve posture.  Jorge with improved posture with mirror.       The patient will continue to benefit from skilled outpatient physical therapy in order to address the deficits listed in the problem list on the initial evaluation, provide patient and family education, and maximize the patients level of independence in the home and community environments.     The patient's spiritual, cultural, and educational needs were considered, and the patient is agreeable to the plan of care and goals.           Plan: Continue with POC to increase activities as patient tolerates.    Goals:   Active       Long Term Goals       Patient will report compliance with updated home exercise program for 4 days a week or more to maintain improvements post discharge.  (Met)       Start:  03/21/25    Expected End:  06/02/25    Resolved:  05/01/25         Patient will increased lower extremity strength as evidenced by increase in manual muscle test  by 1/2 grade as appropriate to decrease gait deviations consistently.   (Met)       Start:  03/21/25    Expected End:  06/02/25    Resolved:  05/01/25         Patient will demonstrate decreased fall risk by improving Meneses Balance Assessment score without AD from 49/56 to 56/56 to improve safety in household and during community outings.   ( Error)       Start:  03/21/25    Expected End:  06/02/25    Resolved:  05/01/25          Patient will increase gait speed as assessed by the timed 10MWT from 0.86 m/s to 1.0 m/s to increase safety and (I) with gait at a community level. (MDC for CVA= 0.14 m/s)   (Progressing)       Start:  03/21/25    Expected End:  06/02/25               Short Term Goals       Patient will report compliance with home exercise program 4 days a week to improve carry over.  (Met)       Start:  03/21/25    Expected End:  06/02/25    Resolved:  04/07/25         Patient will  improve Timed Up and Go testing from  15.6 seconds to 12.7 seconds to demonstrate improvement in household mobility and reduced fall risk. (Met)       Start:  03/21/25    Expected End:  06/02/25    Resolved:  05/27/25         Patient will improve right hip external rotation by 10 degrees to improve ability to don socks and shoes. (Progressing)       Start:  03/21/25    Expected End:  06/02/25                Airam Borrego, PTA

## 2025-06-18 ENCOUNTER — OFFICE VISIT (OUTPATIENT)
Dept: CARDIOLOGY | Facility: CLINIC | Age: 75
End: 2025-06-18
Payer: MEDICARE

## 2025-06-18 VITALS
BODY MASS INDEX: 24.46 KG/M2 | WEIGHT: 156.19 LBS | SYSTOLIC BLOOD PRESSURE: 138 MMHG | DIASTOLIC BLOOD PRESSURE: 68 MMHG | OXYGEN SATURATION: 98 % | HEART RATE: 85 BPM

## 2025-06-18 DIAGNOSIS — N18.2 HYPERTENSION ASSOCIATED WITH STAGE 2 CHRONIC KIDNEY DISEASE DUE TO TYPE 2 DIABETES MELLITUS: ICD-10-CM

## 2025-06-18 DIAGNOSIS — E11.22 HYPERTENSION ASSOCIATED WITH STAGE 2 CHRONIC KIDNEY DISEASE DUE TO TYPE 2 DIABETES MELLITUS: ICD-10-CM

## 2025-06-18 DIAGNOSIS — E78.2 MIXED DIABETIC HYPERLIPIDEMIA ASSOCIATED WITH TYPE 2 DIABETES MELLITUS: ICD-10-CM

## 2025-06-18 DIAGNOSIS — Z79.82 ASPIRIN LONG-TERM USE: ICD-10-CM

## 2025-06-18 DIAGNOSIS — I12.9 HYPERTENSION ASSOCIATED WITH STAGE 2 CHRONIC KIDNEY DISEASE DUE TO TYPE 2 DIABETES MELLITUS: ICD-10-CM

## 2025-06-18 DIAGNOSIS — I70.0 AORTIC ATHEROSCLEROSIS: ICD-10-CM

## 2025-06-18 DIAGNOSIS — E78.5 DYSLIPIDEMIA: Primary | ICD-10-CM

## 2025-06-18 DIAGNOSIS — E11.3393 CONTROLLED TYPE 2 DIABETES MELLITUS WITH BOTH EYES AFFECTED BY MODERATE NONPROLIFERATIVE RETINOPATHY WITHOUT MACULAR EDEMA, WITHOUT LONG-TERM CURRENT USE OF INSULIN: ICD-10-CM

## 2025-06-18 DIAGNOSIS — E11.69 MIXED DIABETIC HYPERLIPIDEMIA ASSOCIATED WITH TYPE 2 DIABETES MELLITUS: ICD-10-CM

## 2025-06-18 DIAGNOSIS — C49.9 SARCOMA: ICD-10-CM

## 2025-06-18 PROCEDURE — 99999 PR PBB SHADOW E&M-EST. PATIENT-LVL III: CPT | Mod: PBBFAC,,, | Performed by: INTERNAL MEDICINE

## 2025-06-18 PROCEDURE — 99213 OFFICE O/P EST LOW 20 MIN: CPT | Mod: PBBFAC,PN | Performed by: INTERNAL MEDICINE

## 2025-06-18 PROCEDURE — 99214 OFFICE O/P EST MOD 30 MIN: CPT | Mod: S$PBB,,, | Performed by: INTERNAL MEDICINE

## 2025-06-18 RX ORDER — ATORVASTATIN CALCIUM 20 MG/1
20 TABLET, FILM COATED ORAL NIGHTLY
Qty: 90 TABLET | Refills: 3 | Status: SHIPPED | OUTPATIENT
Start: 2025-06-18

## 2025-06-18 RX ORDER — CARBIDOPA AND LEVODOPA 25; 100 MG/1; MG/1
TABLET ORAL
Qty: 180 TABLET | Refills: 0 | Status: SHIPPED | OUTPATIENT
Start: 2025-06-18

## 2025-06-18 NOTE — ASSESSMENT & PLAN NOTE
He is on metformin alone at this time at 1000 mg p.o. b.i.d. reportedly he is not taking Jardiance already belches or semaglutide

## 2025-06-18 NOTE — ASSESSMENT & PLAN NOTE
Reportedly has a follow-up evaluation at [facility] and noted to be clinically stable and he was encouraged to continue physical therapy once a week and apparently he was also cleared for driving   22-Jan-2019 16:42

## 2025-06-18 NOTE — ASSESSMENT & PLAN NOTE
Previous blood test shows LDL cholesterol of 61 fairly well controlled maintain on same regimen.  Her recent liver profile is normal as well.  He had some of the blood work done at [facility]

## 2025-06-18 NOTE — ASSESSMENT & PLAN NOTE
This has sclerosis continue on risk factor modification, maintain on blood pressure control aspirin therapy and statin therapy.  As well as diabetic control clinically stable

## 2025-06-18 NOTE — PROGRESS NOTES
Subjective:    Patient ID:  Jorge Becerra is a 74 y.o. 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  Reportedly he has been doing well from this has perspective and encouraged her to follow-up in 6 months' time  Clinically from cardiac perspective he has no new symptoms of angina shortness of breath no PND orthopnea and has remained reasonably active.  Reportedly he has continue on getting physical therapy now reduced to once a week and also medically cleared for any driving as well.    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  MUSCULOSKELETAL: No myalgias  NEURO: No lightheadedness or dizziness  EYES: No Double vision, blurry, vision or headache              Objective        Vitals:    06/18/25 0846   BP: 138/68   Pulse: 85       LIPIDS - LAST 2   Lab Results   Component Value Date    CHOL 119 (L) 05/17/2024    CHOL 104 (L) 08/05/2023    HDL 44 05/17/2024    HDL 40 08/05/2023    LDLCALC 61.4 (L) 05/17/2024    LDLCALC 45.8 (L) 08/05/2023    TRIG 68 05/17/2024    TRIG 91 08/05/2023    CHOLHDL 37.0 05/17/2024    CHOLHDL 38.5 08/05/2023       CBC - LAST 2  Lab Results   Component Value Date    WBC 8.05 09/12/2024    WBC 7.55  "08/05/2023    RBC 4.06 (L) 09/12/2024    RBC 4.29 (L) 08/05/2023    HGB 13.6 (L) 09/12/2024    HGB 14.2 08/05/2023    HCT 41.3 09/12/2024    HCT 42.6 08/05/2023     (H) 09/12/2024    MCV 99 (H) 08/05/2023    MCH 33.5 (H) 09/12/2024    MCH 33.1 (H) 08/05/2023    MCHC 32.9 09/12/2024    MCHC 33.3 08/05/2023    RDW 14.6 (H) 09/12/2024    RDW 14.6 (H) 08/05/2023     09/12/2024     08/05/2023    MPV 10.5 09/12/2024    MPV 11.1 08/05/2023    GRAN 5.0 09/12/2024    GRAN 61.7 09/12/2024    LYMPH 2.1 09/12/2024    LYMPH 25.8 09/12/2024    MONO 0.5 09/12/2024    MONO 6.6 09/12/2024    BASO 0.04 09/12/2024    BASO 0.03 08/05/2023    NRBC 0 09/12/2024    NRBC 0 08/05/2023       CHEMISTRY & LIVER FUNCTION - LAST 2  Lab Results   Component Value Date     12/05/2024     09/12/2024    K 4.3 12/05/2024    K 4.0 09/12/2024     12/05/2024     09/12/2024    CO2 25 12/05/2024    CO2 23 09/12/2024    ANIONGAP 12 12/05/2024    ANIONGAP 16 09/12/2024    BUN 14 12/05/2024    BUN 13 09/12/2024    CREATININE 1.1 12/05/2024    CREATININE 1.1 09/12/2024     (H) 12/05/2024     (H) 09/12/2024    CALCIUM 10.3 12/05/2024    CALCIUM 10.0 09/12/2024    MG 1.6 09/12/2024    MG 1.9 02/07/2023    ALBUMIN 4.1 12/05/2024    ALBUMIN 4.2 09/12/2024    PROT 7.7 12/05/2024    PROT 7.5 09/12/2024    ALKPHOS 60 12/05/2024    ALKPHOS 57 09/12/2024    ALT 17 12/05/2024    ALT 17 09/12/2024    AST 23 12/05/2024    AST 25 09/12/2024    BILITOT 0.5 12/05/2024    BILITOT 0.5 09/12/2024        CARDIAC PROFILE - LAST 2  No results found for: "BNP", "CPK", "CPKMB", "LDH", "TROPONINI", "TROPONINIHS"     COAGULATION - LAST 2  Lab Results   Component Value Date    INR 1.0 05/31/2021       ENDOCRINE & PSA - LAST 2  Lab Results   Component Value Date    HGBA1C 6.7 (H) 12/05/2024    HGBA1C 7.0 (H) 05/17/2024    MICROALBUR 1.2 10/31/2019    MICROALBUR 1.7 10/15/2018    TSH 3.282 09/12/2024    TSH 3.075 05/30/2024    " PSA 0.65 10/23/2020        ECHOCARDIOGRAM RESULTS  Results for orders placed during the hospital encounter of 02/15/23    Echo    Interpretation Summary  · The left ventricle is normal in size with concentric remodeling and normal systolic function.  · The estimated ejection fraction is 68%.  · Normal left ventricular diastolic function.  · Mild left atrial enlargement.  · Mild right ventricular enlargement with normal right ventricular systolic function.  · Mild right atrial enlargement.  · Normal central venous pressure (3 mmHg).  · The estimated PA systolic pressure is 27 mmHg.      CURRENT/PREVIOUS VISIT EKG  Results for orders placed or performed during the hospital encounter of 09/12/24   EKG 12-lead    Collection Time: 09/12/24  8:46 AM   Result Value Ref Range    QRS Duration 90 ms    OHS QTC Calculation 422 ms    Narrative    Test Reason : G31.84,    Vent. Rate : 075 BPM     Atrial Rate : 075 BPM     P-R Int : 132 ms          QRS Dur : 090 ms      QT Int : 378 ms       P-R-T Axes : 005 -29 001 degrees     QTc Int : 422 ms    Normal sinus rhythm  Voltage criteria for left ventricular hypertrophy  Abnormal ECG  When compared with ECG of 12-SEP-2024 08:45,  Previous ECG has undetermined rhythm, needs review  Confirmed by Christiano ENGLISH, Vinicio ROSA ([id number]) on 9/17/2024 10:38:38 PM    Referred By: SHANNON VAZ           Confirmed By:Vinicio Alvarado MD     No valid procedures specified.   Results for orders placed during the hospital encounter of 02/15/23    Nuclear Stress - Cardiology Interpreted    Interpretation Summary    Normal myocardial perfusion scan. There is no evidence of myocardial ischemia or infarction.    The gated perfusion images showed an ejection fraction of 65% at rest. The gated perfusion images showed an ejection fraction of 73% post stress. Normal ejection fraction is greater than 53%.    There is normal wall motion at rest and post stress.    LV cavity size is normal at rest and normal at  stress.    The ECG portion of the study is negative for ischemia.    The patient reported no chest pain during the stress test.    During stress, rare PACs are noted. , During stress, rare PVCs are noted.    The patient exercised for 3 minutes 3 seconds on a Charli protocol, corresponding to a functional capacity of 5 METS, achieving a peak heart rate of 142 bpm, which is 97 % of the age predicted maximum heart rate.    No valid procedures specified.    PHYSICAL EXAM  CONSTITUTIONAL: Well built, well nourished in no apparent distress  NECK: no carotid bruit, no JVD  LUNGS: CTA  CHEST WALL: no tenderness  HEART: regular rate and rhythm, S1, S2 normal, no murmur, click, rub or gallop   ABDOMEN: soft, non-tender; bowel sounds normal; no masses,  no organomegaly  EXTREMITIES: Extremities normal, no edema, no calf tenderness noted  NEURO: AAO X 3    I HAVE REVIEWED :    The vital signs, nurses notes, and all the pertinent radiology and labs.        Current Outpatient Medications   Medication Instructions    allopurinoL (ZYLOPRIM) 300 mg, Oral, Every other day    amLODIPine (NORVASC) 5 mg, Oral    aspirin (ECOTRIN) 81 mg, Daily    atorvastatin (LIPITOR) 20 mg, Oral, Nightly    carbidopa-levodopa  mg (SINEMET)  mg per tablet Do not take medication 45 minutes before or after a protein rich meal - Take 1/2 tablet in AM for 1 week, then 1 tablet in AM for 1 week, then 1 tablet BID for 2 weeks, then 1 tablet TID    cholecalciferol, vitamin D3, 10 mcg (400 unit) Cap capsule 1 tablet, Daily    cyanocobalamin, vitamin B-12, 1,000 mcg Cap 1 tablet, Daily    dapagliflozin propanediol (FARXIGA) 10 mg, Oral, Daily    donepeziL (ARICEPT) 10 mg, Oral, Every morning    ferrous sulfate (FEOSOL) 325 mg, With breakfast    fosinopriL (MONOPRIL) 40 mg, Oral, Daily    ketoconazole (NIZORAL) 2 % shampoo Topical (Top), Twice weekly    magnesium oxide 400 mg magnesium Cap 1 tablet, Daily    metFORMIN (GLUCOPHAGE) 1,000 mg, Oral, 2  times daily    methocarbamoL (ROBAXIN) 750 mg    vit C/E/zinc/lutein/zeaxanthin (OCUVITE EYE HEALTH ORAL) 1 tablet, 2 times daily          Assessment & Plan     1. Dyslipidemia  Assessment & Plan:  Previous blood test shows LDL cholesterol of 61 fairly well controlled maintain on same regimen.  Her recent liver profile is normal as well.  He had some of the blood work done at [facility]      2. Mixed diabetic hyperlipidemia associated with type 2 diabetes mellitus  Assessment & Plan:  He is presently on Lipitor 20 mg daily last LDL cholesterol is stable      3. Aortic atherosclerosis  Assessment & Plan:  This has sclerosis continue on risk factor modification, maintain on blood pressure control aspirin therapy and statin therapy.  As well as diabetic control clinically stable      4. Hypertension associated with stage 2 chronic kidney disease due to type 2 diabetes mellitus  Overview:  Blood pressure is controlled 138/68 mm Hg  Encouraged to stay on amlodipine 5 mg a day, Monocryl 40 mg daily, maintain on low-salt low-fat diet      5. Sarcoma  Assessment & Plan:  Reportedly has a follow-up evaluation at [facility] and noted to be clinically stable and he was encouraged to continue physical therapy once a week and apparently he was also cleared for driving      6. 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, without long-term current use of insulin  Assessment & Plan:  He is on metformin alone at this time at 1000 mg p.o. b.i.d. reportedly he is not taking Jardiance already belches or semaglutide      7. Aspirin long-term use  Assessment & Plan:  Long-term usage of aspirin CBC is normal clinically stable continue the            No follow-ups on file.            [1]   Social History  Tobacco Use    Smoking status: Never    Smokeless tobacco: Never   Substance Use Topics    Alcohol use: Never

## 2025-06-24 ENCOUNTER — CLINICAL SUPPORT (OUTPATIENT)
Dept: REHABILITATION | Facility: HOSPITAL | Age: 75
End: 2025-06-24
Payer: MEDICARE

## 2025-06-24 DIAGNOSIS — R29.898 RIGHT LEG WEAKNESS: ICD-10-CM

## 2025-06-24 DIAGNOSIS — M25.60 DECREASED RANGE OF MOTION: Primary | ICD-10-CM

## 2025-06-24 DIAGNOSIS — Z74.09 IMPAIRED FUNCTIONAL MOBILITY, BALANCE, GAIT, AND ENDURANCE: ICD-10-CM

## 2025-06-24 PROCEDURE — 97530 THERAPEUTIC ACTIVITIES: CPT | Mod: PO

## 2025-06-24 PROCEDURE — 97112 NEUROMUSCULAR REEDUCATION: CPT | Mod: PO

## 2025-06-27 ENCOUNTER — OFFICE VISIT (OUTPATIENT)
Dept: FAMILY MEDICINE | Facility: CLINIC | Age: 75
End: 2025-06-27
Payer: MEDICARE

## 2025-06-27 ENCOUNTER — LAB VISIT (OUTPATIENT)
Dept: LAB | Facility: HOSPITAL | Age: 75
End: 2025-06-27
Attending: STUDENT IN AN ORGANIZED HEALTH CARE EDUCATION/TRAINING PROGRAM
Payer: MEDICARE

## 2025-06-27 VITALS
BODY MASS INDEX: 24.01 KG/M2 | OXYGEN SATURATION: 98 % | HEIGHT: 67 IN | SYSTOLIC BLOOD PRESSURE: 134 MMHG | WEIGHT: 153 LBS | DIASTOLIC BLOOD PRESSURE: 64 MMHG | HEART RATE: 84 BPM

## 2025-06-27 DIAGNOSIS — I10 BENIGN ESSENTIAL HTN: ICD-10-CM

## 2025-06-27 DIAGNOSIS — M54.50 ACUTE LEFT-SIDED LOW BACK PAIN WITHOUT SCIATICA: ICD-10-CM

## 2025-06-27 DIAGNOSIS — E11.3393 CONTROLLED TYPE 2 DIABETES MELLITUS WITH BOTH EYES AFFECTED BY MODERATE NONPROLIFERATIVE RETINOPATHY WITHOUT MACULAR EDEMA, WITHOUT LONG-TERM CURRENT USE OF INSULIN: ICD-10-CM

## 2025-06-27 DIAGNOSIS — E11.3393 CONTROLLED TYPE 2 DIABETES MELLITUS WITH BOTH EYES AFFECTED BY MODERATE NONPROLIFERATIVE RETINOPATHY WITHOUT MACULAR EDEMA, WITHOUT LONG-TERM CURRENT USE OF INSULIN: Primary | ICD-10-CM

## 2025-06-27 LAB
ALBUMIN/CREAT UR: 28.6 UG/MG
CHOLEST SERPL-MCNC: 90 MG/DL (ref 120–199)
CHOLEST/HDLC SERPL: 2 {RATIO} (ref 2–5)
CREAT UR-MCNC: 140 MG/DL (ref 23–375)
EAG (OHS): 166 MG/DL (ref 68–131)
HBA1C MFR BLD: 7.4 % (ref 4–5.6)
HDLC SERPL-MCNC: 46 MG/DL (ref 40–75)
HDLC SERPL: 51.1 % (ref 20–50)
LDLC SERPL CALC-MCNC: 34 MG/DL (ref 63–159)
MICROALBUMIN UR-MCNC: 40 UG/ML (ref ?–5000)
NONHDLC SERPL-MCNC: 44 MG/DL
TRIGL SERPL-MCNC: 50 MG/DL (ref 30–150)

## 2025-06-27 PROCEDURE — 82570 ASSAY OF URINE CREATININE: CPT

## 2025-06-27 PROCEDURE — 83036 HEMOGLOBIN GLYCOSYLATED A1C: CPT

## 2025-06-27 PROCEDURE — 99999 PR PBB SHADOW E&M-EST. PATIENT-LVL IV: CPT | Mod: PBBFAC,,, | Performed by: STUDENT IN AN ORGANIZED HEALTH CARE EDUCATION/TRAINING PROGRAM

## 2025-06-27 PROCEDURE — 99214 OFFICE O/P EST MOD 30 MIN: CPT | Mod: PBBFAC,PO | Performed by: STUDENT IN AN ORGANIZED HEALTH CARE EDUCATION/TRAINING PROGRAM

## 2025-06-27 PROCEDURE — 80061 LIPID PANEL: CPT

## 2025-06-27 PROCEDURE — 36415 COLL VENOUS BLD VENIPUNCTURE: CPT | Mod: PO

## 2025-06-27 RX ORDER — MELOXICAM 15 MG/1
15 TABLET ORAL DAILY
Qty: 14 TABLET | Refills: 0 | Status: SHIPPED | OUTPATIENT
Start: 2025-06-27

## 2025-06-27 NOTE — PROGRESS NOTES
"[facility] CLINIC NOTE    Patient Name: Jorge Becerra  YOB: 1950    PRESENTING HISTORY     History of Present Illness:  Mr. Jorge Becerra is a 74 y.o. male here for routine f/u.     C/o 1 mo of left falnk tightness. Menthol roll on helps.   Left low back.   No radiation.  No incontinence. No fevers.   No leg weakness/numbness.     Recently went to [facility], everything looked good.     HTN- fosinopril, amlodipine    DM2-  today. Normally running 130-140. Depends on eating.     History of gout- controlled.     Seing neurology. On aricept, recently started on sinemet.       Did PT, right leg doing very well with strength.         ROS      OBJECTIVE:   Vital Signs:  Vitals:    06/27/25 0836   BP: 134/64   Pulse: 84   SpO2: 98%   Weight: 69.4 kg (153 lb)   Height: 5' 7" (1.702 m)         Physical Exam  Vitals and nursing note reviewed.   Constitutional:       Appearance: He is not ill-appearing, toxic-appearing or diaphoretic.   HENT:      Head: Normocephalic and atraumatic.      Right Ear: External ear normal.      Left Ear: External ear normal.   Eyes:      General: No scleral icterus.     Conjunctiva/sclera: Conjunctivae normal.      Pupils: Pupils are equal, round, and reactive to light.   Neck:      Thyroid: No thyromegaly.   Cardiovascular:      Rate and Rhythm: Normal rate and regular rhythm.      Heart sounds: Normal heart sounds. No murmur heard.  Pulmonary:      Effort: Pulmonary effort is normal. No respiratory distress.      Breath sounds: Normal breath sounds. No wheezing or rales.   Musculoskeletal:         General: No tenderness or deformity. Normal range of motion.      Cervical back: Normal range of motion and neck supple.      Comments: Nl windshield wiper, nl straight leg raise.    Lymphadenopathy:      Cervical: No cervical adenopathy.   Skin:     General: Skin is warm and dry.      Findings: No erythema or rash.   Neurological:      Mental Status: He is alert and " oriented to person, place, and time.      Sensory: No sensory deficit.      Coordination: Coordination normal.      Gait: Gait is intact.      Deep Tendon Reflexes: Reflexes normal.   Psychiatric:         Mood and Affect: Mood and affect normal.         Cognition and Memory: Memory normal.         Judgment: Judgment normal.         ASSESSMENT & PLAN:     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, without long-term current use of insulin  -     Lipid Panel; Future; Expected date: 06/27/2025  -     Hemoglobin A1C; Future; Expected date: 06/27/2025  -     Microalbumin/Creatinine Ratio, Urine; Future; Expected date: 06/27/2025  Stable, continue current medications    Benign essential HTN  -     Lipid Panel; Future; Expected date: 06/27/2025  -     Hemoglobin A1C; Future; Expected date: 06/27/2025  -     Microalbumin/Creatinine Ratio, Urine; Future; Expected date: 06/27/2025  Stable, continue current medications    Acute left-sided low back pain without sciatica  -     meloxicam (MOBIC) 15 MG tablet; Take 1 tablet (15 mg total) by mouth once daily.  Dispense: 14 tablet; Refill: 0  Does have malignancy history but low suspicion of sinister etiology. Conservative management for now. If does not improve will pursue more in depth evaluation.        Mehul Royal  Internal Medicine

## 2025-06-30 ENCOUNTER — RESULTS FOLLOW-UP (OUTPATIENT)
Dept: FAMILY MEDICINE | Facility: CLINIC | Age: 75
End: 2025-06-30

## 2025-07-01 ENCOUNTER — CLINICAL SUPPORT (OUTPATIENT)
Dept: REHABILITATION | Facility: HOSPITAL | Age: 75
End: 2025-07-01
Payer: MEDICARE

## 2025-07-01 DIAGNOSIS — M25.60 DECREASED RANGE OF MOTION: Primary | ICD-10-CM

## 2025-07-01 DIAGNOSIS — R29.898 RIGHT LEG WEAKNESS: ICD-10-CM

## 2025-07-01 DIAGNOSIS — Z74.09 IMPAIRED FUNCTIONAL MOBILITY, BALANCE, GAIT, AND ENDURANCE: ICD-10-CM

## 2025-07-01 PROCEDURE — 97530 THERAPEUTIC ACTIVITIES: CPT | Mod: PO,CQ

## 2025-07-01 PROCEDURE — 97110 THERAPEUTIC EXERCISES: CPT | Mod: PO,CQ

## 2025-07-01 NOTE — PROGRESS NOTES
Outpatient Rehab    Physical Therapy Visit    Patient Name: Jorge Becerra  MRN: [id number]  YOB: 1950  Encounter Date: 7/1/2025    Therapy Diagnosis:   Encounter Diagnoses   Name Primary?    Decreased range of motion Yes    Right leg weakness     Impaired functional mobility, balance, gait, and endurance      Physician: Michael Nino MD    Physician Orders: Eval and Treat  Medical Diagnosis: Synucleinopathy  Primary parkinsonism  Surgical Diagnosis: Not applicable for this Episode   Surgical Date: Not applicable for this Episode  Days Since Last Surgery: Not applicable for this Episode    Visit # / Visits Authorized:  21 / 30  Insurance Authorization Period: 3/20/2025 to 12/31/2025  Date of Evaluation: 3/21/2025  Plan of Care Certification: 5/27/2025 to 7/10/2025      PT/PTA: PTA   Number of PTA visits since last PT visit:1  Time In: 0800   Time Out: 0840  Total Time (in minutes): 40   Total Billable Time (in minutes): 40    FOTO:  Intake Score:  %  Survey Score 2:  %  Survey Score 3:  %    Precautions:     Standard, history of cancer, fall       Subjective   without complaints.  Pain reported as 0/10.      Objective            Treatment:  Therapeutic Exercise  TE 1: Recumbent Bike Level 5 15 minutes  TE 2: Seated figure 4 stretch 3x30 seconds R L  with light overpressure  TE 3: Shuttle: B LP 68# 3 minutes, B heel raise/heel dip 3x10  Therapeutic Activity  TA 1: Gait Better VR TM 10 minutes 1.5 mph, 1104 feet with B UE support    Time Entry(in minutes):  Therapeutic Activity Time Entry: 15  Therapeutic Exercise Time Entry: 25    Assessment & Plan   Assessment: Jorge provided good participation and effort during today's session with treatment focused on lower extremity strengthening/range of motion and functional mobility to increase safety with ambulation.   Evaluation/Treatment Tolerance: Patient tolerated treatment well    The patient will continue to benefit from skilled outpatient physical  therapy in order to address the deficits listed in the problem list on the initial evaluation, provide patient and family education, and maximize the patients level of independence in the home and community environments.     The patient's spiritual, cultural, and educational needs were considered, and the patient is agreeable to the plan of care and goals.           Plan: Continue with poc to increase activities as patient tolerates.    Goals:   Active       Long Term Goals       Patient will report compliance with updated home exercise program for 4 days a week or more to maintain improvements post discharge.  (Met)       Start:  03/21/25    Expected End:  06/02/25    Resolved:  05/01/25         Patient will increased lower extremity strength as evidenced by increase in manual muscle test  by 1/2 grade as appropriate to decrease gait deviations consistently.   (Met)       Start:  03/21/25    Expected End:  06/02/25    Resolved:  05/01/25         Patient will demonstrate decreased fall risk by improving Meneses Balance Assessment score without AD from 49/56 to 56/56 to improve safety in household and during community outings.   ( Error)       Start:  03/21/25    Expected End:  06/02/25    Resolved:  05/01/25          Patient will increase gait speed as assessed by the timed 10MWT from 0.86 m/s to 1.0 m/s to increase safety and (I) with gait at a community level. (MDC for CVA= 0.14 m/s)   (Progressing)       Start:  03/21/25    Expected End:  06/02/25               Short Term Goals       Patient will report compliance with home exercise program 4 days a week to improve carry over.  (Met)       Start:  03/21/25    Expected End:  06/02/25    Resolved:  04/07/25         Patient will improve Timed Up and Go testing from  15.6 seconds to 12.7 seconds to demonstrate improvement in household mobility and reduced fall risk. (Met)       Start:  03/21/25    Expected End:  06/02/25    Resolved:  05/27/25         Patient  will improve right hip external rotation by 10 degrees to improve ability to don socks and shoes. (Progressing)       Start:  03/21/25    Expected End:  06/02/25                Airam Borrego, PTA

## 2025-07-01 NOTE — PROGRESS NOTES
Outpatient Rehab    Physical Therapy Progress Note : Updated Plan of Care    Patient Name: Jorge Becerra  MRN: [id number]  YOB: 1950  Encounter Date: 6/24/2025    Therapy Diagnosis:   Encounter Diagnoses   Name Primary?    Decreased range of motion Yes    Right leg weakness     Impaired functional mobility, balance, gait, and endurance      Physician: Michael Nino MD    Physician Orders: Eval and Treat  Medical Diagnosis: Synucleinopathy  Primary parkinsonism  Surgical Diagnosis: Not applicable for this Episode   Surgical Date: Not applicable for this Episode  Days Since Last Surgery: Not applicable for this Episode    Visit # / Visits Authorized:  21 / 30  Insurance Authorization Period: 3/20/2025 to 12/31/2025  Date of Evaluation: 3/21/2025   Plan of Care Certification: 5/27/2025 to 7/10/2025      PT/PTA: PT   Number of PTA visits since last PT visit:3  Time In: 0800   Time Out: 0845  Total Time (in minutes): 45   Total Billable Time (in minutes): 45    FOTO:  Intake Score: 60%  Survey Score 2: 73%  Survey Score 3:  %    Precautions:     Standard, history of cancer, fall        Subjective   He went to [facility] and had good report. Inidicates he does not have to return for 6 months. He has continued to use cane or walker for community distances and walks in home without AD..  Pain reported as 0/10.      Objective         Fall Risk  Functional mobility test results suggest the patient is not: At Risk for Falls  Meneses Balance  Meneses Balance Scale  1. Sitting to Standing: Able to stand without using hands and stabilize independently  2. Standing Unsupported: Able to stand safely for 2 minutes  3. Sitting with Back Unsupported but Feet Supported on Floor or on a Stool: Able to sit safely and securely for 2 minutes  4. Standing to Sitting: Sits safely with minimal use of hands  5.  Transfers: Able to transfer safely with minor use of hands  6. Standing Unsupported with Eyes Closed: Able to stand 10  seconds safely  7. Standing Unsupported with Feet Together: Able to place feet together independently and stand 1 minute safely  8. Reach Forward with Outstretched Arm While Standing: Can reach forward 12 cm (5 inches)  9.  Object from Floor from a Standing Position: Able to  slipper safely and easily  10. Turning to Look Behind Over Left and Right Shoulders While Standing: Looks behind from both sides and weight shifts well  11. Turn 360 Degrees: Able to turn 360 degrees safely in 4 seconds or less  12. Place Alternate Foot on Step or Stool While Standing Unsupported: Able to stand independently and safely and complete 8 steps in 20 seconds  13. Standing Unsupported One Foot in Front: Able to place foot ahead independently and hold 30 seconds  14. Standing on One Leg: Tries to lift leg unable to hold 3 seconds but remains standing independently  Meneses Balance Score: 51  Meneses Balance Fall Risk: Low    Interpretation:  41-56 = Low Fall Risk  21-40 = Medium Fall Risk  0-20 = High Fall Risk    Timed Up & Go (TUG)  Time: 11.5 seconds  Observations: Short strides  An older adult who takes >=12 seconds to complete the TUG is at risk for falling.       Sit to Stand Testing      The patient completed 12 repetitions of a sit to stand transfer in 30 seconds. hands on knees         Ambulation Details    10MWT with AD and without AD: 1.0 m/s         Treatment:  Balance/Neuromuscular Re-Education  NMR 1: BOSU squats x 10  NMR 2: forward lunge onto BOSU x 10 each leg  Therapeutic Activity  TA 1: x 10 step up with knee drive with RLE  TA 2: x 10 lateral stepping over 6 inch step    Time Entry(in minutes):  Neuromuscular Re-Education Time Entry: 15  Therapeutic Activity Time Entry: 30    Assessment & Plan   Assessment  Jorge was reassessed by physical therapist today. He demonstrates good improvement in gait speed, functional strength, and mobility. He continues to require assistance for gait without assistive device  to perform proper gait pattern without limping. He would benefit from continued physical therapy to progress balance, strength training, and gait pattern to return to prior level of function and safely ambulate in household and community.  Evaluation/Treatment Tolerance: Patient tolerated treatment well    The patient will continue to benefit from skilled outpatient physical therapy in order to address the deficits listed in the problem list on the initial evaluation, provide patient and family education, and maximize the patients level of independence in the home and community environments.     The patient's spiritual, cultural, and educational needs were considered, and the patient is agreeable to the plan of care and goals.           Goals:   Active       Long Term Goals       Patient will report compliance with updated home exercise program for 4 days a week or more to maintain improvements post discharge.  (Met)       Start:  03/21/25    Expected End:  06/02/25    Resolved:  05/01/25         Patient will increased lower extremity strength as evidenced by increase in manual muscle test  by 1/2 grade as appropriate to decrease gait deviations consistently.   (Met)       Start:  03/21/25    Expected End:  06/02/25    Resolved:  05/01/25         Patient will demonstrate decreased fall risk by improving Meneses Balance Assessment score without AD from 49/56 to 56/56 to improve safety in household and during community outings.   ( Error)       Start:  03/21/25    Expected End:  06/02/25    Resolved:  05/01/25          Patient will increase gait speed as assessed by the timed 10MWT from 0.86 m/s to 1.0 m/s to increase safety and (I) with gait at a community level. (MDC for CVA= 0.14 m/s)   (Progressing)       Start:  03/21/25    Expected End:  06/02/25               Short Term Goals       Patient will report compliance with home exercise program 4 days a week to improve carry over.  (Met)       Start:   03/21/25    Expected End:  06/02/25    Resolved:  04/07/25         Patient will improve Timed Up and Go testing from  15.6 seconds to 12.7 seconds to demonstrate improvement in household mobility and reduced fall risk. (Met)       Start:  03/21/25    Expected End:  06/02/25    Resolved:  05/27/25         Patient will improve right hip external rotation by 10 degrees to improve ability to don socks and shoes. (Progressing)       Start:  03/21/25    Expected End:  06/02/25                Maia De Paz, PT

## 2025-07-02 ENCOUNTER — OFFICE VISIT (OUTPATIENT)
Dept: FAMILY MEDICINE | Facility: CLINIC | Age: 75
End: 2025-07-02
Payer: MEDICARE

## 2025-07-02 VITALS
DIASTOLIC BLOOD PRESSURE: 64 MMHG | TEMPERATURE: 98 F | SYSTOLIC BLOOD PRESSURE: 130 MMHG | BODY MASS INDEX: 24.36 KG/M2 | OXYGEN SATURATION: 98 % | HEART RATE: 86 BPM | HEIGHT: 67 IN | WEIGHT: 155.19 LBS

## 2025-07-02 DIAGNOSIS — F02.80 ALZHEIMER DISEASE: ICD-10-CM

## 2025-07-02 DIAGNOSIS — E55.9 VITAMIN D DEFICIENCY: ICD-10-CM

## 2025-07-02 DIAGNOSIS — E78.2 MIXED DIABETIC HYPERLIPIDEMIA ASSOCIATED WITH TYPE 2 DIABETES MELLITUS: ICD-10-CM

## 2025-07-02 DIAGNOSIS — Z85.72 HX OF LYMPHOMA: ICD-10-CM

## 2025-07-02 DIAGNOSIS — E11.3393 CONTROLLED TYPE 2 DIABETES MELLITUS WITH BOTH EYES AFFECTED BY MODERATE NONPROLIFERATIVE RETINOPATHY WITHOUT MACULAR EDEMA, WITHOUT LONG-TERM CURRENT USE OF INSULIN: ICD-10-CM

## 2025-07-02 DIAGNOSIS — N18.2 HYPERTENSION ASSOCIATED WITH STAGE 2 CHRONIC KIDNEY DISEASE DUE TO TYPE 2 DIABETES MELLITUS: ICD-10-CM

## 2025-07-02 DIAGNOSIS — E11.22 HYPERTENSION ASSOCIATED WITH STAGE 2 CHRONIC KIDNEY DISEASE DUE TO TYPE 2 DIABETES MELLITUS: ICD-10-CM

## 2025-07-02 DIAGNOSIS — Z79.82 ASPIRIN LONG-TERM USE: ICD-10-CM

## 2025-07-02 DIAGNOSIS — Z00.00 ENCOUNTER FOR MEDICARE ANNUAL WELLNESS EXAM: Primary | ICD-10-CM

## 2025-07-02 DIAGNOSIS — Z87.39 HISTORY OF GOUT: ICD-10-CM

## 2025-07-02 DIAGNOSIS — G20.C PRIMARY PARKINSONISM: ICD-10-CM

## 2025-07-02 DIAGNOSIS — G30.9 ALZHEIMER DISEASE: ICD-10-CM

## 2025-07-02 DIAGNOSIS — Z74.09 IMPAIRED FUNCTIONAL MOBILITY, BALANCE, GAIT, AND ENDURANCE: ICD-10-CM

## 2025-07-02 DIAGNOSIS — I70.0 AORTIC ATHEROSCLEROSIS: ICD-10-CM

## 2025-07-02 DIAGNOSIS — Z00.00 ENCOUNTER FOR MEDICARE ANNUAL WELLNESS EXAM: ICD-10-CM

## 2025-07-02 DIAGNOSIS — Z86.0100 HISTORY OF COLON POLYPS: ICD-10-CM

## 2025-07-02 DIAGNOSIS — G47.33 OBSTRUCTIVE SLEEP APNEA: ICD-10-CM

## 2025-07-02 DIAGNOSIS — D50.9 IRON DEFICIENCY ANEMIA, UNSPECIFIED IRON DEFICIENCY ANEMIA TYPE: ICD-10-CM

## 2025-07-02 DIAGNOSIS — R00.2 PALPITATIONS: ICD-10-CM

## 2025-07-02 DIAGNOSIS — I12.9 HYPERTENSION ASSOCIATED WITH STAGE 2 CHRONIC KIDNEY DISEASE DUE TO TYPE 2 DIABETES MELLITUS: ICD-10-CM

## 2025-07-02 DIAGNOSIS — E11.69 MIXED DIABETIC HYPERLIPIDEMIA ASSOCIATED WITH TYPE 2 DIABETES MELLITUS: ICD-10-CM

## 2025-07-02 DIAGNOSIS — G31.9 NEURODEGENERATIVE COGNITIVE IMPAIRMENT: ICD-10-CM

## 2025-07-02 DIAGNOSIS — C49.21 SARCOMA OF RIGHT THIGH: ICD-10-CM

## 2025-07-02 PROBLEM — E78.5 DYSLIPIDEMIA: Status: RESOLVED | Noted: 2024-04-22 | Resolved: 2025-07-02

## 2025-07-02 PROBLEM — D64.89 ANEMIA DUE TO RADIATION: Status: RESOLVED | Noted: 2020-02-05 | Resolved: 2025-07-02

## 2025-07-02 PROBLEM — R26.9 GAIT ABNORMALITY: Status: RESOLVED | Noted: 2024-06-06 | Resolved: 2025-07-02

## 2025-07-02 PROBLEM — G31.84 MILD COGNITIVE IMPAIRMENT: Status: RESOLVED | Noted: 2019-11-02 | Resolved: 2025-07-02

## 2025-07-02 PROBLEM — R22.41 MASS OF RIGHT THIGH: Status: RESOLVED | Noted: 2021-05-31 | Resolved: 2025-07-02

## 2025-07-02 PROBLEM — R11.0 CHEMOTHERAPY-INDUCED NAUSEA: Status: RESOLVED | Noted: 2021-08-14 | Resolved: 2025-07-02

## 2025-07-02 PROBLEM — R29.3 POSTURE ABNORMALITY: Status: RESOLVED | Noted: 2022-06-07 | Resolved: 2025-07-02

## 2025-07-02 PROBLEM — R26.89 DECREASED FUNCTIONAL MOBILITY: Status: RESOLVED | Noted: 2022-02-07 | Resolved: 2025-07-02

## 2025-07-02 PROBLEM — R29.898 DECREASED STRENGTH OF UPPER EXTREMITY: Status: RESOLVED | Noted: 2022-06-07 | Resolved: 2025-07-02

## 2025-07-02 PROBLEM — M62.89 ABNORMAL INCREASED MUSCLE TIGHTNESS: Status: RESOLVED | Noted: 2022-06-07 | Resolved: 2025-07-02

## 2025-07-02 PROBLEM — R29.898 RIGHT LEG WEAKNESS: Status: RESOLVED | Noted: 2025-03-21 | Resolved: 2025-07-02

## 2025-07-02 PROBLEM — E11.9 TYPE 2 DIABETES MELLITUS WITHOUT COMPLICATION, WITHOUT LONG-TERM CURRENT USE OF INSULIN: Status: RESOLVED | Noted: 2023-09-08 | Resolved: 2025-07-02

## 2025-07-02 PROBLEM — R53.1 DECREASED STRENGTH: Status: RESOLVED | Noted: 2022-02-07 | Resolved: 2025-07-02

## 2025-07-02 PROBLEM — C49.9 SARCOMA: Status: RESOLVED | Noted: 2021-08-13 | Resolved: 2025-07-02

## 2025-07-02 PROBLEM — G89.3 CANCER RELATED PAIN: Status: RESOLVED | Noted: 2021-08-14 | Resolved: 2025-07-02

## 2025-07-02 PROBLEM — I15.2 HYPERTENSION ASSOCIATED WITH TYPE 2 DIABETES MELLITUS: Status: RESOLVED | Noted: 2019-11-02 | Resolved: 2025-07-02

## 2025-07-02 PROBLEM — M25.619 DECREASED RANGE OF MOTION (ROM) OF SHOULDER: Status: RESOLVED | Noted: 2022-06-07 | Resolved: 2025-07-02

## 2025-07-02 PROBLEM — M25.60 DECREASED RANGE OF MOTION: Status: RESOLVED | Noted: 2022-02-07 | Resolved: 2025-07-02

## 2025-07-02 PROBLEM — Z85.830 HISTORY OF SARCOMA OF BONE: Status: ACTIVE | Noted: 2021-06-25

## 2025-07-02 PROBLEM — E11.59 HYPERTENSION ASSOCIATED WITH TYPE 2 DIABETES MELLITUS: Status: RESOLVED | Noted: 2019-11-02 | Resolved: 2025-07-02

## 2025-07-02 PROBLEM — T45.1X5A CHEMOTHERAPY-INDUCED NAUSEA: Status: RESOLVED | Noted: 2021-08-14 | Resolved: 2025-07-02

## 2025-07-02 PROCEDURE — 99999 PR PBB SHADOW E&M-EST. PATIENT-LVL V: CPT | Mod: PBBFAC,,, | Performed by: PHYSICIAN ASSISTANT

## 2025-07-02 PROCEDURE — 99215 OFFICE O/P EST HI 40 MIN: CPT | Mod: PBBFAC,PN | Performed by: PHYSICIAN ASSISTANT

## 2025-07-02 NOTE — PROGRESS NOTES
"Jorge Becerra presented for an initial Medicare AWV today. The following components were reviewed and updated:    Medical history  Family History  Social history  Allergies and Current Medications  Health Risk Assessment  Health Maintenance  Care Team    **See Completed Assessments for Annual Wellness visit with in the encounter summary    The following assessments were completed:  Depression Screening  Cognitive function Screening  Timed Get Up Test  Whisper Test      Opioid documentation:      Patient does not have a current opioid prescription.          Vitals:    07/02/25 0920   BP: 130/64   BP Location: Right arm   Patient Position: Sitting   Pulse: 86   Temp: 98.1 °F (36.7 °C)   TempSrc: Oral   SpO2: 98%   Weight: 70.4 kg (155 lb 3.3 oz)   Height: 5' 7" (1.702 m)     Body mass index is 24.31 kg/m².       Physical Exam  Constitutional:       Appearance: Normal appearance.   HENT:      Head: Normocephalic and atraumatic.   Pulmonary:      Effort: Pulmonary effort is normal.      Breath sounds: Normal breath sounds.   Neurological:      General: No focal deficit present.      Mental Status: He is alert and oriented to person, place, and time. Mental status is at baseline.   Psychiatric:         Mood and Affect: Mood normal.         Behavior: Behavior normal.         Thought Content: Thought content normal.         Judgment: Judgment normal.            Diagnoses and health risks identified today and associated recommendations/orders:  Encounter for Medicare annual wellness exam  Comments:  Patient is scheduled for A1c  Orders:  -     Referral to Enhanced Annual Wellness Visit (eAWV) W+1    Primary parkinsonism  Comments:  Stable, followed by Neurology.  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  Continue you metformin    Alzheimer disease  Comments:  Stable, patient scored 5/5 on a mini-mental status exam.  Followed by Neurology    Sarcoma of right thigh  Comments:  Patient under surveillance by MD Morales.    Aspirin long-term use  Comments:  Stable, continue to monitor blood work    History of colon polyps  Comments:  Stable, colonoscopy current    History of gout  Comments:  Stable, continue to monitor    Hx of lymphoma  Comments:  In remission, followed by MD Morales    Hypertension associated with stage 2 chronic kidney disease due to type 2 diabetes mellitus  Comments:  Controlled, continue amlodipine, Monopril    Impaired functional mobility, balance, gait, and endurance  Comments:  Stable, patient not using any walking device currently.  Continue Sinemet    Iron deficiency anemia, unspecified iron deficiency anemia type  Comments:  Stable, continue iron supplementation    Mixed diabetic hyperlipidemia associated with type 2 diabetes mellitus  Comments:  Controlled, continue atorvastatin    Obstructive sleep apnea  Comments:  Controlled, patient uses CPAP    Palpitations  Comments:  Controlled, continue to monitor    Vitamin D deficiency  Comments:  Stable, continue vitamin-D supplement    Encounter for Medicare annual wellness exam  -     Referral to Enhanced Annual Wellness Visit (eAWV) W+1         Provided Jorge with a 5-10 year written screening schedule and personal prevention plan. Recommendations were developed using the USPSTF age appropriate recommendations. Education, counseling, and referrals were provided as needed.  After Visit Summary printed and given to patient which includes a list of additional screenings\tests needed.    No follow-ups on file.      EMILY Pineda    I offered to discuss advanced care planning, including how to pick a person who  would make decisions for you if you were unable to make them for yourself, called a health care power of , and what kind of decisions you might make such as use of life sustaining treatments such as ventilators and tube feeding when faced with a life limiting illness recorded on a living will that they will need to know. (How you want to be cared for as you near the end of your natural life)     X  Patient has advanced directives on file, which we reviewed, and they do not wish to make changes.

## 2025-07-02 NOTE — PATIENT INSTRUCTIONS
Mario Patel,     If you are due for any health screening(s) below please notify me so we can arrange them to be ordered and scheduled. Most healthy patients at your age complete them, but you are free to accept or refuse.     If you can't do it, I'll definitely understand. If you can, I'd certainly appreciate it!    All of your core healthy metrics are met.                     Counseling and Referral of Other Preventative  (Italic type indicates deductible and co-insurance are waived)    Patient Name: Jorge Becerra  Today's Date: 7/2/2025    Health Maintenance       Date Due Completion Date    Shingles Vaccine (1 of 2) Never done ---    RSV Vaccine (Age 60+ and Pregnant patients) (1 - 1-dose 75+ series) Never done ---    Influenza Vaccine (1) 09/01/2025 10/17/2024    Hemoglobin A1c 12/27/2025 6/27/2025    TETANUS VACCINE 04/14/2026 4/14/2016 (Done)    Override on 4/14/2016: Done ([facility])    Diabetic Eye Exam 06/04/2026 6/4/2025    Diabetes Urine Screening 06/27/2026 6/27/2025    Lipid Panel 06/27/2026 6/27/2025        No orders of the defined types were placed in this encounter.      The following information is provided to all patients.  This information is to help you find resources for any of the problems found today that may be affecting your health:                  Living healthy guide: www.[facility].[place].gov      Understanding Diabetes: www.diabetes.org      Eating healthy: www.cdc.gov/healthyweight      CDC home safety checklist: www.cdc.gov/steadi/patient.html      Agency on Aging: www.goea.[place].[facility]      Alcoholics anonymous (AA): www.aa.org      Physical Activity: www.donna.nih.gov/kr9nlmr      Tobacco use: www.quitwithusla.org

## 2025-07-08 ENCOUNTER — CLINICAL SUPPORT (OUTPATIENT)
Dept: REHABILITATION | Facility: HOSPITAL | Age: 75
End: 2025-07-08
Payer: MEDICARE

## 2025-07-08 DIAGNOSIS — Z74.09 IMPAIRED FUNCTIONAL MOBILITY, BALANCE, GAIT, AND ENDURANCE: Primary | ICD-10-CM

## 2025-07-08 PROCEDURE — 97110 THERAPEUTIC EXERCISES: CPT | Mod: PO

## 2025-07-08 PROCEDURE — 97530 THERAPEUTIC ACTIVITIES: CPT | Mod: PO

## 2025-07-09 NOTE — PROGRESS NOTES
Outpatient Rehab    Physical Therapy Progress Note : Updated Plan of Care    Patient Name: Jorge Becerra  MRN: [id number]  YOB: 1950  Encounter Date: 7/8/2025    Therapy Diagnosis:   Encounter Diagnosis   Name Primary?    Impaired functional mobility, balance, gait, and endurance Yes     Physician: Michael Nino MD    Physician Orders: Eval and Treat  Medical Diagnosis: Synucleinopathy  Primary parkinsonism  Surgical Diagnosis: Not applicable for this Episode   Surgical Date: Not applicable for this Episode  Days Since Last Surgery: Not applicable for this Episode    Visit # / Visits Authorized:  22 / 30  Insurance Authorization Period: 3/20/2025 to 12/31/2025  Date of Evaluation: 3/21/2025   Plan of Care Certification: 5/27/2025 to 7/10/2025      PT/PTA: PT   Number of PTA visits since last PT visit:1  Time In: 0801   Time Out: 0845  Total Time (in minutes): 44   Total Billable Time (in minutes): 44    FOTO:  Intake Score: 60%  Survey Score 2: 73%  Survey Score 3:  %    Precautions:     Standard, history of cancer, fall       Subjective   He has been doing well with PT. No complaints. He continues to perform HEP..  Pain reported as 0/10.      Objective         Fall Risk  Functional mobility test results suggest the patient is not: At Risk for Falls  Meneses Balance  Meneses Balance Scale  1. Sitting to Standing: Able to stand without using hands and stabilize independently  2. Standing Unsupported: Able to stand safely for 2 minutes  3. Sitting with Back Unsupported but Feet Supported on Floor or on a Stool: Able to sit safely and securely for 2 minutes  4. Standing to Sitting: Sits safely with minimal use of hands  5.  Transfers: Able to transfer safely with minor use of hands  6. Standing Unsupported with Eyes Closed: Able to stand 10 seconds safely  7. Standing Unsupported with Feet Together: Able to place feet together independently and stand 1 minute safely  8. Reach Forward with Outstretched Arm  While Standing: Can reach forward 12 cm (5 inches)  9.  Object from Floor from a Standing Position: Able to  slipper safely and easily  10. Turning to Look Behind Over Left and Right Shoulders While Standing: Looks behind from both sides and weight shifts well  11. Turn 360 Degrees: Able to turn 360 degrees safely in 4 seconds or less  12. Place Alternate Foot on Step or Stool While Standing Unsupported: Able to stand independently and safely and complete 8 steps in 20 seconds  13. Standing Unsupported One Foot in Front: Able to place foot ahead independently and hold 30 seconds  14. Standing on One Leg: Tries to lift leg unable to hold 3 seconds but remains standing independently  Meneses Balance Score: 51  Meneses Balance Fall Risk: Low    Interpretation:  41-56 = Low Fall Risk  21-40 = Medium Fall Risk  0-20 = High Fall Risk    Timed Up & Go (TUG)  Time: 11.5 seconds  Observations: Short strides  An older adult who takes >=12 seconds to complete the TUG is at risk for falling.       Sit to Stand Testing      The patient completed 12 repetitions of a sit to stand transfer in 30 seconds. hands on knees         Ambulation Details    10MWT with AD and without AD: 1.0 m/s    Gait Analysis  Base of Support: Normal    Right Side Walking Observations  Decreased: Stance Time       Left Side Walking Observations  Decreased: Step Length            Treatment:  Therapeutic Exercise  TE 1: Shuttle squat 75# 3 x 10  TE 2: Shuttle single leg squat 50# 2 x 10 each  Therapeutic Activity  TA 1: Gait Better VR TM 10 minutes 1.3 mph, with cognitive task(66%) and obstacles(55%)  TA 2: 2 x 10 each step up with contralateral knee drive, single UE support  TA 3: Functional assessment    Time Entry(in minutes):  Neuromuscular Re-Education Time Entry: 20  Therapeutic Activity Time Entry: 24    Assessment & Plan   Assessment  Jorge was reassessed at last visit with PT. He has improved Meneses Balance score to 51/56 indicating low fall  risk. He demonstrates improvements in overall strength of right lower extremity, however he continues to ambulate with decrease step length on left foot to decrease weight onto right lower extremity when using cane. Able to walk around home without assistive device but states he occasionally uses walls or furniture to avoid loss of balance. He is able to ascend 6 inch stairs with reciprocal pattern but requires step to for descending safely while using bilateral handrails. Gait speed has improved to 1.0 m/s. He remains limited with hip rotation to allow him to independently don socks and shoes. He would benefit from physical therapy 1x/week for 6 weeks to improve hip range of motion and improve balance and gait pattern to reduce risk of falls on variable surface for community and navigating home and yard.   Evaluation/Treatment Tolerance: Patient tolerated treatment well  Plan  From a physical therapy perspective, the patient would benefit from: Skilled Rehab Services    Planned therapy interventions include: Therapeutic exercise, Therapeutic activities, Neuromuscular re-education, Manual therapy, and Gait training.    Planned modalities to include: Electrical stimulation - attended and Electrical stimulation - passive/unattended.        Visit Frequency: 1 times Per Week for 6 Weeks.       This plan was discussed with Patient.   Discussion participants: Agreed Upon Plan of Care  Plan details: Plan of Care Certification Date: 7/8/2025 to 8/20/2025          The patient will continue to benefit from skilled outpatient physical therapy in order to address the deficits listed in the problem list on the initial evaluation, provide patient and family education, and maximize the patients level of independence in the home and community environments.     The patient's spiritual, cultural, and educational needs were considered, and the patient is agreeable to the plan of care and goals.     Education  Education was done with  Patient. The patient's learning style includes Listening. The patient Verbalizes understanding.         Improvement with PT.       Goals:   Active       Long Term Goals       Patient will be able to descend 6 inch stairs holding bilateral handrail with reciprocal pattern to improve independence in community.       Start:  07/09/25    Expected End:  08/20/25             Patient will increase gait speed as assessed by the timed 10MWT from 1.0 m/s to 1.14 m/s to increase safety and (I) with gait at a community level. (MDC for CVA= 0.14 m/s)         Start:  07/09/25    Expected End:  08/20/25            Patient will demonstrate ability to perform floor transfer with external support and supervision for fall recovery.       Start:  07/09/25    Expected End:  08/20/25               Short Term Goals       Patient will improve right hip external rotation by 10 degrees to improve ability to don socks and shoes.       Start:  07/09/25    Expected End:  08/20/25            Patient will perform 5x sit to  11 seconds or less to demonstrate improved lower extremity functional strength and endurance.        Start:  07/09/25    Expected End:  08/20/25              Resolved       Long Term Goals       Patient will report compliance with updated home exercise program for 4 days a week or more to maintain improvements post discharge.  (Met)       Start:  03/21/25    Expected End:  06/02/25    Resolved:  05/01/25         Patient will increased lower extremity strength as evidenced by increase in manual muscle test  by 1/2 grade as appropriate to decrease gait deviations consistently.   (Met)       Start:  03/21/25    Expected End:  06/02/25    Resolved:  05/01/25         Patient will demonstrate decreased fall risk by improving Meneses Balance Assessment score without AD from 49/56 to 56/56 to improve safety in household and during community outings.   ( Error)       Start:  03/21/25    Expected End:  06/02/25     Resolved:  05/01/25          Patient will increase gait speed as assessed by the timed 10MWT from 0.86 m/s to 1.0 m/s to increase safety and (I) with gait at a community level. (MDC for CVA= 0.14 m/s)   (Met)       Start:  03/21/25    Expected End:  06/02/25    Resolved:  07/09/25            Short Term Goals       Patient will report compliance with home exercise program 4 days a week to improve carry over.  (Met)       Start:  03/21/25    Expected End:  06/02/25    Resolved:  04/07/25         Patient will improve Timed Up and Go testing from  15.6 seconds to 12.7 seconds to demonstrate improvement in household mobility and reduced fall risk. (Met)       Start:  03/21/25    Expected End:  06/02/25    Resolved:  05/27/25             Maia eD Paz, PT

## 2025-07-15 ENCOUNTER — CLINICAL SUPPORT (OUTPATIENT)
Dept: REHABILITATION | Facility: HOSPITAL | Age: 75
End: 2025-07-15
Payer: MEDICARE

## 2025-07-15 DIAGNOSIS — Z74.09 IMPAIRED FUNCTIONAL MOBILITY, BALANCE, GAIT, AND ENDURANCE: Primary | ICD-10-CM

## 2025-07-15 PROCEDURE — 97110 THERAPEUTIC EXERCISES: CPT | Mod: PO

## 2025-07-16 NOTE — PROGRESS NOTES
Outpatient Rehab    Physical Therapy Visit    Patient Name: Jorge Becerra  MRN: [id number]  YOB: 1950  Encounter Date: 7/15/2025    Therapy Diagnosis:   Encounter Diagnosis   Name Primary?    Impaired functional mobility, balance, gait, and endurance Yes     Physician: Michael Nino MD    Physician Orders: Eval and Treat  Medical Diagnosis: Synucleinopathy  Primary parkinsonism  Surgical Diagnosis: Not applicable for this Episode   Surgical Date: Not applicable for this Episode  Days Since Last Surgery: Not applicable for this Episode    Visit # / Visits Authorized:  23 / 30  Insurance Authorization Period: 3/20/2025 to 12/31/2025  Date of Evaluation: 3/21/2025  Plan of Care Certification: 7/8/2025 to 8/20/2025      PT/PTA: PT   Number of PTA visits since last PT visit:0  Time In: 1345   Time Out: 1430  Total Time (in minutes): 45   Total Billable Time (in minutes): 45    FOTO:  Intake Score: 60%  Survey Score 2: 73%  Survey Score 3: Not applicable for this Episode%    Precautions:  Additional Precautions and Protocol Details: Standard, history of cancer, fall       Subjective   He arrives without complaints..  Pain reported as 0/10.      Objective            Treatment:  Therapeutic Exercise  TE 1: standing hip abd with purple TB 2 x 10  TE 2: Standing hip ext with purple TB 2 x 10  TE 3: 2 x 5 standing on one leg, taping 2 color dots in lateral direction with contralateral using purple TB  TE 4: wall squats with orange ball 2 x 10  TE 5: wall squats with orange ball performing pulses in squat x 5  TE 6: step up on 5 inch step with Darius on RLE x 10 each leg  TE 7: toe tap with RLE on 3 inch step with BUE support x 10  TE 8: staggered feet sit to stands x 10  Therapeutic Activity  TA 1: Gait Better VR TM 10 minutes 1.5 mph, with cognitive task(59%) and obstacles(62%)    Time Entry(in minutes):  Therapeutic Exercise Time Entry: 45    Assessment & Plan   Assessment: Jorge tolerated treatment session  well. Progression of standing exercises with purple resistance band; given for HEP. Pt challenged with wall squats and lateral pulsing activities. He remains appropriate for PT at this time.  Evaluation/Treatment Tolerance: Patient tolerated treatment well    The patient will continue to benefit from skilled outpatient physical therapy in order to address the deficits listed in the problem list on the initial evaluation, provide patient and family education, and maximize the patients level of independence in the home and community environments.     The patient's spiritual, cultural, and educational needs were considered, and the patient is agreeable to the plan of care and goals.     Education  Education was done with Patient. The patient's learning style includes Listening. The patient Verbalizes understanding.         Progression with resistance bands for HEP       Plan: Continue with poc to increase activities as patient tolerates.    Goals:   Active       Long Term Goals       Patient will be able to descend 6 inch stairs holding bilateral handrail with reciprocal pattern to improve independence in community. (Progressing)       Start:  07/09/25    Expected End:  08/20/25             Patient will increase gait speed as assessed by the timed 10MWT from 1.0 m/s to 1.14 m/s to increase safety and (I) with gait at a community level. (MDC for CVA= 0.14 m/s)   (Progressing)       Start:  07/09/25    Expected End:  08/20/25            Patient will demonstrate ability to perform floor transfer with external support and supervision for fall recovery. (Progressing)       Start:  07/09/25    Expected End:  08/20/25               Short Term Goals       Patient will improve right hip external rotation by 10 degrees to improve ability to don socks and shoes. (Progressing)       Start:  07/09/25    Expected End:  08/20/25            Patient will perform 5x sit to  11 seconds or less to demonstrate improved lower  extremity functional strength and endurance.  (Progressing)       Start:  07/09/25    Expected End:  08/20/25              Resolved       Long Term Goals       Patient will report compliance with updated home exercise program for 4 days a week or more to maintain improvements post discharge.  (Met)       Start:  03/21/25    Expected End:  06/02/25    Resolved:  05/01/25         Patient will increased lower extremity strength as evidenced by increase in manual muscle test  by 1/2 grade as appropriate to decrease gait deviations consistently.   (Met)       Start:  03/21/25    Expected End:  06/02/25    Resolved:  05/01/25         Patient will demonstrate decreased fall risk by improving Meneses Balance Assessment score without AD from 49/56 to 56/56 to improve safety in household and during community outings.   ( Error)       Start:  03/21/25    Expected End:  06/02/25    Resolved:  05/01/25          Patient will increase gait speed as assessed by the timed 10MWT from 0.86 m/s to 1.0 m/s to increase safety and (I) with gait at a community level. (MDC for CVA= 0.14 m/s)   (Met)       Start:  03/21/25    Expected End:  06/02/25    Resolved:  07/09/25            Short Term Goals       Patient will report compliance with home exercise program 4 days a week to improve carry over.  (Met)       Start:  03/21/25    Expected End:  06/02/25    Resolved:  04/07/25         Patient will improve Timed Up and Go testing from  15.6 seconds to 12.7 seconds to demonstrate improvement in household mobility and reduced fall risk. (Met)       Start:  03/21/25    Expected End:  06/02/25    Resolved:  05/27/25             Maia De Paz, PT

## 2025-07-22 ENCOUNTER — CLINICAL SUPPORT (OUTPATIENT)
Dept: REHABILITATION | Facility: HOSPITAL | Age: 75
End: 2025-07-22
Payer: MEDICARE

## 2025-07-22 DIAGNOSIS — Z74.09 IMPAIRED FUNCTIONAL MOBILITY, BALANCE, GAIT, AND ENDURANCE: Primary | ICD-10-CM

## 2025-07-22 PROCEDURE — 97530 THERAPEUTIC ACTIVITIES: CPT | Mod: PO,CQ

## 2025-07-22 NOTE — PROGRESS NOTES
Outpatient Rehab    Physical Therapy Visit    Patient Name: Jorge Becerra  MRN: [id number]  YOB: 1950  Encounter Date: 7/22/2025    Therapy Diagnosis:   Encounter Diagnosis   Name Primary?    Impaired functional mobility, balance, gait, and endurance Yes     Physician: Michael Nino MD    Physician Orders: Eval and Treat  Medical Diagnosis: Synucleinopathy  Primary parkinsonism  Surgical Diagnosis: Not applicable for this Episode   Surgical Date: Not applicable for this Episode  Days Since Last Surgery: Not applicable for this Episode    Visit # / Visits Authorized:  24 / 30  Insurance Authorization Period: 3/20/2025 to 12/31/2025  Date of Evaluation: 3/21/2025  Plan of Care Certification: 7/8/2025 to 8/20/2025      PT/PTA: PTA   Number of PTA visits since last PT visit:1  Time In: 0715   Time Out: 0755  Total Time (in minutes): 40   Total Billable Time (in minutes): 40    FOTO:  Intake Score (%): 60  Survey Score 2 (%): 73  Survey Score 3 (%): Not applicable for this Episode    Precautions:  Additional Precautions and Protocol Details: Additional Precautions and Protocol Details: Standard, history of cancer, fall       Subjective   without complaints.  Pain reported as 0/10.      Objective            Treatment:  Therapeutic Activity  TA 1: GaitBetter 1.5 mph: Obstacles 70%, Memory (cognitive task) 80% with increasing difficulty, B rail use  TA 2: Sit<>Stand with 2# cane reaching overhead in stand 10 times  TA 3: Parallel bars: weight shift (ant/post, R/L) on foam cushion 2 minutes each with mirror and TC/VC to improve upright (decrease hip flexion)  TA 4: Parallel bars: SLS with mirror 3x30 seconds R L, light UE support  TA 5: Quadriped on mat 5 times alternating R L: UE, LE, UE opposite LE  TA 6: Parallel bars: Side Step up/over 6 inch box R L 3 minutes    Time Entry(in minutes):  Therapeutic Activity Time Entry: 40    Assessment & Plan   Assessment: Jorge provided good participation and effort  during today's session with treatment focused on functional mobility to improve balance and coordination to improve safety with ambulation. Jorge tolerated activities with cues to decrease speed and keep head up as well as seated rest breaks.  Evaluation/Treatment Tolerance: Patient tolerated treatment well    The patient will continue to benefit from skilled outpatient physical therapy in order to address the deficits listed in the problem list on the initial evaluation, provide patient and family education, and maximize the patients level of independence in the home and community environments.     The patient's spiritual, cultural, and educational needs were considered, and the patient is agreeable to the plan of care and goals.           Plan: Continue with POC to increase activities as patient tolerates    Goals:   Active       Long Term Goals       Patient will be able to descend 6 inch stairs holding bilateral handrail with reciprocal pattern to improve independence in community. (Progressing)       Start:  07/09/25    Expected End:  08/20/25             Patient will increase gait speed as assessed by the timed 10MWT from 1.0 m/s to 1.14 m/s to increase safety and (I) with gait at a community level. (MDC for CVA= 0.14 m/s)   (Progressing)       Start:  07/09/25    Expected End:  08/20/25            Patient will demonstrate ability to perform floor transfer with external support and supervision for fall recovery. (Progressing)       Start:  07/09/25    Expected End:  08/20/25               Short Term Goals       Patient will improve right hip external rotation by 10 degrees to improve ability to don socks and shoes. (Progressing)       Start:  07/09/25    Expected End:  08/20/25            Patient will perform 5x sit to  11 seconds or less to demonstrate improved lower extremity functional strength and endurance.  (Progressing)       Start:  07/09/25    Expected End:  08/20/25              Resolved        Long Term Goals       Patient will report compliance with updated home exercise program for 4 days a week or more to maintain improvements post discharge.  (Met)       Start:  03/21/25    Expected End:  06/02/25    Resolved:  05/01/25         Patient will increased lower extremity strength as evidenced by increase in manual muscle test  by 1/2 grade as appropriate to decrease gait deviations consistently.   (Met)       Start:  03/21/25    Expected End:  06/02/25    Resolved:  05/01/25         Patient will demonstrate decreased fall risk by improving Meneses Balance Assessment score without AD from 49/56 to 56/56 to improve safety in household and during community outings.   ( Error)       Start:  03/21/25    Expected End:  06/02/25    Resolved:  05/01/25          Patient will increase gait speed as assessed by the timed 10MWT from 0.86 m/s to 1.0 m/s to increase safety and (I) with gait at a community level. (MDC for CVA= 0.14 m/s)   (Met)       Start:  03/21/25    Expected End:  06/02/25    Resolved:  07/09/25            Short Term Goals       Patient will report compliance with home exercise program 4 days a week to improve carry over.  (Met)       Start:  03/21/25    Expected End:  06/02/25    Resolved:  04/07/25         Patient will improve Timed Up and Go testing from  15.6 seconds to 12.7 seconds to demonstrate improvement in household mobility and reduced fall risk. (Met)       Start:  03/21/25    Expected End:  06/02/25    Resolved:  05/27/25             Airam Borrego, PTA

## 2025-07-24 DIAGNOSIS — G31.84 MILD COGNITIVE IMPAIRMENT: ICD-10-CM

## 2025-07-24 RX ORDER — DONEPEZIL HYDROCHLORIDE 10 MG/1
10 TABLET, FILM COATED ORAL EVERY MORNING
Qty: 90 TABLET | Refills: 3 | Status: SHIPPED | OUTPATIENT
Start: 2025-07-24

## 2025-08-05 ENCOUNTER — CLINICAL SUPPORT (OUTPATIENT)
Dept: REHABILITATION | Facility: HOSPITAL | Age: 75
End: 2025-08-05
Payer: MEDICARE

## 2025-08-05 DIAGNOSIS — Z74.09 IMPAIRED FUNCTIONAL MOBILITY, BALANCE, GAIT, AND ENDURANCE: Primary | ICD-10-CM

## 2025-08-05 PROCEDURE — 97530 THERAPEUTIC ACTIVITIES: CPT | Mod: KX,PO,CQ

## 2025-08-05 PROCEDURE — 97112 NEUROMUSCULAR REEDUCATION: CPT | Mod: KX,PO,CQ

## 2025-08-05 PROCEDURE — 97110 THERAPEUTIC EXERCISES: CPT | Mod: KX,PO,CQ

## 2025-08-05 NOTE — PROGRESS NOTES
"  Outpatient Rehab    Physical Therapy Visit    Patient Name: Jorge Becerra  MRN: [id number]  YOB: 1950  Encounter Date: 8/5/2025    Therapy Diagnosis:   Encounter Diagnosis   Name Primary?    Impaired functional mobility, balance, gait, and endurance Yes     Physician: Michael Nino MD    Physician Orders: Eval and Treat  Medical Diagnosis: Synucleinopathy  Primary parkinsonism  Surgical Diagnosis: Not applicable for this Episode   Surgical Date: Not applicable for this Episode  Days Since Last Surgery: Not applicable for this Episode    Visit # / Visits Authorized:  25 / 30  Insurance Authorization Period: 3/20/2025 to 12/31/2025  Date of Evaluation: 3/21/2025  Plan of Care Certification: 7/8/2025 to 8/20/2025      PT/PTA: PTA   Number of PTA visits since last PT visit:2  Time In: 0715   Time Out: 0755  Total Time (in minutes): 40   Total Billable Time (in minutes): 40    FOTO:  Intake Score (%): 60  Survey Score 2 (%): 73  Survey Score 3 (%): Not applicable for this Episode    Precautions:  Additional Precautions and Protocol Details: Additional Precautions and Protocol Details: Additional Precautions and Protocol Details: Standard, history of cancer, fall      Subjective   "Doing okay" states trying to do yard work in a.m. before it get's too hot..  Pain reported as 0/10.      Objective            Treatment:  Therapeutic Exercise  TE 1: Seated chin tuck 10 times  TE 2: Chin tuck against wall with towel roll 5 times  TE 3: B shoulder flexion with cane, back against wall 3x10  Balance/Neuromuscular Re-Education  NMR 1: Recumbent bike Level 6 10 minutes  Therapeutic Activity  TA 1: Gait Better VR TM 1.5mph 2% incline:Obstacles 75%, Memory (cognitive task) 60% random, B rail use    Time Entry(in minutes):  Neuromuscular Re-Education Time Entry: 10  Therapeutic Activity Time Entry: 10  Therapeutic Exercise Time Entry: 20    Assessment & Plan   Assessment: Jorge provided good participation and effort " during today's session with treatment focused on neuromuscular re-education for repetitive reciprocal lower extremity motion, trunk and neck exercises to improve posture (home exercise program provided) and functional mobility on Treadmill with dual tasking.  Jorge with good follow through with posture correction during ambulation with cane but unable to maintain for more than 5 steps, improved posture on Treadmill with Bilateral upper extremity support.  Evaluation/Treatment Tolerance: Patient tolerated treatment well    The patient will continue to benefit from skilled outpatient physical therapy in order to address the deficits listed in the problem list on the initial evaluation, provide patient and family education, and maximize the patients level of independence in the home and community environments.     The patient's spiritual, cultural, and educational needs were considered, and the patient is agreeable to the plan of care and goals.     Education  Education was done with Patient. The patient's learning style includes Listening and Demonstration. The patient Demonstrates understanding and Verbalizes understanding.         Home exercise program        Plan: Continue with POC to increase activities as patient tolerates.    Goals:   Active       Long Term Goals       Patient will be able to descend 6 inch stairs holding bilateral handrail with reciprocal pattern to improve independence in community. (Progressing)       Start:  07/09/25    Expected End:  08/20/25             Patient will increase gait speed as assessed by the timed 10MWT from 1.0 m/s to 1.14 m/s to increase safety and (I) with gait at a community level. (MDC for CVA= 0.14 m/s)   (Progressing)       Start:  07/09/25    Expected End:  08/20/25            Patient will demonstrate ability to perform floor transfer with external support and supervision for fall recovery. (Progressing)       Start:  07/09/25    Expected End:  08/20/25                Short Term Goals       Patient will improve right hip external rotation by 10 degrees to improve ability to don socks and shoes. (Progressing)       Start:  07/09/25    Expected End:  08/20/25            Patient will perform 5x sit to  11 seconds or less to demonstrate improved lower extremity functional strength and endurance.  (Progressing)       Start:  07/09/25    Expected End:  08/20/25              Resolved       Long Term Goals       Patient will report compliance with updated home exercise program for 4 days a week or more to maintain improvements post discharge.  (Met)       Start:  03/21/25    Expected End:  06/02/25    Resolved:  05/01/25         Patient will increased lower extremity strength as evidenced by increase in manual muscle test  by 1/2 grade as appropriate to decrease gait deviations consistently.   (Met)       Start:  03/21/25    Expected End:  06/02/25    Resolved:  05/01/25         Patient will demonstrate decreased fall risk by improving Meneses Balance Assessment score without AD from 49/56 to 56/56 to improve safety in household and during community outings.   ( Error)       Start:  03/21/25    Expected End:  06/02/25    Resolved:  05/01/25          Patient will increase gait speed as assessed by the timed 10MWT from 0.86 m/s to 1.0 m/s to increase safety and (I) with gait at a community level. (MDC for CVA= 0.14 m/s)   (Met)       Start:  03/21/25    Expected End:  06/02/25    Resolved:  07/09/25            Short Term Goals       Patient will report compliance with home exercise program 4 days a week to improve carry over.  (Met)       Start:  03/21/25    Expected End:  06/02/25    Resolved:  04/07/25         Patient will improve Timed Up and Go testing from  15.6 seconds to 12.7 seconds to demonstrate improvement in household mobility and reduced fall risk. (Met)       Start:  03/21/25    Expected End:  06/02/25    Resolved:  05/27/25             Airam Borrego PTA

## 2025-08-09 ENCOUNTER — PATIENT MESSAGE (OUTPATIENT)
Facility: CLINIC | Age: 75
End: 2025-08-09
Payer: MEDICARE

## 2025-08-11 RX ORDER — CARBIDOPA AND LEVODOPA 25; 100 MG/1; MG/1
1 TABLET ORAL 3 TIMES DAILY
Qty: 270 TABLET | Refills: 3 | Status: SHIPPED | OUTPATIENT
Start: 2025-08-11 | End: 2026-08-11

## 2025-08-12 ENCOUNTER — CLINICAL SUPPORT (OUTPATIENT)
Dept: REHABILITATION | Facility: HOSPITAL | Age: 75
End: 2025-08-12
Payer: MEDICARE

## 2025-08-12 DIAGNOSIS — Z74.09 IMPAIRED FUNCTIONAL MOBILITY, BALANCE, GAIT, AND ENDURANCE: Primary | ICD-10-CM

## 2025-08-12 PROCEDURE — 97112 NEUROMUSCULAR REEDUCATION: CPT | Mod: KX,PO,CQ

## 2025-08-12 PROCEDURE — 97530 THERAPEUTIC ACTIVITIES: CPT | Mod: KX,PO,CQ

## 2025-08-19 ENCOUNTER — CLINICAL SUPPORT (OUTPATIENT)
Dept: REHABILITATION | Facility: HOSPITAL | Age: 75
End: 2025-08-19
Payer: MEDICARE

## 2025-08-19 DIAGNOSIS — Z74.09 IMPAIRED FUNCTIONAL MOBILITY, BALANCE, GAIT, AND ENDURANCE: Primary | ICD-10-CM

## 2025-08-19 PROCEDURE — 97110 THERAPEUTIC EXERCISES: CPT | Mod: KX,PO

## 2025-08-19 PROCEDURE — 97530 THERAPEUTIC ACTIVITIES: CPT | Mod: KX,PO

## 2025-08-26 ENCOUNTER — LAB VISIT (OUTPATIENT)
Dept: LAB | Facility: HOSPITAL | Age: 75
End: 2025-08-26
Attending: PSYCHIATRY & NEUROLOGY
Payer: MEDICARE

## 2025-09-05 DIAGNOSIS — N18.2 HYPERTENSION ASSOCIATED WITH STAGE 2 CHRONIC KIDNEY DISEASE DUE TO TYPE 2 DIABETES MELLITUS: ICD-10-CM

## 2025-09-05 DIAGNOSIS — I12.9 HYPERTENSION ASSOCIATED WITH STAGE 2 CHRONIC KIDNEY DISEASE DUE TO TYPE 2 DIABETES MELLITUS: ICD-10-CM

## 2025-09-05 DIAGNOSIS — E11.22 HYPERTENSION ASSOCIATED WITH STAGE 2 CHRONIC KIDNEY DISEASE DUE TO TYPE 2 DIABETES MELLITUS: ICD-10-CM

## 2025-09-05 RX ORDER — FOSINOPRIL SODIUM 40 MG/1
40 TABLET ORAL DAILY
Qty: 90 TABLET | Refills: 0 | Status: SHIPPED | OUTPATIENT
Start: 2025-09-05